# Patient Record
Sex: MALE | Race: WHITE | NOT HISPANIC OR LATINO | Employment: FULL TIME | URBAN - METROPOLITAN AREA
[De-identification: names, ages, dates, MRNs, and addresses within clinical notes are randomized per-mention and may not be internally consistent; named-entity substitution may affect disease eponyms.]

---

## 2017-01-11 ENCOUNTER — MEDICAL CORRESPONDENCE (OUTPATIENT)
Dept: HEALTH INFORMATION MANAGEMENT | Facility: CLINIC | Age: 27
End: 2017-01-11

## 2017-01-11 DIAGNOSIS — E10.9 TYPE 1 DIABETES MELLITUS (H): Primary | ICD-10-CM

## 2017-01-27 ASSESSMENT — ENCOUNTER SYMPTOMS: POLYDIPSIA: 1

## 2017-02-01 ENCOUNTER — OFFICE VISIT (OUTPATIENT)
Dept: ENDOCRINOLOGY | Facility: CLINIC | Age: 27
End: 2017-02-01

## 2017-02-01 VITALS
SYSTOLIC BLOOD PRESSURE: 123 MMHG | DIASTOLIC BLOOD PRESSURE: 82 MMHG | HEART RATE: 66 BPM | WEIGHT: 264.3 LBS | BODY MASS INDEX: 32.86 KG/M2 | HEIGHT: 75 IN

## 2017-02-01 DIAGNOSIS — E10.65 TYPE 1 DIABETES MELLITUS WITH HYPERGLYCEMIA (H): Primary | ICD-10-CM

## 2017-02-01 ASSESSMENT — PAIN SCALES - GENERAL: PAINLEVEL: NO PAIN (0)

## 2017-02-01 NOTE — Clinical Note
"2/1/2017       RE: Billy Carey  [address] [facility] [address]     Dear Colleague,    Thank you for referring your patient, Billy Carey, to the [facility] ENDOCRINOLOGY at [facility]. Please see a copy of my visit note below.    HPI   Billy Carey is a 26 year old male with type 1 diabetes mellitus here today for a follow up visit.   I have not seen Billy since March 2015.     He states he has been doing well and has a new job and is no longer traveling and is home at nights.  He continues to use a Medtronic Paradigm 630G insulin pump and his current basal rates are:   Midnight= 2.1 units/ hr.   8 am = 3.0 units/ hr.   2200 = 2.1 units/hr.   He does not have his bolus wizard set up and he has been using an I/C ratio of 1:10.  I had him set his bolus wizard at 1:10 and sensitivity 35.  Pt's A1C is 7.4 % today.    We downloaded pt's insulin pump and his meter data today.  He has been having hypoglycemia during the afternoon.  His average glucose was 110 with SD 41 over the past month.  On ROS today, he denies any visual changes.  Pt denies n/v, SOB at rest, cough, chest pain, abd pain, diarrhea, dysuria, hematuria or foot ulcers.  He denies any numbness, tingling or pain in his feet or hands.    ROS   Please see under HPI.     Family Hx   No change.     Personal Hx    Smoke: none.   ETOH: none.   .  He has a 3 1/2 yr old daughter and 18 month old son.  Working full time.    PMHX:   1. Type 1 Diabetes Mellitus.   2. Ear tubes as a child.   3. HTN.   4. Hx of mild retinopathy.   Past Medical History   Diagnosis Date     Diabetes mellitus type 1, uncontrolled, insulin dependent (H) 9/16/2013   Physical Exam   General appearance: Vital signs:   /82 mmHg  Pulse 66  Ht 1.905 m (6' 3\")  Wt 119.886 kg (264 lb 4.8 oz)  BMI 33.04 kg/m2  Estimated body mass index is 33.04 kg/(m^2) as calculated from the following:    Height as of this encounter: 1.905 m (6' 3\").    " Weight as of this encounter: 119.886 kg (264 lb 4.8 oz).  Head:Normal.   Eyes:  PERRLA; fundi not visualized.   Ears, Nose, Throat:  no goiter.   Lungs:  clear.   Cardiovascular system: RRR.   Abdomen: nontender.   Musculoskeletal system: no edema.   Foot:  no foot ulcers.   Neurological:  normal.   Skin:  Dry; skin dry and erythematous on the LE's b/l.    Results   CREATININE   Date Value Ref Range Status   03/27/2015 0.94 0.66 - 1.25 mg/dL Final     GFR ESTIMATE   Date Value Ref Range Status   03/27/2015 >90  Non  GFR Calc   >60 mL/min/1.7m2 Final     HEMOGLOBIN A1C   Date Value Ref Range Status   09/16/2013 8.3* 4.3 - 6.0 % Final     POTASSIUM   Date Value Ref Range Status   12/14/2012 4.2 3.4 - 5.3 mmol/L Final     ALT   Date Value Ref Range Status   03/27/2015 49 0 - 70 U/L Final     AST   Date Value Ref Range Status   03/27/2015 31 0 - 45 U/L Final     TSH   Date Value Ref Range Status   03/27/2015 2.23 0.40 - 4.00 mU/L Final     Comment:     Effective 7/30/2014, the reference range for this assay has changed to reflect   new instrumentation/methodology.       T4 FREE   Date Value Ref Range Status   03/27/2015 1.01 0.76 - 1.46 ng/dL Final     Comment:     Effective 7/30/2014, the reference range for this assay has changed to reflect   new instrumentation/methodology.           CHOLESTEROL   Date Value Ref Range Status   03/27/2015 193 <200 mg/dL Final     Comment:     LDL Cholesterol is the primary guide to therapy.   The NCEP recommends further evaluation of: patients with cholesterol greater   than 200 mg/dL if additional risk factors are present, cholesterol greater   than   240 mg/dL, triglycerides greater than 150 mg/dL, or HDL less than 40 mg/dL.     12/14/2012 183 0 - 200 mg/dL Final     Comment:     LDL Cholesterol is the primary guide to therapy.   The NCEP recommends further evaluation of: patients with cholesterol greater   than 200 mg/dL if additional risk factors are present,  cholesterol greater   than   240 mg/dL, triglycerides greater than 150 mg/dL, or HDL less than 40 mg/dL.     HDL CHOLESTEROL   Date Value Ref Range Status   03/27/2015 53 >40 mg/dL Final   12/14/2012 37* 40 - 110 mg/dL Final     LDL CHOLESTEROL CALCULATED   Date Value Ref Range Status   03/27/2015 127 0 - 129 mg/dL Final     Comment:     LDL Cholesterol is the primary guide to therapy: LDL-cholesterol goal in high   risk patients is <100 mg/dL and in very high risk patients is <70 mg/dL.     12/14/2012 105 0 - 129 mg/dL Final     Comment:     LDL Cholesterol is the primary guide to therapy: LDL-cholesterol goal in high   risk patients is <100 mg/dL and in very high risk patients is <70 mg/dL.     TRIGLYCERIDES   Date Value Ref Range Status   03/27/2015 66 0 - 150 mg/dL Final     Comment:     Fasting specimen   12/14/2012 205* 0 - 150 mg/dL Final     Comment:     Fasting specimen     CHOLESTEROL/HDL RATIO   Date Value Ref Range Status   03/27/2015 3.6 0.0 - 5.0 Final   12/14/2012 4.9 0.0 - 5.0 Final     A1C      7.4   2/1/2017  A1C      7.9   3/27/2015  A1C      7.9   12/12/2012  A1C      7.9   9/27/2011  A1C      9.2   4/9/2007    ASSESSMENT/PLAN:     1. TYPE I DIABETES MELLITUS: I had Billy add a lower basal insulin rate of 2.7 units/hr at 2 pm in view of hypoglycemia during the afternoon and early evening hours  His new basal insulin rates are:  Midnight= 2.10 units/hr.  8 am = 3.0 units/hr.  2 pm = 2.7 units/hr.  2200= 2.10 units/hr.  I had him set up his bolus wizard feature on his pump for an I/C ratio of 1:10 and sensitivity of 35.  I asked Billy to check his blood sugar premeals and at hs DAILY.  I placed a referral for pt to see Oph here.  Fasting labs have been ordered and are to be done within the next few weeks.  Pt declined getting the flu vaccine today.    2. HTN: Normotensive at this time without RX.    3. HX OF MILD RETINOPATHY: I placed a referral for pt to be seen in [facility] here.    4.  HYPER  LIPIDEMIA:  I placed a order for pt to have a fasting lipid panel done within the next few months.    5. Return to Endocrine Clinic to see me in 3 months.          Again, thank you for allowing me to participate in the care of your patient.      Sincerely,    Sri Pepe PA-C

## 2017-02-01 NOTE — PROGRESS NOTES
"HPI   Billy Carey is a 26 year old male with type 1 diabetes mellitus here today for a follow up visit.   I have not seen Billy since March 2015.     He states he has been doing well and has a new job and is no longer traveling and is home at nights.  He continues to use a Medtronic Paradigm 630G insulin pump and his current basal rates are:   Midnight= 2.1 units/ hr.   8 am = 3.0 units/ hr.   2200 = 2.1 units/hr.   He does not have his bolus wizard set up and he has been using an I/C ratio of 1:10.  I had him set his bolus wizard at 1:10 and sensitivity 35.  Pt's A1C is 7.4 % today.    We downloaded pt's insulin pump and his meter data today.  He has been having hypoglycemia during the afternoon.  His average glucose was 110 with SD 41 over the past month.  On ROS today, he denies any visual changes.  Pt denies n/v, SOB at rest, cough, chest pain, abd pain, diarrhea, dysuria, hematuria or foot ulcers.  He denies any numbness, tingling or pain in his feet or hands.    ROS   Please see under HPI.     Family Hx   No change.     Personal Hx    Smoke: none.   ETOH: none.   .  He has a 3 1/2 yr old daughter and 18 month old son.  Working full time.    PMHX:   1. Type 1 Diabetes Mellitus.   2. Ear tubes as a child.   3. HTN.   4. Hx of mild retinopathy.   Past Medical History   Diagnosis Date     Diabetes mellitus type 1, uncontrolled, insulin dependent (H) 9/16/2013   Physical Exam   General appearance: Vital signs:   /82 mmHg  Pulse 66  Ht 1.905 m (6' 3\")  Wt 119.886 kg (264 lb 4.8 oz)  BMI 33.04 kg/m2  Estimated body mass index is 33.04 kg/(m^2) as calculated from the following:    Height as of this encounter: 1.905 m (6' 3\").    Weight as of this encounter: 119.886 kg (264 lb 4.8 oz).  Head:Normal.   Eyes:  PERRLA; fundi not visualized.   Ears, Nose, Throat:  no goiter.   Lungs:  clear.   Cardiovascular system: RRR.   Abdomen: nontender.   Musculoskeletal system: no edema.   Foot:  no foot ulcers. "   Neurological:  normal.   Skin:  Dry; skin dry and erythematous on the LE's b/l.    Results   CREATININE   Date Value Ref Range Status   03/27/2015 0.94 0.66 - 1.25 mg/dL Final     GFR ESTIMATE   Date Value Ref Range Status   03/27/2015 >90  Non  GFR Calc   >60 mL/min/1.7m2 Final     HEMOGLOBIN A1C   Date Value Ref Range Status   09/16/2013 8.3* 4.3 - 6.0 % Final     POTASSIUM   Date Value Ref Range Status   12/14/2012 4.2 3.4 - 5.3 mmol/L Final     ALT   Date Value Ref Range Status   03/27/2015 49 0 - 70 U/L Final     AST   Date Value Ref Range Status   03/27/2015 31 0 - 45 U/L Final     TSH   Date Value Ref Range Status   03/27/2015 2.23 0.40 - 4.00 mU/L Final     Comment:     Effective 7/30/2014, the reference range for this assay has changed to reflect   new instrumentation/methodology.       T4 FREE   Date Value Ref Range Status   03/27/2015 1.01 0.76 - 1.46 ng/dL Final     Comment:     Effective 7/30/2014, the reference range for this assay has changed to reflect   new instrumentation/methodology.           CHOLESTEROL   Date Value Ref Range Status   03/27/2015 193 <200 mg/dL Final     Comment:     LDL Cholesterol is the primary guide to therapy.   The NCEP recommends further evaluation of: patients with cholesterol greater   than 200 mg/dL if additional risk factors are present, cholesterol greater   than   240 mg/dL, triglycerides greater than 150 mg/dL, or HDL less than 40 mg/dL.     12/14/2012 183 0 - 200 mg/dL Final     Comment:     LDL Cholesterol is the primary guide to therapy.   The NCEP recommends further evaluation of: patients with cholesterol greater   than 200 mg/dL if additional risk factors are present, cholesterol greater   than   240 mg/dL, triglycerides greater than 150 mg/dL, or HDL less than 40 mg/dL.     HDL CHOLESTEROL   Date Value Ref Range Status   03/27/2015 53 >40 mg/dL Final   12/14/2012 37* 40 - 110 mg/dL Final     LDL CHOLESTEROL CALCULATED   Date Value Ref Range  Status   03/27/2015 127 0 - 129 mg/dL Final     Comment:     LDL Cholesterol is the primary guide to therapy: LDL-cholesterol goal in high   risk patients is <100 mg/dL and in very high risk patients is <70 mg/dL.     12/14/2012 105 0 - 129 mg/dL Final     Comment:     LDL Cholesterol is the primary guide to therapy: LDL-cholesterol goal in high   risk patients is <100 mg/dL and in very high risk patients is <70 mg/dL.     TRIGLYCERIDES   Date Value Ref Range Status   03/27/2015 66 0 - 150 mg/dL Final     Comment:     Fasting specimen   12/14/2012 205* 0 - 150 mg/dL Final     Comment:     Fasting specimen     CHOLESTEROL/HDL RATIO   Date Value Ref Range Status   03/27/2015 3.6 0.0 - 5.0 Final   12/14/2012 4.9 0.0 - 5.0 Final     A1C      7.4   2/1/2017  A1C      7.9   3/27/2015  A1C      7.9   12/12/2012  A1C      7.9   9/27/2011  A1C      9.2   4/9/2007    ASSESSMENT/PLAN:     1. TYPE I DIABETES MELLITUS: I had Billy add a lower basal insulin rate of 2.7 units/hr at 2 pm in view of hypoglycemia during the afternoon and early evening hours  His new basal insulin rates are:  Midnight= 2.10 units/hr.  8 am = 3.0 units/hr.  2 pm = 2.7 units/hr.  2200= 2.10 units/hr.  I had him set up his bolus wizard feature on his pump for an I/C ratio of 1:10 and sensitivity of 35.  I asked Billy to check his blood sugar premeals and at hs DAILY.  I placed a referral for pt to see Oph here.  Fasting labs have been ordered and are to be done within the next few weeks.  Pt declined getting the flu vaccine today.    2. HTN: Normotensive at this time without RX.    3. HX OF MILD RETINOPATHY: I placed a referral for pt to be seen in Oph here.    4.  HYPER LIPIDEMIA:  I placed a order for pt to have a fasting lipid panel done within the next few months.    5. Return to Endocrine Clinic to see me in 3 months.

## 2017-02-10 ENCOUNTER — OFFICE VISIT (OUTPATIENT)
Dept: OPHTHALMOLOGY | Facility: CLINIC | Age: 27
End: 2017-02-10

## 2017-02-10 DIAGNOSIS — E10.9 TYPE 1 DIABETES MELLITUS WITHOUT RETINOPATHY (H): Primary | ICD-10-CM

## 2017-02-10 DIAGNOSIS — H52.03 HYPEROPIA, BILATERAL: ICD-10-CM

## 2017-02-10 ASSESSMENT — REFRACTION
OD_SPHERE: +0.50
OS_AXIS: 100
OS_SPHERE: +0.50
OS_CYLINDER: +0.50
OD_CYLINDER: +0.50
OD_AXIS: 078

## 2017-02-10 ASSESSMENT — SLIT LAMP EXAM - LIDS
COMMENTS: NORMAL
COMMENTS: NORMAL

## 2017-02-10 ASSESSMENT — VISUAL ACUITY
OD_SC: 20/15
OD_SC: J1+
METHOD: SNELLEN - LINEAR
OD_SC+: -2
OS_SC: J1+
OS_SC: 20/15

## 2017-02-10 ASSESSMENT — CUP TO DISC RATIO
OS_RATIO: 0.5
OD_RATIO: 0.4

## 2017-02-10 ASSESSMENT — REFRACTION_MANIFEST
OD_SPHERE: PLANO
OS_SPHERE: PLANO
OD_CYLINDER: SPHERE
OS_CYLINDER: SPHERE

## 2017-02-10 ASSESSMENT — TONOMETRY
IOP_METHOD: ICARE
OD_IOP_MMHG: 20
OS_IOP_MMHG: 16

## 2017-02-10 ASSESSMENT — CONF VISUAL FIELD
OS_NORMAL: 1
METHOD: COUNTING FINGERS
OD_NORMAL: 1

## 2017-02-10 ASSESSMENT — EXTERNAL EXAM - RIGHT EYE: OD_EXAM: NORMAL

## 2017-02-10 ASSESSMENT — EXTERNAL EXAM - LEFT EYE: OS_EXAM: NORMAL

## 2017-02-10 NOTE — PROGRESS NOTES
Assessment/Plan  1. Type 1 diabetes mellitus without retinopathy OU   Educated patient on clinical findings and the importance of continued management with primary care physician. Continue management as directed and return to clinic in 1 year for dilated exam, or sooner, as needed.  2. Hyperopia OU   No glasses prescription recommended given excellent vision uncorrected. Monitor annually.    Complete documentation of historical and exam elements from today's encounter can  be found in the full encounter summary report (not reduplicated in this progress  note). I personally obtained the chief complaint(s) and history of present illness. I  confirmed and edited as necessary the review of systems, past medical/surgical  history, family history, social history, and examination findings as documented by  others; and I examined the patient myself. I personally reviewed the relevant tests,  images, and reports as documented above. I formulated and edited as necessary the  assessment and plan and discussed the findings and management plan with the  patient and family.    Lazaro Caldwell OD, FAAO

## 2017-02-10 NOTE — NURSING NOTE
Chief Complaints and History of Present Illnesses   Patient presents with     Diabetic Eye Exam     HPI    Affected eye(s):  Both   Symptoms:     No blurred vision   No floaters   No flashes         Do you have eye pain now?:  No      Comments:  ENID: 18 months ago    POH: no dx, injuries, sx, or lasers  Fhx: none    Patient has no complaints today    DMI BS last reading 120 last reading: dx age 2  A1C 7.4 last week   A1C      8.3   9/16/2013  A1C      7.9   12/12/2012  A1C      7.9   9/27/2011  A1C      9.2   4/9/2007    Edna Bland February 10, 2017 8:29 AM

## 2017-04-04 ENCOUNTER — TELEPHONE (OUTPATIENT)
Dept: ENDOCRINOLOGY | Facility: CLINIC | Age: 27
End: 2017-04-04

## 2017-04-04 DIAGNOSIS — E10.65 TYPE 1 DIABETES MELLITUS WITH HYPERGLYCEMIA (H): ICD-10-CM

## 2017-04-14 ENCOUNTER — HOSPITAL ENCOUNTER (OUTPATIENT)
Dept: LAB | Facility: CLINIC | Age: 27
Discharge: HOME | End: 2017-04-14
Attending: PHYSICIAN ASSISTANT | Admitting: PHYSICIAN ASSISTANT
Payer: COMMERCIAL

## 2017-04-14 DIAGNOSIS — E10.65 TYPE 1 DIABETES MELLITUS WITH HYPERGLYCEMIA (H): ICD-10-CM

## 2017-04-14 LAB
ALT SERPL W P-5'-P-CCNC: 25 U/L (ref 0–70)
AST SERPL W P-5'-P-CCNC: 16 U/L (ref 0–45)
CHOLEST SERPL-MCNC: 189 MG/DL
CREAT SERPL-MCNC: 0.9 MG/DL (ref 0.66–1.25)
CREAT UR-MCNC: 234 MG/DL
GFR SERPL CREATININE-BSD FRML MDRD: NORMAL ML/MIN/1.7M2
HDLC SERPL-MCNC: 59 MG/DL
LDLC SERPL CALC-MCNC: 113 MG/DL
MICROALBUMIN UR-MCNC: 19 MG/L
MICROALBUMIN/CREAT UR: 8.08 MG/G CR (ref 0–17)
NONHDLC SERPL-MCNC: 130 MG/DL
T4 FREE SERPL-MCNC: 1.03 NG/DL (ref 0.76–1.46)
TRIGL SERPL-MCNC: 83 MG/DL
TSH SERPL DL<=0.05 MIU/L-ACNC: 2.76 MU/L (ref 0.4–4)

## 2017-04-14 PROCEDURE — 84450 TRANSFERASE (AST) (SGOT): CPT | Performed by: PHYSICIAN ASSISTANT

## 2017-04-14 PROCEDURE — 84460 ALANINE AMINO (ALT) (SGPT): CPT | Performed by: PHYSICIAN ASSISTANT

## 2017-04-14 PROCEDURE — 84443 ASSAY THYROID STIM HORMONE: CPT | Performed by: PHYSICIAN ASSISTANT

## 2017-04-14 PROCEDURE — 82043 UR ALBUMIN QUANTITATIVE: CPT | Performed by: PHYSICIAN ASSISTANT

## 2017-04-14 PROCEDURE — 84439 ASSAY OF FREE THYROXINE: CPT | Performed by: PHYSICIAN ASSISTANT

## 2017-04-14 PROCEDURE — 80061 LIPID PANEL: CPT | Performed by: PHYSICIAN ASSISTANT

## 2017-04-14 PROCEDURE — 36415 COLL VENOUS BLD VENIPUNCTURE: CPT | Performed by: PHYSICIAN ASSISTANT

## 2017-04-14 PROCEDURE — 82565 ASSAY OF CREATININE: CPT | Performed by: PHYSICIAN ASSISTANT

## 2017-06-20 ENCOUNTER — MEDICAL CORRESPONDENCE (OUTPATIENT)
Dept: HEALTH INFORMATION MANAGEMENT | Facility: CLINIC | Age: 27
End: 2017-06-20

## 2017-10-31 DIAGNOSIS — E10.65 TYPE 1 DIABETES MELLITUS WITH HYPERGLYCEMIA (H): ICD-10-CM

## 2018-04-02 DIAGNOSIS — E10.65 TYPE 1 DIABETES MELLITUS WITH HYPERGLYCEMIA (H): ICD-10-CM

## 2018-09-14 DIAGNOSIS — E10.8 TYPE 1 DIABETES MELLITUS WITH COMPLICATION (H): Primary | ICD-10-CM

## 2018-09-18 ENCOUNTER — TELEPHONE (OUTPATIENT)
Dept: ENDOCRINOLOGY | Facility: CLINIC | Age: 28
End: 2018-09-18

## 2018-09-18 NOTE — TELEPHONE ENCOUNTER
PA needed for  Novolog vials  10 ml needs 40 ml per 30 days for Type 1 diabetes He has a pump and is using up to 130 units daily . We sent in an order for Humalog put they tell us Novolog is covered it just  Needs clearance for the amount of insulin being used. We have not seen him since  2/2017  Continued cancels after refill given.

## 2018-09-18 NOTE — TELEPHONE ENCOUNTER
Per pharmacy, patient has [employer]; [id number]; PCN-ADV; [id number]; ID-[id number]    Central Prior Authorization Team   Phone: [phone]      PA Initiation    Medication: NOVOLOG VIAL 100 UNIT/ML soln-PA initiated  Insurance Company:    Pharmacy Filling the Rx: [id number] - [place], [place] - [address]  Filling Pharmacy Phone: [phone]  Filling Pharmacy Fax:    Start Date: 9/18/2018

## 2018-09-19 DIAGNOSIS — E10.65 TYPE 1 DIABETES MELLITUS WITH HYPERGLYCEMIA (H): ICD-10-CM

## 2018-09-19 NOTE — TELEPHONE ENCOUNTER
Prior Authorization Not Needed per Insurance    Medication: NOVOLOG VIAL 100 UNIT/ML soln-PA not needed  Insurance Company: [employer] - Phone [phone] Fax [phone]  Expected CoPay:      Pharmacy Filling the Rx: [id number] [place], [place] - [address]  Pharmacy Notified: Yes  Patient Notified: No    Per [employer], this does not require a PA. It does need an override each month due to patient reaching max amount allowable paid. Pharmacy can fill 2 days early each month and will need to call the pharmacy help desk to get the override, since insurance has paid max benefits for the year. They will continue to manually override this each month for the pharmacy.

## 2018-09-19 NOTE — TELEPHONE ENCOUNTER
Pharmacy  said  PA was needed for Novolog vials  Due to 130 units daily . PA done and one not needed.  Order for Humalog had been sent in but  pharmacy  said Novolog is the covered  Insulin. Billy needs to be seen in clinic for future fills. We gave him refills last  Time with the understanding that he needed to show for the August appointment  and it's down as a no show.

## 2018-11-28 ENCOUNTER — PATIENT OUTREACH (OUTPATIENT)
Dept: CARE COORDINATION | Facility: CLINIC | Age: 28
End: 2018-11-28

## 2018-11-29 ENCOUNTER — OFFICE VISIT (OUTPATIENT)
Dept: ENDOCRINOLOGY | Facility: CLINIC | Age: 28
End: 2018-11-29
Payer: COMMERCIAL

## 2018-11-29 VITALS
HEIGHT: 75 IN | WEIGHT: 268.2 LBS | BODY MASS INDEX: 33.35 KG/M2 | SYSTOLIC BLOOD PRESSURE: 135 MMHG | HEART RATE: 94 BPM | DIASTOLIC BLOOD PRESSURE: 79 MMHG

## 2018-11-29 DIAGNOSIS — E10.65 TYPE 1 DIABETES MELLITUS WITH HYPERGLYCEMIA (H): Primary | ICD-10-CM

## 2018-11-29 LAB — HBA1C MFR BLD: 6.6 % (ref 4.3–6)

## 2018-11-29 NOTE — MR AVS SNAPSHOT
"              After Visit Summary   11/29/2018    Billy Carey    MRN: [id number]           Patient Information     Date Of Birth          1990        Visit Information        Provider Department      11/29/2018 3:30 PM Sri Pepe PA-C [facility] Endocrinology        Today's Diagnoses     Type 1 diabetes mellitus with hyperglycemia (H)    -  1       Follow-ups after your visit        Your next 10 appointments already scheduled     May 30, 2019  3:00 PM CDT   (Arrive by 2:45 PM)   RETURN DIABETES with [facility] Endocrinology ([facility])    [address] [address] [phone]              Who to contact     Please call your clinic at [phone] to:    Ask questions about your health    Make or cancel appointments    Discuss your medicines    Learn about your test results    Speak to your doctor            Additional Information About Your Visit        [employer] Information     [employer] gives you secure access to your electronic health record. If you see a primary care provider, you can also send messages to your care team and make appointments. If you have questions, please call your primary care clinic.  If you do not have a primary care provider, please call [phone] and they will assist you.      [employer] is an electronic gateway that provides easy, online access to your medical records. With [employer], you can request a clinic appointment, read your test results, renew a prescription or communicate with your care team.     To access your existing account, please contact your [facility] or call [phone] for assistance.        Care EveryWhere ID     This is your Care EveryWhere ID. This could be used by other organizations to access your [place] medical records  [id number]        Your Vitals Were     Pulse Height BMI (Body Mass Index)             94 1.905 m (6' 3\") 33.52 " kg/m2          Blood Pressure from Last 3 Encounters:   11/29/18 135/79   02/01/17 123/82   10/21/16 120/80    Weight from Last 3 Encounters:   11/29/18 121.7 kg (268 lb 3.2 oz)   02/01/17 119.9 kg (264 lb 4.8 oz)   10/21/16 113.4 kg (250 lb)              We Performed the Following     C FLU VAC QUADRIVALENT SPLIT VIRUS 3+YRS IM     Hemoglobin A1c POCT          Today's Medication Changes          These changes are accurate as of 11/29/18 11:59 PM.  If you have any questions, ask your nurse or doctor.               These medicines have changed or have updated prescriptions.        Dose/Directions    insulin aspart 100 UNITS/ML vial   Commonly known as:  NovoLOG VIAL   This may have changed:    - medication strength  - See the new instructions.   Used for:  Type 1 diabetes mellitus with hyperglycemia (H)   Changed by:  Sri Pepe PA-C        Use in insulin pump.  Pt uses approx 120-130 units in 24 hrs.   Quantity:  40 mL   Refills:  3            Where to get your medicines      [facility] [place], [place] - [address] AT [address] [place] [address] [address] [facility] [address]     Phone:  [phone]     insulin aspart 100 UNITS/ML vial                Primary Care Provider    None Specified       No primary provider on file.        Equal Access to Services     ARABELLA PHILLIPS : Hadrobert son Soautumn, waaxda luqadaha, qaybta kaalmaalisa zaidi, laura lovell. So [facility] [phone].    ATENCIÓN: Si habla español, tiene a bellamy disposición servicios gratuitos de asistencia lingüística. Jose al [phone].    We comply with applicable federal civil rights laws and [place] laws. We do not discriminate on the basis of race, color, national origin, age, disability, sex, sexual orientation, or gender identity.            Thank you!     Thank you for choosing [facility] ENDOCRINOLOGY  for your care. Our  goal is always to provide you with excellent care. Hearing back from our patients is one way we can continue to improve our services. Please take a few minutes to complete the written survey that you may receive in the mail after your visit with us. Thank you!             Your Updated Medication List - Protect others around you: Learn how to safely use, store and throw away your medicines at www.disposemymeds.org.          This list is accurate as of 11/29/18 11:59 PM.  Always use your most recent med list.                   Brand Name Dispense Instructions for use Diagnosis    ASPIRIN NOT PRESCRIBED    INTENTIONAL      Type I (juvenile type) diabetes mellitus without mention of complication, uncontrolled       blood glucose monitoring meter device kit     1 kit    1 kit 5 times daily. Use as directed    Diabetes mellitus, type 1       blood glucose monitoring test strip    KELL CONTOUR NEXT    4 Box    Test 4-5 times daily    Diabetes mellitus type 1 (H)       insulin aspart 100 UNITS/ML vial    NovoLOG VIAL    40 mL    Use in insulin pump.  Pt uses approx 120-130 units in 24 hrs.    Type 1 diabetes mellitus with hyperglycemia (H)

## 2018-11-29 NOTE — NURSING NOTE
Chief Complaint   Patient presents with     RECHECK     Type I Diabetes     Performed capillary puncture for A1C testing. Patient tolerated well.    Joyce Alexandra, [facility]  Endocrinology & Diabetes

## 2018-11-29 NOTE — LETTER
11/29/2018       RE: Billy Carey  [address] [facility] [address]     Dear Colleague,    Thank you for referring your patient, Billy Carey, to the [facility] ENDOCRINOLOGY at [facility]. Please see a copy of my visit note below.    HPI   Billy Carey is a 28 year old male with type 1 diabetes mellitus here today for a follow up visit.   I have not seen Billy since Feb 2017.    He states he has been doing well and busy with work and his family.  He has 3 children ages 1, 3 and 5 year old.  He is currently using a [employer]tronic 670G insulin pump and his current basal rates are:   Midnight= 2.3 units/ hr.   8 am = 2.8 units/ hr.   1400 = 2.5 units/hr.  2200 = 2.3 units/hr.   His I/C ratio is 1:10;sensitivity is 30 and active insulin time is 3 1/2 hours  Pt's A1C is 6.6 % today.  His insulin pump download shows:  Auto mode use at 69 %.  Sensor wear is 74 %.  His average blood sugar was 151 with SD 48.  Bolus % is 38 % and basal % is 62 %.  Blood sugar values are good without frequent hypoglycemia.  On ROS today, he denies frequent headaches, blurred vision, n/v, SOB at rest, cough, chest pain, abd pain, diarrhea, dysuria, hematuria or foot ulcers.  He denies any numbness, tingling or pain in his feet or hands.    DIABETES CARE:  Retinopathy: none; pt seen by Oph in 2/2017.   Nephropathy: none; urine microalbuminuria negative in 4/2017.  Neuropathy: none.  Feet: no ulcers.  Taking ASA : no.  Lipids: LDL was 113 in 4/2017.    ROS   Please see under HPI.     ALLERGIES:  Penicillins     Current Outpatient Prescriptions   Medication     ASPIRIN NOT PRESCRIBED, INTENTIONAL,     blood glucose (KELL CONTOUR NEXT) test strip     Blood Glucose Monitoring Suppl (KELL CONTOUR NEXT LINK) W/DEVICE KIT     insulin aspart (NOVOLOG VIAL) 100 UNITS/ML vial     No current facility-administered medications for this visit.    Family Hx   No change.     Personal Hx    Smoke: none.   ETOH: none.  "  .  He has 3 children ages 1, 3 and 5.  Working full time.  Exercise: yes.    PMHX:   1. Type 1 Diabetes Mellitus.   2. Ear tubes as a child.   3. HTN.   4. Hx of mild retinopathy.   Past Medical History:   Diagnosis Date     Diabetes mellitus type 1, uncontrolled, insulin dependent (H) 9/16/2013     Physical Exam   General appearance: Vital signs:   /79  Pulse 94  Ht 1.905 m (6' 3\")  Wt 121.7 kg (268 lb 3.2 oz)  BMI 33.52 kg/m2  Estimated body mass index is 33.52 kg/(m^2) as calculated from the following:    Height as of this encounter: 1.905 m (6' 3\").    Weight as of this encounter: 121.7 kg (268 lb 3.2 oz).  Head:Normal.   Eyes:  PERRLA; fundi not visualized.   Ears, Nose, Throat:  no goiter.   Lungs:  clear.   Cardiovascular system: RRR.   Abdomen: nontender.   Musculoskeletal system: no edema.   Feet:  no foot ulcers.  Normal monofilamentous exam.  Neurological:  normal.   Skin:  Normal.    Results   Creatinine   Date Value Ref Range Status   04/14/2017 0.90 0.66 - 1.25 mg/dL Final     GFR Estimate   Date Value Ref Range Status   04/14/2017 >90  Non  GFR Calc   >60 mL/min/1.7m2 Final     Hemoglobin A1C   Date Value Ref Range Status   09/16/2013 8.3 (H) 4.3 - 6.0 % Final     Potassium   Date Value Ref Range Status   12/14/2012 4.2 3.4 - 5.3 mmol/L Final     ALT   Date Value Ref Range Status   04/14/2017 25 0 - 70 U/L Final     AST   Date Value Ref Range Status   04/14/2017 16 0 - 45 U/L Final     TSH   Date Value Ref Range Status   04/14/2017 2.76 0.40 - 4.00 mU/L Final     T4 Free   Date Value Ref Range Status   04/14/2017 1.03 0.76 - 1.46 ng/dL Final         Cholesterol   Date Value Ref Range Status   04/14/2017 189 <200 mg/dL Final   03/27/2015 193 <200 mg/dL Final     Comment:     LDL Cholesterol is the primary guide to therapy.   The NCEP recommends further evaluation of: patients with cholesterol greater   than 200 mg/dL if additional risk factors are present, cholesterol " greater   than   240 mg/dL, triglycerides greater than 150 mg/dL, or HDL less than 40 mg/dL.       HDL Cholesterol   Date Value Ref Range Status   04/14/2017 59 >39 mg/dL Final   03/27/2015 53 >40 mg/dL Final     LDL Cholesterol Calculated   Date Value Ref Range Status   04/14/2017 113 (H) <100 mg/dL Final     Comment:     Above desirable:  100-129 mg/dl   Borderline High:  130-159 mg/dL   High:             160-189 mg/dL   Very high:       >189 mg/dl     03/27/2015 127 0 - 129 mg/dL Final     Comment:     LDL Cholesterol is the primary guide to therapy: LDL-cholesterol goal in high   risk patients is <100 mg/dL and in very high risk patients is <70 mg/dL.       Triglycerides   Date Value Ref Range Status   04/14/2017 83 <150 mg/dL Final   03/27/2015 66 0 - 150 mg/dL Final     Comment:     Fasting specimen     Cholesterol/HDL Ratio   Date Value Ref Range Status   03/27/2015 3.6 0.0 - 5.0 Final   12/14/2012 4.9 0.0 - 5.0 Final     A1C      6.6   11/29/2018  A1C      7.4   2/1/2017  A1C      7.9   3/27/2015  A1C      7.9   12/12/2012  A1C      7.9   9/27/2011  A1C      9.2   4/9/2007    ASSESSMENT/PLAN:     1. TYPE I DIABETES MELLITUS:Type 1 diabetes mellitus with good glycemic control at this time with use of 670G insulin pump/sensor in auto mode.  No insulin rate change today.  Reminded pt to change set every 2-3 days and bolus approx 5 minutes prior to eating.  Pt is to schedule his annual Oph exam.  Feet are ok today.  His urine microalbuminuria was negative in 4/2017 with normal creat/GFR .  Flu vaccine given today.  I placed an order for fasting annual diabetes labs to be done within the next few weeks.    2. HTN: Stable BP today. No taking RX at this time.    3.  HYPERLIPIDEMIA:  Fasting lipid panel ordered.    4. Return to Endocrine Clinic to see me in 6 months.        Again, thank you for allowing me to participate in the care of your patient.      Sincerely,    Sri Pepe PA-C

## 2018-12-06 NOTE — PROGRESS NOTES
HPI   Billy Carey is a 28 year old male with type 1 diabetes mellitus here today for a follow up visit.   I have not seen Billy since Feb 2017.    He states he has been doing well and busy with work and his family.  He has 3 children ages 1, 3 and 5 year old.  He is currently using a [employer]tronic 670G insulin pump and his current basal rates are:   Midnight= 2.3 units/ hr.   8 am = 2.8 units/ hr.   1400 = 2.5 units/hr.  2200 = 2.3 units/hr.   His I/C ratio is 1:10;sensitivity is 30 and active insulin time is 3 1/2 hours  Pt's A1C is 6.6 % today.  His insulin pump download shows:  Auto mode use at 69 %.  Sensor wear is 74 %.  His average blood sugar was 151 with SD 48.  Bolus % is 38 % and basal % is 62 %.  Blood sugar values are good without frequent hypoglycemia.  On ROS today, he denies frequent headaches, blurred vision, n/v, SOB at rest, cough, chest pain, abd pain, diarrhea, dysuria, hematuria or foot ulcers.  He denies any numbness, tingling or pain in his feet or hands.    DIABETES CARE:  Retinopathy: none; pt seen by Oph in 2/2017.   Nephropathy: none; urine microalbuminuria negative in 4/2017.  Neuropathy: none.  Feet: no ulcers.  Taking ASA : no.  Lipids: LDL was 113 in 4/2017.    ROS   Please see under HPI.     ALLERGIES:  Penicillins     Current Outpatient Prescriptions   Medication     ASPIRIN NOT PRESCRIBED, INTENTIONAL,     blood glucose (KELL CONTOUR NEXT) test strip     Blood Glucose Monitoring Suppl (KELL CONTOUR NEXT LINK) W/DEVICE KIT     insulin aspart (NOVOLOG VIAL) 100 UNITS/ML vial     No current facility-administered medications for this visit.    Family Hx   No change.     Personal Hx    Smoke: none.   ETOH: none.   .  He has 3 children ages 1, 3 and 5.  Working full time.  Exercise: yes.    PMHX:   1. Type 1 Diabetes Mellitus.   2. Ear tubes as a child.   3. HTN.   4. Hx of mild retinopathy.   Past Medical History:   Diagnosis Date     Diabetes mellitus type 1, uncontrolled, insulin  "dependent (H) 9/16/2013     Physical Exam   General appearance: Vital signs:   /79  Pulse 94  Ht 1.905 m (6' 3\")  Wt 121.7 kg (268 lb 3.2 oz)  BMI 33.52 kg/m2  Estimated body mass index is 33.52 kg/(m^2) as calculated from the following:    Height as of this encounter: 1.905 m (6' 3\").    Weight as of this encounter: 121.7 kg (268 lb 3.2 oz).  Head:Normal.   Eyes:  PERRLA; fundi not visualized.   Ears, Nose, Throat:  no goiter.   Lungs:  clear.   Cardiovascular system: RRR.   Abdomen: nontender.   Musculoskeletal system: no edema.   Feet:  no foot ulcers.  Normal monofilamentous exam.  Neurological:  normal.   Skin:  Normal.    Results   Creatinine   Date Value Ref Range Status   04/14/2017 0.90 0.66 - 1.25 mg/dL Final     GFR Estimate   Date Value Ref Range Status   04/14/2017 >90  Non  GFR Calc   >60 mL/min/1.7m2 Final     Hemoglobin A1C   Date Value Ref Range Status   09/16/2013 8.3 (H) 4.3 - 6.0 % Final     Potassium   Date Value Ref Range Status   12/14/2012 4.2 3.4 - 5.3 mmol/L Final     ALT   Date Value Ref Range Status   04/14/2017 25 0 - 70 U/L Final     AST   Date Value Ref Range Status   04/14/2017 16 0 - 45 U/L Final     TSH   Date Value Ref Range Status   04/14/2017 2.76 0.40 - 4.00 mU/L Final     T4 Free   Date Value Ref Range Status   04/14/2017 1.03 0.76 - 1.46 ng/dL Final         Cholesterol   Date Value Ref Range Status   04/14/2017 189 <200 mg/dL Final   03/27/2015 193 <200 mg/dL Final     Comment:     LDL Cholesterol is the primary guide to therapy.   The NCEP recommends further evaluation of: patients with cholesterol greater   than 200 mg/dL if additional risk factors are present, cholesterol greater   than   240 mg/dL, triglycerides greater than 150 mg/dL, or HDL less than 40 mg/dL.       HDL Cholesterol   Date Value Ref Range Status   04/14/2017 59 >39 mg/dL Final   03/27/2015 53 >40 mg/dL Final     LDL Cholesterol Calculated   Date Value Ref Range Status "   04/14/2017 113 (H) <100 mg/dL Final     Comment:     Above desirable:  100-129 mg/dl   Borderline High:  130-159 mg/dL   High:             160-189 mg/dL   Very high:       >189 mg/dl     03/27/2015 127 0 - 129 mg/dL Final     Comment:     LDL Cholesterol is the primary guide to therapy: LDL-cholesterol goal in high   risk patients is <100 mg/dL and in very high risk patients is <70 mg/dL.       Triglycerides   Date Value Ref Range Status   04/14/2017 83 <150 mg/dL Final   03/27/2015 66 0 - 150 mg/dL Final     Comment:     Fasting specimen     Cholesterol/HDL Ratio   Date Value Ref Range Status   03/27/2015 3.6 0.0 - 5.0 Final   12/14/2012 4.9 0.0 - 5.0 Final     A1C      6.6   11/29/2018  A1C      7.4   2/1/2017  A1C      7.9   3/27/2015  A1C      7.9   12/12/2012  A1C      7.9   9/27/2011  A1C      9.2   4/9/2007    ASSESSMENT/PLAN:     1. TYPE I DIABETES MELLITUS:Type 1 diabetes mellitus with good glycemic control at this time with use of 670G insulin pump/sensor in auto mode.  No insulin rate change today.  Reminded pt to change set every 2-3 days and bolus approx 5 minutes prior to eating.  Pt is to schedule his annual Oph exam.  Feet are ok today.  His urine microalbuminuria was negative in 4/2017 with normal creat/GFR .  Flu vaccine given today.  I placed an order for fasting annual diabetes labs to be done within the next few weeks.    2. HTN: Stable BP today. No taking RX at this time.    3.  HYPERLIPIDEMIA:  Fasting lipid panel ordered.    4. Return to Endocrine Clinic to see me in 6 months.

## 2018-12-21 ENCOUNTER — HOSPITAL ENCOUNTER (OUTPATIENT)
Dept: LAB | Facility: CLINIC | Age: 28
Discharge: HOME | End: 2018-12-21
Attending: PHYSICIAN ASSISTANT | Admitting: PHYSICIAN ASSISTANT
Payer: COMMERCIAL

## 2018-12-21 DIAGNOSIS — E10.65 TYPE 1 DIABETES MELLITUS WITH HYPERGLYCEMIA (H): ICD-10-CM

## 2018-12-21 LAB
ALT SERPL W P-5'-P-CCNC: 29 U/L (ref 0–70)
AST SERPL W P-5'-P-CCNC: 20 U/L (ref 0–45)
CHOLEST SERPL-MCNC: 179 MG/DL
CREAT SERPL-MCNC: 0.92 MG/DL (ref 0.66–1.25)
CREAT UR-MCNC: 225 MG/DL
GFR SERPL CREATININE-BSD FRML MDRD: >90 ML/MIN/{1.73_M2}
HDLC SERPL-MCNC: 53 MG/DL
LDLC SERPL CALC-MCNC: 110 MG/DL
MICROALBUMIN UR-MCNC: 13 MG/L
MICROALBUMIN/CREAT UR: 5.6 MG/G CR (ref 0–17)
NONHDLC SERPL-MCNC: 126 MG/DL
POTASSIUM SERPL-SCNC: 4 MMOL/L (ref 3.4–5.3)
TRIGL SERPL-MCNC: 82 MG/DL
TSH SERPL DL<=0.005 MIU/L-ACNC: 1.52 MU/L (ref 0.4–4)

## 2018-12-21 PROCEDURE — 84450 TRANSFERASE (AST) (SGOT): CPT | Performed by: PHYSICIAN ASSISTANT

## 2018-12-21 PROCEDURE — 82043 UR ALBUMIN QUANTITATIVE: CPT | Performed by: PHYSICIAN ASSISTANT

## 2018-12-21 PROCEDURE — 36415 COLL VENOUS BLD VENIPUNCTURE: CPT | Performed by: PHYSICIAN ASSISTANT

## 2018-12-21 PROCEDURE — 82565 ASSAY OF CREATININE: CPT | Performed by: PHYSICIAN ASSISTANT

## 2018-12-21 PROCEDURE — 84443 ASSAY THYROID STIM HORMONE: CPT | Performed by: PHYSICIAN ASSISTANT

## 2018-12-21 PROCEDURE — 80061 LIPID PANEL: CPT | Performed by: PHYSICIAN ASSISTANT

## 2018-12-21 PROCEDURE — 84132 ASSAY OF SERUM POTASSIUM: CPT | Performed by: PHYSICIAN ASSISTANT

## 2018-12-21 PROCEDURE — 84460 ALANINE AMINO (ALT) (SGPT): CPT | Performed by: PHYSICIAN ASSISTANT

## 2019-02-07 ENCOUNTER — MYC MEDICAL ADVICE (OUTPATIENT)
Dept: ENDOCRINOLOGY | Facility: CLINIC | Age: 29
End: 2019-02-07

## 2019-02-14 ENCOUNTER — OFFICE VISIT (OUTPATIENT)
Dept: OPHTHALMOLOGY | Facility: CLINIC | Age: 29
End: 2019-02-14
Payer: COMMERCIAL

## 2019-02-14 DIAGNOSIS — H52.223 REGULAR ASTIGMATISM OF BOTH EYES: ICD-10-CM

## 2019-02-14 DIAGNOSIS — E10.3293 MILD NONPROLIFERATIVE DIABETIC RETINOPATHY OF BOTH EYES WITHOUT MACULAR EDEMA ASSOCIATED WITH TYPE 1 DIABETES MELLITUS (H): Primary | ICD-10-CM

## 2019-02-14 ASSESSMENT — TONOMETRY
OD_IOP_MMHG: 16
OS_IOP_MMHG: 15
IOP_METHOD: TONOPEN

## 2019-02-14 ASSESSMENT — REFRACTION_MANIFEST
OD_SPHERE: -2.25
OS_AXIS: 110
METHOD_AUTOREFRACTION: 1
OD_AXIS: 174
OD_CYLINDER: +1.75
OS_SPHERE: -0.50
OS_CYLINDER: +0.75

## 2019-02-14 ASSESSMENT — CUP TO DISC RATIO
OD_RATIO: 0.4
OS_RATIO: 0.5

## 2019-02-14 ASSESSMENT — REFRACTION
OD_CYLINDER: SPHERE
OD_SPHERE: +0.25

## 2019-02-14 ASSESSMENT — EXTERNAL EXAM - LEFT EYE: OS_EXAM: NORMAL

## 2019-02-14 ASSESSMENT — VISUAL ACUITY
METHOD: SNELLEN - LINEAR
OD_SC+: -1
OS_SC: 20/20
OD_SC: 20/15

## 2019-02-14 ASSESSMENT — CONF VISUAL FIELD
METHOD: COUNTING FINGERS
OS_NORMAL: 1
OD_NORMAL: 1

## 2019-02-14 ASSESSMENT — SLIT LAMP EXAM - LIDS
COMMENTS: NORMAL
COMMENTS: NORMAL

## 2019-02-14 ASSESSMENT — EXTERNAL EXAM - RIGHT EYE: OD_EXAM: NORMAL

## 2019-02-14 NOTE — LETTER
February 14, 2019          Your patient, Billy Carey, was examined in the Ophthalmology Clinic today for a diabetic evaluation.     Examination of the right eye today shows: Non-proliferative diabetic retinopathy:  mild.    Examination of the left eye today shows: Non-proliferative diabetic retinopathy:  mild.    The condition of the eyes today is: stable.    I recommend follow-up examination in: 1 year.    I have encouraged the patient to continue working with you to maintain tight control of blood glucose and blood pressure.   Thank you for allowing us to participate in the care of this patient.     Sincerely,    Lazaro Caldwell, ABBY, FAAO

## 2019-02-14 NOTE — PROGRESS NOTES
History  HPI     COMPREHENSIVE EYE EXAM     In both eyes.  Associated symptoms include Negative for eye pain, dryness, tearing, flashes and floaters.              Comments       Last BGL: 120 before lunch  Last A1C: 6.6  Results       Component                Value               Date                       A1C                      8.3                 09/16/2013                 A1C                      7.9                 12/12/2012                 A1C                      7.9                 09/27/2011                 A1C                      9.2                 04/09/2007                      Last edited by Vidya Bland on 2/14/2019  2:14 PM. (History)          Assessment/Plan  (E10.4223) Mild nonproliferative diabetic retinopathy of both eyes without macular edema associated with type 1 diabetes mellitus (H)  (primary encounter diagnosis)  Comment: Mild NPDR both eyes   Plan:  Educated patient on clinical findings and the importance of continued management with primary care physician. Continue management as directed and return to clinic in 1 year for dilated exam, or sooner, as needed.    (H52.223) Regular astigmatism of both eyes  Comment: Minimal refractive error  Plan: REFRACTION         No spectacles recommended. Monitor annually.    Return to clinic in 1 year for comprehensive eye exam.    Complete documentation of historical and exam elements from today's encounter can  be found in the full encounter summary report (not reduplicated in this progress  note). I personally obtained the chief complaint(s) and history of present illness. I  confirmed and edited as necessary the review of systems, past medical/surgical  history, family history, social history, and examination findings as documented by  others; and I examined the patient myself. I personally reviewed the relevant tests,  images, and reports as documented above. I formulated and edited as necessary the  assessment and plan and discussed the  findings and management plan with the  patient and family.    Lazaro Caldwell OD, FAAO

## 2019-02-14 NOTE — NURSING NOTE
Chief Complaints and History of Present Illnesses   Patient presents with     COMPREHENSIVE EYE EXAM     Chief Complaint(s) and History of Present Illness(es)     COMPREHENSIVE EYE EXAM     Laterality: both eyes    Associated symptoms: Negative for eye pain, dryness, tearing, flashes and floaters              Comments       Last BGL: 120 before lunch  Last A1C: 6.6  Results       Component                Value               Date                       A1C                      8.3                 09/16/2013                 A1C                      7.9                 12/12/2012                 A1C                      7.9                 09/27/2011                 A1C                      9.2                 04/09/2007

## 2019-08-16 ENCOUNTER — TELEPHONE (OUTPATIENT)
Dept: ENDOCRINOLOGY | Facility: CLINIC | Age: 29
End: 2019-08-16

## 2019-08-16 DIAGNOSIS — E10.65 TYPE 1 DIABETES MELLITUS WITH HYPERGLYCEMIA (H): ICD-10-CM

## 2019-08-16 RX ORDER — INSULIN ASPART 100 [IU]/ML
INJECTION, SOLUTION INTRAVENOUS; SUBCUTANEOUS
Qty: 3 ML | Refills: 0 | Status: SHIPPED | OUTPATIENT
Start: 2019-08-16 | End: 2019-08-16

## 2019-08-16 RX ORDER — INSULIN ASPART 100 [IU]/ML
INJECTION, SOLUTION INTRAVENOUS; SUBCUTANEOUS
Qty: 3 ML | Refills: 0 | Status: SHIPPED | OUTPATIENT
Start: 2019-08-16 | End: 2019-09-06

## 2019-08-17 ENCOUNTER — NURSE TRIAGE (OUTPATIENT)
Dept: NURSING | Facility: CLINIC | Age: 29
End: 2019-08-17

## 2019-08-17 ENCOUNTER — HOSPITAL ENCOUNTER (EMERGENCY)
Facility: CLINIC | Age: 29
Discharge: HOME | End: 2019-08-17
Attending: EMERGENCY MEDICINE | Admitting: EMERGENCY MEDICINE
Payer: COMMERCIAL

## 2019-08-17 VITALS
TEMPERATURE: 99.1 F | SYSTOLIC BLOOD PRESSURE: 143 MMHG | HEART RATE: 74 BPM | WEIGHT: 250.22 LBS | OXYGEN SATURATION: 97 % | BODY MASS INDEX: 31.11 KG/M2 | DIASTOLIC BLOOD PRESSURE: 85 MMHG | HEIGHT: 75 IN | RESPIRATION RATE: 16 BRPM

## 2019-08-17 DIAGNOSIS — R20.2 PARESTHESIA: ICD-10-CM

## 2019-08-17 DIAGNOSIS — R73.09 LABILE BLOOD GLUCOSE: ICD-10-CM

## 2019-08-17 LAB
ANION GAP SERPL CALCULATED.3IONS-SCNC: 5 MMOL/L (ref 3–14)
BASOPHILS # BLD AUTO: 0.1 10E9/L (ref 0–0.2)
BASOPHILS NFR BLD AUTO: 0.8 %
BUN SERPL-MCNC: 20 MG/DL (ref 7–30)
CALCIUM SERPL-MCNC: 8.8 MG/DL (ref 8.5–10.1)
CHLORIDE SERPL-SCNC: 105 MMOL/L (ref 94–109)
CO2 SERPL-SCNC: 29 MMOL/L (ref 20–32)
CREAT SERPL-MCNC: 1.11 MG/DL (ref 0.66–1.25)
DIFFERENTIAL METHOD BLD: NORMAL
EOSINOPHIL # BLD AUTO: 0.1 10E9/L (ref 0–0.7)
EOSINOPHIL NFR BLD AUTO: 1.4 %
ERYTHROCYTE [DISTWIDTH] IN BLOOD BY AUTOMATED COUNT: 11.9 % (ref 10–15)
GFR SERPL CREATININE-BSD FRML MDRD: 88 ML/MIN/{1.73_M2}
GLUCOSE BLDC GLUCOMTR-MCNC: 124 MG/DL (ref 70–99)
GLUCOSE SERPL-MCNC: 114 MG/DL (ref 70–99)
HCT VFR BLD AUTO: 44.6 % (ref 40–53)
HGB BLD-MCNC: 16.1 G/DL (ref 13.3–17.7)
IMM GRANULOCYTES # BLD: 0 10E9/L (ref 0–0.4)
IMM GRANULOCYTES NFR BLD: 0.2 %
LYMPHOCYTES # BLD AUTO: 2.5 10E9/L (ref 0.8–5.3)
LYMPHOCYTES NFR BLD AUTO: 27.2 %
MAGNESIUM SERPL-MCNC: 2.1 MG/DL (ref 1.6–2.3)
MCH RBC QN AUTO: 30.2 PG (ref 26.5–33)
MCHC RBC AUTO-ENTMCNC: 36.1 G/DL (ref 31.5–36.5)
MCV RBC AUTO: 84 FL (ref 78–100)
MONOCYTES # BLD AUTO: 1 10E9/L (ref 0–1.3)
MONOCYTES NFR BLD AUTO: 10.5 %
NEUTROPHILS # BLD AUTO: 5.4 10E9/L (ref 1.6–8.3)
NEUTROPHILS NFR BLD AUTO: 59.9 %
NRBC # BLD AUTO: 0 10*3/UL
NRBC BLD AUTO-RTO: 0 /100
PLATELET # BLD AUTO: 174 10E9/L (ref 150–450)
POTASSIUM SERPL-SCNC: 3.7 MMOL/L (ref 3.4–5.3)
RBC # BLD AUTO: 5.33 10E12/L (ref 4.4–5.9)
SODIUM SERPL-SCNC: 139 MMOL/L (ref 133–144)
WBC # BLD AUTO: 9.1 10E9/L (ref 4–11)

## 2019-08-17 PROCEDURE — 36415 COLL VENOUS BLD VENIPUNCTURE: CPT | Performed by: EMERGENCY MEDICINE

## 2019-08-17 PROCEDURE — 00000146 ZZHCL STATISTIC GLUCOSE BY METER IP

## 2019-08-17 PROCEDURE — 83735 ASSAY OF MAGNESIUM: CPT | Performed by: EMERGENCY MEDICINE

## 2019-08-17 PROCEDURE — 99283 EMERGENCY DEPT VISIT LOW MDM: CPT

## 2019-08-17 PROCEDURE — 80048 BASIC METABOLIC PNL TOTAL CA: CPT | Performed by: EMERGENCY MEDICINE

## 2019-08-17 PROCEDURE — 85025 COMPLETE CBC W/AUTO DIFF WBC: CPT | Performed by: EMERGENCY MEDICINE

## 2019-08-17 ASSESSMENT — ENCOUNTER SYMPTOMS: NUMBNESS: 1

## 2019-08-17 ASSESSMENT — MIFFLIN-ST. JEOR: SCORE: 2175.63

## 2019-08-17 NOTE — ED AVS SNAPSHOT
[facility] Emergency Department  [address]  [facility] [address]  Phone:  [phone]  Fax:  [phone]                                    Billy Carey   MRN: [id number]    Department:  [facility] Emergency Department   Date of Visit:  8/17/2019           After Visit Summary Signature Page    I have received my discharge instructions, and my questions have been answered. I have discussed any challenges I see with this plan with the nurse or doctor.    ..........................................................................................................................................  Patient/Patient Representative Signature      ..........................................................................................................................................  Patient Representative Print Name and Relationship to Patient    ..................................................               ................................................  Date                                   Time    ..........................................................................................................................................  Reviewed by Signature/Title    ...................................................              ..............................................  Date                                               Time          22EPIC Rev 08/18

## 2019-08-17 NOTE — TELEPHONE ENCOUNTER
"Endocrine cross cover:    Received page: \"Insulin was frozen -out of insulin -may need called in to pharmacy\".     Spoke with pt via phone - he has been having trouble with hyperglycemia for the last few days. He has rotated infusion sites and otherwise tried to trouble shoot. He feels like it takes several hours for his insulin to work. He notes that there was evidence of other things in his fridge freezing and suspects that the insulin he is currently using also froze.     Currently glucose is in the high 200s and he is asymptomatic.     Novolog vial re-ordered to pharmacy ([facility]) that is near pt's house and currently open.     He does not currently have subcutaneous insulin as back up. Novolog at current 1/10g as well as correction scale ordered. He reports current daily insulin dose is ~110 units per day, basal heavy, he estimates 60 units per day basal. Weight is ~120kg thus 0.3U/kg would be ~36 units. However suspect some insulin resistance in the setting of BMI 33. Ordered back up glargine dose of 50 units per day.     He was instructed to go to the ED if symptoms of DKA develop.     Angelica Melgoza MD  Endocrine Fellow         "

## 2019-08-18 NOTE — TELEPHONE ENCOUNTER
"Patient reports \"I'm Type 1 diabetic in last 4 days I've been having a really hard time with my pump. Seems like there is 2-3 hour delay with Novolog insulin. Been difficult to keep sugars in normal range. I called [employer] earlier- they are sending a new pump which is supposed to get delivered on Monday. Right now my pump is spotty at best.\"    Patient reports he spoke with endocrine on-call doctor yesterday who ordered back up insulin just in case pump fails. Patient currently has the back up insulin (Novolog, Glargine)     Patient also reports he went into the urgent care on Wednesday about numbness on left shin and left arm. Patient is asking if he should go in to get that checked out. FNA asked what the urgent care recommendations were. Patient said they did a workup and told him for further workup and diagnosis to get evaluated in the emergency department. Patient was advised he should follow their recommendations. Patient says he is going out of town this upcoming Monday. FNA advised again to go into ED to get evaluated.     Caller verbalized understanding and had no further questions.     Peyton Galindo RN/[facility]    Reason for Disposition    [1] Follow-up call to recent contact AND [2] information only call, no triage required    Protocols used: INFORMATION ONLY CALL-A-      "

## 2019-08-18 NOTE — ED TRIAGE NOTES
"Pt states hyperglycemia for \"a couple of days\", reports blood sugars \"in the 400s\" for the past several days. Also reports being seen at urgent care several days ago for numbness which is still present intermittently. ABCs intact GCS 15  in triage  "

## 2019-08-18 NOTE — ED PROVIDER NOTES
History     Chief Complaint:  Hyperglycemia    HPI  Billy Carey is a 29 year old male with a history of type I diabetes, who presents to the emergency department today for evaluation of hyperglycemia. The patient reports a light intermittent numbness in his left shin began two weeks ago as well as a light intermittent in the left arm which concerned him. He states he also had fluctuating blood sugar values recently between 400 and 40. He explains that he has tried every troubleshooting step he could think of in attempt to control his blood sugars as well as understand the katharine of the problem. He believes his insulin pump may have issues and he will be receiving a new pump on Monday. The patient visited [facility] for his numbness concerns where labs were drawn including Lyme testing, CT scan of the head and neck was completed, and the patient was referred here. Here, the patient states his numbness is not present currently and his blood sugars are reportedly controlled after obtaining some back-up insulin shots he took prior to presentation to the emergency department. With regard to Lyme, the patient states he has been in the woods a couple times and out fishing semi-recently, but nothing that would concern him for Lyme's disease. The patient denies other numbness, vision changes, or speech problems.     Allergies:  Penicillins    Medications:    Novolog flexpen  Lantus Pen    Past Medical History:    Diabetes mellitus type 1, uncontrolled, insulin dependent  Hyperlipidemia   Folliculitis    Past Surgical History:    Myringotomy, insert tube bilateral, combined    Family History:    Family history reviewed. No pertinent family history.    Social History:  The patient reports that he has never smoked. He has never used smokeless tobacco. He reports that he does not drink alcohol or use drugs.   Marital Status:      Review of Systems   Neurological: Positive for numbness (left shin and  "left arm, not present right now).   All other systems reviewed and are negative.      Physical Exam     Patient Vitals for the past 24 hrs:   BP Temp Temp src Pulse Heart Rate Resp SpO2 Height Weight   08/17/19 2200 (!) 143/85 -- -- 74 -- -- 97 % -- --   08/17/19 2145 (!) 141/82 -- -- 72 -- -- 97 % -- --   08/17/19 2130 (!) 140/96 -- -- 74 -- -- 97 % -- --   08/17/19 2115 (!) 154/95 -- -- 75 -- -- 97 % -- --   08/17/19 2100 (!) 146/91 -- -- 78 -- -- 98 % -- --   08/17/19 2044 133/78 99.1  F (37.3  C) Oral -- 84 16 99 % 1.905 m (6' 3\") 113.5 kg (250 lb 3.6 oz)     Physical Exam  Vital signs and nursing notes reviewed.     Constitutional: laying on gurney appears comfortable  HENT: Oropharynx is clear and moist  Eyes: Conjunctivae are normal bilaterally. Pupils equal  Neck: normal range of motion  Cardiovascular: Normal rate, regular rhythm, normal heart sounds.   Pulmonary/Chest: Effort normal and breath sounds normal. No respiratory distress.   Abdominal: Soft. Bowel sounds are normal. No tenderness to palpation. No rebound or guarding.   Musculoskeletal: No joint swelling or edema.   Neurological: Alert and oriented. No focal weakness.  Normal speech.  No ataxia.  Denies current paresthesias of extremities.   Skin: Skin is warm and dry. No rash noted.   Psych: normal affect      Emergency Department Course     Laboratory:  Laboratory findings were communicated with the patient who voiced understanding of the findings.    CBC: WBC 9.1, HGB 16.1,   BMP: Glucose 114 (H), o/w WNL (Creatinine 1.11)  Glucose 124 (H)  Magnesium 2.1    Emergency Department Course:  2103 Nursing notes and vitals reviewed.  2108 I performed a physical examination of the patient as documented above.  2250 I rechecked the patient.  2304 I personally reviewed the laboratory results with the patient and answered all related questions prior to discharge.    Impression & Plan      Medical Decision Making:  Billy Carey is a 29 year old " male who presents due to fluctuation blood sugars at home and also intermittent paresthesias in the left shin area and left lateral arm. On examination, he had no obvious neurologic abnormalities, evidence of vascular abnormaliy, or abnormalities of the extremities. His laboratory tests here were unremarkable. I had a long discussion about his insulin usage as he does use a pump and he is getting a new pump on Monday, so it is possible he could be having some pump malfunction that is causing his labile blood sugars at home. There is no indication there is any obvious infectious source that is causing him to have more labile blood sugars. I recommended that he not try to micro manage his sugars as much as he seems to be focusing on trying to keep his sugars in a specific, very tight range, I'd rather have him be a little bit elevated instead of having potentially low sugars at home as he says his sugars sometimes suddenly drop. He has been in touch with his endocrinologist and I advised he continue to follow up with them. He had a Lyme screen done as an outpatient and a CT scan done for his paresthesias and there is no indication he needs emergent MRI. I did refer him to neurology though if he does have persistent paresthesias or other neurologic symptoms, he should follow up with them or return here if any sudden worsening changes, specifically weakness, slurred speech, vision changes, fever, etc. Patient is discharged home in good condition.     Diagnosis:    ICD-10-CM    1. Labile blood glucose R73.09    2. Paresthesia R20.2      Disposition:   The patient is discharged to home.    Discharge Medications:  START taking      Dose / Directions   glucagon 1 MG kit  Commonly known as:  GLUCAGON EMERGENCY      Dose:  1 mg  Inject 1 mg into the muscle once for 1 dose  Quantity:  1 mg  Refills:  0       Scribe Disclosure:  Bebeto HUGHES, am serving as a scribe at 9:03 PM on 8/17/2019 to document services personally  performed by Julio César Tovar MD based on my observations and the provider's statements to me.    [facility] EMERGENCY DEPARTMENT       Julio César Tovar MD  08/18/19 [id number]

## 2019-08-19 ENCOUNTER — TELEPHONE (OUTPATIENT)
Dept: ENDOCRINOLOGY | Facility: CLINIC | Age: 29
End: 2019-08-19

## 2019-08-19 NOTE — TELEPHONE ENCOUNTER
[facility]    Phone Message    May a detailed message be left on voicemail: yes    Reason for Call: Symptoms or Concerns     If patient has red-flag symptoms, warm transfer to triage line    Current symptom or concern: Wife called and patient is still experiencing uncontrolled blood sugar and is also having pain in his leg. I did schedule him for Thursday at 9am but wife is really concerned about his symptoms. Please follow up.    Symptoms have been present for:  3 day(s)        Are there any new or worsening symptoms? Yes: Experiencing leg pain      Action Taken: Message routed to:  [facility] Diabetes

## 2019-08-19 NOTE — TELEPHONE ENCOUNTER
"Patient stated that he believes he had a \"Bad Reservoir consecutively for a few days\". He saw [facility] and could smell insulin in pump. Went to urgent care, was then directed to ED. Patient received his new pump today, August 19, 2019. He stated that he is hoping that his glucose becomes stable now that he has switched pumps. Patient will keep upcoming appt on 8/22/2019 with SAYRA Pepe.  "

## 2019-08-20 ENCOUNTER — TELEPHONE (OUTPATIENT)
Dept: ENDOCRINOLOGY | Facility: CLINIC | Age: 29
End: 2019-08-20

## 2019-08-20 NOTE — TELEPHONE ENCOUNTER
[facility] Call [facility]    Phone Message    May a detailed message be left on voicemail: yes    Reason for Call: Other: Patient has been having issues with their insulin pump. It appears to be leaking. the inside of it smells like insulin, and there are bubbles forming in the insulin that were not there when first using. Patient has tried using 6 different reservoirs, moved the site 8 times. This past weekend [employer] sent new pump over and same problems occurred. His blood sugars have been all out of wack this past week. Patient stated that he is running out of options, and unsure if he needs to move to a different pump. Please call back to discuss.     Action Taken: Message routed to:  [facility] Walter

## 2019-08-21 ENCOUNTER — TELEPHONE (OUTPATIENT)
Dept: EDUCATION SERVICES | Facility: CLINIC | Age: 29
End: 2019-08-21

## 2019-08-21 DIAGNOSIS — E10.65 UNCONTROLLED TYPE 1 DIABETES MELLITUS WITH HYPERGLYCEMIA (H): Primary | ICD-10-CM

## 2019-08-21 NOTE — TELEPHONE ENCOUNTER
[facility]    Phone Message    May a detailed message be left on voicemail: yes    Reason for Call: Medication Question or concern regarding medication   Prescription Clarification  Name of Medication: insulin lispro (HUMALOG KWIKPEN) 100 UNIT/ML pen  Prescribing Provider: Major   [facility]   What on the order needs clarification? The pharmacy states that the Pt does not want to pen, he prefers the [place]. Please send in new script as soon as possible          Action Taken: Message routed to:  [facility] Walter

## 2019-08-21 NOTE — TELEPHONE ENCOUNTER
Phone call from Haja.  He has continued issues with his blood sugar not being controlled.  He has been researching it and found someone who had a similar problem.  That person moved from Novolog to Humalog and the problem resolved.  Haja requests to try Humalog.  A prescription for this was sent in.  He was told that it may not be covered because insurance usually covers one or the other.  Humalog and Novolog are used interchangeably.  We can run a prior auth if needed. Tari Gonsalves RN,CDE

## 2019-08-22 ENCOUNTER — TELEPHONE (OUTPATIENT)
Dept: ENDOCRINOLOGY | Facility: CLINIC | Age: 29
End: 2019-08-22

## 2019-08-22 NOTE — TELEPHONE ENCOUNTER
Prior Authorization Retail Medication Request    Medication/Dose: Humalog 100 unit vials  ICD code (if different than what is on RX):E10.65  Rationale:Uncontrolled type 1 diabetes mellitus with hyperglycemia     Insurance Name:[facility]  Insurance ID:[id number]       Pharmacy Information (if different than what is on RX)  Name:[facility]   Phone:[phone]

## 2019-08-27 NOTE — TELEPHONE ENCOUNTER
Prior Authorization Approval    Authorization Effective Date: 8/27/2019  Authorization Expiration Date: 8/27/2020  Medication: Humalog 100 unit vials-PA approved  Approved Dose/Quantity:  Reference #: [id number]   Insurance Company: [employer] - Phone [phone] Fax [phone]  Expected CoPay:       CoPay Card Available:      Foundation Assistance Needed:    Which Pharmacy is filling the prescription (Not needed for infusion/clinic administered): [id number] - [place], [place] - [address]  Pharmacy Notified: Yes  Patient Notified: No-Pharmacy will contact

## 2019-08-27 NOTE — TELEPHONE ENCOUNTER
Central Prior Authorization Team   Phone: [phone]      PA Initiation    Medication: Humalog 100 unit vials-PA initiated  Insurance Company: [employer] - Phone [phone] Fax [phone]  Pharmacy Filling the Rx: [id number] - [place], [place] - [address]  Filling Pharmacy Phone: [phone]  Filling Pharmacy Fax:    Start Date: 8/27/2019

## 2019-08-28 NOTE — TELEPHONE ENCOUNTER
I received a voicemail from Marci at [facility] stating the Humalog was still rejecting. I reached out to Karina, lead alejandra, who indicated they were billing the wrong insurance which patient no longer has. Once she billed correctly, she verified paid claim. She will call patient when it's ready for pickup.

## 2019-09-04 DIAGNOSIS — E10.8 TYPE 1 DIABETES MELLITUS WITH COMPLICATION (H): Primary | ICD-10-CM

## 2019-09-06 DIAGNOSIS — E10.65 TYPE 1 DIABETES MELLITUS WITH HYPERGLYCEMIA (H): ICD-10-CM

## 2019-09-06 RX ORDER — INSULIN ASPART 100 [IU]/ML
INJECTION, SOLUTION INTRAVENOUS; SUBCUTANEOUS
Qty: 15 ML | Refills: 3 | Status: SHIPPED | OUTPATIENT
Start: 2019-09-06 | End: 2020-02-24

## 2019-09-06 RX ORDER — INSULIN GLARGINE 100 [IU]/ML
INJECTION, SOLUTION SUBCUTANEOUS
Qty: 15 ML | Refills: 1 | Status: SHIPPED | OUTPATIENT
Start: 2019-09-06 | End: 2019-09-30

## 2019-09-30 DIAGNOSIS — E10.65 TYPE 1 DIABETES MELLITUS WITH HYPERGLYCEMIA (H): ICD-10-CM

## 2019-09-30 NOTE — TELEPHONE ENCOUNTER
(Pharmacy requested: 15 Syringe)     insulin glargine (BASAGLAR KWIKPEN) 100 UNIT/ML pen Last Written Prescription Date:  9-6-19  Last Fill Quantity: 15 ML,   # refills: 1  Last Office Visit : 11-29-18  Future Office visit:  NONE    Routing refill request to provider for review/approval because:  Insulin - refilled per clinic

## 2019-10-01 RX ORDER — INSULIN GLARGINE 100 [IU]/ML
INJECTION, SOLUTION SUBCUTANEOUS
Qty: 15 ML | Refills: 1 | Status: SHIPPED | OUTPATIENT
Start: 2019-10-01 | End: 2023-04-13

## 2019-10-02 ENCOUNTER — TELEPHONE (OUTPATIENT)
Dept: NEUROLOGY | Facility: CLINIC | Age: 29
End: 2019-10-02

## 2019-10-02 NOTE — TELEPHONE ENCOUNTER
"----- Message from Risa N Link sent at 10/1/2019  2:53 PM CDT -----  Regarding: RE: Follow up with Sri COLBERT  Sent mychart to call and schedule.  ----- Message -----  From: Mayra Mijares RN  Sent: 9/30/2019   1:52 PM CDT  To: [facility]  Subject: Follow up with Sri COLBERT                   Please call and schedule follow up appointment with SAYRA Pires, based on previous office visit with patient on 11-. Patient currently has nothing scheduled, and cancelled his appt he was suppose to come to on 8-. Thank you!!       Office Visit Notes:  \"Return to Endocrine Clinic to see me in 6 months.\"      Mayra BLOCK RN                "

## 2019-11-07 ENCOUNTER — HEALTH MAINTENANCE LETTER (OUTPATIENT)
Age: 29
End: 2019-11-07

## 2019-11-12 ENCOUNTER — TELEPHONE (OUTPATIENT)
Dept: ENDOCRINOLOGY | Facility: CLINIC | Age: 29
End: 2019-11-12

## 2019-11-27 DIAGNOSIS — E10.65 TYPE 1 DIABETES MELLITUS WITH HYPERGLYCEMIA (H): ICD-10-CM

## 2019-11-27 NOTE — TELEPHONE ENCOUNTER
insulin aspart (NOVOLOG VIAL) 100 UNITS/ML vial        Last Written Prescription Date:  08/16/19  Last Fill Quantity: 40mL,   # refills: 3  Last Office Visit : 11/29/18  Future Office visit:  None scheduled    Routing refill request to provider for review/approval because:  Insulin - refilled per clinic

## 2019-11-27 NOTE — TELEPHONE ENCOUNTER
Short Acting Insulin Protocol Ebafrk48/27 12:47 PM   Blood pressure less than 140/90 in past 6 months    HgbA1C in past 3 or 6 months    Recent (6 mo) or future (30 days) visit within the authorizing provider's specialty     Sent to clinic coordinators for scheduling.   Mira Gonzales RN on 11/27/2019 at 12:56 PM

## 2019-12-02 ENCOUNTER — TELEPHONE (OUTPATIENT)
Dept: ENDOCRINOLOGY | Facility: CLINIC | Age: 29
End: 2019-12-02

## 2019-12-02 NOTE — TELEPHONE ENCOUNTER
Forms received from: [employer]     Forms were prescription for pump supplies and diabetic testing supplies     Faxed Date:12/3/19

## 2019-12-02 NOTE — TELEPHONE ENCOUNTER
[facility]    Phone Message    May a detailed message be left on voicemail: yes    Reason for Call: Other: [employer] IS CALLING TO CHECK ON rx FOR INSULIN PUMP SUPPLIES ORDER FAXED [phone].They are refaxing today. Please call [employer] at [phone] with any questions. Thanks.    Action Taken: Message routed to:  [facility] GLEN Watson

## 2019-12-03 ENCOUNTER — MEDICAL CORRESPONDENCE (OUTPATIENT)
Dept: HEALTH INFORMATION MANAGEMENT | Facility: CLINIC | Age: 29
End: 2019-12-03

## 2020-01-13 LAB
ALBUMIN (URINE) MG/SPEC: 9.1 UG/DL
ALBUMIN/CREATININE RATIO: 5.4 MG/G CREAT (ref 0–30)
ALT SERPL-CCNC: 16 IU/L (ref 0–44)
AST SERPL-CCNC: 18 IU/L (ref 0–40)
CHOLEST SERPL-MCNC: 199 MG/DL (ref 100–199)
CREAT SERPL-MCNC: 1.06 MG/DL (ref 0.76–1.27)
CREATININE (URINE): 169.4 MG/DL
GFR SERPL CREATININE-BSD FRML MDRD: 93 ML/MIN/1.73M2
GLUCOSE SERPL-MCNC: 72 MG/DL (ref 65–99)
HDLC SERPL-MCNC: 53 MG/DL
LDLC SERPL CALC-MCNC: 128 MG/DL (ref 0–99)
POTASSIUM SERPL-SCNC: 4.1 MMOL/L (ref 3.5–5.2)
TRIGL SERPL-MCNC: 92 MG/DL (ref 0–149)
TSH SERPL-ACNC: 3.87 UIU/ML (ref 0.45–4.5)

## 2020-01-24 LAB
HBA1C MFR BLD: 6.5 % (ref 4.8–5.6)
HBA1C MFR BLD: 6.5 % (ref 4.8–5.6)

## 2020-02-04 ENCOUNTER — TELEPHONE (OUTPATIENT)
Dept: ENDOCRINOLOGY | Facility: CLINIC | Age: 30
End: 2020-02-04

## 2020-02-13 ENCOUNTER — TRANSFERRED RECORDS (OUTPATIENT)
Dept: HEALTH INFORMATION MANAGEMENT | Facility: CLINIC | Age: 30
End: 2020-02-13

## 2020-02-24 ENCOUNTER — OFFICE VISIT (OUTPATIENT)
Dept: FAMILY MEDICINE | Facility: CLINIC | Age: 30
End: 2020-02-24
Payer: COMMERCIAL

## 2020-02-24 VITALS
HEIGHT: 75 IN | HEART RATE: 59 BPM | OXYGEN SATURATION: 99 % | DIASTOLIC BLOOD PRESSURE: 82 MMHG | WEIGHT: 266 LBS | BODY MASS INDEX: 33.07 KG/M2 | SYSTOLIC BLOOD PRESSURE: 120 MMHG | TEMPERATURE: 98.2 F | RESPIRATION RATE: 20 BRPM

## 2020-02-24 DIAGNOSIS — Z00.00 VISIT FOR PREVENTIVE HEALTH EXAMINATION: Primary | ICD-10-CM

## 2020-02-24 DIAGNOSIS — Z11.1 SCREENING EXAMINATION FOR PULMONARY TUBERCULOSIS: ICD-10-CM

## 2020-02-24 DIAGNOSIS — Z11.4 ENCOUNTER FOR SCREENING FOR HIV: ICD-10-CM

## 2020-02-24 PROCEDURE — 99385 PREV VISIT NEW AGE 18-39: CPT | Performed by: PHYSICIAN ASSISTANT

## 2020-02-24 PROCEDURE — 87389 HIV-1 AG W/HIV-1&-2 AB AG IA: CPT | Performed by: PHYSICIAN ASSISTANT

## 2020-02-24 PROCEDURE — 36415 COLL VENOUS BLD VENIPUNCTURE: CPT | Performed by: PHYSICIAN ASSISTANT

## 2020-02-24 PROCEDURE — 86481 TB AG RESPONSE T-CELL SUSP: CPT | Performed by: PHYSICIAN ASSISTANT

## 2020-02-24 RX ORDER — ERGOCALCIFEROL 1.25 MG/1
50000 CAPSULE, LIQUID FILLED ORAL WEEKLY
COMMUNITY
Start: 2020-01-24

## 2020-02-24 ASSESSMENT — ENCOUNTER SYMPTOMS
FREQUENCY: 0
NERVOUS/ANXIOUS: 0
PALPITATIONS: 0
COUGH: 0
NAUSEA: 0
SORE THROAT: 0
PARESTHESIAS: 0
CONSTIPATION: 0
HEMATURIA: 0
CHILLS: 0
EYE PAIN: 0
HEARTBURN: 0
HEMATOCHEZIA: 0
ABDOMINAL PAIN: 0
HEADACHES: 0
ARTHRALGIAS: 0
WEAKNESS: 0
DIARRHEA: 0
DIZZINESS: 0
MYALGIAS: 0
FEVER: 0
SHORTNESS OF BREATH: 0
DYSURIA: 0
JOINT SWELLING: 0

## 2020-02-24 ASSESSMENT — MIFFLIN-ST. JEOR: SCORE: 2247.2

## 2020-02-24 NOTE — PROGRESS NOTES
SUBJECTIVE:   CC: Billy Carey is an 29 year old male who presents for preventative health visit.     Healthy Habits:     Getting at least 3 servings of Calcium per day:  Yes    Bi-annual eye exam:  Yes    Dental care twice a year:  Yes    Sleep apnea or symptoms of sleep apnea:  None    Diet:  Carbohydrate counting    Frequency of exercise:  2-3 days/week    Duration of exercise:  30-45 minutes    Taking medications regularly:  Yes    Medication side effects:  Not applicable    PHQ-2 Total Score: 0    Additional concerns today:  No      History of type 1 diabetes. Followed by private practice endocrinologist. He states stable on pump. Has had no hospitalizations since diagnosis 27 years ago. However, has had a few pump failures requiring ER visits.         Today's PHQ-2 Score:   PHQ-2 ( 1999 [employer]) 2/24/2020   Q1: Little interest or pleasure in doing things 0   Q2: Feeling down, depressed or hopeless 0   PHQ-2 Score 0   Q1: Little interest or pleasure in doing things Not at all   Q2: Feeling down, depressed or hopeless Not at all   PHQ-2 Score 0       Abuse: Current or Past(Physical, Sexual or Emotional)- No  Do you feel safe in your environment? Yes        Social History     Tobacco Use     Smoking status: Never Smoker     Smokeless tobacco: Never Used   Substance Use Topics     Alcohol use: No     Comment: rarely         Alcohol Use 2/24/2020   Prescreen: >3 drinks/day or >7 drinks/week? No       Last PSA: No results found for: PSA    Reviewed orders with patient. Reviewed health maintenance and updated orders accordingly - Yes  BP Readings from Last 3 Encounters:   02/24/20 120/82   08/17/19 (!) 143/85   11/29/18 135/79    Wt Readings from Last 3 Encounters:   02/24/20 120.7 kg (266 lb)   08/17/19 113.5 kg (250 lb 3.6 oz)   11/29/18 121.7 kg (268 lb 3.2 oz)                  Patient Active Problem List   Diagnosis     Diabetes mellitus type 1, uncontrolled, insulin dependent (H)     Obesity     Hyperlipidemia  with target LDL less than 100     Folliculitis     Past Surgical History:   Procedure Laterality Date     MYRINGOTOMY, INSERT TUBE BILATERAL, COMBINED Bilateral     As a child       Social History     Tobacco Use     Smoking status: Never Smoker     Smokeless tobacco: Never Used   Substance Use Topics     Alcohol use: No     Comment: rarely     Family History   Problem Relation Age of Onset     C.A.D. No family hx of      Diabetes No family hx of      Hypertension No family hx of      Cancer No family hx of         no skin cancer     Amblyopia No family hx of      Retinal detachment No family hx of      Thyroid Disease No family hx of      Glaucoma No family hx of      Macular Degeneration No family hx of          Current Outpatient Medications   Medication Sig Dispense Refill     ASPIRIN NOT PRESCRIBED, INTENTIONAL,        blood glucose (addwish CONTOUR NEXT) test strip Test 4-5 times daily 4 Box 3     Blood Glucose Monitoring Suppl (addwish CONTOUR NEXT LINK) W/DEVICE KIT 1 kit 5 times daily. Use as directed 1 kit 1     glucagon (GLUCAGON EMERGENCY) 1 MG kit Inject 1 mg into the muscle once for 1 dose 1 mg 3     glucagon (GLUCAGON EMERGENCY) 1 MG kit Inject 1 mg into the muscle once for 1 dose 1 mg 0     insulin aspart (FIASP) 100 UNIT/ML vial        insulin glargine (BASAGLAR KWIKPEN) 100 UNIT/ML pen INJECT 50 UNITS ONCE DAILY SUBCUTANEOUSLY 15 mL 1     insulin pen needle (BD CHRIST U/F) 32G X 4 MM miscellaneous Use 3-6 pen needles daily or as directed. 50 each 0     vitamin D2 (ERGOCALCIFEROL) 85283 units (1250 mcg) capsule        Allergies   Allergen Reactions     Penicillins Rash     Recent Labs   Lab Test 08/17/19  2121 12/21/18  0929 04/14/17  0844 03/27/15  0955 09/16/13  1228  12/12/12  1214   A1C  --   --   --   --  8.3*  --  7.9*   LDL  --  110* 113* 127  --    < >  --    HDL  --  53 59 53  --    < >  --    TRIG  --  82 83 66  --    < >  --    ALT  --  29 25 49  --    < >  --    CR 1.11 0.92 0.90 0.94  --    <  ">  --    GFRESTIMATED 88 >90 >90  Non  GFR Calc   >90  Non  GFR Calc    --    < >  --    GFRESTBLACK >90 >90 >90   GFR Calc   >90   GFR Calc    --    < >  --    POTASSIUM 3.7 4.0  --   --   --    < >  --    TSH  --  1.52 2.76 2.23  --    < >  --     < > = values in this interval not displayed.        Reviewed and updated as needed this visit by clinical staff  Tobacco  Allergies  Meds  Problems  Med Hx  Surg Hx  Fam Hx  Soc Hx          Reviewed and updated as needed this visit by Provider  Tobacco  Allergies  Meds  Problems  Med Hx  Surg Hx  Fam Hx        Past Medical History:   Diagnosis Date     Diabetes mellitus type 1, uncontrolled, insulin dependent (H) 9/16/2013      Past Surgical History:   Procedure Laterality Date     MYRINGOTOMY, INSERT TUBE BILATERAL, COMBINED Bilateral     As a child       Review of Systems   Constitutional: Negative for chills and fever.   HENT: Negative for congestion, ear pain, hearing loss and sore throat.    Eyes: Negative for pain and visual disturbance.   Respiratory: Negative for cough and shortness of breath.    Cardiovascular: Negative for chest pain, palpitations and peripheral edema.   Gastrointestinal: Negative for abdominal pain, constipation, diarrhea, heartburn, hematochezia and nausea.   Genitourinary: Negative for discharge, dysuria, frequency, genital sores, hematuria, impotence and urgency.   Musculoskeletal: Negative for arthralgias, joint swelling and myalgias.   Skin: Negative for rash.   Neurological: Negative for dizziness, weakness, headaches and paresthesias.   Psychiatric/Behavioral: Negative for mood changes. The patient is not nervous/anxious.      OBJECTIVE:   /82 (BP Location: Right arm, Patient Position: Chair, Cuff Size: Adult Large)   Pulse 59   Temp 98.2  F (36.8  C) (Oral)   Resp 20   Ht 1.905 m (6' 3\")   Wt 120.7 kg (266 lb)   SpO2 99%   BMI 33.25 kg/m  "     Physical Exam  GENERAL: healthy, alert and no distress  EYES: Eyes grossly normal to inspection, PERRL and conjunctivae and sclerae normal  HENT: ear canals and TM's normal, nose and mouth without ulcers or lesions  NECK: no adenopathy, no asymmetry, masses, or scars and thyroid normal to palpation  RESP: lungs clear to auscultation - no rales, rhonchi or wheezes  CV: regular rate and rhythm, normal S1 S2, no S3 or S4, no murmur, click or rub, no peripheral edema and peripheral pulses strong  ABDOMEN: soft, nontender, no hepatosplenomegaly, no masses and bowel sounds normal   (male): normal male genitalia without lesions or urethral discharge, no hernia  MS: no gross musculoskeletal defects noted, no edema  SKIN: no suspicious lesions or rashes  NEURO: Normal strength and tone, mentation intact and speech normal  PSYCH: mentation appears normal, affect normal/bright  LYMPH: no cervical adenopathy    Diagnostic Test Results:  none     ASSESSMENT/PLAN:   (Z00.00) Visit for preventive health examination  (primary encounter diagnosis)  Comment: normal exam. States has his labs checked routinely through endocrine. Will obtain NELSY for this. Otherwise, stable on exam   Plan:     (Z11.1) Screening examination for pulmonary tuberculosis  Comment: requiring this for adoption form. Patient would like serum testing. Form to be finished after results return and given to patient or faxed.   Plan: Quantiferon TB Gold Plus            (Z11.4) Encounter for screening for HIV  Comment: as above   Plan: HIV Antigen Antibody Combo            (E10.65) Diabetes mellitus type 1, uncontrolled, insulin dependent (H)  Comment: stable per patient. nelsy obtianed.   Plan:     COUNSELING:   Reviewed preventive health counseling, as reflected in patient instructions       Regular exercise       Healthy diet/nutrition    Estimated body mass index is 33.25 kg/m  as calculated from the following:    Height as of this encounter: 1.905 m (6'  "3\").    Weight as of this encounter: 120.7 kg (266 lb).     Weight management plan: Discussed healthy diet and exercise guidelines     reports that he has never smoked. He has never used smokeless tobacco.      Counseling Resources:  ATP IV Guidelines  Pooled Cohorts Equation Calculator  FRAX Risk Assessment  ICSI Preventive Guidelines  Dietary Guidelines for Americans, 2010  USDA's MyPlate  ASA Prophylaxis  Lung CA Screening    Elfego Herrera PA-C  [facility]"

## 2020-02-25 ENCOUNTER — NURSE TRIAGE (OUTPATIENT)
Dept: NURSING | Facility: CLINIC | Age: 30
End: 2020-02-25

## 2020-02-25 ENCOUNTER — TELEPHONE (OUTPATIENT)
Dept: FAMILY MEDICINE | Facility: CLINIC | Age: 30
End: 2020-02-25

## 2020-02-25 LAB
GAMMA INTERFERON BACKGROUND BLD IA-ACNC: 0.02 IU/ML
HIV 1+2 AB+HIV1 P24 AG SERPL QL IA: NONREACTIVE
M TB IFN-G BLD-IMP: NEGATIVE
M TB IFN-G CD4+ BCKGRND COR BLD-ACNC: >10 IU/ML
MITOGEN IGNF BCKGRD COR BLD-ACNC: 0.02 IU/ML
MITOGEN IGNF BCKGRD COR BLD-ACNC: 0.04 IU/ML

## 2020-02-25 NOTE — TELEPHONE ENCOUNTER
----- Message from Elfego Herrera PA-C sent at 2/25/2020  3:24 PM CST -----  Please also call with the following. Forms in outbox.     Lewis Cervantes,    Your labs have returned and are negative. I have finished your form. Do you want us to fax or mail this anywhere for you? Or do you want to pick this up at the front?    -travon herrera, PAC

## 2020-02-26 NOTE — TELEPHONE ENCOUNTER
"Patient is returning a call from the clinic. Advised patient of the following message below as seen in chart review.     \"Hi Billy,     Your labs have returned and are negative. I have finished your form. Do you want us to fax or mail this anywhere for you? Or do you want to pick this up at the front?\"      Patient states he would like the form mailed to the address on file:  [id number]  [id number]    Notified patient that I will let the clinic know. Message sent to LEONA Galindo RN/[facility]    "

## 2020-02-26 NOTE — TELEPHONE ENCOUNTER
"Patient is returning a call from the clinic. Advised patient of the following message below as seen in chart review.     \"Lewis Cervantes,     Your labs have returned and are negative. I have finished your form. Do you want us to fax or mail this anywhere for you? Or do you want to pick this up at the front?\"      Patient states he would like the form mailed to the address on file:  [address] [id number]    Notified patient that I will let the clinic know.   Message sent to LEONA Galindo RN/[facility]    Reason for Disposition    [1] Follow-up call to recent contact AND [2] information only call, no triage required    Additional Information    Negative: [1] Caller is not with the adult (patient) AND [2] reporting urgent symptoms    Negative: Lab result questions    Negative: Medication questions    Negative: Caller can't be reached by phone    Negative: Caller has already spoken to PCP or another triager    Negative: RN needs further essential information from caller in order to complete triage    Negative: Requesting regular office appointment    Negative: [1] Caller requesting NON-URGENT health information AND [2] PCP's office is the best resource    Negative: Health Information question, no triage required and triager able to answer question    Negative: General information question, no triage required and triager able to answer question    Negative: Question about upcoming scheduled test, no triage required and triager able to answer question    Negative: [1] Caller is not with the adult (patient) AND [2] probable NON-URGENT symptoms    Protocols used: INFORMATION ONLY CALL-A-AH      "

## 2020-09-03 ENCOUNTER — TELEPHONE (OUTPATIENT)
Dept: FAMILY MEDICINE | Facility: CLINIC | Age: 30
End: 2020-09-03

## 2020-09-03 NOTE — TELEPHONE ENCOUNTER
ZB out until 9/14/20, called pt, had physical 2/2020, not sure what all is needed, F2F visits out 2-3 weeks, will check with wife to see if can be virtual with lab only, will call back to schedule, routed to TC, can await pt call back   Lida Almeida RN, BSN  Message handled by CLINIC NURSE.

## 2020-09-03 NOTE — TELEPHONE ENCOUNTER
Billy sent in a message to have paper work and immunizations.    He and his wife are adopting from [facility].    Should Elfego Herrera see this patient or Ravi Lozada     are adopting from [place] & need a medical suitability form completed & notarized. The form requests test for syphilis, HIV, tuberculosis, & Hep B/C.     Please contact Billy

## 2020-09-04 LAB
CHOLESTEROL (EXTERNAL): 159 MG/DL
HBA1C MFR BLD: 5.6 %
HDLC SERPL-MCNC: 44 MG/DL
LDL CHOLESTEROL CALCULATED (EXTERNAL): 96 MG/DL
NON HDL CHOLESTEROL (EXTERNAL): 115 MG/DL
TRIGLYCERIDES (EXTERNAL): 94 MG/DL

## 2020-09-17 ENCOUNTER — TRANSFERRED RECORDS (OUTPATIENT)
Dept: HEALTH INFORMATION MANAGEMENT | Facility: CLINIC | Age: 30
End: 2020-09-17

## 2020-09-17 LAB — RETINOPATHY: NORMAL

## 2020-10-19 ENCOUNTER — MYC MEDICAL ADVICE (OUTPATIENT)
Dept: FAMILY MEDICINE | Facility: CLINIC | Age: 30
End: 2020-10-19

## 2020-10-20 NOTE — TELEPHONE ENCOUNTER
Will route to , I don't see any attachment    Mine Vivar/LUIS  [place]---[facility]

## 2020-10-20 NOTE — TELEPHONE ENCOUNTER
Left detailed message on VM to resend Duplia message and send the attachment    Mine Vivar/LUIS  [place]---[facility]

## 2020-11-04 DIAGNOSIS — Z11.3 ROUTINE SCREENING FOR STI (SEXUALLY TRANSMITTED INFECTION): ICD-10-CM

## 2020-11-04 DIAGNOSIS — Z11.59 NEED FOR HEPATITIS C SCREENING TEST: ICD-10-CM

## 2020-11-04 DIAGNOSIS — Z11.59 NEED FOR HEPATITIS B SCREENING TEST: ICD-10-CM

## 2020-11-04 PROCEDURE — 87340 HEPATITIS B SURFACE AG IA: CPT | Performed by: PHYSICIAN ASSISTANT

## 2020-11-04 PROCEDURE — 86780 TREPONEMA PALLIDUM: CPT | Mod: 90 | Performed by: PHYSICIAN ASSISTANT

## 2020-11-04 PROCEDURE — 86704 HEP B CORE ANTIBODY TOTAL: CPT | Performed by: PHYSICIAN ASSISTANT

## 2020-11-04 PROCEDURE — 86706 HEP B SURFACE ANTIBODY: CPT | Performed by: PHYSICIAN ASSISTANT

## 2020-11-04 PROCEDURE — 36415 COLL VENOUS BLD VENIPUNCTURE: CPT | Performed by: PHYSICIAN ASSISTANT

## 2020-11-04 PROCEDURE — 99000 SPECIMEN HANDLING OFFICE-LAB: CPT | Performed by: PHYSICIAN ASSISTANT

## 2020-11-04 PROCEDURE — 86803 HEPATITIS C AB TEST: CPT | Performed by: PHYSICIAN ASSISTANT

## 2020-11-05 LAB
HBV CORE AB SERPL QL IA: NONREACTIVE
HBV SURFACE AB SERPL IA-ACNC: 71.18 M[IU]/ML
HBV SURFACE AG SERPL QL IA: NONREACTIVE
HCV AB SERPL QL IA: NONREACTIVE
T PALLIDUM AB SER QL: NONREACTIVE

## 2020-11-11 ENCOUNTER — MYC MEDICAL ADVICE (OUTPATIENT)
Dept: FAMILY MEDICINE | Facility: CLINIC | Age: 30
End: 2020-11-11

## 2020-11-12 NOTE — TELEPHONE ENCOUNTER
"We called Billy. No longer needs assistance with brenden, \"My wife took care of it.\"   Tavo Hernandez RN    "

## 2020-11-12 NOTE — TELEPHONE ENCOUNTER
Will route to Tavo GRIGSBY RN for coordinating with patient for notarizing    Mine Vivar/LUIS  [place]---[facility]

## 2020-11-29 ENCOUNTER — HEALTH MAINTENANCE LETTER (OUTPATIENT)
Age: 30
End: 2020-11-29

## 2021-04-10 ENCOUNTER — HEALTH MAINTENANCE LETTER (OUTPATIENT)
Age: 31
End: 2021-04-10

## 2021-06-05 ENCOUNTER — HEALTH MAINTENANCE LETTER (OUTPATIENT)
Age: 31
End: 2021-06-05

## 2021-06-10 ENCOUNTER — VIRTUAL VISIT (OUTPATIENT)
Dept: FAMILY MEDICINE | Facility: CLINIC | Age: 31
End: 2021-06-10
Payer: COMMERCIAL

## 2021-06-10 DIAGNOSIS — Z11.59 NEED FOR HEPATITIS B SCREENING TEST: ICD-10-CM

## 2021-06-10 DIAGNOSIS — Z02.89 ENCOUNTER FOR COMPLETION OF FORM WITH PATIENT: Primary | ICD-10-CM

## 2021-06-10 DIAGNOSIS — Z11.4 ENCOUNTER FOR SCREENING FOR HIV: ICD-10-CM

## 2021-06-10 DIAGNOSIS — Z11.1 SCREENING EXAMINATION FOR PULMONARY TUBERCULOSIS: ICD-10-CM

## 2021-06-10 DIAGNOSIS — Z11.3 ROUTINE SCREENING FOR STI (SEXUALLY TRANSMITTED INFECTION): ICD-10-CM

## 2021-06-10 DIAGNOSIS — Z11.59 NEED FOR HEPATITIS C SCREENING TEST: ICD-10-CM

## 2021-06-10 PROCEDURE — 99213 OFFICE O/P EST LOW 20 MIN: CPT | Mod: 95 | Performed by: PHYSICIAN ASSISTANT

## 2021-06-10 RX ORDER — ATORVASTATIN CALCIUM 10 MG/1
TABLET, FILM COATED ORAL
COMMUNITY
Start: 2021-01-18 | End: 2022-11-30

## 2021-06-10 NOTE — PROGRESS NOTES
"Billy is a 31 year old who is being evaluated via a billable telephone visit.      What phone number would you like to be contacted at? [phone]  How would you like to obtain your AVS? MyChart    Assessment & Plan     Encounter for completion of form with patient  Form started. Will keep at desk at inbasket until results return. Patient then to have notarized. Also, in case labs are required in 6 additional months, will place labs as standing to hopefully prevent delay in adoption paperwork for him and his family.      Need for hepatitis B screening test    - Hepatitis B Surface Antibody; Standing  - Hepatitis B surface antigen; Standing  - Hepatitis B core antibody; Standing    Need for hepatitis C screening test    - Hepatitis C Screen Reflex to HCV RNA Quant and Genotype; Standing    Routine screening for STI (sexually transmitted infection)    - Treponema Abs w Reflex to RPR and Titer; Standing    Encounter for screening for HIV    - HIV Antigen Antibody Combo; Standing    Screening examination for pulmonary tuberculosis    - Quantiferon-TB Gold Plus; Standing         Return in about 1 day (around 6/11/2021) for Lab Work.    Elfego Herrera PA-C  [facility]    Subjective   Billy is a 31 year old who presents for the following health issues     HPI     -completed adoption paper work and lab orders. Requires hep b evaluation, hep c testing, TB screening, syphilis screening, and HIV. Underwent this testing in nov of 2020, but patient informs me this is required every 6 months. He continues to follow endocrine for his diabetes and is reported as controlled. No concerns.       Review of Systems   Constitutional, HEENT, cardiovascular, pulmonary, gi and gu systems are negative, except as otherwise noted.      Objective    Vitals - Patient Reported  Weight (Patient Reported): 117.9 kg (260 lb)  Height (Patient Reported): 190.5 cm (6' 3\")  BMI (Based on Pt Reported Ht/Wt): " 32.5      Vitals:  No vitals were obtained today due to virtual visit.    Physical Exam   healthy, alert and no distress  PSYCH: Alert and oriented times 3; coherent speech, normal   rate and volume, able to articulate logical thoughts, able   to abstract reason, no tangential thoughts, no hallucinations   or delusions  His affect is normal  RESP: No cough, no audible wheezing, able to talk in full sentences  Remainder of exam unable to be completed due to telephone visits          Phone call duration: 5 minutes

## 2021-06-24 ENCOUNTER — TELEPHONE (OUTPATIENT)
Dept: FAMILY MEDICINE | Facility: CLINIC | Age: 31
End: 2021-06-24

## 2021-06-24 DIAGNOSIS — Z11.3 ROUTINE SCREENING FOR STI (SEXUALLY TRANSMITTED INFECTION): ICD-10-CM

## 2021-06-24 DIAGNOSIS — Z11.59 NEED FOR HEPATITIS C SCREENING TEST: ICD-10-CM

## 2021-06-24 DIAGNOSIS — Z11.59 NEED FOR HEPATITIS B SCREENING TEST: ICD-10-CM

## 2021-06-24 DIAGNOSIS — Z11.4 ENCOUNTER FOR SCREENING FOR HIV: ICD-10-CM

## 2021-06-24 DIAGNOSIS — Z11.1 SCREENING EXAMINATION FOR PULMONARY TUBERCULOSIS: ICD-10-CM

## 2021-06-24 PROCEDURE — 87389 HIV-1 AG W/HIV-1&-2 AB AG IA: CPT | Performed by: PHYSICIAN ASSISTANT

## 2021-06-24 PROCEDURE — 86780 TREPONEMA PALLIDUM: CPT | Mod: 90 | Performed by: PHYSICIAN ASSISTANT

## 2021-06-24 PROCEDURE — 86704 HEP B CORE ANTIBODY TOTAL: CPT | Performed by: PHYSICIAN ASSISTANT

## 2021-06-24 PROCEDURE — 36415 COLL VENOUS BLD VENIPUNCTURE: CPT | Performed by: PHYSICIAN ASSISTANT

## 2021-06-24 PROCEDURE — 86706 HEP B SURFACE ANTIBODY: CPT | Performed by: PHYSICIAN ASSISTANT

## 2021-06-24 PROCEDURE — 99000 SPECIMEN HANDLING OFFICE-LAB: CPT | Performed by: PHYSICIAN ASSISTANT

## 2021-06-24 PROCEDURE — 86803 HEPATITIS C AB TEST: CPT | Performed by: PHYSICIAN ASSISTANT

## 2021-06-24 PROCEDURE — 86481 TB AG RESPONSE T-CELL SUSP: CPT | Performed by: PHYSICIAN ASSISTANT

## 2021-06-24 PROCEDURE — 87340 HEPATITIS B SURFACE AG IA: CPT | Performed by: PHYSICIAN ASSISTANT

## 2021-06-24 NOTE — TELEPHONE ENCOUNTER
Patient Quality Outreach Summary      Summary:    Patient is due/failing the following:   A1C, Eye Exam and Microalbumin    Type of outreach:    pt has lab only apt today will discuss with patient at that time  Eye exam up to date-routed to abstract.  Spoke with patient-diabetes labs done at [facility]-they will be faxing resutls.     Questions for provider review:    None                                                                                                                    ARTIE Holt

## 2021-06-25 LAB
HBV CORE AB SERPL QL IA: NONREACTIVE
HBV SURFACE AB SERPL IA-ACNC: 51.48 M[IU]/ML
HBV SURFACE AG SERPL QL IA: NONREACTIVE
HCV AB SERPL QL IA: NONREACTIVE
HIV 1+2 AB+HIV1 P24 AG SERPL QL IA: NONREACTIVE
T PALLIDUM AB SER QL: NONREACTIVE

## 2021-06-26 LAB
GAMMA INTERFERON BACKGROUND BLD IA-ACNC: 0.04 IU/ML
M TB IFN-G CD4+ BCKGRND COR BLD-ACNC: 9.96 IU/ML
M TB TUBERC IFN-G BLD QL: NEGATIVE
MITOGEN IGNF BCKGRD COR BLD-ACNC: 0.08 IU/ML
MITOGEN IGNF BCKGRD COR BLD-ACNC: 0.13 IU/ML

## 2021-06-30 ENCOUNTER — TELEPHONE (OUTPATIENT)
Dept: FAMILY MEDICINE | Facility: CLINIC | Age: 31
End: 2021-06-30

## 2021-07-19 NOTE — TELEPHONE ENCOUNTER
Per spouse, form was missing [facility] and state fields so was rejected. Spouse provided new form. Form re-completed, [facility] Copy forwarded to abstracting.   Original form at  for spouse to .   Message handled by RN with huddle - provider name: James Herrera PA-C.  Tavo Hernandez RN

## 2021-09-25 ENCOUNTER — HEALTH MAINTENANCE LETTER (OUTPATIENT)
Age: 31
End: 2021-09-25

## 2021-12-09 ENCOUNTER — TRANSFERRED RECORDS (OUTPATIENT)
Dept: HEALTH INFORMATION MANAGEMENT | Facility: CLINIC | Age: 31
End: 2021-12-09
Payer: COMMERCIAL

## 2021-12-09 LAB
ALBUMIN (URINE) MG/SPEC: 1.5 MG/DL
ALBUMIN/CREATININE RATIO: 6 MCG/MG CREAT
ALT SERPL-CCNC: 23 U/L (ref 9–46)
AST SERPL-CCNC: 27 U/L (ref 10–40)
CHOLESTEROL (EXTERNAL): 200 MG/DL
CREATININE (EXTERNAL): 1.15 MG/DL (ref 0.6–1.35)
CREATININE (URINE): 271 MG/DL (ref 20–320)
GFR ESTIMATED (EXTERNAL): 83 ML/MIN/1.73M2
GFR ESTIMATED (IF AFRICAN AMERICAN) (EXTERNAL): 96 ML/MIN/1.73M2
GLUCOSE (EXTERNAL): 107 MG/DL (ref 65–99)
HBA1C MFR BLD: 6.1 %
HDLC SERPL-MCNC: 54 MG/DL
LDL CHOLESTEROL CALCULATED (EXTERNAL): 127 MG/DL
NON HDL CHOLESTEROL (EXTERNAL): 146 MG/DL
POTASSIUM (EXTERNAL): 4 MMOL/L (ref 3.5–5.3)
TRIGLYCERIDES (EXTERNAL): 93 MG/DL
TSH SERPL-ACNC: 2.81 MLU/L (ref 0.4–4.5)

## 2021-12-15 ENCOUNTER — HOSPITAL ENCOUNTER (EMERGENCY)
Facility: CLINIC | Age: 31
Discharge: HOME | End: 2021-12-15
Attending: EMERGENCY MEDICINE | Admitting: EMERGENCY MEDICINE
Payer: COMMERCIAL

## 2021-12-15 VITALS
RESPIRATION RATE: 13 BRPM | TEMPERATURE: 98.6 F | HEART RATE: 87 BPM | SYSTOLIC BLOOD PRESSURE: 132 MMHG | DIASTOLIC BLOOD PRESSURE: 85 MMHG | OXYGEN SATURATION: 99 %

## 2021-12-15 DIAGNOSIS — R00.2 PALPITATIONS: ICD-10-CM

## 2021-12-15 LAB
ANION GAP SERPL CALCULATED.3IONS-SCNC: 12 MMOL/L (ref 3–14)
BASOPHILS # BLD AUTO: 0.1 10E3/UL (ref 0–0.2)
BASOPHILS NFR BLD AUTO: 1 %
BUN SERPL-MCNC: 14 MG/DL (ref 7–30)
CALCIUM SERPL-MCNC: 9.7 MG/DL (ref 8.5–10.1)
CHLORIDE BLD-SCNC: 102 MMOL/L (ref 94–109)
CO2 SERPL-SCNC: 22 MMOL/L (ref 20–32)
CREAT SERPL-MCNC: 1.08 MG/DL (ref 0.66–1.25)
EOSINOPHIL # BLD AUTO: 0.1 10E3/UL (ref 0–0.7)
EOSINOPHIL NFR BLD AUTO: 1 %
ERYTHROCYTE [DISTWIDTH] IN BLOOD BY AUTOMATED COUNT: 11.9 % (ref 10–15)
GFR SERPL CREATININE-BSD FRML MDRD: 90 ML/MIN/1.73M2
GLUCOSE BLD-MCNC: 110 MG/DL (ref 70–99)
HCT VFR BLD AUTO: 48.8 % (ref 40–53)
HGB BLD-MCNC: 17.5 G/DL (ref 13.3–17.7)
HOLD SPECIMEN: NORMAL
IMM GRANULOCYTES # BLD: 0 10E3/UL
IMM GRANULOCYTES NFR BLD: 0 %
LYMPHOCYTES # BLD AUTO: 2.8 10E3/UL (ref 0.8–5.3)
LYMPHOCYTES NFR BLD AUTO: 32 %
MAGNESIUM SERPL-MCNC: 1.9 MG/DL (ref 1.6–2.3)
MCH RBC QN AUTO: 29.5 PG (ref 26.5–33)
MCHC RBC AUTO-ENTMCNC: 35.9 G/DL (ref 31.5–36.5)
MCV RBC AUTO: 82 FL (ref 78–100)
MONOCYTES # BLD AUTO: 0.9 10E3/UL (ref 0–1.3)
MONOCYTES NFR BLD AUTO: 10 %
NEUTROPHILS # BLD AUTO: 4.9 10E3/UL (ref 1.6–8.3)
NEUTROPHILS NFR BLD AUTO: 56 %
NRBC # BLD AUTO: 0 10E3/UL
NRBC BLD AUTO-RTO: 0 /100
PLATELET # BLD AUTO: 255 10E3/UL (ref 150–450)
POTASSIUM BLD-SCNC: 3.3 MMOL/L (ref 3.4–5.3)
RBC # BLD AUTO: 5.93 10E6/UL (ref 4.4–5.9)
SODIUM SERPL-SCNC: 136 MMOL/L (ref 133–144)
TROPONIN I SERPL HS-MCNC: 4 NG/L
TSH SERPL DL<=0.005 MIU/L-ACNC: 2.95 MU/L (ref 0.4–4)
WBC # BLD AUTO: 8.8 10E3/UL (ref 4–11)

## 2021-12-15 PROCEDURE — 84443 ASSAY THYROID STIM HORMONE: CPT | Performed by: EMERGENCY MEDICINE

## 2021-12-15 PROCEDURE — 93005 ELECTROCARDIOGRAM TRACING: CPT

## 2021-12-15 PROCEDURE — 85025 COMPLETE CBC W/AUTO DIFF WBC: CPT | Performed by: EMERGENCY MEDICINE

## 2021-12-15 PROCEDURE — 36415 COLL VENOUS BLD VENIPUNCTURE: CPT | Performed by: EMERGENCY MEDICINE

## 2021-12-15 PROCEDURE — 84484 ASSAY OF TROPONIN QUANT: CPT | Performed by: EMERGENCY MEDICINE

## 2021-12-15 PROCEDURE — 80048 BASIC METABOLIC PNL TOTAL CA: CPT | Performed by: EMERGENCY MEDICINE

## 2021-12-15 PROCEDURE — 250N000013 HC RX MED GY IP 250 OP 250 PS 637: Performed by: EMERGENCY MEDICINE

## 2021-12-15 PROCEDURE — 83735 ASSAY OF MAGNESIUM: CPT | Performed by: EMERGENCY MEDICINE

## 2021-12-15 PROCEDURE — 99284 EMERGENCY DEPT VISIT MOD MDM: CPT

## 2021-12-15 RX ORDER — POTASSIUM CHLORIDE 1500 MG/1
20 TABLET, EXTENDED RELEASE ORAL ONCE
Status: COMPLETED | OUTPATIENT
Start: 2021-12-15 | End: 2021-12-15

## 2021-12-15 RX ADMIN — POTASSIUM CHLORIDE 20 MEQ: 1500 TABLET, EXTENDED RELEASE ORAL at 14:57

## 2021-12-15 ASSESSMENT — ENCOUNTER SYMPTOMS
HEADACHES: 0
DYSURIA: 0
FEVER: 0
CHEST TIGHTNESS: 1
PALPITATIONS: 1
VOMITING: 0
NUMBNESS: 1
CHILLS: 0
LIGHT-HEADEDNESS: 1
NAUSEA: 0

## 2021-12-15 NOTE — ED TRIAGE NOTES
Pt c/o heart paliptations for several weeks and feels like he is going to pass out when it happens.  In the last 3 days he ws started on phentermine for weight loss and the episodes have gotten worse. Today it happen suddenly about 1 Hr ago and fees extremely anxious.

## 2021-12-15 NOTE — ED PROVIDER NOTES
--    ED Attending Physician Attestation    I Thompson Trammell DO, cared for this patient with the Resident. I have performed a history and physical examination of the patient and discussed management with the resident. I reviewed the resident's documentation above and agree with the documented findings and plan of care.    Summary of HPI, PE, ED Course   Patient is a 31 year old male evaluated in the emergency department for palpitations. Exam notable for sinus tachycardia. Lungs clear. No sensory abnormalities on exam. I reviewed and edited the exam and ROS as appropriate. ED course notable for resolution of the patient's paresthesias and palpitations. After the completion of care in the emergency department, the patient was discharged.    Critical Care & Procedures  Not applicable.    Medical Decision Making  The medical record was reviewed and interpreted.  Current labs reviewed and interpreted.  EKG reviewed and interpreted: Sinus tachycardia.      Thompson Trammell DO  Emergency Medicine       History   Chief Complaint:  Palpitations     HPI   Billy Carey is a 31 year old male with history of type 1 diabetes who presents with palpitations. The patient reports several weeks of intermittent palpitations with associated lightheadedness, which he has noticed occur once or twice per day. About one hour ago, his symptoms started again suddenly. The patient complains of chest tightness, tingling in his hands, and shortness of breath. He denies chest pain, headache, fever, chills, nausea, vomiting, or dysuria. He states that his blood glucose has been normal lately and he has been eating and drinking normally. The patient also notes that he started taking phentermine 3 days ago for weight loss. He reports some increased stress at work, though feels he has been handling this well recently.    Review of Systems   Constitutional: Negative for chills and fever.   Respiratory: Positive for chest tightness.     Cardiovascular: Positive for palpitations. Negative for chest pain.   Gastrointestinal: Negative for nausea and vomiting.   Genitourinary: Negative for dysuria.   Neurological: Positive for light-headedness and numbness. Negative for headaches.   All other systems reviewed and are negative.    Allergies:  Penicillins    Medications:  Lipitor  Insulin  Glucagon    Past Medical History:     Type 1 diabetes  Obesity  Hyperlipidemia    Past Surgical History:    Myringotomy    Social History:  Patient presents with his family    Physical Exam     Patient Vitals for the past 24 hrs:   BP Temp Pulse Resp SpO2   12/15/21 1500 -- -- 87 13 99 %   12/15/21 1445 132/85 -- 85 27 100 %   12/15/21 1311 -- -- 99 14 100 %   12/15/21 1300 (!) 156/100 -- 111 (!) 6 100 %   12/15/21 1250 -- -- -- 28 --   12/15/21 1223 (!) 147/95 98.6  F (37  C) 117 16 98 %     Physical Exam  Constitutional: Alert. Uncomfortable-appearing.  HENT: No apparent facial swelling, bruising, lesions on external inspection. Facial motor function is grossly symmetric.   Eyes: Pupils are round, equal bilaterally.  Extra-ocular movements grossly intact.   Neck: Able to fully range.   Cardiac: Warm and well-perfused. Tachycardic. Regular rhythm.  Pulmonary: Tachypneic. Clear to auscultation.  Abdomen: Soft. Non-distended. Dexcom in place  Musculoskeletal: Extremities are without apparent deformity on external inspection.  Skin: No diaphoresis.  No apparent lesions of exposed skin.   Neuro: Cranial nerves grossly intact. Spontaneously moves all 4 extremities.  Psychiatric: Anxious-appearing. Regular speech. Organized and appropriate though content and processes.    Emergency Department Course   ECG  ECG obtained at 1253, ECG read at 1257  Sinus tachycardia  Otherwise normal ECG   No prior ECG for comparison.  Rate 125 bpm. PA interval 112 ms. QRS duration 80 ms. QT/QTc 378/545 ms. P-R-T axes * 72 68.     Imaging:  No orders to display     Laboratory:  Labs Ordered  and Resulted from Time of ED Arrival to Time of ED Departure   BASIC METABOLIC PANEL - Abnormal       Result Value    Sodium 136      Potassium 3.3 (*)     Chloride 102      Carbon Dioxide (CO2) 22      Anion Gap 12      Urea Nitrogen 14      Creatinine 1.08      Calcium 9.7      Glucose 110 (*)     GFR Estimate 90     CBC WITH PLATELETS AND DIFFERENTIAL - Abnormal    WBC Count 8.8      RBC Count 5.93 (*)     Hemoglobin 17.5      Hematocrit 48.8      MCV 82      MCH 29.5      MCHC 35.9      RDW 11.9      Platelet Count 255      % Neutrophils 56      % Lymphocytes 32      % Monocytes 10      % Eosinophils 1      % Basophils 1      % Immature Granulocytes 0      NRBCs per 100 WBC 0      Absolute Neutrophils 4.9      Absolute Lymphocytes 2.8      Absolute Monocytes 0.9      Absolute Eosinophils 0.1      Absolute Basophils 0.1      Absolute Immature Granulocytes 0.0      Absolute NRBCs 0.0     TROPONIN I - Normal    Troponin I High Sensitivity 4     MAGNESIUM - Normal    Magnesium 1.9     TSH WITH FREE T4 REFLEX - Normal    TSH 2.95        Procedures  None    Emergency Department Course:    Assessments/Consults  ED Course as of 12/15/21 1728   Wed Dec 15, 2021   1250 Obtained history and performed physical exam       1258 Dr. Trammell discussed the case with Prashant Hercules MD. Discussed presentation, exam, ddx, plan.       1258 Dr. Trammell' evaluation   1328 Potassium(!): 3.3   1335 Glucose(!): 110   1336 Magnesium: 1.9   1338 Troponin I High Sensitivity: 4   1338 CBC + differential(!)  No leukocytosis, no anemia.   1357 TSH: 2.95   1435 Reevaluated patient.  He reports he has had resolution of his symptoms.     Reviewed:  I reviewed nursing notes, vitals, past medical history, Care Everywhere and [facility]    Interventions:  None    Disposition:  The patient was discharged to home.     Impression & Plan   Medical Decision Making:  MDM: This is a well-appearing 31 year old male presenting with palpitations.  DDx includes ACS,  anemia, dehydration, electrolyte abnormality, hyperthyroidism, metabolic abnormality, DKA, infection, anxiety, among others.  On arrival appears in distress with rapid respiratory rate.  Patient was hypertensive and tachycardic, though afebrile and satting well on room air.  He does report being on phentermine, though was intermittently having similar symptoms prior to initiating this therapy.  He has had good glucose control over the recent weeks, so less likely DKA/hyperglycemia precipitating symptoms.  No recent systemic symptoms to suggest infectious process.  Obtained labs including CBC, BMP, magnesium, troponin, TSH.  ECG was reassuring without evidence of acute ischemia, and with normal intervals.  Overall, his work-up was not concerning, though had mild hypokalemia which was replaced by PO.     On reevaluation following results of his work-up, he had reported resolution of his symptoms despite no therapy being given. I reviewed results and overall clinical impression. His questions were answered. Instructed them to follow-up with PCP as soon as possible; PCP referral was placed. Can consider Zio Patch in clinic. No new Rx given. Return precautions were given, instructed him to return to the ED if there are any concerns. He verbalized understanding of instructions and is agreeable to this plan. Discharged patient in stable condition.      Diagnosis:    ICD-10-CM    1. Palpitations  R00.2 Primary Care Referral       Discharge Medications:  Discharge Medication List as of 12/15/2021  2:36 PM          Scribe Disclosure:  I, Yaritza Salvador, am serving as a scribe at 12:49 PM on 12/15/2021 to document services personally performed by Thompson Trammell DO based on my observations and the provider's statements to me.     Prashant Hercules MD PGY-2  Emergency Medicine Resident, [facility]       Thompson Trammell DO  12/15/21 9806

## 2021-12-15 NOTE — DISCHARGE INSTRUCTIONS
Follow-up: I have placed a referral to establish care with a primary care physician.  Call to schedule an appointment as soon as possible.    While we are reassured by what we are seeing in the emergency department today, please understand that we only see you at one moment in time.  Because of this, if your symptoms worsen, change, or you develop new, concerning symptoms, you should return to the emergency department for further evaluation. We are always happy to take care of you here at [facility].

## 2021-12-16 LAB
ATRIAL RATE - MUSE: 125 BPM
DIASTOLIC BLOOD PRESSURE - MUSE: NORMAL MMHG
INTERPRETATION ECG - MUSE: NORMAL
P AXIS - MUSE: NORMAL DEGREES
PR INTERVAL - MUSE: 112 MS
QRS DURATION - MUSE: 80 MS
QT - MUSE: 378 MS
QTC - MUSE: 545 MS
R AXIS - MUSE: 72 DEGREES
SYSTOLIC BLOOD PRESSURE - MUSE: NORMAL MMHG
T AXIS - MUSE: 68 DEGREES
VENTRICULAR RATE- MUSE: 125 BPM

## 2022-01-06 ENCOUNTER — OFFICE VISIT (OUTPATIENT)
Dept: FAMILY MEDICINE | Facility: CLINIC | Age: 32
End: 2022-01-06
Payer: COMMERCIAL

## 2022-01-06 VITALS
SYSTOLIC BLOOD PRESSURE: 124 MMHG | BODY MASS INDEX: 33.69 KG/M2 | HEIGHT: 75 IN | HEART RATE: 66 BPM | OXYGEN SATURATION: 99 % | RESPIRATION RATE: 16 BRPM | TEMPERATURE: 98.7 F | WEIGHT: 271 LBS | DIASTOLIC BLOOD PRESSURE: 80 MMHG

## 2022-01-06 DIAGNOSIS — E87.6 HYPOKALEMIA: ICD-10-CM

## 2022-01-06 DIAGNOSIS — R00.2 PALPITATIONS: Primary | ICD-10-CM

## 2022-01-06 DIAGNOSIS — R55 PRE-SYNCOPE: ICD-10-CM

## 2022-01-06 DIAGNOSIS — E10.3293 MILD NONPROLIFERATIVE DIABETIC RETINOPATHY OF BOTH EYES WITHOUT MACULAR EDEMA ASSOCIATED WITH TYPE 1 DIABETES MELLITUS (H): ICD-10-CM

## 2022-01-06 LAB
CRP SERPL-MCNC: <2.9 MG/L (ref 0–8)
ERYTHROCYTE [SEDIMENTATION RATE] IN BLOOD BY WESTERGREN METHOD: 2 MM/HR (ref 0–15)
POTASSIUM BLD-SCNC: 4.2 MMOL/L (ref 3.4–5.3)

## 2022-01-06 PROCEDURE — 85652 RBC SED RATE AUTOMATED: CPT | Performed by: PHYSICIAN ASSISTANT

## 2022-01-06 PROCEDURE — 84132 ASSAY OF SERUM POTASSIUM: CPT | Performed by: PHYSICIAN ASSISTANT

## 2022-01-06 PROCEDURE — 36415 COLL VENOUS BLD VENIPUNCTURE: CPT | Performed by: PHYSICIAN ASSISTANT

## 2022-01-06 PROCEDURE — 99214 OFFICE O/P EST MOD 30 MIN: CPT | Performed by: PHYSICIAN ASSISTANT

## 2022-01-06 PROCEDURE — 86140 C-REACTIVE PROTEIN: CPT | Performed by: PHYSICIAN ASSISTANT

## 2022-01-06 ASSESSMENT — MIFFLIN-ST. JEOR: SCORE: 2259.88

## 2022-01-06 NOTE — PATIENT INSTRUCTIONS
"  Patient Education     Heart Palpitations    Palpitations are the feeling that your heart is beating hard, fast, or irregular. Some describe it as \"pounding,\" \"flip-flopping in the chest,\" or \"skipped beats.\" Palpitations may occur in someone with heart disease. But they can also occur in a healthy person.  Heart-related causes:    Heart rhythm problem (arrhythmia)    Heart valve disease    Disease of the heart muscle (cardiomyopathy)    Coronary artery disease    High blood pressure  Non-heart-related causes:    Certain medicines such as asthma inhalers and decongestants    Some herbal supplements, energy drinks and pills, and weight loss pills    Illegal stimulant drugs such as cocaine, crank, methamphetamine, PCP, bath salts, and ecstasy    Caffeine, alcohol, and tobacco    Health conditions such as thyroid disease, anemia, anxiety, and panic disorder  Sometimes the cause can't be found.  Home care  Follow these home care tips:    Don't use too much caffeine, alcohol, or tobacco, or any stimulant drugs.    Tell your doctor about any prescription or over-the-counter or herbal medicines you take.  Follow-up care    Follow up with your doctor, or as advised.    Call 911  This is the fastest and safest way to get to the emergency department. The paramedics can also start treatment on the way to the hospital, if needed.  Don't wait until your symptoms are severe to call 911. These are reasons to call 911:    Chest pain    Shortness of breath    Feeling lightheaded, faint, or dizzy, or losing consciousness    Very irregular heartbeat    Rapid heartbeat that makes you uncomfortable    Slower than usual heart rate along with symptoms    Chest pain with weakness, dizziness, heavy sweating, nausea, or vomiting    Extreme drowsiness, confusion, or weakness    Weakness of an arm or leg, or on one side of the face    Trouble with speech or vision  When to seek medical advice  Call your healthcare provider right away if you " have palpitations that last longer than normal, or are different from your past palpitations.  [employer] last reviewed this educational content on 11/1/2019 2000-2021 [employer]. All rights reserved. This information is not intended as a substitute for professional medical care. Always follow your healthcare professional's instructions.

## 2022-01-06 NOTE — PROGRESS NOTES
"Assessment & Plan     Palpitations  Unknown etiology. Patient reports it seemed to start after he started Phentermine for weight loss, but he stopped the medication after his ED visit on 12/15 and although the palpitations have improved he continues to have \"episodes\" every 2-3 days. He also reports that he is noticing he is more sensitive to caffeine since being off the Phentermine and he feels like this seems to be a possible trigger of the episodes that have been occurring the past couple weeks. He has no history of palpitations or cardiac issues in the past. He has never been worked up for this previously, other than EKG in the ED which showed sinus tachycardia. He also had TSH checked in the ED which was normal. His CBC and BMP were relatively unremarkable other than hypokalemia, will recheck that today with potassium level. Due to his ongoing episodes of palpitations, will ordered leadless EKG monitor for 7 days to see if we can capture any of these events. Low suspicion for hypertrophic cardiomyopathy based on history and no S4 heard on exam with valsalva. Additionally, low suspicion for myocarditis or pericarditis, but given he has little children in his home who have been sick recently, will check ESR and CRP to look for any increase in his inflammatory markers. The patient is in agreement with this plan. His symptoms are likely secondary to a reaction to the Phentermine use and now he may have some underlying anxiety which is triggered with the caffeine/stimulant use. Will wait on EKG monitor results and then further assess. Patient is in agreement with this plan.  - Leadless EKG Monitor 3 to 7 Days; Future  - Potassium  - ESR: Erythrocyte sedimentation rate  - CRP, inflammation    Pre-syncope  No pre-syncopal episodes since his ED visit, but he does report having a pre-syncopal event while deer hunting a couple months prior to the Phentermine use. He states his blood sugar was normal at that time. This " "could be due to dehydration or other external cause with him being in a deer stand for hours, but given this event occurred prior to his other palpitation episodes, leadless EKG monitor will be good to assess for any cardiac arrhythmias.  - Leadless EKG Monitor 3 to 7 Days; Future  - Potassium  - ESR: Erythrocyte sedimentation rate  - CRP, inflammation    Hypokalemia  Patient with hypokalemia (3.3) during his ED visit on 12/15. Potassium level checked today to re-assess.  - Potassium    Mild nonproliferative diabetic retinopathy of both eyes without macular edema associated with type 1 diabetes mellitus (H)  History of this, patient reports it was very mild. He is overdue for his eye exam. Encouraged him to follow up with Ophthalmology for assessment of this.      BMI:   Estimated body mass index is 33.87 kg/m  as calculated from the following:    Height as of this encounter: 1.905 m (6' 3\").    Weight as of this encounter: 122.9 kg (271 lb).       Return in about 3 months (around 4/6/2022) for Physical Exam.    MADISON Wang  [facility] PRISCILLA Cervantes is a 31 year old who presents for the following health issues     HPI     ED/ Followup:    Facility:  [facility]   Date of visit: 12/15/2021  Reason for visit: palpitations and vertigo  Current Status: has improved     Palpitations/Pre-Syncope: Patient was recently seen in the ED on 12/15 for palpitations, pre-syncope and numbness/tingling of her upper extremities. EKG and laboratory evaluation at that time were unremarkable. The patient felt his symptoms may be due to him starting Phentermine so he discontinued this on 12/15 and reports his symptoms have improved, but he continues to have occasional episodes of palpitations and lightheadedness which occurs on average every 2-3 days. He does report he seems to be more sensitive to caffeine since discontinuing the Phentermine. He also reports having some increased difficulty " "sleeping, has trouble falling asleep. Although most of his symptoms started after he started Phentermine, he does report one episode of presyncope while he was deer hunting in October which he was never seen for. He otherwise is very active (lifts and bikes frequently) and this does not cause him any issues. No history of passing out in his adolescent or early adult years with exercise. Denies chest pain, shortness of breath or feeling off balance. No recent illness that the patient is aware of. No visionary or hearing changes.     Diabetes Mellitus, Type 1: Well controlled with insulin pump. Sugars have been normal recently and most recent A1C was 6.1%. He feels he is stable. Last eye examination was >1 year ago.      Review of Systems   Constitutional, HEENT, cardiovascular, pulmonary, gi and gu systems are negative, except as otherwise noted.      Objective    /80   Pulse 66   Temp 98.7  F (37.1  C) (Oral)   Resp 16   Ht 1.905 m (6' 3\")   Wt 122.9 kg (271 lb)   SpO2 99%   BMI 33.87 kg/m    Physical Exam   GENERAL: healthy, alert and no distress  RESP: lungs clear to auscultation - no rales, rhonchi or wheezes  CV: regular rate and rhythm, normal S1 S2, no S3 or S4, no murmur, click or rub        "

## 2022-01-06 NOTE — PROGRESS NOTES
Assessment & Plan     Palpitations  Unclear cause. Possible side effect of phentermine and residually present since stopping, though course length for this to resolve seem quite long. Exam normal today and work up negative at ER. Did have slightly low potassium, but not in the range that should induce arrhythmias/sxs. Rechecking this today. arrythmia is still possible and given ongiong symptoms, recommending zio patch for assessment. Also, given children with illness prior to his symptoms as well as age and male gender, consider possible myocarditis/pericarditis secondary to asymptomatic covid-19. Will check esr and crp today to aid in this. If elevated, echo to be obtained. If work up negative and symptoms do not continue to improve, consider echo still in the future  - Leadless EKG Monitor 3 to 7 Days; Future  - Potassium; Future  - ESR: Erythrocyte sedimentation rate; Future  - CRP, inflammation; Future  - Potassium  - ESR: Erythrocyte sedimentation rate  - CRP, inflammation    Pre-syncope  As above   - Leadless EKG Monitor 3 to 7 Days; Future  - Potassium; Future  - ESR: Erythrocyte sedimentation rate; Future  - CRP, inflammation; Future  - Potassium  - ESR: Erythrocyte sedimentation rate  - CRP, inflammation    Hypokalemia  As above   - Potassium; Future  - Potassium    Mild nonproliferative diabetic retinopathy of both eyes without macular edema associated with type 1 diabetes mellitus (H)  Followed by optho. Stable per patient.           No follow-ups on file.    ADAM [facility]iz[facility] PRISCILLA Cervantes is a 31 year old who presents for the following health issues     HPI     ED/UC Followup:    Facility:  [facility]   Date of visit: 12/15/2021  Reason for visit: palpitations and vertigo  Current Status: has improved. States intermittent palpitations started shortly after starting phentermine through his endocrinologist in December. This prompted ER visit  "where work up was normal with exception of slightly low potassium. Patient has since been off of phentermine for ~3 weeks but still has noted intermittent palpitations, especially when taking in any caffeine. Last occurrence was 5 days ago. No chest pains with these, but admits to feeling lightheaded and needing to lay down. Last minutes to up to 2 hours, though has improved over time. No history of similar symptoms. No known family history of heart disease including congenital.     Patient is type 1 diabetic and well controlled on pump. Most recent a1c was 6.1%. NELSY obtained to have notes and labs faxed to us. He states during episodes, no hypoglycemia.     Of note, patient does admit to his children being sick with mild URI prior to his symptoms starting. He never had URI symptoms. Kids were never tested for covid.     tsh was normal at ER       Review of Systems   Constitutional, HEENT, cardiovascular, pulmonary, GI, , musculoskeletal, neuro, skin, endocrine and psych systems are negative, except as otherwise noted.      Objective    /80   Pulse 66   Temp 98.7  F (37.1  C) (Oral)   Resp 16   Ht 1.905 m (6' 3\")   Wt 122.9 kg (271 lb)   SpO2 99%   BMI 33.87 kg/m    Body mass index is 33.87 kg/m .  Physical Exam   GENERAL: healthy, alert and no distress  NECK: no carotid bruits  RESP: lungs clear to auscultation - no rales, rhonchi or wheezes  CV: regular rates and rhythm, normal S1 S2, no S3 or S4 and no murmur, click or rub. Negative for murmur on valsalva   PSYCH: mentation appears normal, affect normal/bright            "

## 2022-01-07 ENCOUNTER — HOSPITAL ENCOUNTER (OUTPATIENT)
Dept: CARDIOLOGY | Facility: CLINIC | Age: 32
End: 2022-01-07
Attending: PHYSICIAN ASSISTANT
Payer: COMMERCIAL

## 2022-01-07 DIAGNOSIS — R00.2 PALPITATIONS: ICD-10-CM

## 2022-01-07 DIAGNOSIS — R55 PRE-SYNCOPE: ICD-10-CM

## 2022-01-07 PROCEDURE — 93248 EXT ECG>7D<15D REV&INTERPJ: CPT | Performed by: INTERNAL MEDICINE

## 2022-01-07 NOTE — PROGRESS NOTES
Registered patient for at home placement of Zio patch. [employer] will mail Zio patch to patient's home address.

## 2022-01-15 ENCOUNTER — HEALTH MAINTENANCE LETTER (OUTPATIENT)
Age: 32
End: 2022-01-15

## 2022-05-07 ENCOUNTER — HEALTH MAINTENANCE LETTER (OUTPATIENT)
Age: 32
End: 2022-05-07

## 2022-07-02 ENCOUNTER — HEALTH MAINTENANCE LETTER (OUTPATIENT)
Age: 32
End: 2022-07-02

## 2022-07-20 ENCOUNTER — TELEPHONE (OUTPATIENT)
Dept: FAMILY MEDICINE | Facility: CLINIC | Age: 32
End: 2022-07-20

## 2022-07-20 NOTE — TELEPHONE ENCOUNTER
Patient Quality Outreach    Patient is due for the following:   Diabetes -  A1C and Eye Exam    NEXT STEPS:   Schedule a lab only visit for A1C    Type of outreach:    Sent [employer] message.      Questions for provider review:    None     Chris Chung

## 2022-11-30 ENCOUNTER — NURSE TRIAGE (OUTPATIENT)
Dept: NURSING | Facility: CLINIC | Age: 32
End: 2022-11-30

## 2022-11-30 ENCOUNTER — OFFICE VISIT (OUTPATIENT)
Dept: INTERNAL MEDICINE | Facility: CLINIC | Age: 32
End: 2022-11-30
Payer: COMMERCIAL

## 2022-11-30 VITALS
SYSTOLIC BLOOD PRESSURE: 108 MMHG | HEART RATE: 64 BPM | TEMPERATURE: 98.1 F | BODY MASS INDEX: 35.31 KG/M2 | WEIGHT: 284 LBS | RESPIRATION RATE: 16 BRPM | DIASTOLIC BLOOD PRESSURE: 76 MMHG | OXYGEN SATURATION: 98 % | HEIGHT: 75 IN

## 2022-11-30 DIAGNOSIS — E66.812 CLASS 2 OBESITY WITHOUT SERIOUS COMORBIDITY WITH BODY MASS INDEX (BMI) OF 35.0 TO 35.9 IN ADULT, UNSPECIFIED OBESITY TYPE: ICD-10-CM

## 2022-11-30 DIAGNOSIS — E10.69 TYPE 1 DIABETES MELLITUS WITH OTHER SPECIFIED COMPLICATION (H): ICD-10-CM

## 2022-11-30 DIAGNOSIS — Z00.00 ROUTINE GENERAL MEDICAL EXAMINATION AT A HEALTH CARE FACILITY: Primary | ICD-10-CM

## 2022-11-30 DIAGNOSIS — Z23 NEED FOR PROPHYLACTIC VACCINATION AND INOCULATION AGAINST INFLUENZA: ICD-10-CM

## 2022-11-30 DIAGNOSIS — Z13.220 SCREENING FOR HYPERLIPIDEMIA: ICD-10-CM

## 2022-11-30 DIAGNOSIS — E10.3293 MILD NONPROLIFERATIVE DIABETIC RETINOPATHY OF BOTH EYES WITHOUT MACULAR EDEMA ASSOCIATED WITH TYPE 1 DIABETES MELLITUS (H): ICD-10-CM

## 2022-11-30 PROBLEM — E66.01 MORBID OBESITY (H): Status: ACTIVE | Noted: 2022-11-30

## 2022-11-30 LAB
ALBUMIN SERPL BCG-MCNC: 4.5 G/DL (ref 3.5–5.2)
ALP SERPL-CCNC: 54 U/L (ref 40–129)
ALT SERPL W P-5'-P-CCNC: 52 U/L (ref 10–50)
ANION GAP SERPL CALCULATED.3IONS-SCNC: 12 MMOL/L (ref 7–15)
AST SERPL W P-5'-P-CCNC: 46 U/L (ref 10–50)
BASOPHILS # BLD AUTO: 0.1 10E3/UL (ref 0–0.2)
BASOPHILS NFR BLD AUTO: 1 %
BILIRUB SERPL-MCNC: 1.1 MG/DL
BUN SERPL-MCNC: 14.2 MG/DL (ref 6–20)
CALCIUM SERPL-MCNC: 9.8 MG/DL (ref 8.6–10)
CHLORIDE SERPL-SCNC: 104 MMOL/L (ref 98–107)
CHOLEST SERPL-MCNC: 214 MG/DL
CREAT SERPL-MCNC: 1.11 MG/DL (ref 0.67–1.17)
CREAT UR-MCNC: 262 MG/DL
DEPRECATED HCO3 PLAS-SCNC: 25 MMOL/L (ref 22–29)
EOSINOPHIL # BLD AUTO: 0.3 10E3/UL (ref 0–0.7)
EOSINOPHIL NFR BLD AUTO: 5 %
ERYTHROCYTE [DISTWIDTH] IN BLOOD BY AUTOMATED COUNT: 12.2 % (ref 10–15)
GFR SERPL CREATININE-BSD FRML MDRD: 89 ML/MIN/1.73M2
GLUCOSE SERPL-MCNC: 86 MG/DL (ref 70–99)
HBA1C MFR BLD: 6 % (ref 0–5.6)
HCT VFR BLD AUTO: 47 % (ref 40–53)
HDLC SERPL-MCNC: 59 MG/DL
HGB BLD-MCNC: 16.5 G/DL (ref 13.3–17.7)
IMM GRANULOCYTES # BLD: 0 10E3/UL
IMM GRANULOCYTES NFR BLD: 1 %
LDLC SERPL CALC-MCNC: 143 MG/DL
LYMPHOCYTES # BLD AUTO: 1.4 10E3/UL (ref 0.8–5.3)
LYMPHOCYTES NFR BLD AUTO: 23 %
MCH RBC QN AUTO: 29.4 PG (ref 26.5–33)
MCHC RBC AUTO-ENTMCNC: 35.1 G/DL (ref 31.5–36.5)
MCV RBC AUTO: 84 FL (ref 78–100)
MICROALBUMIN UR-MCNC: 19.9 MG/L
MICROALBUMIN/CREAT UR: 7.6 MG/G CR (ref 0–17)
MONOCYTES # BLD AUTO: 0.8 10E3/UL (ref 0–1.3)
MONOCYTES NFR BLD AUTO: 14 %
NEUTROPHILS # BLD AUTO: 3.4 10E3/UL (ref 1.6–8.3)
NEUTROPHILS NFR BLD AUTO: 56 %
NONHDLC SERPL-MCNC: 155 MG/DL
NRBC # BLD AUTO: 0 10E3/UL
NRBC BLD AUTO-RTO: 0 /100
PLAT MORPH BLD: NORMAL
PLATELET # BLD AUTO: 42 10E3/UL (ref 150–450)
POTASSIUM SERPL-SCNC: 4.6 MMOL/L (ref 3.4–5.3)
PROT SERPL-MCNC: 6.6 G/DL (ref 6.4–8.3)
RBC # BLD AUTO: 5.62 10E6/UL (ref 4.4–5.9)
RBC MORPH BLD: NORMAL
SODIUM SERPL-SCNC: 141 MMOL/L (ref 136–145)
TRIGL SERPL-MCNC: 61 MG/DL
TSH SERPL DL<=0.005 MIU/L-ACNC: 2.73 UIU/ML (ref 0.3–4.2)
WBC # BLD AUTO: 6 10E3/UL (ref 4–11)

## 2022-11-30 PROCEDURE — 90471 IMMUNIZATION ADMIN: CPT | Performed by: NURSE PRACTITIONER

## 2022-11-30 PROCEDURE — 82043 UR ALBUMIN QUANTITATIVE: CPT | Performed by: NURSE PRACTITIONER

## 2022-11-30 PROCEDURE — 83036 HEMOGLOBIN GLYCOSYLATED A1C: CPT | Performed by: NURSE PRACTITIONER

## 2022-11-30 PROCEDURE — 80050 GENERAL HEALTH PANEL: CPT | Performed by: NURSE PRACTITIONER

## 2022-11-30 PROCEDURE — 80061 LIPID PANEL: CPT | Performed by: NURSE PRACTITIONER

## 2022-11-30 PROCEDURE — 90686 IIV4 VACC NO PRSV 0.5 ML IM: CPT | Performed by: NURSE PRACTITIONER

## 2022-11-30 PROCEDURE — 36415 COLL VENOUS BLD VENIPUNCTURE: CPT | Performed by: NURSE PRACTITIONER

## 2022-11-30 PROCEDURE — 99395 PREV VISIT EST AGE 18-39: CPT | Mod: 25 | Performed by: NURSE PRACTITIONER

## 2022-11-30 RX ORDER — ATORVASTATIN CALCIUM 10 MG/1
10 TABLET, FILM COATED ORAL DAILY
Qty: 90 TABLET | Refills: 3 | Status: SHIPPED | OUTPATIENT
Start: 2022-11-30

## 2022-11-30 ASSESSMENT — ENCOUNTER SYMPTOMS
EYE PAIN: 0
CONSTIPATION: 0
PALPITATIONS: 0
DYSURIA: 0
WEAKNESS: 0
NERVOUS/ANXIOUS: 0
JOINT SWELLING: 0
ABDOMINAL PAIN: 0
HEARTBURN: 0
ARTHRALGIAS: 0
DIARRHEA: 0
COUGH: 0
HEMATURIA: 0
SHORTNESS OF BREATH: 0
FEVER: 0
CHILLS: 0
HEADACHES: 0
HEMATOCHEZIA: 0
NAUSEA: 0
MYALGIAS: 0
DIZZINESS: 0
SORE THROAT: 0
PARESTHESIAS: 0

## 2022-11-30 NOTE — PROGRESS NOTES
SUBJECTIVE:   CC: Billy is an 32 year old who presents for preventative health visit.     Healthy Habits:     Getting at least 3 servings of Calcium per day:  Yes    Bi-annual eye exam:  Yes    Dental care twice a year:  Yes    Sleep apnea or symptoms of sleep apnea:  None    Diet:  Breakfast skipped    Frequency of exercise:  2-3 days/week    Duration of exercise:  30-45 minutes    Taking medications regularly:  Yes    Medication side effects:  None    PHQ-2 Total Score: 0    Additional concerns today:  No              Today's PHQ-2 Score:   PHQ-2 ( 1999 Pf[employer]) 11/30/2022   Q1: Little interest or pleasure in doing things 0   Q2: Feeling down, depressed or hopeless 0   PHQ-2 Score 0   PHQ-2 Total Score (12-17 Years)- Positive if 3 or more points; Administer PHQ-A if positive -   Q1: Little interest or pleasure in doing things Not at all   Q2: Feeling down, depressed or hopeless Not at all   PHQ-2 Score 0       Have you ever done Advance Care Planning? (For example, a Health Directive, POLST, or a discussion with a medical provider or your loved ones about your wishes): No, advance care planning information given to patient to review.  Patient declined advance care planning discussion at this time.    Social History     Tobacco Use     Smoking status: Never     Smokeless tobacco: Never   Substance Use Topics     Alcohol use: No     Comment: rarely         Alcohol Use 11/30/2022   Prescreen: >3 drinks/day or >7 drinks/week? No       Last PSA: No results found for: PSA    Reviewed orders with patient. Reviewed health maintenance and updated orders accordingly - Yes      Reviewed and updated as needed this visit by clinical staff   Tobacco  Allergies  Meds  Problems  Med Hx  Surg Hx  Fam Hx          Reviewed and updated as needed this visit by Provider    Allergies                  Review of Systems   Constitutional: Negative for chills and fever.   HENT: Negative for congestion, ear pain, hearing loss and sore  "throat.    Eyes: Negative for pain and visual disturbance.   Respiratory: Negative for cough and shortness of breath.    Cardiovascular: Negative for chest pain, palpitations and peripheral edema.   Gastrointestinal: Negative for abdominal pain, constipation, diarrhea, heartburn, hematochezia and nausea.   Genitourinary: Negative for dysuria, genital sores, hematuria and urgency.   Musculoskeletal: Negative for arthralgias, joint swelling and myalgias.   Skin: Negative for rash.   Neurological: Negative for dizziness, weakness, headaches and paresthesias.   Psychiatric/Behavioral: Negative for mood changes. The patient is not nervous/anxious.      Sees endocrine for diabetes   a1c has been excellent     Wt Readings from Last 4 Encounters:   11/30/22 128.8 kg (284 lb)   01/06/22 122.9 kg (271 lb)   02/24/20 120.7 kg (266 lb)   08/17/19 113.5 kg (250 lb 3.6 oz)     Working towards adoption from [place] - child less than 5 years   Their youngest child is 5     OBJECTIVE:   /76   Pulse 64   Temp 98.1  F (36.7  C) (Oral)   Resp 16   Ht 1.905 m (6' 3\")   Wt 128.8 kg (284 lb)   SpO2 98%   BMI 35.50 kg/m      Physical Exam  GENERAL:  alert and no distress  EYES: Eyes grossly normal to inspection, and conjunctivae and sclerae normal  HENT: ear canals and TM's normal, nose and mouth without ulcers or lesions  NECK: no adenopathy, no asymmetry, masses, or scars and thyroid normal to palpation  RESP: lungs clear to auscultation - no rales, rhonchi or wheezes  CV: regular rate and rhythm, normal S1 S2, no S3 or S4, no murmur, click or rub, no peripheral edema and peripheral pulses strong  ABDOMEN: soft, nontender, no hepatosplenomegaly, no masses and bowel sounds normal   (male): normal male genitalia without lesions or urethral discharge, no hernia  MS: no gross musculoskeletal defects noted, no edema  SKIN: no suspicious lesions or rashes  NEURO: Normal strength and tone, mentation intact and speech " normal  PSYCH: mentation appears normal, affect normal/bright    Diagnostic Test Results:  Labs reviewed in Epic  Lab     ASSESSMENT/PLAN:   (Z00.00) Routine general medical examination at a health care facility  (primary encounter diagnosis)  Comment:   Plan: Lipid panel reflex to direct LDL Non-fasting,         Albumin Random Urine Quantitative with Creat         Ratio, Comprehensive metabolic panel (BMP +         Alb, Alk Phos, ALT, AST, Total. Bili, TP), TSH         with free T4 reflex, CBC with platelets and         differential            (E10.69) Type 1 diabetes mellitus with other specified complication (H)  Comment: uses an insulin pump and sees endocrine   Last a1c good   Lab Results   Component Value Date    A1C 6.0 11/30/2022    A1C 6.5 01/24/2020    A1C 8.3 09/16/2013    A1C 7.9 12/12/2012    A1C 7.9 09/27/2011    A1C 9.2 04/09/2007       Plan: atorvastatin (LIPITOR) 10 MG tablet        He had stopped medication for a while     (E10.5140) Mild nonproliferative diabetic retinopathy of both eyes without macular edema associated with type 1 diabetes mellitus (H)  Comment:   Plan: HEMOGLOBIN A1C, Lipid panel reflex to direct         LDL Non-fasting, Albumin Random Urine         Quantitative with Creat Ratio            (Z13.220) Screening for hyperlipidemia  Comment:   Plan: Lipid panel reflex to direct LDL Non-fasting            (E66.9,  Z68.35) Class 2 obesity without serious comorbidity with body mass index (BMI) of 35.0 to 35.9 in adult, unspecified obesity type  Comment:   Plan: discussed weight loss     (Z23) Need for prophylactic vaccination and inoculation against influenza  Comment:   Plan: INFLUENZA VACCINE IM > 6 MONTHS VALENT IIV4         (AFLURIA/FLUZONE)                    COUNSELING:   Reviewed preventive health counseling, as reflected in patient instructions       Regular exercise       Healthy diet/nutrition       Osteoporosis prevention/bone health      BMI:   Estimated body mass index is  "35.5 kg/m  as calculated from the following:    Height as of this encounter: 1.905 m (6' 3\").    Weight as of this encounter: 128.8 kg (284 lb).         He reports that he has never smoked. He has never used smokeless tobacco.        ZAHRAA Kingsley CNP [facility]  "

## 2022-12-01 ENCOUNTER — LAB (OUTPATIENT)
Dept: LAB | Facility: CLINIC | Age: 32
End: 2022-12-01
Payer: COMMERCIAL

## 2022-12-01 ENCOUNTER — TELEPHONE (OUTPATIENT)
Dept: INTERNAL MEDICINE | Facility: CLINIC | Age: 32
End: 2022-12-01

## 2022-12-01 ENCOUNTER — PATIENT OUTREACH (OUTPATIENT)
Dept: ONCOLOGY | Facility: CLINIC | Age: 32
End: 2022-12-01

## 2022-12-01 DIAGNOSIS — D69.6 LOW PLATELET COUNT (H): Primary | ICD-10-CM

## 2022-12-01 DIAGNOSIS — D69.6 LOW PLATELET COUNT (H): ICD-10-CM

## 2022-12-01 LAB
BASOPHILS # BLD AUTO: 0.1 10E3/UL (ref 0–0.2)
BASOPHILS NFR BLD AUTO: 1 %
EOSINOPHIL # BLD AUTO: 0.2 10E3/UL (ref 0–0.7)
EOSINOPHIL NFR BLD AUTO: 3 %
ERYTHROCYTE [DISTWIDTH] IN BLOOD BY AUTOMATED COUNT: 12.1 % (ref 10–15)
HCT VFR BLD AUTO: 50.6 % (ref 40–53)
HGB BLD-MCNC: 17.5 G/DL (ref 13.3–17.7)
IMM GRANULOCYTES # BLD: 0 10E3/UL
IMM GRANULOCYTES NFR BLD: 0 %
LYMPHOCYTES # BLD AUTO: 2 10E3/UL (ref 0.8–5.3)
LYMPHOCYTES NFR BLD AUTO: 28 %
MCH RBC QN AUTO: 29.2 PG (ref 26.5–33)
MCHC RBC AUTO-ENTMCNC: 34.6 G/DL (ref 31.5–36.5)
MCV RBC AUTO: 85 FL (ref 78–100)
MONOCYTES # BLD AUTO: 0.9 10E3/UL (ref 0–1.3)
MONOCYTES NFR BLD AUTO: 13 %
NEUTROPHILS # BLD AUTO: 3.9 10E3/UL (ref 1.6–8.3)
NEUTROPHILS NFR BLD AUTO: 55 %
NRBC # BLD AUTO: 0 10E3/UL
NRBC BLD AUTO-RTO: 0 /100
PLATELET # BLD AUTO: 43 10E3/UL (ref 150–450)
RBC # BLD AUTO: 5.99 10E6/UL (ref 4.4–5.9)
RETICS # AUTO: 0.08 10E6/UL (ref 0.03–0.1)
RETICS/RBC NFR AUTO: 1.3 % (ref 0.5–2)
WBC # BLD AUTO: 7.2 10E3/UL (ref 4–11)

## 2022-12-01 PROCEDURE — 85025 COMPLETE CBC W/AUTO DIFF WBC: CPT

## 2022-12-01 PROCEDURE — 85045 AUTOMATED RETICULOCYTE COUNT: CPT

## 2022-12-01 PROCEDURE — 36415 COLL VENOUS BLD VENIPUNCTURE: CPT

## 2022-12-01 NOTE — TELEPHONE ENCOUNTER
Jayshree from [facility] lab calling with critical value for platelets.  Reports it was run today and was 43.    Please advise, thanks.

## 2022-12-01 NOTE — PROGRESS NOTES
Referral received for benign heme services, see below.    Referral reason: thrombocytopenia    Current abnormal labs:   Lab Results   Component Value Date    WBC 6.0 11/30/2022    WBC 9.1 08/17/2019     Lab Results   Component Value Date    RBC 5.62 11/30/2022    RBC 5.33 08/17/2019     Lab Results   Component Value Date    HGB 16.5 11/30/2022    HGB 16.1 08/17/2019     Lab Results   Component Value Date    HCT 47.0 11/30/2022    HCT 44.6 08/17/2019     Lab Results   Component Value Date    MCV 84 11/30/2022    MCV 84 08/17/2019     Lab Results   Component Value Date    MCH 29.4 11/30/2022    MCH 30.2 08/17/2019     Lab Results   Component Value Date    MCHC 35.1 11/30/2022    MCHC 36.1 08/17/2019     Lab Results   Component Value Date    RDW 12.2 11/30/2022    RDW 11.9 08/17/2019     Lab Results   Component Value Date    PLT 42 11/30/2022     08/17/2019         Outreach: MyChart message to patient to advise recheck of labs to ensure they are correct as well as a request to referring provider for smear to ensure no clumping or other abnormalities with sample. Platelets rechecked and still low.    Plan: Triage instructions updated and sent to NPS for completion.

## 2022-12-01 NOTE — TELEPHONE ENCOUNTER
Patient has been advised through Oncology/hematology services. Future appointment in process.    Reading emoteShare message, it looks like hematology is requesting a recheck cbc. Order pended.

## 2022-12-01 NOTE — TELEPHONE ENCOUNTER
Seen @ [facility] this am.   Call rec'd from oncall physician regarding low platelet count. He states he was told someone would follow up. Patient wants to get in for retest tomorrow to have it rechecked.      He would like this message to be sent to [facility]: Isabella Mitchell CNP. Please have clinic call him back on Thursday am.  Message will be routed to provider.     Saba Molina RN Triage Nurse Advisor 9:21 PM 11/30/2022    Reason for Disposition    Caller requesting lab results  (Exception: Routine or non-urgent lab result.)    Additional Information    Negative: Lab calling with strep throat test results and triager can call in prescription    Negative: Lab calling with urinalysis test results and triager can call in prescription    Negative: Medication questions    Negative: Medication renewal and refill questions    Negative: Pre-operative or pre-procedural questions    Negative: ED call to PCP (i.e., primary care provider; doctor, NP, or PA)    Negative: Doctor (or NP/PA) call to PCP    Negative: Call about patient who is currently hospitalized    Negative: Lab or radiology calling with CRITICAL test results    Negative: [1] Follow-up call from patient regarding patient's clinical status AND [2] information urgent    Negative: [1] Caller requests to speak ONLY to PCP AND [2] URGENT question    Negative: [1] Caller requests to speak to PCP now AND [2] won't tell us reason for call  (Exception: If 10 pm to 6 am, caller must first discuss reason for the call.)    Negative: Notification of hospital admission    Negative: Notification of death    Protocols used: PCP CALL - NO TRIAGE-[facility]

## 2022-12-01 NOTE — TELEPHONE ENCOUNTER
Called patient and the cbc repeat was done today - platelet low 43  Morphology pending   He will call hematology for appointment

## 2022-12-02 LAB
PATH REPORT.COMMENTS IMP SPEC: NORMAL
PATH REPORT.FINAL DX SPEC: NORMAL
PATH REPORT.MICROSCOPIC SPEC OTHER STN: NORMAL
PATH REPORT.MICROSCOPIC SPEC OTHER STN: NORMAL

## 2022-12-02 PROCEDURE — 85060 BLOOD SMEAR INTERPRETATION: CPT | Performed by: PATHOLOGY

## 2022-12-02 NOTE — PROGRESS NOTES
RECORDS STATUS - ALL OTHER DIAGNOSIS    low platelet count  RECORDS RECEIVED FROM: [facility]   DATE RECEIVED: 12/6/2022   NOTES STATUS DETAILS   OFFICE NOTE from referring provider Complete ref by MIHAELA Mitchell   MEDICATION LIST Complete [facility]   CLINICAL TRIAL TREATMENTS TO DATE     LABS     PATHOLOGY REPORTS     ANYTHING RELATED TO DIAGNOSIS Complete Labs last updated on 12/1/2022   GENONOMIC TESTING     TYPE:     IMAGING (NEED IMAGES & REPORT)     CT SCANS     MRI     MAMMO     ULTRASOUND     PET

## 2022-12-05 LAB
DAT POLY: NORMAL
SPECIMEN EXPIRATION DATE: NORMAL

## 2022-12-05 NOTE — PROGRESS NOTES
[facility]    Hematology/Oncology New Patient Note      Today's Date: 12/6/22    Reason for Consultation: Thrombocytopenia  Referring Provider: ZAHRAA Kingsley CNP      HISTORY OF PRESENT ILLNESS: Billy Carey is a 32 year old male who is referred for thrombocytopenia. 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    Historically, he has had historical normal CBC. On 11/30/22, WBC 6.0, Hb 16.5, PLT 42. Repeat CBC on 12/1/22, WBC 7.2, Hb 17.5, PLT 43. Hepatitis and HIV studies negative.     He has had eustachian tube placement without bleeding event. He has not had any other surgeries.     For DM1- he is followed by endocrinology. He has continuous blood glucose monitoring and an insulin pump.     He drinks alcohol 1-2x/week. No excessive use. No history of smoking.     Family history of VTE in his sister. He is unaware of the nuances surrounding this event. No family history of bleeding disorder or malignancy.       REVIEW OF SYSTEMS:   A 14 point ROS was reviewed with pertinent positives and negatives in the HPI.        HOME MEDICATIONS:  Current Outpatient Medications   Medication Sig Dispense Refill     ASPIRIN NOT PRESCRIBED, INTENTIONAL,        atorvastatin (LIPITOR) 10 MG tablet Take 1 tablet (10 mg) by mouth daily 90 tablet 3     blood glucose (KELL CONTOUR NEXT) test strip Test 4-5 times daily 4 Box 3     Blood Glucose Monitoring Suppl (KELL CONTOUR NEXT LINK) W/DEVICE KIT 1 kit 5 times daily. Use as directed 1 kit 1     glucagon (GLUCAGON EMERGENCY) 1 MG kit Inject 1 mg into the muscle once for 1 dose 1 mg 3     insulin aspart (FIASP) 100 UNIT/ML vial        insulin glargine (BASAGLAR KWIKPEN) 100 UNIT/ML pen INJECT 50 UNITS ONCE DAILY SUBCUTANEOUSLY 15 mL 1     insulin pen needle (BD CHRIST U/F) 32G X 4 MM miscellaneous Use 3-6 pen needles daily or as directed. 50 each 0     vitamin D2 (ERGOCALCIFEROL) 94368 units (1250 mcg) capsule             ALLERGIES:  Allergies   Allergen Reactions     Penicillins Rash         PAST MEDICAL HISTORY:  Past Medical History:   Diagnosis Date     Diabetes mellitus type 1, uncontrolled, insulin dependent 2013         PAST SURGICAL HISTORY:  Past Surgical History:   Procedure Laterality Date     MYRINGOTOMY, INSERT TUBE BILATERAL, COMBINED Bilateral     As a child         SOCIAL HISTORY:  Social History     Socioeconomic History     Marital status:      Spouse name: Jayla     Number of children: 3     Years of education: Not on file     Highest education level: Not on file   Occupational History     Occupation:      Comment: [employer]   Tobacco Use     Smoking status: Never     Smokeless tobacco: Never   Substance and Sexual Activity     Alcohol use: No     Comment: rarely     Drug use: No     Sexual activity: Yes     Partners: Female   Other Topics Concern     Parent/sibling w/ CABG, MI or angioplasty before 65F 55M? Not Asked   Social History Narrative     Not on file     Social Determinants of Health     Financial Resource Strain: Not on file   Food Insecurity: Not on file   Transportation Needs: Not on file   Physical Activity: Not on file   Stress: Not on file   Social Connections: Not on file   Intimate Partner Violence: Not on file   Housing Stability: Not on file         FAMILY HISTORY:  Family History   Problem Relation Age of Onset     C.A.D. No family hx of      Diabetes No family hx of      Hypertension No family hx of      Cancer No family hx of         no skin cancer     Amblyopia No family hx of      Retinal detachment No family hx of      Thyroid Disease No family hx of      Glaucoma No family hx of      Macular Degeneration No family hx of          PHYSICAL EXAM:  Vital signs:  BP (!) 141/91   Pulse 66   Temp 97  F (36.1  C) (Tympanic)   Resp 16   Wt 128.7 kg (283 lb 11.2 oz)   SpO2 99%   BMI 35.46 kg/m     ECO  GENERAL/CONSTITUTIONAL: No acute distress.  EYES: Pupils  are equal, round, and react to light and accommodation. Extraocular movements intact.  No scleral icterus.  ENT/MOUTH: Neck supple. Oropharynx clear, no mucositis.  LYMPH: No anterior cervical, posterior cervical, supraclavicular, axillary or inguinal adenopathy.   RESPIRATORY: Clear to auscultation bilaterally. No crackles or wheezing.   CARDIOVASCULAR: Regular rate and rhythm without murmurs, gallops, or rubs.  GASTROINTESTINAL: No hepatosplenomegaly, masses, or tenderness. The patient has normal bowel sounds. No guarding.  No distention.  MUSCULOSKELETAL: Warm and well-perfused, no cyanosis, clubbing, or edema.  NEUROLOGIC: Cranial nerves II-XII are intact. Alert, oriented, answers questions appropriately.  INTEGUMENTARY: No rashes or jaundice.  GAIT: Steady, does not use assistive device      LABS:   Latest Reference Range & Units 12/06/22 09:40   Sodium 136 - 145 mmol/L 141   Potassium 3.4 - 5.3 mmol/L 3.9   Chloride 98 - 107 mmol/L 102   Carbon Dioxide (CO2) 22 - 29 mmol/L 30 (H)   Urea Nitrogen 6.0 - 20.0 mg/dL 18.4   Creatinine 0.67 - 1.17 mg/dL 1.18 (H)   GFR Estimate >60 mL/min/1.73m2 83   Calcium 8.6 - 10.0 mg/dL 9.7   Anion Gap 7 - 15 mmol/L 9   Albumin 3.5 - 5.2 g/dL 5.0   Protein Total 6.4 - 8.3 g/dL 7.7   Alkaline Phosphatase 40 - 129 U/L 76   ALT 10 - 50 U/L 34   AST 10 - 50 U/L 33   Bilirubin Total <=1.2 mg/dL 0.9   Glucose 70 - 99 mg/dL 67 (L)   Iron 61 - 157 ug/dL 59 (L)   Iron Binding Capacity 240 - 430 ug/dL 319   Iron Sat Index 15 - 46 % 18   Lactate Dehydrogenase 0 - 250 U/L 240   TSH 0.30 - 4.20 uIU/mL 3.85   WBC 4.0 - 11.0 10e3/uL 9.0 (P)   Hemoglobin 13.3 - 17.7 g/dL 17.9 (H) (P)   Hematocrit 40.0 - 53.0 % 52.7 (P)   Platelet Count 150 - 450 10e3/uL 46 (LL) (P)   RBC Count 4.40 - 5.90 10e6/uL 6.20 (H) (P)   MCV 78 - 100 fL 85 (P)   MCH 26.5 - 33.0 pg 28.9 (P)   MCHC 31.5 - 36.5 g/dL 34.0 (P)   RDW 10.0 - 15.0 % 12.3 (P)   Absolute NRBCs 10e3/uL 0.0 (P)   NRBCs per 100 WBC <1 /100 0 (P)   %  Retic 0.5 - 2.0 % 1.4   Absolute Retic 0.025 - 0.095 10e6/uL 0.085   INR 0.85 - 1.15  0.91   PTT 22 - 38 Seconds 25   Fibrinogen 170 - 490 mg/dL 282   SPECIMEN EXPIRATION DATE  [id number]   MIRA Anti-IgG,-C3d  Positive 3+        Latest Reference Range & Units 11/04/20 14:20 06/24/21 16:27   Hepatitis B Surface Antibody <8.00 m[IU]/mL 71.18 (H) 51.48 (H)   Hep B Surface Agn NR^Nonreactive  Nonreactive Nonreactive   Hepatitis B Core Margarette NR^Nonreactive  Nonreactive Nonreactive   Hepatitis C Antibody NR^Nonreactive  Nonreactive Nonreactive   HIV Antigen Antibody Combo NR^Nonreactive      Nonreactive       PATHOLOGY:  Final Diagnosis 12/1/22:   Peripheral blood, morphology:  - Marked thrombocytopenia.  - Hemoglobin quantitatively within normal limits and erythrocytes without diagnostic morphologic abnormality.  - WBC subsets quantitatively within normal limits and without diagnostic morphologic abnormality.  - Negative for schistocytes.  - Negative for overt features of myelodysplasia or circulating blasts.         ASSESSMENT/PLAN:  Billy Carey is a 32 year old male who is referred for thrombocytopenia. 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    1) Thrombocytopenia, acute  -This is a new diagnosis for patient and an incidental finding when he was in for routine physical examination. He has not had any bleeding episodes.   -No new meds, recent infection, or recent surgeries.  -TSH normal at 3.85.   -HepB/C and HIV studies historically negative.   -No splenomegaly on physical examination.  -I doubt hemolysis as retic is normal as well as LFTs.  -We discussed the possibility of lab artifact, but there is no comment on platelet clumping on smear. Also, he had lab work repeated two days in a row with PLT count returning in the 40s.   -Given his history of autoimmune disease, he may have underlying ITP, which is a diagnosis of exclusion. We had discussed possible steroid therapy if platelets are to return  <30K.   -Repeat CBC is showing PLTs still in the 40's. Hb is 17.9, WBC normal.   -Also is consideration for a microangiopathic hemolytic anemia. MIRA has come back positive at 3+ (which is inconsistent with MAHA) and patient does not have elevated LFTs, abnormal coag studies, nor reticulocytosis. LDH is also normal at 240. He is afebrile. Will await for further MIRA workup from blood bank as well.  -Repeat peripheral smear is pending. Will need to assess for evidence of schistocytosis.   -Discussed bone marrow biopsy if he is to develop abnormality in his other cell lines.   -Hold on steroids for now until further information above returns.     2) DM1  -Patient managed by endocrinology.  -If he is to need steroids, we discussed he will need to stay in contact with his endo team due to risk of DKA with steroid treatment.     3) History of HTN, HLD    4) ELIZABETH  -Check renal US.    5) Check weekly CBC, CMP, LDH, coag studies, MIRA to monitor for worsening of counts with follow up in 3-4 weeks.    Complexity: HIGH.     Hayde Lopez DO  Hematology/Oncology  [facility]

## 2022-12-06 ENCOUNTER — PATIENT OUTREACH (OUTPATIENT)
Dept: ONCOLOGY | Facility: CLINIC | Age: 32
End: 2022-12-06

## 2022-12-06 ENCOUNTER — LAB (OUTPATIENT)
Dept: ONCOLOGY | Facility: CLINIC | Age: 32
End: 2022-12-06
Attending: NURSE PRACTITIONER
Payer: COMMERCIAL

## 2022-12-06 ENCOUNTER — PRE VISIT (OUTPATIENT)
Dept: ONCOLOGY | Facility: CLINIC | Age: 32
End: 2022-12-06

## 2022-12-06 ENCOUNTER — MYC MEDICAL ADVICE (OUTPATIENT)
Dept: ONCOLOGY | Facility: CLINIC | Age: 32
End: 2022-12-06

## 2022-12-06 VITALS
DIASTOLIC BLOOD PRESSURE: 91 MMHG | HEART RATE: 66 BPM | SYSTOLIC BLOOD PRESSURE: 141 MMHG | OXYGEN SATURATION: 99 % | WEIGHT: 283.7 LBS | RESPIRATION RATE: 16 BRPM | BODY MASS INDEX: 35.46 KG/M2 | TEMPERATURE: 97 F

## 2022-12-06 DIAGNOSIS — N17.9 ACUTE KIDNEY INJURY (H): Primary | ICD-10-CM

## 2022-12-06 DIAGNOSIS — D69.6 LOW PLATELET COUNT (H): ICD-10-CM

## 2022-12-06 LAB
ALBUMIN SERPL BCG-MCNC: 5 G/DL (ref 3.5–5.2)
ALP SERPL-CCNC: 76 U/L (ref 40–129)
ALT SERPL W P-5'-P-CCNC: 34 U/L (ref 10–50)
ANION GAP SERPL CALCULATED.3IONS-SCNC: 9 MMOL/L (ref 7–15)
APTT PPP: 25 SECONDS (ref 22–38)
AST SERPL W P-5'-P-CCNC: 33 U/L (ref 10–50)
BASOPHILS # BLD AUTO: 0.1 10E3/UL (ref 0–0.2)
BASOPHILS NFR BLD AUTO: 1 %
BILIRUB SERPL-MCNC: 0.9 MG/DL
BUN SERPL-MCNC: 18.4 MG/DL (ref 6–20)
CALCIUM SERPL-MCNC: 9.7 MG/DL (ref 8.6–10)
CHLORIDE SERPL-SCNC: 102 MMOL/L (ref 98–107)
CREAT SERPL-MCNC: 1.18 MG/DL (ref 0.67–1.17)
DEPRECATED HCO3 PLAS-SCNC: 30 MMOL/L (ref 22–29)
EOSINOPHIL # BLD AUTO: 0.4 10E3/UL (ref 0–0.7)
EOSINOPHIL NFR BLD AUTO: 5 %
ERYTHROCYTE [DISTWIDTH] IN BLOOD BY AUTOMATED COUNT: 12.3 % (ref 10–15)
FIBRINOGEN PPP-MCNC: 282 MG/DL (ref 170–490)
FOLATE SERPL-MCNC: 8.8 NG/ML (ref 4.6–34.8)
GFR SERPL CREATININE-BSD FRML MDRD: 83 ML/MIN/1.73M2
GLUCOSE SERPL-MCNC: 67 MG/DL (ref 70–99)
HCT VFR BLD AUTO: 52.7 % (ref 40–53)
HGB BLD-MCNC: 17.9 G/DL (ref 13.3–17.7)
IMM GRANULOCYTES # BLD: 0 10E3/UL
IMM GRANULOCYTES NFR BLD: 0 %
INR PPP: 0.91 (ref 0.85–1.15)
IRON BINDING CAPACITY (ROCHE): 319 UG/DL (ref 240–430)
IRON SATN MFR SERPL: 18 % (ref 15–46)
IRON SERPL-MCNC: 59 UG/DL (ref 61–157)
LDH SERPL L TO P-CCNC: 240 U/L (ref 0–250)
LYMPHOCYTES # BLD AUTO: 2.5 10E3/UL (ref 0.8–5.3)
LYMPHOCYTES NFR BLD AUTO: 28 %
MCH RBC QN AUTO: 28.9 PG (ref 26.5–33)
MCHC RBC AUTO-ENTMCNC: 34 G/DL (ref 31.5–36.5)
MCV RBC AUTO: 85 FL (ref 78–100)
MONOCYTES # BLD AUTO: 1 10E3/UL (ref 0–1.3)
MONOCYTES NFR BLD AUTO: 11 %
NEUTROPHILS # BLD AUTO: 4.9 10E3/UL (ref 1.6–8.3)
NEUTROPHILS NFR BLD AUTO: 55 %
NRBC # BLD AUTO: 0 10E3/UL
NRBC BLD AUTO-RTO: 0 /100
PLATELET # BLD AUTO: 46 10E3/UL (ref 150–450)
POTASSIUM SERPL-SCNC: 3.9 MMOL/L (ref 3.4–5.3)
PROT SERPL-MCNC: 7.7 G/DL (ref 6.4–8.3)
RBC # BLD AUTO: 6.2 10E6/UL (ref 4.4–5.9)
RETICS # AUTO: 0.09 10E6/UL (ref 0.03–0.1)
RETICS/RBC NFR AUTO: 1.4 % (ref 0.5–2)
SODIUM SERPL-SCNC: 141 MMOL/L (ref 136–145)
TSH SERPL DL<=0.005 MIU/L-ACNC: 3.85 UIU/ML (ref 0.3–4.2)
VIT B12 SERPL-MCNC: 890 PG/ML (ref 232–1245)
WBC # BLD AUTO: 9 10E3/UL (ref 4–11)

## 2022-12-06 PROCEDURE — 85610 PROTHROMBIN TIME: CPT | Performed by: INTERNAL MEDICINE

## 2022-12-06 PROCEDURE — 84999 UNLISTED CHEMISTRY PROCEDURE: CPT | Performed by: INTERNAL MEDICINE

## 2022-12-06 PROCEDURE — 83550 IRON BINDING TEST: CPT | Performed by: INTERNAL MEDICINE

## 2022-12-06 PROCEDURE — 99205 OFFICE O/P NEW HI 60 MIN: CPT | Performed by: INTERNAL MEDICINE

## 2022-12-06 PROCEDURE — 86880 COOMBS TEST DIRECT: CPT | Performed by: INTERNAL MEDICINE

## 2022-12-06 PROCEDURE — 85045 AUTOMATED RETICULOCYTE COUNT: CPT | Performed by: INTERNAL MEDICINE

## 2022-12-06 PROCEDURE — 80053 COMPREHEN METABOLIC PANEL: CPT | Performed by: INTERNAL MEDICINE

## 2022-12-06 PROCEDURE — G0463 HOSPITAL OUTPT CLINIC VISIT: HCPCS

## 2022-12-06 PROCEDURE — 82607 VITAMIN B-12: CPT | Performed by: INTERNAL MEDICINE

## 2022-12-06 PROCEDURE — 85025 COMPLETE CBC W/AUTO DIFF WBC: CPT | Performed by: INTERNAL MEDICINE

## 2022-12-06 PROCEDURE — 83010 ASSAY OF HAPTOGLOBIN QUANT: CPT | Performed by: INTERNAL MEDICINE

## 2022-12-06 PROCEDURE — 82040 ASSAY OF SERUM ALBUMIN: CPT | Performed by: INTERNAL MEDICINE

## 2022-12-06 PROCEDURE — 85730 THROMBOPLASTIN TIME PARTIAL: CPT | Performed by: INTERNAL MEDICINE

## 2022-12-06 PROCEDURE — 84443 ASSAY THYROID STIM HORMONE: CPT | Performed by: INTERNAL MEDICINE

## 2022-12-06 PROCEDURE — 83615 LACTATE (LD) (LDH) ENZYME: CPT | Performed by: INTERNAL MEDICINE

## 2022-12-06 PROCEDURE — 85384 FIBRINOGEN ACTIVITY: CPT | Performed by: INTERNAL MEDICINE

## 2022-12-06 PROCEDURE — 36415 COLL VENOUS BLD VENIPUNCTURE: CPT | Performed by: INTERNAL MEDICINE

## 2022-12-06 PROCEDURE — 85060 BLOOD SMEAR INTERPRETATION: CPT | Performed by: PATHOLOGY

## 2022-12-06 PROCEDURE — 86900 BLOOD TYPING SEROLOGIC ABO: CPT | Performed by: INTERNAL MEDICINE

## 2022-12-06 PROCEDURE — 82746 ASSAY OF FOLIC ACID SERUM: CPT | Performed by: INTERNAL MEDICINE

## 2022-12-06 PROCEDURE — 86901 BLOOD TYPING SEROLOGIC RH(D): CPT | Performed by: INTERNAL MEDICINE

## 2022-12-06 ASSESSMENT — PAIN SCALES - GENERAL: PAINLEVEL: NO PAIN (0)

## 2022-12-06 NOTE — PROGRESS NOTES
Medical Assistant Note:  Billy Carey presents today for blood draw.    Patient seen by provider today: Yes: .   present during visit today: Not Applicable.    Concerns: No Concerns.    Procedure:  Lab draw site: right antecub, Needle type: butterfly, Gauge: 21.    Post Assessment:  Labs drawn without difficulty: Yes.    Discharge Plan:  Departure Mode: Ambulatory.    Face to Face Time: 10.    Shanna Quiroz, CMA

## 2022-12-06 NOTE — PROGRESS NOTES
received a call from Av stating that he has a planned work from 12/06/22 - 12/08/22. He is calling to obtain Dr. Lopez's opinion about this trip.     Writer spoke to Dr. Lopez who recommends that Billy NOT go on this trip as his platelets are very low and require frequent monitoring and with these low platelets he is at risk for bleed. Writer spoke to Billy and he expressed understanding. Writer sent a letter to Billy's mychart to excuse him from this trip.     Armida Gibbons RN on 12/6/2022 at 3:04 PM

## 2022-12-06 NOTE — LETTER
December 6, 2022      To Whom it May Concern,       Please excuse Billy from his scheduled work trip on 12/06/22 - 12/08/22 as he has an urgent medical concern that needs to be monitored. Please contact our clinic at [phone] if you have any additional questions. Thank you.      Sincerely,      Dr. Hayde Lopez

## 2022-12-06 NOTE — PROGRESS NOTES
DATE:  12/6/2022   TIME OF RECEIPT FROM LAB: 9:40 am   LAB TEST: Platelets   LAB VALUE:  46  RESULTS GIVEN WITH READ-BACK TO: Hayde Lopez   TIME LAB VALUE REPORTED TO PROVIDER:   9:50 am       Latest Reference Range & Units 12/06/22 09:40   Platelet Count 150 - 450 10e3/uL 46 (LL) (P)   (LL): Data is critically low  (P): Preliminary    Writer spoke to Dr. Lopez. No change in the plan of care at this time. Dr. Lopez states that she will call the patient to discuss these results.    Armida Gibbons RN on 12/6/2022 at 11:15 AM

## 2022-12-06 NOTE — LETTER
12/6/2022         RE: Billy Carey  [address] [id number]        Dear Colleague,    Thank you for referring your patient, Billy Carey, to the [facility]. Please see a copy of my visit note below.    [facility]    Hematology/Oncology New Patient Note      Today's Date: 12/6/22    Reason for Consultation: Thrombocytopenia  Referring Provider: ZAHRAA Kingsley CNP      HISTORY OF PRESENT ILLNESS: Billy Carey is a 32 year old male who is referred for thrombocytopenia. 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    Historically, he has had historical normal CBC. On 11/30/22, WBC 6.0, Hb 16.5, PLT 42. Repeat CBC on 12/1/22, WBC 7.2, Hb 17.5, PLT 43. Hepatitis and HIV studies negative.     He has had eustachian tube placement without bleeding event. He has not had any other surgeries.     For DM1- he is followed by endocrinology. He has continuous blood glucose monitoring and an insulin pump.     He drinks alcohol 1-2x/week. No excessive use. No history of smoking.     Family history of VTE in his sister. He is unaware of the nuances surrounding this event. No family history of bleeding disorder or malignancy.       REVIEW OF SYSTEMS:   A 14 point ROS was reviewed with pertinent positives and negatives in the HPI.        HOME MEDICATIONS:  Current Outpatient Medications   Medication Sig Dispense Refill     ASPIRIN NOT PRESCRIBED, INTENTIONAL,        atorvastatin (LIPITOR) 10 MG tablet Take 1 tablet (10 mg) by mouth daily 90 tablet 3     blood glucose (KELL CONTOUR NEXT) test strip Test 4-5 times daily 4 Box 3     Blood Glucose Monitoring Suppl (KELL CONTOUR NEXT LINK) W/DEVICE KIT 1 kit 5 times daily. Use as directed 1 kit 1     glucagon (GLUCAGON EMERGENCY) 1 MG kit Inject 1 mg into the muscle once for 1 dose 1 mg 3     insulin aspart (FIASP) 100 UNIT/ML vial        insulin glargine (BASAGLAR KWIKPEN) 100 UNIT/ML pen  INJECT 50 UNITS ONCE DAILY SUBCUTANEOUSLY 15 mL 1     insulin pen needle (BD CHRIST U/F) 32G X 4 MM miscellaneous Use 3-6 pen needles daily or as directed. 50 each 0     vitamin D2 (ERGOCALCIFEROL) 67599 units (1250 mcg) capsule            ALLERGIES:  Allergies   Allergen Reactions     Penicillins Rash         PAST MEDICAL HISTORY:  Past Medical History:   Diagnosis Date     Diabetes mellitus type 1, uncontrolled, insulin dependent 9/16/2013         PAST SURGICAL HISTORY:  Past Surgical History:   Procedure Laterality Date     MYRINGOTOMY, INSERT TUBE BILATERAL, COMBINED Bilateral     As a child         SOCIAL HISTORY:  Social History     Socioeconomic History     Marital status:      Spouse name: Jayla     Number of children: 3     Years of education: Not on file     Highest education level: Not on file   Occupational History     Occupation:      Comment: [employer]   Tobacco Use     Smoking status: Never     Smokeless tobacco: Never   Substance and Sexual Activity     Alcohol use: No     Comment: rarely     Drug use: No     Sexual activity: Yes     Partners: Female   Other Topics Concern     Parent/sibling w/ CABG, MI or angioplasty before 65F 55M? Not Asked   Social History Narrative     Not on file     Social Determinants of Health     Financial Resource Strain: Not on file   Food Insecurity: Not on file   Transportation Needs: Not on file   Physical Activity: Not on file   Stress: Not on file   Social Connections: Not on file   Intimate Partner Violence: Not on file   Housing Stability: Not on file         FAMILY HISTORY:  Family History   Problem Relation Age of Onset     C.A.D. No family hx of      Diabetes No family hx of      Hypertension No family hx of      Cancer No family hx of         no skin cancer     Amblyopia No family hx of      Retinal detachment No family hx of      Thyroid Disease No family hx of      Glaucoma No family hx of      Macular Degeneration No family hx of           PHYSICAL EXAM:  Vital signs:  BP (!) 141/91   Pulse 66   Temp 97  F (36.1  C) (Tympanic)   Resp 16   Wt 128.7 kg (283 lb 11.2 oz)   SpO2 99%   BMI 35.46 kg/m     ECO  GENERAL/CONSTITUTIONAL: No acute distress.  EYES: Pupils are equal, round, and react to light and accommodation. Extraocular movements intact.  No scleral icterus.  ENT/MOUTH: Neck supple. Oropharynx clear, no mucositis.  LYMPH: No anterior cervical, posterior cervical, supraclavicular, axillary or inguinal adenopathy.   RESPIRATORY: Clear to auscultation bilaterally. No crackles or wheezing.   CARDIOVASCULAR: Regular rate and rhythm without murmurs, gallops, or rubs.  GASTROINTESTINAL: No hepatosplenomegaly, masses, or tenderness. The patient has normal bowel sounds. No guarding.  No distention.  MUSCULOSKELETAL: Warm and well-perfused, no cyanosis, clubbing, or edema.  NEUROLOGIC: Cranial nerves II-XII are intact. Alert, oriented, answers questions appropriately.  INTEGUMENTARY: No rashes or jaundice.  GAIT: Steady, does not use assistive device      LABS:   Latest Reference Range & Units 22 09:40   Sodium 136 - 145 mmol/L 141   Potassium 3.4 - 5.3 mmol/L 3.9   Chloride 98 - 107 mmol/L 102   Carbon Dioxide (CO2) 22 - 29 mmol/L 30 (H)   Urea Nitrogen 6.0 - 20.0 mg/dL 18.4   Creatinine 0.67 - 1.17 mg/dL 1.18 (H)   GFR Estimate >60 mL/min/1.73m2 83   Calcium 8.6 - 10.0 mg/dL 9.7   Anion Gap 7 - 15 mmol/L 9   Albumin 3.5 - 5.2 g/dL 5.0   Protein Total 6.4 - 8.3 g/dL 7.7   Alkaline Phosphatase 40 - 129 U/L 76   ALT 10 - 50 U/L 34   AST 10 - 50 U/L 33   Bilirubin Total <=1.2 mg/dL 0.9   Glucose 70 - 99 mg/dL 67 (L)   Iron 61 - 157 ug/dL 59 (L)   Iron Binding Capacity 240 - 430 ug/dL 319   Iron Sat Index 15 - 46 % 18   Lactate Dehydrogenase 0 - 250 U/L 240   TSH 0.30 - 4.20 uIU/mL 3.85   WBC 4.0 - 11.0 10e3/uL 9.0 (P)   Hemoglobin 13.3 - 17.7 g/dL 17.9 (H) (P)   Hematocrit 40.0 - 53.0 % 52.7 (P)   Platelet Count 150 - 450 10e3/uL 46  (LL) (P)   RBC Count 4.40 - 5.90 10e6/uL 6.20 (H) (P)   MCV 78 - 100 fL 85 (P)   MCH 26.5 - 33.0 pg 28.9 (P)   MCHC 31.5 - 36.5 g/dL 34.0 (P)   RDW 10.0 - 15.0 % 12.3 (P)   Absolute NRBCs 10e3/uL 0.0 (P)   NRBCs per 100 WBC <1 /100 0 (P)   % Retic 0.5 - 2.0 % 1.4   Absolute Retic 0.025 - 0.095 10e6/uL 0.085   INR 0.85 - 1.15  0.91   PTT 22 - 38 Seconds 25   Fibrinogen 170 - 490 mg/dL 282   SPECIMEN EXPIRATION DATE  [id number]   MIRA Anti-IgG,-C3d  Positive 3+        Latest Reference Range & Units 11/04/20 14:20 06/24/21 16:27   Hepatitis B Surface Antibody <8.00 m[IU]/mL 71.18 (H) 51.48 (H)   Hep B Surface Agn NR^Nonreactive  Nonreactive Nonreactive   Hepatitis B Core Margarette NR^Nonreactive  Nonreactive Nonreactive   Hepatitis C Antibody NR^Nonreactive  Nonreactive Nonreactive   HIV Antigen Antibody Combo NR^Nonreactive      Nonreactive       PATHOLOGY:  Final Diagnosis 12/1/22:   Peripheral blood, morphology:  - Marked thrombocytopenia.  - Hemoglobin quantitatively within normal limits and erythrocytes without diagnostic morphologic abnormality.  - WBC subsets quantitatively within normal limits and without diagnostic morphologic abnormality.  - Negative for schistocytes.  - Negative for overt features of myelodysplasia or circulating blasts.         ASSESSMENT/PLAN:  Billy Carey is a 32 year old male who is referred for thrombocytopenia. 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    1) Thrombocytopenia, acute  -This is a new diagnosis for patient and an incidental finding when he was in for routine physical examination. He has not had any bleeding episodes.   -No new meds, recent infection, or recent surgeries.  -TSH normal at 3.85.   -HepB/C and HIV studies historically negative.   -No splenomegaly on physical examination.  -I doubt hemolysis as retic is normal as well as LFTs.  -We discussed the possibility of lab artifact, but there is no comment on platelet clumping on smear. Also, he had lab work  repeated two days in a row with PLT count returning in the 40s.   -Given his history of autoimmune disease, he may have underlying ITP, which is a diagnosis of exclusion. We had discussed possible steroid therapy if platelets are to return <30K.   -Repeat CBC is showing PLTs still in the 40's. Hb is 17.9, WBC normal.   -Also is consideration for a microangiopathic hemolytic anemia. MIRA has come back positive at 3+ (which is inconsistent with MAHA) and patient does not have elevated LFTs, abnormal coag studies, nor reticulocytosis. LDH is also normal at 240. He is afebrile. Will await for further MIRA workup from blood bank as well.  -Repeat peripheral smear is pending. Will need to assess for evidence of schistocytosis.   -Discussed bone marrow biopsy if he is to develop abnormality in his other cell lines.   -Hold on steroids for now until further information above returns.     2) DM1  -Patient managed by endocrinology.  -If he is to need steroids, we discussed he will need to stay in contact with his endo team due to risk of DKA with steroid treatment.     3) History of HTN, HLD    4) ELIZABETH  -Check renal US.    5) Check weekly CBC, CMP, LDH, coag studies, MIRA to monitor for worsening of counts with follow up in 3-4 weeks.    Complexity: HIGH.     Hayde Lopez DO  Hematology/Oncology  [facility]        Again, thank you for allowing me to participate in the care of your patient.        Sincerely,        Hayde Lopez DO

## 2022-12-06 NOTE — NURSING NOTE
"Oncology Rooming Note    December 6, 2022 9:11 AM   Billy Carey is a 32 year old male who presents for:    Chief Complaint   Patient presents with     Oncology Clinic Visit     New Patient     Initial Vitals: BP (!) 141/91   Pulse 66   Temp 97  F (36.1  C) (Tympanic)   Resp 16   Wt 128.7 kg (283 lb 11.2 oz)   SpO2 99%   BMI 35.46 kg/m   Estimated body mass index is 35.46 kg/m  as calculated from the following:    Height as of 11/30/22: 1.905 m (6' 3\").    Weight as of this encounter: 128.7 kg (283 lb 11.2 oz). Body surface area is 2.61 meters squared.  No Pain (0) Comment: Data Unavailable   No LMP for male patient.  Allergies reviewed: Yes  Medications reviewed: Yes    Medications: Medication refills not needed today.  Pharmacy name entered into [employer]:    [id number] [place], [place] - [address] [place] [address] [address] AT [facility]  [id number] [place] [address]  [id number] - [place], [place] - [address]  [facility] [place] [facility] [address] [place] [address]      Radha Burt CMA              "

## 2022-12-07 ENCOUNTER — MYC MEDICAL ADVICE (OUTPATIENT)
Dept: ONCOLOGY | Facility: CLINIC | Age: 32
End: 2022-12-07

## 2022-12-07 ENCOUNTER — HOSPITAL ENCOUNTER (OUTPATIENT)
Dept: CT IMAGING | Facility: CLINIC | Age: 32
Discharge: HOME | End: 2022-12-07
Attending: INTERNAL MEDICINE | Admitting: INTERNAL MEDICINE
Payer: COMMERCIAL

## 2022-12-07 ENCOUNTER — INFUSION THERAPY VISIT (OUTPATIENT)
Dept: INFUSION THERAPY | Facility: CLINIC | Age: 32
End: 2022-12-07
Attending: INTERNAL MEDICINE
Payer: COMMERCIAL

## 2022-12-07 VITALS
SYSTOLIC BLOOD PRESSURE: 141 MMHG | HEART RATE: 89 BPM | TEMPERATURE: 98.1 F | DIASTOLIC BLOOD PRESSURE: 89 MMHG | OXYGEN SATURATION: 98 % | RESPIRATION RATE: 16 BRPM

## 2022-12-07 DIAGNOSIS — D69.6 LOW PLATELET COUNT (H): Primary | ICD-10-CM

## 2022-12-07 DIAGNOSIS — R59.1 DIFFUSE LYMPHADENOPATHY: ICD-10-CM

## 2022-12-07 DIAGNOSIS — N17.9 ACUTE KIDNEY INJURY (H): Primary | ICD-10-CM

## 2022-12-07 DIAGNOSIS — D69.6 THROMBOCYTOPENIA (H): Primary | ICD-10-CM

## 2022-12-07 DIAGNOSIS — N17.9 ACUTE KIDNEY INJURY (H): ICD-10-CM

## 2022-12-07 DIAGNOSIS — D69.6 LOW PLATELET COUNT (H): ICD-10-CM

## 2022-12-07 LAB
HAPTOGLOB SERPL-MCNC: 37 MG/DL (ref 32–197)
PATH REPORT.COMMENTS IMP SPEC: NORMAL
PATH REPORT.COMMENTS IMP SPEC: NORMAL
PATH REPORT.FINAL DX SPEC: NORMAL
PATH REPORT.MICROSCOPIC SPEC OTHER STN: NORMAL
PATH REPORT.MICROSCOPIC SPEC OTHER STN: NORMAL
PATH REPORT.RELEVANT HX SPEC: NORMAL

## 2022-12-07 PROCEDURE — 96360 HYDRATION IV INFUSION INIT: CPT

## 2022-12-07 PROCEDURE — 258N000003 HC RX IP 258 OP 636: Performed by: INTERNAL MEDICINE

## 2022-12-07 PROCEDURE — 74177 CT ABD & PELVIS W/CONTRAST: CPT

## 2022-12-07 PROCEDURE — 250N000011 HC RX IP 250 OP 636: Performed by: INTERNAL MEDICINE

## 2022-12-07 RX ORDER — DIPHENHYDRAMINE HYDROCHLORIDE 50 MG/ML
50 INJECTION INTRAMUSCULAR; INTRAVENOUS
Status: CANCELLED
Start: 2022-12-07

## 2022-12-07 RX ORDER — HEPARIN SODIUM (PORCINE) LOCK FLUSH IV SOLN 100 UNIT/ML 100 UNIT/ML
5 SOLUTION INTRAVENOUS
Status: CANCELLED | OUTPATIENT
Start: 2022-12-07

## 2022-12-07 RX ORDER — MEPERIDINE HYDROCHLORIDE 25 MG/ML
25 INJECTION INTRAMUSCULAR; INTRAVENOUS; SUBCUTANEOUS EVERY 30 MIN PRN
Status: CANCELLED | OUTPATIENT
Start: 2022-12-07

## 2022-12-07 RX ORDER — EPINEPHRINE 1 MG/ML
0.3 INJECTION, SOLUTION INTRAMUSCULAR; SUBCUTANEOUS EVERY 5 MIN PRN
Status: CANCELLED | OUTPATIENT
Start: 2022-12-07

## 2022-12-07 RX ORDER — METHYLPREDNISOLONE SODIUM SUCCINATE 125 MG/2ML
125 INJECTION, POWDER, LYOPHILIZED, FOR SOLUTION INTRAMUSCULAR; INTRAVENOUS
Status: CANCELLED
Start: 2022-12-07

## 2022-12-07 RX ORDER — ALBUTEROL SULFATE 90 UG/1
1-2 AEROSOL, METERED RESPIRATORY (INHALATION)
Status: CANCELLED
Start: 2022-12-07

## 2022-12-07 RX ORDER — ALBUTEROL SULFATE 0.83 MG/ML
2.5 SOLUTION RESPIRATORY (INHALATION)
Status: CANCELLED | OUTPATIENT
Start: 2022-12-07

## 2022-12-07 RX ORDER — IOPAMIDOL 755 MG/ML
500 INJECTION, SOLUTION INTRAVASCULAR ONCE
Status: COMPLETED | OUTPATIENT
Start: 2022-12-07 | End: 2022-12-07

## 2022-12-07 RX ORDER — HEPARIN SODIUM,PORCINE 10 UNIT/ML
5 VIAL (ML) INTRAVENOUS
Status: CANCELLED | OUTPATIENT
Start: 2022-12-07

## 2022-12-07 RX ADMIN — SODIUM CHLORIDE 1000 ML: 9 INJECTION, SOLUTION INTRAVENOUS at 11:25

## 2022-12-07 RX ADMIN — IOPAMIDOL 100 ML: 755 INJECTION, SOLUTION INTRAVENOUS at 11:11

## 2022-12-07 RX ADMIN — SODIUM CHLORIDE 58 ML: 9 INJECTION, SOLUTION INTRAVENOUS at 11:11

## 2022-12-07 NOTE — TELEPHONE ENCOUNTER
Writer spoke to Billy about Dr. Lopez's recommendations. He verbalized understanding. He is scheduled for a STAT CT and IV fluids today.    Armida Gibbons RN on 12/7/2022 at 12:19 PM

## 2022-12-07 NOTE — PROGRESS NOTES
Called patient to review CT results showing diffuse LAD, including pericardial fat pad mass/LAD. His labwork has returned with MIRA 3+. Haptoglobin is detectable.    I had discussed my concern for a lymphoproliferative neoplasm.  Patient is agreeable to the following:    Continue to hold on steroids.  Schedule for PET.  Schedule for bone marrow biopsy.     Will try to get this scheduled by the end of the week into early next week. He is willing to have these completed at the  to expedite workup.     Hayde Lopez,   Hematology/Oncology  [facility]

## 2022-12-07 NOTE — PROGRESS NOTES
Infusion Nursing Note:  Billy Carey presents today for IV fluids, 1L NS.    Patient seen by provider today: No, saw Dr Lopez yesterday.   present during visit today: Not Applicable.    Note: N/A.    Intravenous Access:  Peripheral IV placed.    Treatment Conditions:  Not Applicable.    Post Infusion Assessment:  Patient tolerated infusion without incident.  Site patent and intact, free from redness, edema or discomfort.  No evidence of extravasations.  Access discontinued per protocol.     Discharge Plan:   Discharge instructions reviewed with: Patient.  Patient and/or family verbalized understanding of discharge instructions and all questions answered.  AVS to patient via [employer].    Patient discharged in stable condition accompanied by: self and wife.  Departure Mode: Ambulatory.      Francine Iglesias RN

## 2022-12-08 LAB
PERFORMING LABORATORY: NORMAL
SCANNED LAB RESULT: NORMAL
TEST NAME: NORMAL

## 2022-12-08 NOTE — TELEPHONE ENCOUNTER
Patient calling in with questions if he can move Bone marrow biopsy date to Friday 12/9/22. Writer to check with scheduling and update Armida DELGADILLOCC for Dr. Lopez if this can be adjusted. Writer educated patient that appointment on 12/14/22 may be the earliest patient can have biopsy but will update Armida if appointment can be adjusted.     Ovidio Murphy, RN, BSN.  RN Care Coordinator     [facility]   [phone]

## 2022-12-09 ENCOUNTER — HOSPITAL ENCOUNTER (OUTPATIENT)
Dept: PET IMAGING | Facility: HOSPITAL | Age: 32
Discharge: HOME | End: 2022-12-09
Attending: INTERNAL MEDICINE | Admitting: INTERNAL MEDICINE
Payer: COMMERCIAL

## 2022-12-09 DIAGNOSIS — D69.6 THROMBOCYTOPENIA (H): ICD-10-CM

## 2022-12-09 DIAGNOSIS — R59.1 DIFFUSE LYMPHADENOPATHY: Primary | ICD-10-CM

## 2022-12-09 DIAGNOSIS — R59.1 DIFFUSE LYMPHADENOPATHY: ICD-10-CM

## 2022-12-09 LAB — GLUCOSE BLDC GLUCOMTR-MCNC: 62 MG/DL (ref 70–99)

## 2022-12-09 PROCEDURE — A9552 F18 FDG: HCPCS | Performed by: INTERNAL MEDICINE

## 2022-12-09 PROCEDURE — 82962 GLUCOSE BLOOD TEST: CPT

## 2022-12-09 PROCEDURE — 343N000001 HC RX 343: Performed by: INTERNAL MEDICINE

## 2022-12-09 PROCEDURE — 78816 PET IMAGE W/CT FULL BODY: CPT | Mod: PI

## 2022-12-09 RX ADMIN — FLUDEOXYGLUCOSE F-18 16.82 MCI.: 500 INJECTION, SOLUTION INTRAVENOUS at 09:35

## 2022-12-09 NOTE — PROGRESS NOTES
I called IR to review PET and biopsy of a left retroperitoneal LAD given the high FDG avidity. Dr. Llanos is able to complete this.     I called patient to review the above. Will arrange for retroperitoneal LN biopsy.     Bone marrow biopsy already scheduled for 12/4/22.    All questions answered.     Hayde Lopez,   Hematology/Oncology  [facility]

## 2022-12-10 NOTE — TELEPHONE ENCOUNTER
Writer contacted Billy to discuss upcoming bone marrow biopsy instructions. Writer discussed what a bone marrow biopsy is, why it's needed, how to get ready for the biopsy, what happens during the biopsy, length or procedure, when to expect results, self care at home, having a , and when to contact the doctor/triage team.    * Billy denies being on any blood thinning medications.    He is aware of upcoming appointment date/time/location. He understands these instructions and does not have any further questions. Writer sent copy of Your Bone Marrow Biopsy to his [facility].    Armida Gibbons RN on 12/10/2022 at 8:47 AM

## 2022-12-13 LAB
DAT POLY: NORMAL
SPECIMEN EXPIRATION DATE: NORMAL

## 2022-12-14 ENCOUNTER — OFFICE VISIT (OUTPATIENT)
Dept: ONCOLOGY | Facility: CLINIC | Age: 32
End: 2022-12-14
Attending: INTERNAL MEDICINE
Payer: COMMERCIAL

## 2022-12-14 ENCOUNTER — APPOINTMENT (OUTPATIENT)
Dept: LAB | Facility: CLINIC | Age: 32
End: 2022-12-14
Attending: INTERNAL MEDICINE
Payer: COMMERCIAL

## 2022-12-14 VITALS
RESPIRATION RATE: 18 BRPM | DIASTOLIC BLOOD PRESSURE: 77 MMHG | HEART RATE: 99 BPM | TEMPERATURE: 98.9 F | SYSTOLIC BLOOD PRESSURE: 156 MMHG | OXYGEN SATURATION: 100 %

## 2022-12-14 DIAGNOSIS — D69.6 THROMBOCYTOPENIA (H): Primary | ICD-10-CM

## 2022-12-14 DIAGNOSIS — D69.6 LOW PLATELET COUNT (H): ICD-10-CM

## 2022-12-14 DIAGNOSIS — R59.1 DIFFUSE LYMPHADENOPATHY: ICD-10-CM

## 2022-12-14 LAB
ALBUMIN SERPL BCG-MCNC: 4.6 G/DL (ref 3.5–5.2)
ALP SERPL-CCNC: 57 U/L (ref 40–129)
ALT SERPL W P-5'-P-CCNC: 26 U/L (ref 10–50)
ANION GAP SERPL CALCULATED.3IONS-SCNC: 14 MMOL/L (ref 7–15)
APTT PPP: 26 SECONDS (ref 22–38)
AST SERPL W P-5'-P-CCNC: 70 U/L (ref 10–50)
BASOPHILS # BLD AUTO: 0.1 10E3/UL (ref 0–0.2)
BASOPHILS NFR BLD AUTO: 1 %
BILIRUB SERPL-MCNC: 1.8 MG/DL
BLOOD BANK CHART COMMENT: NORMAL
BUN SERPL-MCNC: 11 MG/DL (ref 6–20)
CALCIUM SERPL-MCNC: 9.6 MG/DL (ref 8.6–10)
CHLORIDE SERPL-SCNC: 98 MMOL/L (ref 98–107)
CREAT SERPL-MCNC: 1.23 MG/DL (ref 0.67–1.17)
DAT, ANTI-C3: NEGATIVE
DAT, ANTI-IGG, TUBE: NORMAL
DEPRECATED HCO3 PLAS-SCNC: 26 MMOL/L (ref 22–29)
EOSINOPHIL # BLD AUTO: 0.2 10E3/UL (ref 0–0.7)
EOSINOPHIL NFR BLD AUTO: 4 %
ERYTHROCYTE [DISTWIDTH] IN BLOOD BY AUTOMATED COUNT: 11.9 % (ref 10–15)
FIBRINOGEN PPP-MCNC: 211 MG/DL (ref 170–490)
GFR SERPL CREATININE-BSD FRML MDRD: 79 ML/MIN/1.73M2
GLUCOSE SERPL-MCNC: 214 MG/DL (ref 70–99)
HAPTOGLOB SERPL-MCNC: 31 MG/DL (ref 32–197)
HCT VFR BLD AUTO: 48.4 % (ref 40–53)
HGB BLD-MCNC: 17.3 G/DL (ref 13.3–17.7)
IMM GRANULOCYTES # BLD: 0 10E3/UL
IMM GRANULOCYTES NFR BLD: 0 %
INR PPP: 1.1 (ref 0.85–1.15)
INTERPRETATION: NORMAL
LDH SERPL L TO P-CCNC: 237 U/L (ref 0–250)
LYMPHOCYTES # BLD AUTO: 1.4 10E3/UL (ref 0.8–5.3)
LYMPHOCYTES NFR BLD AUTO: 21 %
MCH RBC QN AUTO: 29.1 PG (ref 26.5–33)
MCHC RBC AUTO-ENTMCNC: 35.7 G/DL (ref 31.5–36.5)
MCV RBC AUTO: 81 FL (ref 78–100)
MONOCYTES # BLD AUTO: 0.7 10E3/UL (ref 0–1.3)
MONOCYTES NFR BLD AUTO: 10 %
NEUTROPHILS # BLD AUTO: 4.3 10E3/UL (ref 1.6–8.3)
NEUTROPHILS NFR BLD AUTO: 64 %
NRBC # BLD AUTO: 0 10E3/UL
NRBC BLD AUTO-RTO: 0 /100
PLATELET # BLD AUTO: 59 10E3/UL (ref 150–450)
POTASSIUM SERPL-SCNC: 4 MMOL/L (ref 3.4–5.3)
PROT SERPL-MCNC: 7 G/DL (ref 6.4–8.3)
RBC # BLD AUTO: 5.95 10E6/UL (ref 4.4–5.9)
SODIUM SERPL-SCNC: 138 MMOL/L (ref 136–145)
SPECIMEN EXPIRATION DATE: NORMAL
WBC # BLD AUTO: 6.6 10E3/UL (ref 4–11)

## 2022-12-14 PROCEDURE — 86880 COOMBS TEST DIRECT: CPT | Performed by: NURSE PRACTITIONER

## 2022-12-14 PROCEDURE — 88305 TISSUE EXAM BY PATHOLOGIST: CPT | Mod: TC | Performed by: NURSE PRACTITIONER

## 2022-12-14 PROCEDURE — 88185 FLOWCYTOMETRY/TC ADD-ON: CPT | Performed by: NURSE PRACTITIONER

## 2022-12-14 PROCEDURE — 88237 TISSUE CULTURE BONE MARROW: CPT | Performed by: NURSE PRACTITIONER

## 2022-12-14 PROCEDURE — 83615 LACTATE (LD) (LDH) ENZYME: CPT | Performed by: NURSE PRACTITIONER

## 2022-12-14 PROCEDURE — 88311 DECALCIFY TISSUE: CPT | Mod: TC | Performed by: NURSE PRACTITIONER

## 2022-12-14 PROCEDURE — 38222 DX BONE MARROW BX & ASPIR: CPT | Performed by: NURSE PRACTITIONER

## 2022-12-14 PROCEDURE — 85610 PROTHROMBIN TIME: CPT | Performed by: NURSE PRACTITIONER

## 2022-12-14 PROCEDURE — 88275 CYTOGENETICS 100-300: CPT | Performed by: NURSE PRACTITIONER

## 2022-12-14 PROCEDURE — 83010 ASSAY OF HAPTOGLOBIN QUANT: CPT | Performed by: NURSE PRACTITIONER

## 2022-12-14 PROCEDURE — 85730 THROMBOPLASTIN TIME PARTIAL: CPT | Performed by: NURSE PRACTITIONER

## 2022-12-14 PROCEDURE — 81277 CYTOGENOMIC NEO MICRORA ALYS: CPT | Performed by: NURSE PRACTITIONER

## 2022-12-14 PROCEDURE — 250N000011 HC RX IP 250 OP 636: Performed by: NURSE PRACTITIONER

## 2022-12-14 PROCEDURE — 88189 FLOWCYTOMETRY/READ 16 & >: CPT | Mod: GC | Performed by: STUDENT IN AN ORGANIZED HEALTH CARE EDUCATION/TRAINING PROGRAM

## 2022-12-14 PROCEDURE — 85004 AUTOMATED DIFF WBC COUNT: CPT | Performed by: NURSE PRACTITIONER

## 2022-12-14 PROCEDURE — 88184 FLOWCYTOMETRY/ TC 1 MARKER: CPT | Performed by: STUDENT IN AN ORGANIZED HEALTH CARE EDUCATION/TRAINING PROGRAM

## 2022-12-14 PROCEDURE — 96374 THER/PROPH/DIAG INJ IV PUSH: CPT

## 2022-12-14 PROCEDURE — 85384 FIBRINOGEN ACTIVITY: CPT | Performed by: NURSE PRACTITIONER

## 2022-12-14 PROCEDURE — 80053 COMPREHEN METABOLIC PANEL: CPT | Performed by: NURSE PRACTITIONER

## 2022-12-14 PROCEDURE — 36415 COLL VENOUS BLD VENIPUNCTURE: CPT | Performed by: NURSE PRACTITIONER

## 2022-12-14 RX ADMIN — MIDAZOLAM HYDROCHLORIDE 2 MG: 1 INJECTION, SOLUTION INTRAMUSCULAR; INTRAVENOUS at 09:53

## 2022-12-14 ASSESSMENT — PAIN SCALES - GENERAL: PAINLEVEL: NO PAIN (0)

## 2022-12-14 NOTE — NURSING NOTE
"Oncology Rooming Note    December 14, 2022 9:25 AM   Billy Carey is a 32 year old male who presents for:    No chief complaint on file.    Initial Vitals: BP (!) 156/77   Pulse 99   Temp 98.9  F (37.2  C)   Resp 18   SpO2 100%  Estimated body mass index is 35.46 kg/m  as calculated from the following:    Height as of 11/30/22: 1.905 m (6' 3\").    Weight as of 12/6/22: 128.7 kg (283 lb 11.2 oz). There is no height or weight on file to calculate BSA.  No Pain (0) Comment: Data Unavailable   No LMP for male patient.  Allergies reviewed: Yes  Medications reviewed: Yes    Medications: Medication refills not needed today.  Pharmacy name entered into [employer]:    [id number] - [place], [place] - [address] [facility] [address] [place] [address]  [id number] [place], [place] - [address]  [id number] [place], [place] - [address] [facility] - [address]    Clinical concerns: none       Jessica Berg RN        BMT Teaching Flowsheet  Teaching Topic: bone marrow biopsy  Person(s) involved in teaching: Patient  Motivation Level  Asks Questions: Yes  Eager to Learn: Yes  Cooperative: Yes  Receptive (willing/able to accept information): Yes  Patient demonstrates understanding of the following:   - Reason for the appointment, diagnosis and treatment plan: Yes  - Knowledge of proper use of medications and conditions for which they are ordered (with special attention to potential side effects or drug interactions): Yes  - Which situations necessitate calling provider and whom to contact: Yes  Teaching concerns addressed: what to expect during and post procedure including restrictions  Proper use and care of (medical equipment, care aids, etc.) NA  Pain management techniques: Yes  Patient instructed on hand hygiene: NA  How and/when to access community resources: NA  Infection Control:  Patient demonstrates understanding of the " following:   Surgical procedure site care taught Yes  Signs and symptoms of infection taught Yes  Wound care taught Yes  Central venous catheter care taught NA  Instructional Materials Used/Given: post procedure instructions  Pt requests IV versed pre med

## 2022-12-14 NOTE — PROGRESS NOTES
BMT ONC Adult Bone Marrow Biopsy Procedure Note  December 14, 2022  BP (!) 156/77   Pulse 99   Temp 98.9  F (37.2  C)   Resp 18   SpO2 100%        DIAGNOSIS: thrombocytopenia      PROCEDURE: Unilateral Bone Marrow Biopsy and Unilateral Aspirate    [place]ATION: [facility]    Patient s identification was positively verified by verbal identification and invasive procedure safety checklist was completed. Informed consent was obtained. Following the administration of Midazolam as pre-medication, patient was placed in the prone position and prepped and draped in a sterile manner. Approximately 10 cc of 1% Lidocaine was used over the left posterior iliac spine. Following this a 3 mm incision was made. Trephine bone marrow core(s) was (were) obtained from the [facility]. Bone marrow aspirates were obtained from the IC. Aspirates were sent for morphology, immunophenotyping, cytogenetics and molecular diagnostics process and hold. A total of approximately 23 ml of marrow was aspirated. Following this procedure a sterile dressing was applied to the bone marrow biopsy site(s). The patient was placed in the supine position to maintain pressure on the biopsy site. Post-procedure wound care instructions were given.     Complications: NO    Interventions: NO    Length of procedure:20 minutes or less      Procedure performed by: Domi Staton CNP on 12/14/2022 at 10:13 AM

## 2022-12-15 ENCOUNTER — HOSPITAL ENCOUNTER (OUTPATIENT)
Dept: CT IMAGING | Facility: CLINIC | Age: 32
Discharge: HOME | End: 2022-12-15
Attending: INTERNAL MEDICINE | Admitting: INTERNAL MEDICINE
Payer: COMMERCIAL

## 2022-12-15 VITALS
RESPIRATION RATE: 18 BRPM | HEART RATE: 64 BPM | OXYGEN SATURATION: 96 % | SYSTOLIC BLOOD PRESSURE: 131 MMHG | DIASTOLIC BLOOD PRESSURE: 68 MMHG

## 2022-12-15 DIAGNOSIS — R59.1 DIFFUSE LYMPHADENOPATHY: ICD-10-CM

## 2022-12-15 LAB
PATH REPORT.COMMENTS IMP SPEC: NORMAL
PATH REPORT.FINAL DX SPEC: NORMAL
PATH REPORT.FINAL DX SPEC: NORMAL
PATH REPORT.MICROSCOPIC SPEC OTHER STN: NORMAL
PATH REPORT.RELEVANT HX SPEC: NORMAL
PATH REPORT.RELEVANT HX SPEC: NORMAL
PATH REPORT.SUPPLEMENTAL REPORTS SPEC: NORMAL

## 2022-12-15 PROCEDURE — 88300 SURGICAL PATH GROSS: CPT | Mod: 26 | Performed by: PATHOLOGY

## 2022-12-15 PROCEDURE — 85097 BONE MARROW INTERPRETATION: CPT | Performed by: PATHOLOGY

## 2022-12-15 PROCEDURE — 88185 FLOWCYTOMETRY/TC ADD-ON: CPT | Performed by: INTERNAL MEDICINE

## 2022-12-15 PROCEDURE — 88313 SPECIAL STAINS GROUP 2: CPT | Mod: 26 | Performed by: PATHOLOGY

## 2022-12-15 PROCEDURE — 250N000009 HC RX 250: Performed by: PHYSICIAN ASSISTANT

## 2022-12-15 PROCEDURE — 88189 FLOWCYTOMETRY/READ 16 & >: CPT | Mod: GC | Performed by: STUDENT IN AN ORGANIZED HEALTH CARE EDUCATION/TRAINING PROGRAM

## 2022-12-15 PROCEDURE — 88161 CYTOPATH SMEAR OTHER SOURCE: CPT | Mod: 26 | Performed by: PATHOLOGY

## 2022-12-15 PROCEDURE — 250N000011 HC RX IP 250 OP 636: Performed by: PHYSICIAN ASSISTANT

## 2022-12-15 PROCEDURE — 77012 CT SCAN FOR NEEDLE BIOPSY: CPT

## 2022-12-15 PROCEDURE — 88305 TISSUE EXAM BY PATHOLOGIST: CPT | Mod: 26 | Performed by: PATHOLOGY

## 2022-12-15 PROCEDURE — 88184 FLOWCYTOMETRY/ TC 1 MARKER: CPT | Performed by: STUDENT IN AN ORGANIZED HEALTH CARE EDUCATION/TRAINING PROGRAM

## 2022-12-15 PROCEDURE — 85060 BLOOD SMEAR INTERPRETATION: CPT | Performed by: PATHOLOGY

## 2022-12-15 PROCEDURE — 88300 SURGICAL PATH GROSS: CPT | Mod: TC | Performed by: INTERNAL MEDICINE

## 2022-12-15 PROCEDURE — 88184 FLOWCYTOMETRY/ TC 1 MARKER: CPT | Performed by: INTERNAL MEDICINE

## 2022-12-15 PROCEDURE — 88311 DECALCIFY TISSUE: CPT | Mod: 26 | Performed by: PATHOLOGY

## 2022-12-15 RX ORDER — FENTANYL CITRATE 50 UG/ML
25-50 INJECTION, SOLUTION INTRAMUSCULAR; INTRAVENOUS EVERY 5 MIN PRN
Status: DISCONTINUED | OUTPATIENT
Start: 2022-12-15 | End: 2022-12-16 | Stop reason: HOSPADM

## 2022-12-15 RX ORDER — FLUMAZENIL 0.1 MG/ML
0.2 INJECTION, SOLUTION INTRAVENOUS
Status: DISCONTINUED | OUTPATIENT
Start: 2022-12-15 | End: 2022-12-16 | Stop reason: HOSPADM

## 2022-12-15 RX ORDER — NALOXONE HYDROCHLORIDE 0.4 MG/ML
0.4 INJECTION, SOLUTION INTRAMUSCULAR; INTRAVENOUS; SUBCUTANEOUS
Status: DISCONTINUED | OUTPATIENT
Start: 2022-12-15 | End: 2022-12-16 | Stop reason: HOSPADM

## 2022-12-15 RX ORDER — NALOXONE HYDROCHLORIDE 0.4 MG/ML
0.2 INJECTION, SOLUTION INTRAMUSCULAR; INTRAVENOUS; SUBCUTANEOUS
Status: DISCONTINUED | OUTPATIENT
Start: 2022-12-15 | End: 2022-12-16 | Stop reason: HOSPADM

## 2022-12-15 RX ADMIN — FENTANYL CITRATE 100 MCG: 50 INJECTION INTRAMUSCULAR; INTRAVENOUS at 11:13

## 2022-12-15 RX ADMIN — MIDAZOLAM HYDROCHLORIDE 1 MG: 1 INJECTION, SOLUTION INTRAMUSCULAR; INTRAVENOUS at 11:23

## 2022-12-15 RX ADMIN — MIDAZOLAM HYDROCHLORIDE 2 MG: 1 INJECTION, SOLUTION INTRAMUSCULAR; INTRAVENOUS at 11:15

## 2022-12-15 RX ADMIN — LIDOCAINE HYDROCHLORIDE 10 ML: 10 INJECTION, SOLUTION EPIDURAL; INFILTRATION; INTRACAUDAL; PERINEURAL at 11:16

## 2022-12-15 RX ADMIN — FENTANYL CITRATE 50 MCG: 50 INJECTION INTRAMUSCULAR; INTRAVENOUS at 11:23

## 2022-12-15 NOTE — SEDATION DOCUMENTATION
Patient tolerated procedure well. Biopsy cores in RPMI taken to lab. Sedation time 21 minutes. Meds given as per EMAR. Patient taken to holding room in stable condition and tolerating apple juice well, at this time. Will continue to monitor.

## 2022-12-19 LAB
INTERPRETATION: NORMAL
PATH REPORT.COMMENTS IMP SPEC: NORMAL
PATH REPORT.FINAL DX SPEC: NORMAL
PATH REPORT.MICROSCOPIC SPEC OTHER STN: NORMAL
PATH REPORT.RELEVANT HX SPEC: NORMAL

## 2022-12-29 LAB
PATH REPORT.COMMENTS IMP SPEC: NORMAL
PATH REPORT.FINAL DX SPEC: NORMAL
PATH REPORT.GROSS SPEC: NORMAL
PATH REPORT.MICROSCOPIC SPEC OTHER STN: NORMAL
PATH REPORT.RELEVANT HX SPEC: NORMAL
PHOTO IMAGE: NORMAL

## 2022-12-30 DIAGNOSIS — R59.1 DIFFUSE LYMPHADENOPATHY: ICD-10-CM

## 2022-12-30 DIAGNOSIS — D69.6 THROMBOCYTOPENIA (H): Primary | ICD-10-CM

## 2022-12-30 LAB
ADDITIONAL COMMENTS: NORMAL
CULTURE HARVEST COMPLETE DATE: NORMAL
INTERPRETATION: NORMAL
ISCN: NORMAL
METHODS: NORMAL

## 2022-12-30 PROCEDURE — 88368 INSITU HYBRIDIZATION MANUAL: CPT | Mod: 26 | Performed by: MEDICAL GENETICS

## 2023-01-04 ENCOUNTER — ANCILLARY ORDERS (OUTPATIENT)
Dept: ONCOLOGY | Facility: CLINIC | Age: 33
End: 2023-01-04

## 2023-01-04 ENCOUNTER — HOSPITAL ENCOUNTER (OUTPATIENT)
Dept: ULTRASOUND IMAGING | Facility: CLINIC | Age: 33
Discharge: HOME | End: 2023-01-04
Attending: INTERNAL MEDICINE | Admitting: INTERNAL MEDICINE
Payer: COMMERCIAL

## 2023-01-04 DIAGNOSIS — D69.6 THROMBOCYTOPENIA: ICD-10-CM

## 2023-01-04 DIAGNOSIS — R59.1 DIFFUSE LYMPHADENOPATHY: ICD-10-CM

## 2023-01-04 DIAGNOSIS — D69.6 THROMBOCYTOPENIA (H): ICD-10-CM

## 2023-01-04 PROCEDURE — 88184 FLOWCYTOMETRY/ TC 1 MARKER: CPT | Performed by: PATHOLOGY

## 2023-01-04 PROCEDURE — 88365 INSITU HYBRIDIZATION (FISH): CPT | Mod: 26 | Performed by: PATHOLOGY

## 2023-01-04 PROCEDURE — 88305 TISSUE EXAM BY PATHOLOGIST: CPT | Mod: TC | Performed by: INTERNAL MEDICINE

## 2023-01-04 PROCEDURE — 250N000009 HC RX 250: Performed by: RADIOLOGY

## 2023-01-04 PROCEDURE — 88307 TISSUE EXAM BY PATHOLOGIST: CPT | Mod: 26 | Performed by: PATHOLOGY

## 2023-01-04 PROCEDURE — 38505 NEEDLE BIOPSY LYMPH NODES: CPT | Mod: RT

## 2023-01-04 PROCEDURE — 88341 IMHCHEM/IMCYTCHM EA ADD ANTB: CPT | Mod: 26 | Performed by: PATHOLOGY

## 2023-01-04 PROCEDURE — 88185 FLOWCYTOMETRY/TC ADD-ON: CPT | Performed by: INTERNAL MEDICINE

## 2023-01-04 PROCEDURE — 88189 FLOWCYTOMETRY/READ 16 & >: CPT | Mod: GC | Performed by: PATHOLOGY

## 2023-01-04 PROCEDURE — 88342 IMHCHEM/IMCYTCHM 1ST ANTB: CPT | Mod: 26 | Performed by: PATHOLOGY

## 2023-01-04 RX ADMIN — LIDOCAINE HYDROCHLORIDE 5 ML: 10 INJECTION, SOLUTION EPIDURAL; INFILTRATION; INTRACAUDAL; PERINEURAL at 14:15

## 2023-01-04 NOTE — DISCHARGE INSTRUCTIONS

## 2023-01-05 LAB
PATH REPORT.COMMENTS IMP SPEC: NORMAL
PATH REPORT.FINAL DX SPEC: NORMAL
PATH REPORT.MICROSCOPIC SPEC OTHER STN: NORMAL
PATH REPORT.RELEVANT HX SPEC: NORMAL

## 2023-01-06 LAB
PATH REPORT.COMMENTS IMP SPEC: NORMAL
PATH REPORT.FINAL DX SPEC: NORMAL
PATH REPORT.GROSS SPEC: NORMAL
PATH REPORT.MICROSCOPIC SPEC OTHER STN: NORMAL
PATH REPORT.RELEVANT HX SPEC: NORMAL
PATH REPORT.SUPPLEMENTAL REPORTS SPEC: NORMAL
PHOTO IMAGE: NORMAL

## 2023-01-09 LAB
DAT POLY: NORMAL
SPECIMEN EXPIRATION DATE: NORMAL

## 2023-01-10 ENCOUNTER — LAB (OUTPATIENT)
Dept: LAB | Facility: CLINIC | Age: 33
End: 2023-01-10
Attending: INTERNAL MEDICINE
Payer: COMMERCIAL

## 2023-01-10 ENCOUNTER — HOSPITAL ENCOUNTER (OUTPATIENT)
Dept: ULTRASOUND IMAGING | Facility: CLINIC | Age: 33
Discharge: HOME | End: 2023-01-10
Attending: INTERNAL MEDICINE
Payer: COMMERCIAL

## 2023-01-10 ENCOUNTER — TELEPHONE (OUTPATIENT)
Dept: MAMMOGRAPHY | Facility: CLINIC | Age: 33
End: 2023-01-10

## 2023-01-10 DIAGNOSIS — S40.011A TRAUMATIC ECCHYMOSIS OF RIGHT SHOULDER, INITIAL ENCOUNTER: Primary | ICD-10-CM

## 2023-01-10 DIAGNOSIS — D69.6 THROMBOCYTOPENIA (H): ICD-10-CM

## 2023-01-10 DIAGNOSIS — R59.1 DIFFUSE LYMPHADENOPATHY: ICD-10-CM

## 2023-01-10 DIAGNOSIS — D69.6 THROMBOCYTOPENIA (H): Primary | ICD-10-CM

## 2023-01-10 DIAGNOSIS — R59.1 DIFFUSE LYMPHADENOPATHY: Primary | ICD-10-CM

## 2023-01-10 DIAGNOSIS — S40.011A TRAUMATIC ECCHYMOSIS OF RIGHT SHOULDER, INITIAL ENCOUNTER: ICD-10-CM

## 2023-01-10 LAB
ALBUMIN SERPL BCG-MCNC: 4.6 G/DL (ref 3.5–5.2)
ALP SERPL-CCNC: 63 U/L (ref 40–129)
ALT SERPL W P-5'-P-CCNC: 26 U/L (ref 10–50)
ANION GAP SERPL CALCULATED.3IONS-SCNC: 11 MMOL/L (ref 7–15)
APTT PPP: 26 SECONDS (ref 22–38)
AST SERPL W P-5'-P-CCNC: 29 U/L (ref 10–50)
BASOPHILS # BLD AUTO: 0.1 10E3/UL (ref 0–0.2)
BASOPHILS NFR BLD AUTO: 1 %
BILIRUB SERPL-MCNC: 1.2 MG/DL
BUN SERPL-MCNC: 14 MG/DL (ref 6–20)
CALCIUM SERPL-MCNC: 9.3 MG/DL (ref 8.6–10)
CHLORIDE SERPL-SCNC: 100 MMOL/L (ref 98–107)
CREAT SERPL-MCNC: 1.03 MG/DL (ref 0.67–1.17)
CRP SERPL-MCNC: <3 MG/L
DEPRECATED HCO3 PLAS-SCNC: 25 MMOL/L (ref 22–29)
EOSINOPHIL # BLD AUTO: 0.1 10E3/UL (ref 0–0.7)
EOSINOPHIL NFR BLD AUTO: 1 %
ERYTHROCYTE [DISTWIDTH] IN BLOOD BY AUTOMATED COUNT: 11.9 % (ref 10–15)
ERYTHROCYTE [SEDIMENTATION RATE] IN BLOOD BY WESTERGREN METHOD: 6 MM/HR (ref 0–15)
FIBRINOGEN PPP-MCNC: 269 MG/DL (ref 170–490)
GFR SERPL CREATININE-BSD FRML MDRD: >90 ML/MIN/1.73M2
GLUCOSE SERPL-MCNC: 172 MG/DL (ref 70–99)
HCT VFR BLD AUTO: 45.8 % (ref 40–53)
HGB BLD-MCNC: 16.4 G/DL (ref 13.3–17.7)
IMM GRANULOCYTES # BLD: 0 10E3/UL
IMM GRANULOCYTES NFR BLD: 0 %
INR PPP: 1.06 (ref 0.85–1.15)
LDH SERPL L TO P-CCNC: 196 U/L (ref 0–250)
LYMPHOCYTES # BLD AUTO: 1.5 10E3/UL (ref 0.8–5.3)
LYMPHOCYTES NFR BLD AUTO: 23 %
MCH RBC QN AUTO: 29.7 PG (ref 26.5–33)
MCHC RBC AUTO-ENTMCNC: 35.8 G/DL (ref 31.5–36.5)
MCV RBC AUTO: 83 FL (ref 78–100)
MONOCYTES # BLD AUTO: 0.7 10E3/UL (ref 0–1.3)
MONOCYTES NFR BLD AUTO: 10 %
NEUTROPHILS # BLD AUTO: 4.2 10E3/UL (ref 1.6–8.3)
NEUTROPHILS NFR BLD AUTO: 65 %
NRBC # BLD AUTO: 0 10E3/UL
NRBC BLD AUTO-RTO: 0 /100
PLAT MORPH BLD: NORMAL
PLATELET # BLD AUTO: 45 10E3/UL (ref 150–450)
POTASSIUM SERPL-SCNC: 4.4 MMOL/L (ref 3.4–5.3)
PROT SERPL-MCNC: 6.9 G/DL (ref 6.4–8.3)
RBC # BLD AUTO: 5.52 10E6/UL (ref 4.4–5.9)
RBC MORPH BLD: NORMAL
RETICS # AUTO: 0.06 10E6/UL (ref 0.03–0.1)
RETICS/RBC NFR AUTO: 1 % (ref 0.5–2)
SODIUM SERPL-SCNC: 136 MMOL/L (ref 136–145)
TOTAL PROTEIN SERUM FOR ELP: 6.7 G/DL (ref 6.4–8.3)
URATE SERPL-MCNC: 5.8 MG/DL (ref 3.4–7)
WBC # BLD AUTO: 6.5 10E3/UL (ref 4–11)

## 2023-01-10 PROCEDURE — 84165 PROTEIN E-PHORESIS SERUM: CPT | Mod: TC | Performed by: PATHOLOGY

## 2023-01-10 PROCEDURE — 86140 C-REACTIVE PROTEIN: CPT

## 2023-01-10 PROCEDURE — 85730 THROMBOPLASTIN TIME PARTIAL: CPT

## 2023-01-10 PROCEDURE — 86803 HEPATITIS C AB TEST: CPT

## 2023-01-10 PROCEDURE — 76882 US LMTD JT/FCL EVL NVASC XTR: CPT | Mod: RT

## 2023-01-10 PROCEDURE — 86900 BLOOD TYPING SEROLOGIC ABO: CPT

## 2023-01-10 PROCEDURE — 86901 BLOOD TYPING SEROLOGIC RH(D): CPT

## 2023-01-10 PROCEDURE — 87389 HIV-1 AG W/HIV-1&-2 AB AG IA: CPT

## 2023-01-10 PROCEDURE — 87340 HEPATITIS B SURFACE AG IA: CPT

## 2023-01-10 PROCEDURE — 86704 HEP B CORE ANTIBODY TOTAL: CPT

## 2023-01-10 PROCEDURE — 85045 AUTOMATED RETICULOCYTE COUNT: CPT

## 2023-01-10 PROCEDURE — 84999 UNLISTED CHEMISTRY PROCEDURE: CPT

## 2023-01-10 PROCEDURE — 85652 RBC SED RATE AUTOMATED: CPT

## 2023-01-10 PROCEDURE — 86038 ANTINUCLEAR ANTIBODIES: CPT

## 2023-01-10 PROCEDURE — 85610 PROTHROMBIN TIME: CPT

## 2023-01-10 PROCEDURE — 82784 ASSAY IGA/IGD/IGG/IGM EACH: CPT

## 2023-01-10 PROCEDURE — 83521 IG LIGHT CHAINS FREE EACH: CPT

## 2023-01-10 PROCEDURE — 84550 ASSAY OF BLOOD/URIC ACID: CPT

## 2023-01-10 PROCEDURE — 83615 LACTATE (LD) (LDH) ENZYME: CPT

## 2023-01-10 PROCEDURE — 86880 COOMBS TEST DIRECT: CPT

## 2023-01-10 PROCEDURE — 86334 IMMUNOFIX E-PHORESIS SERUM: CPT | Performed by: PATHOLOGY

## 2023-01-10 PROCEDURE — 85060 BLOOD SMEAR INTERPRETATION: CPT | Performed by: PATHOLOGY

## 2023-01-10 PROCEDURE — 84155 ASSAY OF PROTEIN SERUM: CPT | Mod: 91

## 2023-01-10 PROCEDURE — 36415 COLL VENOUS BLD VENIPUNCTURE: CPT

## 2023-01-10 PROCEDURE — 80053 COMPREHEN METABOLIC PANEL: CPT | Performed by: INTERNAL MEDICINE

## 2023-01-10 PROCEDURE — 85025 COMPLETE CBC W/AUTO DIFF WBC: CPT

## 2023-01-10 PROCEDURE — 83010 ASSAY OF HAPTOGLOBIN QUANT: CPT

## 2023-01-10 PROCEDURE — 86706 HEP B SURFACE ANTIBODY: CPT

## 2023-01-10 PROCEDURE — 85384 FIBRINOGEN ACTIVITY: CPT

## 2023-01-10 NOTE — PROGRESS NOTES
I called patient to review pathology that has returned negative.     I spoke with my colleagues who are in agreement with continued pursuit of underlying LAD and will send for excisional biopsy with general surgery. Differential also includes Castleman's disease.     Patient is still asymptomatic and agreeable to the plan above.     Will update labs today and send for excisional biopsy.     Hayde Lopez DO  Hematology/Oncology  [facility]

## 2023-01-10 NOTE — TELEPHONE ENCOUNTER
Pathology report reviewed with our breast radiologist Dr Henderson, who confirmed the recent breast imaging is concordant with the final pathology results.    Recommended follow up is follow up care as planned with Dr Lopez.   Patient states he is having significant bruising but seems stable, no new bleeding. He would appreciate a call with next steps.  I walked him through the path report stating nothing significant was found but Dr Henderson feels sample is concordant.  Dr Lopez's office notified and will be in touch with him.    Questions were answered and my phone number given if he has further questions or concerns.  I informed patient I will notify the ordering provider of the results and recommendations for follow up.  Patient verbalized understanding and agrees with the plan of care.     Piedad Rich RN CBCN  Breast Care Nurse Coordinator  [facility]  [phone]

## 2023-01-11 ENCOUNTER — PATIENT OUTREACH (OUTPATIENT)
Dept: ONCOLOGY | Facility: CLINIC | Age: 33
End: 2023-01-11

## 2023-01-11 ENCOUNTER — OFFICE VISIT (OUTPATIENT)
Dept: SURGERY | Facility: CLINIC | Age: 33
End: 2023-01-11
Payer: COMMERCIAL

## 2023-01-11 VITALS
BODY MASS INDEX: 34.28 KG/M2 | SYSTOLIC BLOOD PRESSURE: 131 MMHG | HEART RATE: 70 BPM | HEIGHT: 75 IN | OXYGEN SATURATION: 99 % | WEIGHT: 275.7 LBS | DIASTOLIC BLOOD PRESSURE: 83 MMHG

## 2023-01-11 DIAGNOSIS — D69.6 THROMBOCYTOPENIA (H): ICD-10-CM

## 2023-01-11 DIAGNOSIS — R59.1 DIFFUSE LYMPHADENOPATHY: ICD-10-CM

## 2023-01-11 LAB
ALBUMIN SERPL ELPH-MCNC: 4.6 G/DL (ref 3.7–5.1)
ALPHA1 GLOB SERPL ELPH-MCNC: 0.2 G/DL (ref 0.2–0.4)
ALPHA2 GLOB SERPL ELPH-MCNC: 0.5 G/DL (ref 0.5–0.9)
B-GLOBULIN SERPL ELPH-MCNC: 0.6 G/DL (ref 0.6–1)
GAMMA GLOB SERPL ELPH-MCNC: 0.8 G/DL (ref 0.7–1.6)
HAPTOGLOB SERPL-MCNC: 22 MG/DL (ref 32–197)
HBV CORE AB SERPL QL IA: NONREACTIVE
HBV SURFACE AB SERPL IA-ACNC: 35.73 M[IU]/ML
HBV SURFACE AB SERPL IA-ACNC: REACTIVE M[IU]/ML
HBV SURFACE AG SERPL QL IA: NONREACTIVE
HCV AB SERPL QL IA: NONREACTIVE
HIV 1+2 AB+HIV1 P24 AG SERPL QL IA: NONREACTIVE
IGA SERPL-MCNC: 135 MG/DL (ref 84–499)
IGG SERPL-MCNC: 956 MG/DL (ref 610–1616)
IGM SERPL-MCNC: 44 MG/DL (ref 35–242)
KAPPA LC FREE SER-MCNC: 1.33 MG/DL (ref 0.33–1.94)
KAPPA LC FREE/LAMBDA FREE SER NEPH: 0.92 {RATIO} (ref 0.26–1.65)
LAMBDA LC FREE SERPL-MCNC: 1.45 MG/DL (ref 0.57–2.63)
M PROTEIN SERPL ELPH-MCNC: 0 G/DL
PATH REPORT.COMMENTS IMP SPEC: NORMAL
PATH REPORT.COMMENTS IMP SPEC: NORMAL
PATH REPORT.FINAL DX SPEC: NORMAL
PATH REPORT.MICROSCOPIC SPEC OTHER STN: NORMAL
PATH REPORT.MICROSCOPIC SPEC OTHER STN: NORMAL
PROT PATTERN SERPL ELPH-IMP: NORMAL
PROT PATTERN SERPL IFE-IMP: NORMAL

## 2023-01-11 PROCEDURE — 99203 OFFICE O/P NEW LOW 30 MIN: CPT | Performed by: SURGERY

## 2023-01-11 PROCEDURE — 84165 PROTEIN E-PHORESIS SERUM: CPT | Mod: 26

## 2023-01-11 PROCEDURE — 86334 IMMUNOFIX E-PHORESIS SERUM: CPT | Mod: 26

## 2023-01-11 RX ORDER — CLINDAMYCIN PHOSPHATE 900 MG/50ML
900 INJECTION, SOLUTION INTRAVENOUS SEE ADMIN INSTRUCTIONS
Status: CANCELLED | OUTPATIENT
Start: 2023-01-11

## 2023-01-11 RX ORDER — CLINDAMYCIN PHOSPHATE 900 MG/50ML
900 INJECTION, SOLUTION INTRAVENOUS
Status: CANCELLED | OUTPATIENT
Start: 2023-01-11

## 2023-01-11 ASSESSMENT — PAIN SCALES - GENERAL: PAINLEVEL: NO PAIN (0)

## 2023-01-11 NOTE — PROGRESS NOTES
spoke with Ally in surgery scheduling. Ally confirmed that Billy was not told a surgery date by 01/13/23. Ally states that she will call the patient directly to discuss pre operative exam at the PAC clinic and date of surgery.  tried calling Billy, but did not reach him. Awaiting call back.    Armida Gibbons RN on 1/11/2023 at 1:36 PM

## 2023-01-11 NOTE — PROGRESS NOTES
Patient here for discussion about left inguinal lymph node biopsy.  In November he had a cold, and the symptoms persisted for a long time. He had a routine checkup a few weeks later and was noted to have low platelets. Workup ensued which has included PET/CT as well as needle biopsy of lymph node of right axilla. The PET/CT showed lymphadenopathy in several places. The needle biopsy was nondiagnostic. He does not have any symptoms such as night sweats, fevers, fatigue.  On exam, he is healthy appearing, no acute distress. He has enlarged lymph node in right axilla, and likely enlarged lymph node in left groin area. Not able to palpate any other area of lymphadenopathy having examined neck, clavicular area, axillae, and ingiuinal areas on both sides.   A/p - lymphadenopathy and thromobocytopenia of unknown cause. Next step in workup is a better lymph node biopsy, which will be of the left groin. I discussed risks/beneftis of procedure including bleeding, infection, damage to surrounding organs. Limitations of procedure discussed including a nondiagnostic result. Questions answered. He is understanding of discussion and agreeable to proceed.  This will be excision of left inguianl lymph node, local/mac. I will need [employer] to help with assessment during the case. He will need a preoperative physical.

## 2023-01-11 NOTE — PROGRESS NOTES
Billy met with Dr. Burger today to complete a preoperative consult for removal of a lymph node. He was told at his appointment that he might be able to get this procedure done on 01/13/23 and is calling to inquire about escalating a preoperative exam.    Per chart review, a referral was placed to the PAC team. Writer contacted Olivier (RNs in surgery) to discuss recommendations. Awaiting a call back.     Armida Gibbons RN on 1/11/2023 at 11:37 AM

## 2023-01-11 NOTE — LETTER
1/11/2023       RE: Billy Carey  [address] [place] [address]     Dear Colleague,    Thank you for referring your patient, Billy Carey, to the [facility] [place] [facility]. Please see a copy of my visit note below.    Patient here for discussion about left inguinal lymph node biopsy.  In November he had a cold, and the symptoms persisted for a long time. He had a routine checkup a few weeks later and was noted to have low platelets. Workup ensued which has included PET/CT as well as needle biopsy of lymph node of right axilla. The PET/CT showed lymphadenopathy in several places. The needle biopsy was nondiagnostic. He does not have any symptoms such as night sweats, fevers, fatigue.  On exam, he is healthy appearing, no acute distress. He has enlarged lymph node in right axilla, and likely enlarged lymph node in left groin area. Not able to palpate any other area of lymphadenopathy having examined neck, clavicular area, axillae, and ingiuinal areas on both sides.   A/p - lymphadenopathy and thromobocytopenia of unknown cause. Next step in workup is a better lymph node biopsy, which will be of the left groin. I discussed risks/beneftis of procedure including bleeding, infection, damage to surrounding organs. Limitations of procedure discussed including a nondiagnostic result. Questions answered. He is understanding of discussion and agreeable to proceed.  This will be excision of left inguianl lymph node, local/mac. I will need [facility] to help with assessment during the case. He will need a preoperative physical.        Sincerely,    New Burger MD

## 2023-01-11 NOTE — NURSING NOTE
Pre and Post op Patient Education/Teaching Flowsheet  Relevant Diagnosis:  Lymphadenopathy  Teaching Topic:  Pre and post op teaching  Person(s) Involved in teaching:  Patient     Motivation Level:  Asks Questions:  Yes  Eager to Learn:  Yes  Cooperative:  Yes  Receptive (willing/able to accept information):  Yes  Any cultural factors/Orthodox beliefs that may influence understanding or compliance?  No    Patient/caregiver/family demonstrates understanding of the following:  Reason for the appointment, diagnosis, and treatment plan:  Yes  Patient demonstrates understanding of the following:  Pre-op bowel prep:  N/A  Post-op pain management recommendations (medications, ice compress, binder/athletic supporter (if applicable), etc.:  Yes  Inguinal hernia patients:  Post-op urinary retention- discussed signs/symptoms and visit to ER for Werner catheter placement and to stay in place for at least 48 hours:  NA  Restrictions:  Yes  Medications to take the day of surgery:  Per PCP  Blood thinner medications discussed and when to stop (if applicable):  Yes  Wound care:  Yes  Diabetes medication management (if applicable):  Per PCP  Which situations necessitate calling provider and whom to contact:  Discussed how to contact the hospital, nurse, and clinic scheduling staff if necessary      Date and time of surgery:  TBD  Location of surgery: [facility] [place]- 5th Floor  History and Physical and any other testing necessary prior to surgery:  Yes  Required time line for completion of History and Physical and any pre-op testing:  Yes  Discuss need for someone to drive patient home and stay with them for 24 hours:  Yes  Pre-op showering/scrub information with Surgical Scrub:  Yes  NPO Guidelines:  NPO per Anesthesia Guidelines    Infection Prevention: Patient demonstrates understanding of the following:  Patient instructed on hand hygiene:  Yes  Surgical procedure site care will be taught and will  be reviewed at the time of discharge  Signs and symptoms of infection taught:  Yes  Wound care reviewed and will be taught at the time of discharge  Central venous catheter care will be taught at the time of discharge (if applicable)    Post-op follow-up:  Instructional materials used/given/mailed:  Surgical logistics, post op teaching sheet, and surgical scrub

## 2023-01-11 NOTE — NURSING NOTE
"Chief Complaint   Patient presents with     New Patient     Groin lymphadenopathy       Vitals:    01/11/23 0845   BP: 131/83   BP Location: Left arm   Patient Position: Sitting   Cuff Size: Adult Large   Pulse: 70   SpO2: 99%   Weight: 125.1 kg (275 lb 11.2 oz)   Height: 1.905 m (6' 3\")       Body mass index is 34.46 kg/m .                          Scout Davies, EMT    "

## 2023-01-11 NOTE — PATIENT INSTRUCTIONS
You met with Dr. New Burger.      Today's visit instructions:    Reminder: Surgery Requirements  Your surgery will be at [facility] [place]- 5th Floor  You will need to arrive 1.5 hours early.  You will need someone to drive you home (over 18 years old) and stay with you for 24 hours after the procedure.  You will need a preop physical with PAC (pre-anesthesia care) within 30 days of surgery- closer is always better.  Stop any blood thinners, vitamins, minerals, or herbal supplements 5 days before surgery.  If you are taking a prescribed blood thinner please let us know for specific instructions.  Fasting- a nurse from [facility] will call you 1-2 days before surgery to confirm your procedure and tell you when to stop eating and drinking.   Wash with the soap (Antibacterial, Dial Complete Foam, Hibiclense, or soap given/mailed from the clinic) the night before surgery and morning of surgery. See instructions in the Surgery Packet.  If you would like a procedure estimate please call Cost of Care at [phone].       If you have questions please contact Leny RN or Rayna RN during regular clinic hours, Monday through Friday 7:30 AM - 4:00 PM, or you can contact us via [facility] at anytime.       If you have urgent needs after-hours, weekends, or holidays please call the hospital at [phone] and ask to speak with our on-call General Surgery Team.    Appointment [phone]  Nurse Advice (Leny or Rayna): [phone]   Surgery Scheduler (Ally): [phone]  Fax: [phone]    After Your Lymph Node Removal      Incision care   You may take a shower the day after surgery. Carefully wash your incision with soap and water. Do not submerge yourself in water (bath, whirlpool, hot tub, pool, lake) for 14 days after surgery.   Remove the gauze bandage 24 hours after surgery, but leave the medical tape (Steri-Strips) or glue in place. These will loosen and fall off on  their own 1-2 weeks after surgery.    Always wash your hands before touching your incisions or removing bandages.   It is not unusual to form a collection of fluid or blood under your incision that may feel firm or squishy- it can take several weeks to months for your body to reabsorb it.  At times, it may even drain.  If that should happen keep the area clean with soap, water,  and cover with a clean gauze dressing. You can change this daily or as needed.     Other medicines   Wait to start aspirin or blood thinners until the day after surgery. You can continue your regular medicines at your normal time the day after surgery.   Your pain medicine may cause constipation (hard, dry stools). To help with this, take the stool softener your doctor gave you or an over-the-counter stool softener or laxative. You can stop taking this when you are no longer taking pain medicine and your bowel movements are back to normal.      For pain or discomfort   Take the narcotic pain medicine your doctor gave you as needed and as instructed on the bottle. If you prefer to use over-the-counter medication, use acetaminophen (Tylenol) or ibuprofen (Advil, Motrin) as instructed on the box. Do not take Tylenol if it is in your narcotic pain medication.    Use an ice pack on your surgical cut (incision) for 20 minutes at a time as needed for the first 24 hours. Be sure to protect your skin by putting a cloth between the ice pack and your skin.      Activities   No driving until you feel it s safe to do so. Don t drive while taking narcotic pain medicine.      Diet   You can eat your regular meals after surgery.      Results   You should know any test results about 5-7 business days after surgery from your referring provider.     When to call the doctor   Call your doctor if you have:   A fever above 101 F (38.3 C) (taken under the tongue), or a fever or chills lasting more than a day.   Redness at the incision site.   Any fluid or blood  draining from the incision, especially if it smells bad.    Severe pain that doesn t improve with pain medicine.      We will call you 2 to 4 days after surgery to review this handout, answer questions and help arrange after-surgery care. If you have questions or concerns, please call [phone] during regular office hours. If you need to call after business hours, call [phone] and ask to page the surgeon on-call.

## 2023-01-11 NOTE — PROGRESS NOTES
Writer received a return call from Billy. He verbalized understanding that he will get a call from the surgery team with next steps and appointment details.    Armida Gibbons RN on 1/11/2023 at 4:41 PM

## 2023-01-12 ENCOUNTER — TELEPHONE (OUTPATIENT)
Dept: SURGERY | Facility: CLINIC | Age: 33
End: 2023-01-12

## 2023-01-12 ENCOUNTER — OFFICE VISIT (OUTPATIENT)
Dept: PEDIATRICS | Facility: CLINIC | Age: 33
End: 2023-01-12
Payer: COMMERCIAL

## 2023-01-12 VITALS
OXYGEN SATURATION: 96 % | SYSTOLIC BLOOD PRESSURE: 110 MMHG | HEART RATE: 65 BPM | HEIGHT: 75 IN | TEMPERATURE: 97.3 F | DIASTOLIC BLOOD PRESSURE: 72 MMHG | WEIGHT: 275.2 LBS | BODY MASS INDEX: 34.22 KG/M2 | RESPIRATION RATE: 18 BRPM

## 2023-01-12 DIAGNOSIS — E10.3293 MILD NONPROLIFERATIVE DIABETIC RETINOPATHY OF BOTH EYES WITHOUT MACULAR EDEMA ASSOCIATED WITH TYPE 1 DIABETES MELLITUS (H): ICD-10-CM

## 2023-01-12 DIAGNOSIS — Z01.818 PREOP GENERAL PHYSICAL EXAM: Primary | ICD-10-CM

## 2023-01-12 DIAGNOSIS — I88.9 LYMPHADENITIS: ICD-10-CM

## 2023-01-12 LAB
ANA PAT SER IF-IMP: ABNORMAL
ANA SER QL IF: POSITIVE
ANA TITR SER IF: ABNORMAL {TITER}

## 2023-01-12 PROCEDURE — 99214 OFFICE O/P EST MOD 30 MIN: CPT | Performed by: INTERNAL MEDICINE

## 2023-01-12 SDOH — ECONOMIC STABILITY: FOOD INSECURITY: WITHIN THE PAST 12 MONTHS, YOU WORRIED THAT YOUR FOOD WOULD RUN OUT BEFORE YOU GOT MONEY TO BUY MORE.: NEVER TRUE

## 2023-01-12 SDOH — ECONOMIC STABILITY: TRANSPORTATION INSECURITY
IN THE PAST 12 MONTHS, HAS LACK OF TRANSPORTATION KEPT YOU FROM MEETINGS, WORK, OR FROM GETTING THINGS NEEDED FOR DAILY LIVING?: NO

## 2023-01-12 SDOH — HEALTH STABILITY: PHYSICAL HEALTH: ON AVERAGE, HOW MANY MINUTES DO YOU ENGAGE IN EXERCISE AT THIS LEVEL?: 40 MIN

## 2023-01-12 SDOH — HEALTH STABILITY: PHYSICAL HEALTH: ON AVERAGE, HOW MANY DAYS PER WEEK DO YOU ENGAGE IN MODERATE TO STRENUOUS EXERCISE (LIKE A BRISK WALK)?: 6 DAYS

## 2023-01-12 SDOH — ECONOMIC STABILITY: TRANSPORTATION INSECURITY
IN THE PAST 12 MONTHS, HAS THE LACK OF TRANSPORTATION KEPT YOU FROM MEDICAL APPOINTMENTS OR FROM GETTING MEDICATIONS?: NO

## 2023-01-12 SDOH — ECONOMIC STABILITY: INCOME INSECURITY: IN THE LAST 12 MONTHS, WAS THERE A TIME WHEN YOU WERE NOT ABLE TO PAY THE MORTGAGE OR RENT ON TIME?: NO

## 2023-01-12 SDOH — ECONOMIC STABILITY: INCOME INSECURITY: HOW HARD IS IT FOR YOU TO PAY FOR THE VERY BASICS LIKE FOOD, HOUSING, MEDICAL CARE, AND HEATING?: NOT HARD AT ALL

## 2023-01-12 SDOH — ECONOMIC STABILITY: FOOD INSECURITY: WITHIN THE PAST 12 MONTHS, THE FOOD YOU BOUGHT JUST DIDN'T LAST AND YOU DIDN'T HAVE MONEY TO GET MORE.: NEVER TRUE

## 2023-01-12 ASSESSMENT — LIFESTYLE VARIABLES
HOW MANY STANDARD DRINKS CONTAINING ALCOHOL DO YOU HAVE ON A TYPICAL DAY: 1 OR 2
SKIP TO QUESTIONS 9-10: 0
AUDIT-C TOTAL SCORE: 2
HOW OFTEN DO YOU HAVE A DRINK CONTAINING ALCOHOL: MONTHLY OR LESS
HOW OFTEN DO YOU HAVE SIX OR MORE DRINKS ON ONE OCCASION: LESS THAN MONTHLY

## 2023-01-12 ASSESSMENT — SOCIAL DETERMINANTS OF HEALTH (SDOH)
DO YOU BELONG TO ANY CLUBS OR ORGANIZATIONS SUCH AS CHURCH GROUPS UNIONS, FRATERNAL OR ATHLETIC GROUPS, OR SCHOOL GROUPS?: YES
HOW OFTEN DO YOU ATTEND CHURCH OR RELIGIOUS SERVICES?: 1 TO 4 TIMES PER YEAR
HOW OFTEN DO YOU GET TOGETHER WITH FRIENDS OR RELATIVES?: MORE THAN THREE TIMES A WEEK
IN A TYPICAL WEEK, HOW MANY TIMES DO YOU TALK ON THE PHONE WITH FAMILY, FRIENDS, OR NEIGHBORS?: MORE THAN THREE TIMES A WEEK

## 2023-01-12 ASSESSMENT — PAIN SCALES - GENERAL: PAINLEVEL: NO PAIN (0)

## 2023-01-12 NOTE — TELEPHONE ENCOUNTER
Patient is scheduled for surgery with Dr. Burger    Spoke with: Billy    Date of Surgery: 1/20/2023    Location: [facility]    Informed patient they will need an adult  Yes    Pre op with Provider n/a    H&P: Scheduled with PCP    Pre-procedure COVID-19 Test: n/a    Additional imaging/appointments: n/a    Surgery packet: To be sent via trinket     Additional comments: n/a

## 2023-01-12 NOTE — PROGRESS NOTES
[facility] [place]  [address]  [address]  [place] [address]  Phone: [phone]  Fax: [phone]  Primary Provider: Elfego Herrera  Pre-op Performing Provider: CRUZITO COBB MAI      PREOPERATIVE EVALUATION:  Today's date: 1/12/2023    Billy Carey is a 32 year old male who presents for a preoperative evaluation.    Surgical Information:  Surgery/Procedure: Lymph node removal left groin region   Surgery Location: [facility]   Surgeon: New Burger MD  Surgery Date: 1/20/2023  Time of Surgery: 12pm  Where patient plans to recover: At home with family  Fax number for surgical facility: Note does not need to be faxed, will be available electronically in Epic.    Type of Anesthesia Anticipated: Local with MAC    Assessment & Plan     The proposed surgical procedure is considered LOW risk.    Preop general physical exam  31 yo pt with hx of type 1 DM and recent hx of thrombocytopenia and lymphadenopathy here for preoperative assessment for lymph node biopsy under local with MAC    Axillary lymphadenopathy with thrombocytopenia  Reason for procedure    Mild nonproliferative diabetic retinopathy of both eyes without macular edema associated with type 1 diabetes mellitus (H)  Stable, last A1C within 3 months at goal         Implanted Device:   - Type of device: CGM Patient advised to bring device information on day of surgery.      Risks and Recommendations:  The patient has the following additional risks and recommendations for perioperative complications:   - No identified additional risk factors other than previously addressed    Medication Instructions:   - Statins: Continue taking on the day of surgery.    - Continuous Glucose Monitor (CGM): Patient was made aware on the day of surgery, they should be prepared to remove the Continuous Glucose Monitor (CGM) prior to the operation in order to avoid damage to the equipment during the procedure. The  CGM will not be the source of glucose monitoring during the operation.  - Insulin pump: continue basal rate until time of surgery    RECOMMENDATION:  APPROVAL GIVEN to proceed with proposed procedure, without further diagnostic evaluation.            Subjective     HPI related to upcoming procedure:     Lab Results   Component Value Date    A1C 6.0 11/30/2022    A1C 6.5 01/24/2020    A1C 8.3 09/16/2013    A1C 7.9 12/12/2012    A1C 7.9 09/27/2011    A1C 9.2 04/09/2007         Preop Questions 1/12/2023   1. Have you ever had a heart attack or stroke? No   2. Have you ever had surgery on your heart or blood vessels, such as a stent placement, a coronary artery bypass, or surgery on an artery in your head, neck, heart, or legs? No   3. Do you have chest pain with activity? No   4. Do you have a history of  heart failure? No   5. Do you currently have a cold, bronchitis or symptoms of other infection? No   6. Do you have a cough, shortness of breath, or wheezing? No   7. Do you or anyone in your family have previous history of blood clots? No   8. Do you or does anyone in your family have a serious bleeding problem such as prolonged bleeding following surgeries or cuts? No   9. Have you ever had problems with anemia or been told to take iron pills? No   10. Have you had any abnormal blood loss such as black, tarry or bloody stools? No   11. Have you ever had a blood transfusion? No   12. Are you willing to have a blood transfusion if it is medically needed before, during, or after your surgery? Yes   13. Have you or any of your relatives ever had problems with anesthesia? No   14. Do you have sleep apnea, excessive snoring or daytime drowsiness? No   15. Do you have any artifical heart valves or other implanted medical devices like a pacemaker, defibrillator, or continuous glucose monitor? YES - CGM   15a. What type of device do you have? cgm   15b. Name of the clinic that manages your device:  [facility]   16. Do you have  artificial joints? No   17. Are you allergic to latex? No       Health Care Directive:  Patient does not have a Health Care Directive or Living Will:     Preoperative Review of :   reviewed - no record of controlled substances prescribed.      Status of Chronic Conditions:  See problem list for active medical problems.  Problems all longstanding and stable, except as noted/documented.  See ROS for pertinent symptoms related to these conditions.      Review of Systems  All other systems on a 10-point review are negative, unless otherwise noted in HPI      Patient Active Problem List    Diagnosis Date Noted     Acute kidney injury (H) 12/07/2022     Priority: Medium     Morbid obesity (H) 11/30/2022     Priority: Medium     Mild nonproliferative diabetic retinopathy of both eyes without macular edema associated with type 1 diabetes mellitus (H) 09/17/2020     Priority: Medium     Folliculitis 06/29/2015     Priority: Medium     Hyperlipidemia with target LDL less than 100 06/16/2014     Priority: Medium     Diagnosis updated by automated process. Provider to review and confirm.       Diabetes mellitus type 1, uncontrolled, insulin dependent 09/16/2013     Priority: Medium     Obesity 09/16/2013     Priority: Medium      Past Medical History:   Diagnosis Date     Diabetes mellitus type 1, uncontrolled, insulin dependent 9/16/2013     Past Surgical History:   Procedure Laterality Date     MYRINGOTOMY, INSERT TUBE BILATERAL, COMBINED Bilateral     As a child     Current Outpatient Medications   Medication Sig Dispense Refill     atorvastatin (LIPITOR) 10 MG tablet Take 1 tablet (10 mg) by mouth daily 90 tablet 3     blood glucose (KELL CONTOUR NEXT) test strip Test 4-5 times daily 4 Box 3     Blood Glucose Monitoring Suppl (KELL CONTOUR NEXT LINK) W/DEVICE KIT 1 kit 5 times daily. Use as directed 1 kit 1     insulin glargine (BASAGLAR KWIKPEN) 100 UNIT/ML pen INJECT 50 UNITS ONCE DAILY SUBCUTANEOUSLY 15 mL 1      "insulin pen needle (BD CHRIST U/F) 32G X 4 MM miscellaneous Use 3-6 pen needles daily or as directed. 50 each 0     vitamin D2 (ERGOCALCIFEROL) 26006 units (1250 mcg) capsule        glucagon (GLUCAGON EMERGENCY) 1 MG kit Inject 1 mg into the muscle once for 1 dose 1 mg 3     insulin aspart (FIASP) 100 UNIT/ML vial          Allergies   Allergen Reactions     Penicillins Rash        Social History     Tobacco Use     Smoking status: Never     Smokeless tobacco: Never   Substance Use Topics     Alcohol use: No     Comment: rarely     Family History   Problem Relation Age of Onset     C.A.D. No family hx of      Diabetes No family hx of      Hypertension No family hx of      Cancer No family hx of         no skin cancer     Amblyopia No family hx of      Retinal detachment No family hx of      Thyroid Disease No family hx of      Glaucoma No family hx of      Macular Degeneration No family hx of      History   Drug Use No         Objective     /72   Pulse 65   Temp 97.3  F (36.3  C) (Tympanic)   Resp 18   Ht 1.905 m (6' 3\")   Wt 124.8 kg (275 lb 3.2 oz)   SpO2 96%   BMI 34.40 kg/m      Physical Exam    GENERAL APPEARANCE: healthy, alert and no distress     EYES: EOMI,  PERRL     HENT: ear canals and TM's normal and nose and mouth without ulcers or lesions     NECK: no adenopathy, no asymmetry, masses, or scars and thyroid normal to palpation     RESP: lungs clear to auscultation - no rales, rhonchi or wheezes     CV: regular rates and rhythm, normal S1 S2, no S3 or S4 and no murmur, click or rub     ABDOMEN:  soft, nontender, no HSM or masses and bowel sounds normal     MS: extremities normal- no gross deformities noted, no evidence of inflammation in joints, FROM in all extremities.     SKIN: no suspicious lesions or rashes     NEURO: Normal strength and tone, sensory exam grossly normal, mentation intact and speech normal     PSYCH: mentation appears normal. and affect normal/bright     LYMPHATICS: No " cervical adenopathy    Recent Labs   Lab Test 01/10/23  1517 12/14/22  0920 12/01/22  1159 11/30/22  0756   HGB 16.4 17.3   < > 16.5   PLT 45* 59*   < > 42*   INR 1.06 1.10   < >  --     138   < > 141   POTASSIUM 4.4 4.0   < > 4.6   CR 1.03 1.23*   < > 1.11   A1C  --   --   --  6.0*    < > = values in this interval not displayed.        Diagnostics:  No labs were ordered during this visit.   No EKG required, no history of coronary heart disease, significant arrhythmia, peripheral arterial disease or other structural heart disease.    Revised Cardiac Risk Index (RCRI):  The patient has the following serious cardiovascular risks for perioperative complications:   - Diabetes Mellitus (on Insulin) = 1 point     RCRI Interpretation: 1 point: Class II (low risk - 0.9% complication rate)           Signed Electronically by: Jessenia Bui MD  Copy of this evaluation report is provided to requesting physician.

## 2023-01-12 NOTE — PATIENT INSTRUCTIONS
For informational purposes only. Not to replace the advice of your health care provider. Copyright   2003,  [facility]. All rights reserved. Clinically reviewed by Anusha Simmons MD. MessageMe [id number] - REV .  Preparing for Your Surgery  Getting started  A nurse will call you to review your health history and instructions. They will give you an arrival time based on your scheduled surgery time. Please be ready to share:    Your doctor's clinic name and phone number    Your medical, surgical, and anesthesia history    A list of allergies and sensitivities    A list of medicines, including herbal treatments and over-the-counter drugs    Whether the patient has a legal guardian (ask how to send us the papers in advance)  Please tell us if you're pregnant--or if there's any chance you might be pregnant. Some surgeries may injure a fetus (unborn baby), so they require a pregnancy test. Surgeries that are safe for a fetus don't always need a test, and you can choose whether to have one.   If you have a child who's having surgery, please ask for a copy of Preparing for Your Child's Surgery.    Preparing for surgery    Within 10 to 30 days of surgery: Have a pre-op exam (sometimes called an H&P, or History and Physical). This can be done at a clinic or pre-operative center.  ? If you're having a , you may not need this exam. Talk to your care team.    At your pre-op exam, talk to your care team about all medicines you take. If you need to stop any medicines before surgery, ask when to start taking them again.  ? We do this for your safety. Many medicines can make you bleed too much during surgery. Some change how well surgery (anesthesia) drugs work.    Call your insurance company to let them know you're having surgery. (If you don't have insurance, call [phone].)    Call your clinic if there's any change in your health. This includes signs of a cold or flu (sore throat, runny nose,  cough, rash, fever). It also includes a scrape or scratch near the surgery site.    If you have questions on the day of surgery, call your hospital or surgery center.  Eating and drinking guidelines  For your safety: Unless your surgeon tells you otherwise, follow the guidelines below.    Eat and drink as usual until 8 hours before you arrive for surgery. After that, no food or milk.    Drink clear liquids until 2 hours before you arrive. These are liquids you can see through, like water, Gatorade, and Propel Water. They also include plain black coffee and tea (no cream or milk), candy, and breath mints. You can spit out gum when you arrive.    If you drink alcohol: Stop drinking it the night before surgery.    If your care team tells you to take medicine on the morning of surgery, it's okay to take it with a sip of water.  Preventing infection    Shower or bathe the night before and morning of your surgery. Follow the instructions your clinic gave you. (If no instructions, use regular soap.)    Don't shave or clip hair near your surgery site. We'll remove the hair if needed.    Don't smoke or vape the morning of surgery. You may chew nicotine gum up to 2 hours before surgery. A nicotine patch is okay.  ? Note: Some surgeries require you to completely quit smoking and nicotine. Check with your surgeon.    Your care team will make every effort to keep you safe from infection. We will:  ? Clean our hands often with soap and water (or an alcohol-based hand rub).  ? Clean the skin at your surgery site with a special soap that kills germs.  ? Give you a special gown to keep you warm. (Cold raises the risk of infection.)  ? Wear special hair covers, masks, gowns and gloves during surgery.  ? Give antibiotic medicine, if prescribed. Not all surgeries need antibiotics.  What to bring on the day of surgery    Photo ID and insurance card    Copy of your health care directive, if you have one    Glasses and hearing aids (bring  cases)  ? You can't wear contacts during surgery    Inhaler and eye drops, if you use them (tell us about these when you arrive)    CPAP machine or breathing device, if you use them    A few personal items, if spending the night    If you have . . .  ? A pacemaker, ICD (cardiac defibrillator) or other implant: Bring the ID card.  ? An implanted stimulator: Bring the remote control.  ? A legal guardian: Bring a copy of the certified (court-stamped) guardianship papers.  Please remove any jewelry, including body piercings. Leave jewelry and other valuables at home.  If you're going home the day of surgery    You must have a responsible adult drive you home. They should stay with you overnight as well.    If you don't have someone to stay with you, and you aren't safe to go home alone, we may keep you overnight. Insurance often won't pay for this.  After surgery  If it's hard to control your pain or you need more pain medicine, please call your surgeon's office.  Questions?   If you have any questions for your care team, list them here: _________________________________________________________________________________________________________________________________________________________________________ ____________________________________ ____________________________________ ____________________________________    Instructions About Your Continuous Glucose Monitor  You should be prepared to remove the Continuous Glucose Monitor prior to the operation in order to avoid damage to the equipment during the procedure. Your Continuous Glucose Monitor will not be the source of glucose monitoring during the operation.    How to Manage Your Diabetes Before Surgery  If you use insulin for your diabetes, follow these steps to keep your blood sugar in a safe range before surgery, when you ve been told not to eat or drink:     Check your blood sugar every 4 hours     If your blood sugar is high, take a corrective dose (not a meal  dose) of sliding-scale insulin, if that is what you re used to doing    If your blood sugar is below 100, or you have symptoms of hypoglycemia, follow these steps:   1) Have 1 item from this list:  - 4 oz (1/2 cup) of fruit juice without pulp    - 4 oz (1/2 cup) of regular soda (not diet soda)    - 3 glucose gels    - 5 sugar cubes or sugar packets   2) Check your blood sugar again after 15 minutes  3) Repeat steps 1 and 2 again until your blood sugar is greater than 100

## 2023-01-15 LAB
PERFORMING LABORATORY: NORMAL
SCANNED LAB RESULT: NORMAL
TEST NAME: NORMAL

## 2023-01-19 ENCOUNTER — ANESTHESIA EVENT (OUTPATIENT)
Dept: SURGERY | Facility: CLINIC | Age: 33
End: 2023-01-19
Payer: COMMERCIAL

## 2023-01-20 ENCOUNTER — HOSPITAL ENCOUNTER (OUTPATIENT)
Facility: CLINIC | Age: 33
Discharge: HOME | End: 2023-01-20
Attending: SURGERY | Admitting: SURGERY
Payer: COMMERCIAL

## 2023-01-20 ENCOUNTER — TRANSFERRED RECORDS (OUTPATIENT)
Dept: HEALTH INFORMATION MANAGEMENT | Facility: CLINIC | Age: 33
End: 2023-01-20

## 2023-01-20 ENCOUNTER — ANESTHESIA (OUTPATIENT)
Dept: SURGERY | Facility: CLINIC | Age: 33
End: 2023-01-20
Payer: COMMERCIAL

## 2023-01-20 VITALS
TEMPERATURE: 98.5 F | OXYGEN SATURATION: 97 % | DIASTOLIC BLOOD PRESSURE: 81 MMHG | WEIGHT: 273.15 LBS | BODY MASS INDEX: 33.96 KG/M2 | HEART RATE: 65 BPM | RESPIRATION RATE: 15 BRPM | SYSTOLIC BLOOD PRESSURE: 121 MMHG | HEIGHT: 75 IN

## 2023-01-20 DIAGNOSIS — R59.1 DIFFUSE LYMPHADENOPATHY: ICD-10-CM

## 2023-01-20 LAB
GLUCOSE BLDC GLUCOMTR-MCNC: 121 MG/DL (ref 70–99)
GLUCOSE BLDC GLUCOMTR-MCNC: 152 MG/DL (ref 70–99)
GLUCOSE BLDC GLUCOMTR-MCNC: 177 MG/DL (ref 70–99)

## 2023-01-20 PROCEDURE — 272N000001 HC OR GENERAL SUPPLY STERILE: Performed by: SURGERY

## 2023-01-20 PROCEDURE — 88341 IMHCHEM/IMCYTCHM EA ADD ANTB: CPT | Mod: 26 | Performed by: STUDENT IN AN ORGANIZED HEALTH CARE EDUCATION/TRAINING PROGRAM

## 2023-01-20 PROCEDURE — 250N000009 HC RX 250: Performed by: STUDENT IN AN ORGANIZED HEALTH CARE EDUCATION/TRAINING PROGRAM

## 2023-01-20 PROCEDURE — 88312 SPECIAL STAINS GROUP 1: CPT | Mod: 26 | Performed by: STUDENT IN AN ORGANIZED HEALTH CARE EDUCATION/TRAINING PROGRAM

## 2023-01-20 PROCEDURE — 999N000141 HC STATISTIC PRE-PROCEDURE NURSING ASSESSMENT: Performed by: SURGERY

## 2023-01-20 PROCEDURE — 88264 CHROMOSOME ANALYSIS 20-25: CPT | Performed by: SURGERY

## 2023-01-20 PROCEDURE — 258N000003 HC RX IP 258 OP 636: Performed by: STUDENT IN AN ORGANIZED HEALTH CARE EDUCATION/TRAINING PROGRAM

## 2023-01-20 PROCEDURE — 88185 FLOWCYTOMETRY/TC ADD-ON: CPT | Performed by: SURGERY

## 2023-01-20 PROCEDURE — 38500 BIOPSY/REMOVAL LYMPH NODES: CPT | Mod: GC | Performed by: SURGERY

## 2023-01-20 PROCEDURE — 82962 GLUCOSE BLOOD TEST: CPT

## 2023-01-20 PROCEDURE — 370N000017 HC ANESTHESIA TECHNICAL FEE, PER MIN: Performed by: SURGERY

## 2023-01-20 PROCEDURE — 88365 INSITU HYBRIDIZATION (FISH): CPT | Mod: 26 | Performed by: STUDENT IN AN ORGANIZED HEALTH CARE EDUCATION/TRAINING PROGRAM

## 2023-01-20 PROCEDURE — 88189 FLOWCYTOMETRY/READ 16 & >: CPT | Mod: GC | Performed by: STUDENT IN AN ORGANIZED HEALTH CARE EDUCATION/TRAINING PROGRAM

## 2023-01-20 PROCEDURE — 88342 IMHCHEM/IMCYTCHM 1ST ANTB: CPT | Mod: 26 | Performed by: STUDENT IN AN ORGANIZED HEALTH CARE EDUCATION/TRAINING PROGRAM

## 2023-01-20 PROCEDURE — 88305 TISSUE EXAM BY PATHOLOGIST: CPT | Mod: 26 | Performed by: STUDENT IN AN ORGANIZED HEALTH CARE EDUCATION/TRAINING PROGRAM

## 2023-01-20 PROCEDURE — 250N000011 HC RX IP 250 OP 636: Performed by: STUDENT IN AN ORGANIZED HEALTH CARE EDUCATION/TRAINING PROGRAM

## 2023-01-20 PROCEDURE — 250N000009 HC RX 250: Performed by: SURGERY

## 2023-01-20 PROCEDURE — 88305 TISSUE EXAM BY PATHOLOGIST: CPT | Mod: TC | Performed by: SURGERY

## 2023-01-20 PROCEDURE — 710N000012 HC RECOVERY PHASE 2, PER MINUTE: Performed by: SURGERY

## 2023-01-20 PROCEDURE — 88313 SPECIAL STAINS GROUP 2: CPT | Mod: 26 | Performed by: STUDENT IN AN ORGANIZED HEALTH CARE EDUCATION/TRAINING PROGRAM

## 2023-01-20 PROCEDURE — 88360 TUMOR IMMUNOHISTOCHEM/MANUAL: CPT | Mod: 26 | Performed by: STUDENT IN AN ORGANIZED HEALTH CARE EDUCATION/TRAINING PROGRAM

## 2023-01-20 PROCEDURE — 360N000075 HC SURGERY LEVEL 2, PER MIN: Performed by: SURGERY

## 2023-01-20 RX ORDER — HYDROMORPHONE HCL IN WATER/PF 6 MG/30 ML
0.4 PATIENT CONTROLLED ANALGESIA SYRINGE INTRAVENOUS EVERY 5 MIN PRN
Status: DISCONTINUED | OUTPATIENT
Start: 2023-01-20 | End: 2023-01-20 | Stop reason: HOSPADM

## 2023-01-20 RX ORDER — DEXTROSE MONOHYDRATE 100 MG/ML
INJECTION, SOLUTION INTRAVENOUS CONTINUOUS PRN
Status: CANCELLED | OUTPATIENT
Start: 2023-01-20

## 2023-01-20 RX ORDER — FENTANYL CITRATE 50 UG/ML
50 INJECTION, SOLUTION INTRAMUSCULAR; INTRAVENOUS EVERY 5 MIN PRN
Status: DISCONTINUED | OUTPATIENT
Start: 2023-01-20 | End: 2023-01-20 | Stop reason: HOSPADM

## 2023-01-20 RX ORDER — FENTANYL CITRATE 50 UG/ML
25 INJECTION, SOLUTION INTRAMUSCULAR; INTRAVENOUS EVERY 5 MIN PRN
Status: DISCONTINUED | OUTPATIENT
Start: 2023-01-20 | End: 2023-01-20 | Stop reason: HOSPADM

## 2023-01-20 RX ORDER — NICOTINE POLACRILEX 4 MG
15-30 LOZENGE BUCCAL
Status: CANCELLED | OUTPATIENT
Start: 2023-01-20

## 2023-01-20 RX ORDER — OXYCODONE HYDROCHLORIDE 5 MG/1
5 TABLET ORAL EVERY 6 HOURS PRN
Qty: 5 TABLET | Refills: 0 | Status: SHIPPED | OUTPATIENT
Start: 2023-01-20 | End: 2023-01-23

## 2023-01-20 RX ORDER — CLINDAMYCIN PHOSPHATE 900 MG/50ML
900 INJECTION, SOLUTION INTRAVENOUS
Status: DISCONTINUED | OUTPATIENT
Start: 2023-01-20 | End: 2023-01-20 | Stop reason: HOSPADM

## 2023-01-20 RX ORDER — SODIUM CHLORIDE, SODIUM LACTATE, POTASSIUM CHLORIDE, CALCIUM CHLORIDE 600; 310; 30; 20 MG/100ML; MG/100ML; MG/100ML; MG/100ML
INJECTION, SOLUTION INTRAVENOUS CONTINUOUS
Status: CANCELLED | OUTPATIENT
Start: 2023-01-20

## 2023-01-20 RX ORDER — HYDROMORPHONE HCL IN WATER/PF 6 MG/30 ML
0.2 PATIENT CONTROLLED ANALGESIA SYRINGE INTRAVENOUS EVERY 5 MIN PRN
Status: DISCONTINUED | OUTPATIENT
Start: 2023-01-20 | End: 2023-01-20 | Stop reason: HOSPADM

## 2023-01-20 RX ORDER — LIDOCAINE HYDROCHLORIDE 20 MG/ML
INJECTION, SOLUTION INFILTRATION; PERINEURAL PRN
Status: DISCONTINUED | OUTPATIENT
Start: 2023-01-20 | End: 2023-01-20

## 2023-01-20 RX ORDER — ONDANSETRON 4 MG/1
4 TABLET, ORALLY DISINTEGRATING ORAL EVERY 30 MIN PRN
Status: DISCONTINUED | OUTPATIENT
Start: 2023-01-20 | End: 2023-01-20 | Stop reason: HOSPADM

## 2023-01-20 RX ORDER — PROPOFOL 10 MG/ML
INJECTION, EMULSION INTRAVENOUS CONTINUOUS PRN
Status: DISCONTINUED | OUTPATIENT
Start: 2023-01-20 | End: 2023-01-20

## 2023-01-20 RX ORDER — SODIUM CHLORIDE, SODIUM LACTATE, POTASSIUM CHLORIDE, CALCIUM CHLORIDE 600; 310; 30; 20 MG/100ML; MG/100ML; MG/100ML; MG/100ML
INJECTION, SOLUTION INTRAVENOUS CONTINUOUS PRN
Status: DISCONTINUED | OUTPATIENT
Start: 2023-01-20 | End: 2023-01-20

## 2023-01-20 RX ORDER — ONDANSETRON 2 MG/ML
INJECTION INTRAMUSCULAR; INTRAVENOUS PRN
Status: DISCONTINUED | OUTPATIENT
Start: 2023-01-20 | End: 2023-01-20

## 2023-01-20 RX ORDER — LIDOCAINE 40 MG/G
CREAM TOPICAL
Status: CANCELLED | OUTPATIENT
Start: 2023-01-20

## 2023-01-20 RX ORDER — CLINDAMYCIN PHOSPHATE 900 MG/50ML
900 INJECTION, SOLUTION INTRAVENOUS SEE ADMIN INSTRUCTIONS
Status: DISCONTINUED | OUTPATIENT
Start: 2023-01-20 | End: 2023-01-20 | Stop reason: HOSPADM

## 2023-01-20 RX ORDER — LIDOCAINE HYDROCHLORIDE AND EPINEPHRINE 10; 10 MG/ML; UG/ML
INJECTION, SOLUTION INFILTRATION; PERINEURAL PRN
Status: DISCONTINUED | OUTPATIENT
Start: 2023-01-20 | End: 2023-01-20 | Stop reason: HOSPADM

## 2023-01-20 RX ORDER — SODIUM CHLORIDE, SODIUM LACTATE, POTASSIUM CHLORIDE, CALCIUM CHLORIDE 600; 310; 30; 20 MG/100ML; MG/100ML; MG/100ML; MG/100ML
INJECTION, SOLUTION INTRAVENOUS CONTINUOUS
Status: DISCONTINUED | OUTPATIENT
Start: 2023-01-20 | End: 2023-01-20 | Stop reason: HOSPADM

## 2023-01-20 RX ORDER — FENTANYL CITRATE 50 UG/ML
INJECTION, SOLUTION INTRAMUSCULAR; INTRAVENOUS PRN
Status: DISCONTINUED | OUTPATIENT
Start: 2023-01-20 | End: 2023-01-20

## 2023-01-20 RX ORDER — DEXTROSE MONOHYDRATE 25 G/50ML
25-50 INJECTION, SOLUTION INTRAVENOUS
Status: CANCELLED | OUTPATIENT
Start: 2023-01-20

## 2023-01-20 RX ORDER — ONDANSETRON 2 MG/ML
4 INJECTION INTRAMUSCULAR; INTRAVENOUS EVERY 30 MIN PRN
Status: DISCONTINUED | OUTPATIENT
Start: 2023-01-20 | End: 2023-01-20 | Stop reason: HOSPADM

## 2023-01-20 RX ADMIN — ONDANSETRON 4 MG: 2 INJECTION INTRAMUSCULAR; INTRAVENOUS at 13:54

## 2023-01-20 RX ADMIN — FENTANYL CITRATE 50 MCG: 50 INJECTION, SOLUTION INTRAMUSCULAR; INTRAVENOUS at 13:08

## 2023-01-20 RX ADMIN — LIDOCAINE HYDROCHLORIDE 100 MG: 20 INJECTION, SOLUTION INFILTRATION; PERINEURAL at 13:08

## 2023-01-20 RX ADMIN — FENTANYL CITRATE 50 MCG: 50 INJECTION, SOLUTION INTRAMUSCULAR; INTRAVENOUS at 13:52

## 2023-01-20 RX ADMIN — MIDAZOLAM 2 MG: 1 INJECTION INTRAMUSCULAR; INTRAVENOUS at 13:05

## 2023-01-20 RX ADMIN — SODIUM CHLORIDE, POTASSIUM CHLORIDE, SODIUM LACTATE AND CALCIUM CHLORIDE: 600; 310; 30; 20 INJECTION, SOLUTION INTRAVENOUS at 13:02

## 2023-01-20 RX ADMIN — PROPOFOL 125 MCG/KG/MIN: 10 INJECTION, EMULSION INTRAVENOUS at 13:50

## 2023-01-20 RX ADMIN — PROPOFOL 150 MCG/KG/MIN: 10 INJECTION, EMULSION INTRAVENOUS at 13:08

## 2023-01-20 ASSESSMENT — ACTIVITIES OF DAILY LIVING (ADL)
ADLS_ACUITY_SCORE: 18

## 2023-01-20 NOTE — BRIEF OP NOTE
[facility]    Brief Operative Note    Pre-operative diagnosis: Diffuse lymphadenopathy [R59.1]  Post-operative diagnosis Same as pre-operative diagnosis    Procedure: Procedure(s):  Lymph Node excisional biopsy  Surgeon: Surgeon(s) and Role:     * New Burger MD - Primary  Anesthesia: MAC with Local   Estimated Blood Loss: Less than 10 ml    Drains: None  Specimens:   ID Type Source Tests Collected by Time Destination   1 : partial lymph node left groin  Tissue Lymph Node(s), Inguinal, Left SURGICAL PATHOLOGY EXAM New Burger MD 1/20/2023  2:16 PM      Findings: Enlarged left inguinal lymph nodes, matted together  Complications: None.  Implants: * No implants in log *      Tiesha Gutierrez MD  General Surgery PGY-2

## 2023-01-20 NOTE — DISCHARGE INSTRUCTIONS
Same-Day Surgery   Adult Discharge Orders & Instructions   For 24 hours after surgery  Get plenty of rest.  A responsible adult must stay with you for at least 24 hours after you leave the hospital.   Do not drive or use heavy equipment.  If you have weakness or tingling, don't drive or use heavy equipment until this feeling goes away.  Do not drink alcohol.  Avoid strenuous or risky activities.  Ask for help when climbing stairs.   You may feel lightheaded.  IF so, sit for a few minutes before standing.  Have someone help you get up.   If you have nausea (feel sick to your stomach): Drink only clear liquids such as apple juice, ginger ale, broth or 7-Up.  Rest may also help.  Be sure to drink enough fluids.  Move to a regular diet as you feel able.  You may have a slight fever. Call the doctor if your fever is over 100 F (37.7 C) (taken under the tongue) or lasts longer than 24 hours.  You may have a dry mouth, a sore throat, muscle aches or trouble sleeping.  These should go away after 24 hours.  Do not make important or legal decisions.   Call your doctor for any of the followin.  Signs of infection (fever, growing tenderness at the surgery site, a large amount of drainage or bleeding, severe pain, foul-smelling drainage, redness, swelling).    2. It has been over 8 to 10 hours since surgery and you are still not able to urinate (pass water).    3.  Headache for over 24 hours.    To contact a doctor, call Dr Burger at the general surgery clinic at [phone] or:  '   [phone] and ask for the resident on call for General Surgery (answered 24 hours a day)  '   Emergency Department: [facility]: [phone]

## 2023-01-20 NOTE — ANESTHESIA POSTPROCEDURE EVALUATION
Patient: Billy Carey    Procedure: Procedure(s):  Lymph Node excisional biopsy       Anesthesia Type:  MAC    Note:  Disposition: Outpatient   Postop Pain Control: Uneventful            Sign Out: Well controlled pain   PONV: No   Neuro/Psych: Uneventful            Sign Out: Acceptable/Baseline neuro status   Airway/Respiratory: Uneventful            Sign Out: Acceptable/Baseline resp. status   CV/Hemodynamics: Uneventful            Sign Out: Acceptable CV status; No obvious hypovolemia; No obvious fluid overload   Other NRE: NONE   DID A NON-ROUTINE EVENT OCCUR? No           Last vitals:  Vitals Value Taken Time   /66 01/20/23 1515   Temp 36.9  C (98.5  F) 01/20/23 1451   Pulse 77 01/20/23 1515   Resp 17 01/20/23 1515   SpO2 98 % 01/20/23 1515       Electronically Signed By: Alexsander Dugan MD, MD  January 20, 2023  3:28 PM

## 2023-01-20 NOTE — ANESTHESIA CARE TRANSFER NOTE
Patient: Billy Carey    Procedure: Procedure(s):  Lymph Node excisional biopsy       Diagnosis: Diffuse lymphadenopathy [R59.1]  Diagnosis Additional Information: No value filed.    Anesthesia Type:   MAC     Note:    Oropharynx: oropharynx clear of all foreign objects and spontaneously breathing  Level of Consciousness: awake  Oxygen Supplementation: face mask  Level of Supplemental Oxygen (L/min / FiO2): 6  Independent Airway: airway patency satisfactory and stable  Dentition: dentition unchanged  Vital Signs Stable: post-procedure vital signs reviewed and stable  Report to RN Given: handoff report given  Patient transferred to: PACU  Comments: -VSS  Handoff Report: Identifed the Patient, Identified the Reponsible Provider, Reviewed the pertinent medical history, Discussed the surgical course, Reviewed Intra-OP anesthesia mangement and issues during anesthesia, Set expectations for post-procedure period and Allowed opportunity for questions and acknowledgement of understanding      Vitals:  Vitals Value Taken Time   /63 01/20/23 1451   Temp     Pulse 78 01/20/23 1451   Resp     SpO2 99 % 01/20/23 1454   Vitals shown include unvalidated device data.    Electronically Signed By: Jarred Stiles MD  January 20, 2023  2:55 PM

## 2023-01-20 NOTE — PROGRESS NOTES
Patient seen in preop. No changes since I saw him in clinic recently. Discussed risks of surgery again, including bleeding, infection, damage to surrounding organs, for example sensory nerve damage or lymph leak. Questions answered. He is understanding and agreeable to proceed.

## 2023-01-21 NOTE — OP NOTE
Procedure Date: 01/20/2023    PREOPERATIVE DIAGNOSIS:  Diffuse lymphadenopathy.    POSTOPERATIVE DIAGNOSIS:  Diffuse lymphadenopathy.    PROCEDURE:  Excisional biopsy of lymph node in the left groin.    ANESTHESIA:  Local/MAC.    ESTIMATED BLOOD LOSS:  10 mL.     INDICATIONS FOR PROCEDURE:  The patient is a healthy 32-year-old gentleman with lymphadenopathy and thrombocytopenia that has had a workup so far that has not showed the underlying diagnosis.  Most recently, he had a core needle biopsy of an enlarged lymph node in his right axilla that was nondiagnostic.  I was therefore asked to biopsy a lymph node in his left groin based on PET/CT findings.  Risks and benefits of the procedure were discussed with the patient including risks of bleeding, infection, damage to surrounding organs.  He was understanding of the discussion and agreeable to proceed.    DESCRIPTION OF PROCEDURE:  The patient was placed in the supine position and MAC was administered.  Area was prepped and draped in the usual sterile fashion.  Timeout was performed.  I then used the ultrasound to examine the area again to confirm that this was what I palpated in the groin that I felt to be a ridge of lymph node tissue was indeed that and this appeared to be the case also with the ultrasound.  I then made an incision after local was administered.  Dissection was carried down onto this area.  This cord was about 2 cm thick and several centimeters longer than that.  I used an ultrasound to examine this again and this appeared to be a lymph node, though atypical in light of its size.  It did not feel dense.  It felt soft, almost like a lipoma.  I did start my dissection on the lateral aspect of this with a goal of removing about 2 cm or so of this and the attachments to the lymph node were all tied off with a 2-0 Vicryl.  As this continued to go medially, I decided to just amputate it at that area with a 2-0 Vicryl and then cut that off.  The specimen  was examined on the back table.  I did cut it open to ensure that I had a lymph node and I did see lymph node tissue in addition to a significant amount of fat that appears to have infiltrated into the lymph node.  Good hemostasis.  Wound was closed with several 3-0 Vicryls in the underlying fascia then several in the dermis and then running 4-0 Vicryl in the subcuticular tissue.  Steri-Strips and dressing applied.  The patient tolerated the procedure well and was taken to recovery in good condition.  I spoke to the wife about the findings immediately after and then both the patient and wife when he was more awake about these findings and impression.  They were understanding of discussion and our clinic will be in touch with them to ensure that he does well from a surgical standpoint and recovering from this procedure.    New Burger MD        D: 2023   T: 2023   MT: MANUEL    Name:     MELANIE KELLY  MRN:      [id number]        Account:        [id number]   :      1990           Procedure Date: 2023     Document: [id number]

## 2023-01-23 ENCOUNTER — PATIENT OUTREACH (OUTPATIENT)
Dept: SURGERY | Facility: CLINIC | Age: 33
End: 2023-01-23
Payer: COMMERCIAL

## 2023-01-23 NOTE — PROGRESS NOTES
RN Post-Op/Post-Discharge Care Coordination Note    Mr. Billy Carey is a 32 year old male who underwent excision of al left groin LN on 1/20 with  Dr. New Burger.  Spoke with Patient.    Support  Patient able to care for self independently     Health Status  Nausea/Vomiting: Patient denies nausea/vomiting.  Eating/drinking: Patient is able to eat and drink without any complaints.  Bowel habits: Patient reports having a normal bowel movement.  Drains (OMAR): N/A  Fevers/chills: Patient denies any fever or chills.  Incisions: Patient denies any signs and symptoms of infection..  Wound closure:  Steri-strips  Pain: Improved and he has been off of analgesics for 24 hours.  New Medications:  Oxycodone  Blood sugars WNL    Activity/Restrictions  No restrictions    Equipment  None    Pathology reviewed with patient:  No, not yet available. Pathology will be discussed with the patient by Dr. Hayde Lopez.    Forms/Letters  No    All of his questions were answered including reviewing restrictions and wound care.  He will call this office if he has any further questions and/or concerns.      Patient will return to the clinic on a PRN basis.    A copy of this note was routed to the primary surgeon.      Whom and When to Call  Patient acknowledges understanding of how to manage any medication changes and   when to seek medical care.     Patient advised that if after hour medical concerns arise to please call [phone] and choose option 4 to speak to the physician on call.

## 2023-01-25 NOTE — PROGRESS NOTES
[facility]    Hematology/Oncology Established Patient Note      Today's Date: 1/26/23    Reason for Consultation: Thrombocytopenia  Referring Provider: ZAHRAA Kingsley CNP      HISTORY OF PRESENT ILLNESS: Billy Carey is a 32 year old male who is referred for thrombocytopenia. 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    Historically, he has had historical normal CBC. On 11/30/22, WBC 6.0, Hb 16.5, PLT 42. Repeat CBC on 12/1/22, WBC 7.2, Hb 17.5, PLT 43. Hepatitis and HIV studies negative.     He has had eustachian tube placement without bleeding event. He has not had any other surgeries.     For DM1- he is followed by endocrinology. He has continuous blood glucose monitoring and an insulin pump.     He drinks alcohol 1-2x/week. No excessive use. No history of smoking.     Family history of VTE in his sister. He is unaware of the nuances surrounding this event. No family history of bleeding disorder or malignancy.       INTERIM HISTORY:  See my telephone notes 12/6, 12/9/22, 1/20/23.     Underwent excisional biopsy on 1/20/23.        REVIEW OF SYSTEMS:   A 14 point ROS was reviewed with pertinent positives and negatives in the HPI.        HOME MEDICATIONS:  Current Outpatient Medications   Medication Sig Dispense Refill     atorvastatin (LIPITOR) 10 MG tablet Take 1 tablet (10 mg) by mouth daily 90 tablet 3     blood glucose (KELL CONTOUR NEXT) test strip Test 4-5 times daily 4 Box 3     Blood Glucose Monitoring Suppl (KELL CONTOUR NEXT LINK) W/DEVICE KIT 1 kit 5 times daily. Use as directed 1 kit 1     glucagon (GLUCAGON EMERGENCY) 1 MG kit Inject 1 mg into the muscle once for 1 dose 1 mg 3     insulin aspart (FIASP) 100 UNIT/ML vial        insulin glargine (BASAGLAR KWIKPEN) 100 UNIT/ML pen INJECT 50 UNITS ONCE DAILY SUBCUTANEOUSLY 15 mL 1     insulin pen needle (BD CHRIST U/F) 32G X 4 MM miscellaneous Use 3-6 pen needles daily or as directed. 50 each 0     vitamin D2  (ERGOCALCIFEROL) 56238 units (1250 mcg) capsule            ALLERGIES:  Allergies   Allergen Reactions     Penicillins Rash         PAST MEDICAL HISTORY:  Past Medical History:   Diagnosis Date     Diabetes mellitus type 1, uncontrolled, insulin dependent 9/16/2013         PAST SURGICAL HISTORY:  Past Surgical History:   Procedure Laterality Date     EXCISE MASS GROIN Left 1/20/2023    Procedure: Lymph Node excisional biopsy;  Surgeon: New Burger MD;  Location: [facility] OR     MYRINGOTOMY, INSERT TUBE BILATERAL, COMBINED Bilateral     As a child         SOCIAL HISTORY:  Social History     Socioeconomic History     Marital status:      Spouse name: Jayla     Number of children: 3     Years of education: Not on file     Highest education level: Not on file   Occupational History     Occupation:      Comment: [employer]   Tobacco Use     Smoking status: Never     Smokeless tobacco: Never   Substance and Sexual Activity     Alcohol use: No     Comment: rarely     Drug use: No     Sexual activity: Yes     Partners: Female   Other Topics Concern     Parent/sibling w/ CABG, MI or angioplasty before 65F 55M? Not Asked   Social History Narrative     Not on file     Social Determinants of Health     Financial Resource Strain: Low Risk      Difficulty of Paying Living Expenses: Not hard at all   Food Insecurity: No Food Insecurity     Worried About Running Out of Food in the Last Year: Never true     Ran Out of Food in the Last Year: Never true   Transportation Needs: No Transportation Needs     Lack of Transportation (Medical): No     Lack of Transportation (Non-Medical): No   Physical Activity: Sufficiently Active     Days of Exercise per Week: 6 days     Minutes of Exercise per Session: 40 min   Stress: No Stress Concern Present     Feeling of Stress : Only a little   Social Connections: Socially Integrated     Frequency of Communication with Friends and Family: More than three times a week      "Frequency of Social Gatherings with Friends and Family: More than three times a week     Attends Judaism Services: 1 to 4 times per year     Active Member of Clubs or Organizations: Yes     Attends Club or Organization Meetings: Not on file     Marital Status:    Intimate Partner Violence: Not on file   Housing Stability: Low Risk      Unable to Pay for Housing in the Last Year: No     Number of Places Lived in the Last Year: 1     Unstable Housing in the Last Year: No         FAMILY HISTORY:  Family History   Problem Relation Age of Onset     C.A.D. No family hx of      Diabetes No family hx of      Hypertension No family hx of      Cancer No family hx of         no skin cancer     Amblyopia No family hx of      Retinal detachment No family hx of      Thyroid Disease No family hx of      Glaucoma No family hx of      Macular Degeneration No family hx of          PHYSICAL EXAM:  Vital signs:  BP (!) 143/84   Pulse 64   Temp 97  F (36.1  C) (Tympanic)   Resp 16   Ht 1.905 m (6' 3\")   Wt 125.7 kg (277 lb 3.2 oz)   SpO2 99%   BMI 34.65 kg/m     ECO  GENERAL/CONSTITUTIONAL: No acute distress.  LYMPH: No anterior cervical, posterior cervical, supraclavicular, axillary or inguinal adenopathy.   RESPIRATORY: Clear to auscultation bilaterally. No crackles or wheezing.   CARDIOVASCULAR: Regular rate and rhythm without murmurs, gallops, or rubs.  GASTROINTESTINAL: No hepatosplenomegaly, masses, or tenderness. The patient has normal bowel sounds. No guarding.  No distention.  MUSCULOSKELETAL: Warm and well-perfused, no cyanosis, clubbing, or edema.  NEUROLOGIC: Cranial nerves II-XII are intact. Alert, oriented, answers questions appropriately.  INTEGUMENTARY: No rashes or jaundice.  GAIT: Steady, does not use assistive device.      LABS:   Latest Reference Range & Units 01/10/23 15:17   Sodium 136 - 145 mmol/L 136   Potassium 3.4 - 5.3 mmol/L 4.4   Chloride 98 - 107 mmol/L 100   Carbon Dioxide (CO2) 22 - 29 " mmol/L 25   Urea Nitrogen 6.0 - 20.0 mg/dL 14.0   Creatinine 0.67 - 1.17 mg/dL 1.03   GFR Estimate >60 mL/min/1.73m2 >90   Calcium 8.6 - 10.0 mg/dL 9.3   Anion Gap 7 - 15 mmol/L 11   Albumin 3.5 - 5.2 g/dL 4.6   Protein Total 6.4 - 8.3 g/dL 6.9   Alkaline Phosphatase 40 - 129 U/L 63   ALT 10 - 50 U/L 26   AST 10 - 50 U/L 29   Bilirubin Total <=1.2 mg/dL 1.2   CRP Inflammation <5.00 mg/L <3.00   Glucose 70 - 99 mg/dL 172 (H)   Lactate Dehydrogenase 0 - 250 U/L 196   Uric Acid 3.4 - 7.0 mg/dL 5.8   WBC 4.0 - 11.0 10e3/uL 6.5   Hemoglobin 13.3 - 17.7 g/dL 16.4   Hematocrit 40.0 - 53.0 % 45.8   Platelet Count 150 - 450 10e3/uL 45 (LL)   RBC Count 4.40 - 5.90 10e6/uL 5.52   MCV 78 - 100 fL 83   MCH 26.5 - 33.0 pg 29.7   MCHC 31.5 - 36.5 g/dL 35.8   RDW 10.0 - 15.0 % 11.9   % Neutrophils % 65   % Lymphocytes % 23   % Monocytes % 10   % Eosinophils % 1   % Basophils % 1   Absolute Basophils 0.0 - 0.2 10e3/uL 0.1   Absolute Eosinophils 0.0 - 0.7 10e3/uL 0.1   Absolute Immature Granulocytes <=0.4 10e3/uL 0.0   Absolute Lymphocytes 0.8 - 5.3 10e3/uL 1.5   Absolute Monocytes 0.0 - 1.3 10e3/uL 0.7   % Immature Granulocytes % 0   Absolute Neutrophils 1.6 - 8.3 10e3/uL 4.2   Absolute NRBCs 10e3/uL 0.0   NRBCs per 100 WBC <1 /100 0   RBC Morphology  Confirmed RBC Indices   Platelet Morphology Automated Count Confirmed. Platelet morphology is normal.  Automated Count Confirmed. Platelet morphology is normal.   % Retic 0.5 - 2.0 % 1.0   Absolute Retic 0.025 - 0.095 10e6/uL 0.057   Sed Rate 0 - 15 mm/hr 6   INR 0.85 - 1.15  1.06   PTT 22 - 38 Seconds 26   Fibrinogen 170 - 490 mg/dL 269   SPECIMEN EXPIRATION DATE  [id number]   MIRA Anti-IgG,-C3d  Positive 3+   Hep B Surface Agn Nonreactive  Nonreactive   Hepatitis B Core Margarette Nonreactive  Nonreactive   Hepatitis B Surface Antibody Instrument Value <8.00 m[IU]/mL 35.73   Hepatitis B Surface Antibody  Reactive   Hepatitis C Antibody Nonreactive  Nonreactive   HIV Antigen Antibody  Combo Nonreactive  Nonreactive      Latest Reference Range & Units 01/10/23 15:17   PAVAN interpretation Negative  Positive !      01/10/23 15:17   PAVAN titer 1 1:160       Serum M-protein (g/dL):  1/10/23: 0.0       Latest Reference Range & Units 12/14/22 09:20   MIRA Anti-IgG,-C3d  Weak Positive   MIRA Anti-C3  Negative   MIRA Anti-IgG, Tube  Positive 1+   Blood Bank Chart Comment  BB Chart Comment      Latest Reference Range & Units 12/14/22 09:20   Haptoglobin 32 - 197 mg/dL 31 (L)          Latest Reference Range & Units 12/06/22 09:40   Iron 61 - 157 ug/dL 59 (L)   Iron Binding Capacity 240 - 430 ug/dL 319   Iron Sat Index 15 - 46 % 18   Lactate Dehydrogenase 0 - 250 U/L 240   INR 0.85 - 1.15  0.91   PTT 22 - 38 Seconds 25   Fibrinogen 170 - 490 mg/dL 282   SPECIMEN EXPIRATION DATE  [id number]   MIRA Anti-IgG,-C3d  Positive 3+               PATHOLOGY:  Final Diagnosis 12/1/22:   Peripheral blood, morphology:  - Marked thrombocytopenia.  - Hemoglobin quantitatively within normal limits and erythrocytes without diagnostic morphologic abnormality.  - WBC subsets quantitatively within normal limits and without diagnostic morphologic abnormality.  - Negative for schistocytes.  - Negative for overt features of myelodysplasia or circulating blasts.     Final Diagnosis 12/6/22:   Peripheral blood:  --Slight polycythemia/erythrocytosis.  --Marked thrombocytopenia.   Electronically signed by Angel Marshall MD on 12/7/2022 at 10:33 AM   Comment    The cause of this patient's thrombocytopenia is unclear from the peripheral smear. Some common causes of thrombocytopenia to consider include infections, medications, alcohol, immune mediated mechanisms, and splenic sequestration. If splenic sequestration is of clinical concern, an accurate assessment of spleen size is recommended.         Clinical Information    Thrombocytopenia   Peripheral Smear    The red blood cells appear normochromic.  Rouleaux formation does not appear  increased.  Polychromasia does not appear increased.  Poikilocytosis appears mild and nonspecific.  Platelets  are well granulated.  Lymphocytes are predominantly small with cytologically mature chromatin and appear overall polymorphous.  Neutrophils contain normal cytoplasmic granulation and unremarkable nuclear morphology.  There is no dysplasia and no circulating blasts are seen.     Final Diagnosis 12/14/22:   Bone marrow, left posterior iliac crest, decalcified trephine biopsy and aspiration:  -- Normocellular bone marrow for age (60 to 70%) with maturing trilineage hematopoiesis.  -- No evidence of marrow involvement by lymphoma or other hematopoietic neoplasm.     Peripheral blood showing:  -- Moderate thrombocytopenia.     Case Report   Flow Cytometry Report                             Case: [id number]                                   Authorizing Provider:  Hayde Lopez DO         Collected:           12/14/2022 10:06 AM           Ordering Location:     [facility]   Received:            12/14/2022 10:21 AM                                  [facility]                                                             Pathologist:           Marleny Holden MD                                                      Specimen:    Iliac Crest, Bone Marrow Aspirate, Left                                                    Flow Interpretation   A. Iliac Crest, Bone Marrow Aspirate, Left:    - Polytypic B cells  - No aberrant immunophenotype on T cells  - No increase in myeloid blasts and no abnormal myeloid blast population     Case Report   Surgical Pathology Report                         Case: [id number]                                   Authorizing Provider:  Hayde Lopez DO         Collected:           12/15/2022 11:27 AM           Ordering Location:     [facility]   Received:            12/15/2022 11:54 AM                                  Imaging                                                                        Pathologist:           Mirna Roger                                                                Specimen:    Retroperitoneal                                                                            Final Diagnosis   A.  Retroperitoneum, biopsy:  -Three tissue fragments, entirely submitted for flow cytometry evaluation (gross examination only).       Case Report   Flow Cytometry Report                             Case: [id number]                                   Authorizing Provider:  Hayde Lopez DO         Collected:           12/15/2022 01:16 PM           Ordering Location:     [facility]   Received:            12/15/2022 01:17 PM                                  Imaging                                                                       Pathologist:           Marleny Holden MD                                                      Specimen:    Retroperitoneal                                                                            Flow Interpretation   A. Retroperitoneal :    - Polytypic B cells  - No aberrant immunophenotype on T cells         Case Report   Surgical Pathology Report                         Case: [id number]                                   Authorizing Provider:  Hayde Lopez DO         Collected:           01/04/2023 02:25 PM           Ordering Location:     [facility]   Received:            01/04/2023 02:48 PM                                  [facility]                                                                 Pathologist:           Saadia Allen MD                                                                            Specimen:    Lymph Node(s), Axillary, Right                                                             Final Diagnosis   Lymph node, right axillary, core biopsies:  -Partially sampled  lymph node showing no evidence of Hodgkin's or non-Hodgkin's lymphoma  -Completed immunophenotyping studies show polytypic B cells and no aberrant T-cell antigen expression     Case Report   Flow Cytometry Report                             Case: [id number]                                   Authorizing Provider:  Hayde Lopez DO         Collected:           01/04/2023 02:25 PM           Ordering Location:     [facility]   Received:            01/04/2023 02:53 PM                                  [facility]                                                                 Pathologist:           Xiomara Ramsey MD                                                         Specimen:    Lymph Node(s), Axillary, Right                                                             Flow Interpretation   A. Lymph Node(s), Axillary, Right, :  -Polytypic B cells  No aberrant immunophenotype on T cells         Lymph node 1/20/23 pending:        Case Report   Flow Cytometry Report                             Case: [id number]                                   Authorizing Provider:  New Burger         Collected:           01/20/2023 02:33 PM                                  MD Ari                                                                  Ordering Location:     [facility]                 Received:            01/20/2023 02:33 PM           Pathologist:           Marleny Holden MD                                                      Specimen:    Lymph Node(s), Inguinal, Left                                                              Flow Interpretation   A. Lymph Node(s), Inguinal, Left:     - Polytypic B cells  - No aberrant immunophenotype on T cells           IMAGING:  CT CAP 12/6/22:  IMPRESSION:  1.  Several enlarged lymph nodes identified at the right axilla,  retroperitoneum, left pelvis and borderline enlarged at the left  inguinal locations. While nonspecific, a neoplastic etiology  is  possible including lymphoma.  2.  Mild splenomegaly.  3.  Lobulated indeterminate nodular masses at the left pericardial  fat.  4.  Indeterminate multiple bilateral pulmonary nodules. The dominant  solid nodule is at the right lower lobe measuring 1.1 cm. Neoplasm  remains in the differential and surveillance imaging and/or further  workup is recommended. See below for follow up imaging guidelines.    PET 12/9/22:  IMPRESSION:     Findings suspicious for active neoplasm such as lymphoma involving lymph nodes above/below the diaphragm. The right axillary for left inguinal lymph nodes are amenable to ultrasound-guided histologic sampling if desired.        ASSESSMENT/PLAN:  Billy Carey is a 32 year old male who is referred for thrombocytopenia. 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    1) Thrombocytopenia, warm autoimmune hemolysis  -This is a new diagnosis for patient and an incidental finding when he was in for routine physical examination. He has not had any bleeding episodes.   -No new meds, recent infection, or recent surgeries.  -TSH normal at 3.85.   -HepB/C and HIV studies historically negative.   -No splenomegaly on physical examination.  -I doubt hemolysis as retic is normal as well as LFTs.  -We discussed the possibility of lab artifact, but there is no comment on platelet clumping on smear. Also, he had lab work repeated two days in a row with PLT count returning in the 40s.   -Given his history of autoimmune disease, he may have underlying ITP, which is a diagnosis of exclusion. We had discussed possible steroid therapy if platelets are to return <30K.   -Repeat CBC is showing PLTs still in the 40's. Hb is 17.9, WBC normal.   -Also is consideration for a microangiopathic hemolytic anemia. MIRA has come back positive at 3+ (which is inconsistent with MAHA) and patient does not have elevated LFTs, abnormal coag studies, nor reticulocytosis. LDH is also normal at 240. He is afebrile.  [facility] blood bank testing has returned +IgG and negative for complement, consistent with warm AIHA.   -CT CAP had returned with 2 cm LN of the retroperitoneum, inguinal, and axillary. There was also note of an abnormal pericardial fat mass. I sent for a PET, that did not show FDG avidity of the pericardial fat mass. The retroperitoneal LNs had SUV of 12-13 with the remainder LN 6-7. I sent patient for a core biopsy of the retroperitoneal LN with IR that returned in fragments with unremarkable FLOW. Bone marrow biopsy was normocellular with normal megakaryocyte morphology. I then pursued a right axillary LN biopsy, which again returned negative with unremarkable FLOW. I sent for excisional biopsy of an inguinal LN with general surgery with pathology once again returning as benign with folliculary and interfollicular hyperplasia. Congo red staining is negative. EBV, HHV8 negative. I reviewed with pathology for any morphologic evidence consistent with Castleman's disease and spoke with Dr. Kaminski who is unable to confirm this at this time. She did have an interdepartmental pathology review. I do note the sinuses contain abundant histiocytes in the microscopic description and in my differential diagnosis is also Rosai-Dorfamn or Langerhan's disease.  I spoke with Dr. Tyler who is in agreement with workup above. I reviewed with Dr. 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 I reviewed I would then move onto rituximab next if patient is not to have improvement with steroid therapy. Dr. Tyler is in agreement with this plan.   -I reviewed the above with patient and wife, Jayla. I will need to continue to monitor the patient not only for treatment response for the hemolysis, but for evolution of LAD. My plan is to recheck with PET in 6 months time and will continue to monitor for treatment  response discussed above.     2) Positive PAVAN  -Titer 1:160.  -Patient without arthralgias, skin changes.  -Will check APLA as well as DS-DNA and CH50.  -Possible referral to rheumatology.     3) History of infectious mononucleosis  -EBV negative on LN.  -Will check EBV PCR.     4) DM1  -Patient managed by endocrinology.  -He is instructed to strictly monitor BG due to risk of DKA with steroid therapy.     5) History of HTN, HLD    6) -Labs today.  -Prednisone 60 mg daily x2 weeks.  -Follow up in 2 weeks with labs and to discuss rituximab.     Complexity: HIGH.     Hayde Lopez DO  Hematology/Oncology  [facility]

## 2023-01-26 ENCOUNTER — ONCOLOGY VISIT (OUTPATIENT)
Dept: ONCOLOGY | Facility: CLINIC | Age: 33
End: 2023-01-26
Attending: INTERNAL MEDICINE
Payer: COMMERCIAL

## 2023-01-26 VITALS
OXYGEN SATURATION: 99 % | HEART RATE: 64 BPM | DIASTOLIC BLOOD PRESSURE: 84 MMHG | SYSTOLIC BLOOD PRESSURE: 143 MMHG | WEIGHT: 277.2 LBS | TEMPERATURE: 97 F | HEIGHT: 75 IN | RESPIRATION RATE: 16 BRPM | BODY MASS INDEX: 34.47 KG/M2

## 2023-01-26 DIAGNOSIS — D59.10 AUTOIMMUNE HEMOLYTIC ANEMIA (H): Primary | ICD-10-CM

## 2023-01-26 LAB
ALBUMIN SERPL BCG-MCNC: 4.7 G/DL (ref 3.5–5.2)
ALP SERPL-CCNC: 65 U/L (ref 40–129)
ALT SERPL W P-5'-P-CCNC: 34 U/L (ref 10–50)
ANION GAP SERPL CALCULATED.3IONS-SCNC: 13 MMOL/L (ref 7–15)
APTT PPP: 26 SECONDS (ref 22–38)
AST SERPL W P-5'-P-CCNC: 33 U/L (ref 10–50)
BASOPHILS # BLD AUTO: 0.1 10E3/UL (ref 0–0.2)
BASOPHILS NFR BLD AUTO: 1 %
BILIRUB SERPL-MCNC: 1.5 MG/DL
BUN SERPL-MCNC: 15.5 MG/DL (ref 6–20)
CALCIUM SERPL-MCNC: 9.4 MG/DL (ref 8.6–10)
CHLORIDE SERPL-SCNC: 98 MMOL/L (ref 98–107)
CREAT SERPL-MCNC: 1.01 MG/DL (ref 0.67–1.17)
DEPRECATED HCO3 PLAS-SCNC: 26 MMOL/L (ref 22–29)
EOSINOPHIL # BLD AUTO: 0.1 10E3/UL (ref 0–0.7)
EOSINOPHIL NFR BLD AUTO: 2 %
ERYTHROCYTE [DISTWIDTH] IN BLOOD BY AUTOMATED COUNT: 11.9 % (ref 10–15)
ERYTHROCYTE [SEDIMENTATION RATE] IN BLOOD BY WESTERGREN METHOD: 3 MM/HR (ref 0–15)
FIBRINOGEN PPP-MCNC: 253 MG/DL (ref 170–490)
GFR SERPL CREATININE-BSD FRML MDRD: >90 ML/MIN/1.73M2
GLUCOSE SERPL-MCNC: 174 MG/DL (ref 70–99)
HCT VFR BLD AUTO: 48.2 % (ref 40–53)
HGB BLD-MCNC: 17 G/DL (ref 13.3–17.7)
IMM GRANULOCYTES # BLD: 0 10E3/UL
IMM GRANULOCYTES NFR BLD: 0 %
INR PPP: 1.06 (ref 0.85–1.15)
LDH SERPL L TO P-CCNC: 295 U/L (ref 0–250)
LYMPHOCYTES # BLD AUTO: 1.4 10E3/UL (ref 0.8–5.3)
LYMPHOCYTES NFR BLD AUTO: 21 %
MCH RBC QN AUTO: 29.6 PG (ref 26.5–33)
MCHC RBC AUTO-ENTMCNC: 35.3 G/DL (ref 31.5–36.5)
MCV RBC AUTO: 84 FL (ref 78–100)
MONOCYTES # BLD AUTO: 0.7 10E3/UL (ref 0–1.3)
MONOCYTES NFR BLD AUTO: 10 %
NEUTROPHILS # BLD AUTO: 4.5 10E3/UL (ref 1.6–8.3)
NEUTROPHILS NFR BLD AUTO: 66 %
NRBC # BLD AUTO: 0 10E3/UL
NRBC BLD AUTO-RTO: 0 /100
PATH REPORT.ADDENDUM SPEC: NORMAL
PATH REPORT.COMMENTS IMP SPEC: NORMAL
PATH REPORT.FINAL DX SPEC: NORMAL
PATH REPORT.GROSS SPEC: NORMAL
PATH REPORT.MICROSCOPIC SPEC OTHER STN: NORMAL
PATH REPORT.RELEVANT HX SPEC: NORMAL
PHOTO IMAGE: NORMAL
PLATELET # BLD AUTO: 50 10E3/UL (ref 150–450)
POTASSIUM SERPL-SCNC: 4.3 MMOL/L (ref 3.4–5.3)
PROT SERPL-MCNC: 7.3 G/DL (ref 6.4–8.3)
RBC # BLD AUTO: 5.75 10E6/UL (ref 4.4–5.9)
SODIUM SERPL-SCNC: 137 MMOL/L (ref 136–145)
WBC # BLD AUTO: 6.8 10E3/UL (ref 4–11)

## 2023-01-26 PROCEDURE — 86146 BETA-2 GLYCOPROTEIN ANTIBODY: CPT | Performed by: INTERNAL MEDICINE

## 2023-01-26 PROCEDURE — 85390 FIBRINOLYSINS SCREEN I&R: CPT | Mod: 26 | Performed by: PATHOLOGY

## 2023-01-26 PROCEDURE — 85025 COMPLETE CBC W/AUTO DIFF WBC: CPT | Performed by: INTERNAL MEDICINE

## 2023-01-26 PROCEDURE — 85652 RBC SED RATE AUTOMATED: CPT | Performed by: INTERNAL MEDICINE

## 2023-01-26 PROCEDURE — 83615 LACTATE (LD) (LDH) ENZYME: CPT | Performed by: INTERNAL MEDICINE

## 2023-01-26 PROCEDURE — 80053 COMPREHEN METABOLIC PANEL: CPT | Performed by: INTERNAL MEDICINE

## 2023-01-26 PROCEDURE — 85610 PROTHROMBIN TIME: CPT | Performed by: INTERNAL MEDICINE

## 2023-01-26 PROCEDURE — 85730 THROMBOPLASTIN TIME PARTIAL: CPT | Mod: 91 | Performed by: INTERNAL MEDICINE

## 2023-01-26 PROCEDURE — 87799 DETECT AGENT NOS DNA QUANT: CPT | Performed by: INTERNAL MEDICINE

## 2023-01-26 PROCEDURE — 85384 FIBRINOGEN ACTIVITY: CPT | Performed by: INTERNAL MEDICINE

## 2023-01-26 PROCEDURE — 85730 THROMBOPLASTIN TIME PARTIAL: CPT | Performed by: INTERNAL MEDICINE

## 2023-01-26 PROCEDURE — G0463 HOSPITAL OUTPT CLINIC VISIT: HCPCS | Performed by: INTERNAL MEDICINE

## 2023-01-26 PROCEDURE — 83010 ASSAY OF HAPTOGLOBIN QUANT: CPT | Performed by: INTERNAL MEDICINE

## 2023-01-26 PROCEDURE — 86162 COMPLEMENT TOTAL (CH50): CPT | Performed by: INTERNAL MEDICINE

## 2023-01-26 PROCEDURE — 86225 DNA ANTIBODY NATIVE: CPT | Performed by: INTERNAL MEDICINE

## 2023-01-26 PROCEDURE — 99214 OFFICE O/P EST MOD 30 MIN: CPT | Performed by: INTERNAL MEDICINE

## 2023-01-26 PROCEDURE — 86147 CARDIOLIPIN ANTIBODY EA IG: CPT | Performed by: INTERNAL MEDICINE

## 2023-01-26 PROCEDURE — 36415 COLL VENOUS BLD VENIPUNCTURE: CPT | Performed by: INTERNAL MEDICINE

## 2023-01-26 RX ORDER — PREDNISONE 20 MG/1
TABLET ORAL
Qty: 42 TABLET | Refills: 0 | Status: SHIPPED | OUTPATIENT
Start: 2023-01-26 | End: 2023-02-15

## 2023-01-26 ASSESSMENT — PAIN SCALES - GENERAL: PAINLEVEL: NO PAIN (0)

## 2023-01-26 NOTE — NURSING NOTE
"Oncology Rooming Note    January 26, 2023 1:58 PM   Billy Carey is a 32 year old male who presents for:    Chief Complaint   Patient presents with     Oncology Clinic Visit     Thrombocytopenia      Initial Vitals: BP (!) 143/84   Pulse 64   Temp 97  F (36.1  C) (Tympanic)   Resp 16   Ht 1.905 m (6' 3\")   Wt 125.7 kg (277 lb 3.2 oz)   SpO2 99%   BMI 34.65 kg/m   Estimated body mass index is 34.65 kg/m  as calculated from the following:    Height as of this encounter: 1.905 m (6' 3\").    Weight as of this encounter: 125.7 kg (277 lb 3.2 oz). Body surface area is 2.58 meters squared.  No Pain (0) Comment: Data Unavailable   No LMP for male patient.  Allergies reviewed: Yes  Medications reviewed: Yes    Medications: Medication refills not needed today.  Pharmacy name entered into [facility]:    [id number] - [place], [place] - [address] [place] [address] [address] [place] [address]  [id number] - [place] - [address]  [id number] - [place], [place] - [address] [facility] - . [place], [place] - [address] [facility]    Clinical concerns: follow up       Maddy Leigh CMA              "

## 2023-01-26 NOTE — LETTER
1/26/2023         RE: Billy Carey  [address] [place] [address]      [facility]    Hematology/Oncology Established Patient Note      Today's Date: 1/26/23    Reason for Consultation: Thrombocytopenia  Referring Provider: ZAHRAA Kingsley CNP      HISTORY OF PRESENT ILLNESS: Billy Carey is a 32 year old male who is referred for thrombocytopenia. 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    Historically, he has had historical normal CBC. On 11/30/22, WBC 6.0, Hb 16.5, PLT 42. Repeat CBC on 12/1/22, WBC 7.2, Hb 17.5, PLT 43. Hepatitis and HIV studies negative.     He has had eustachian tube placement without bleeding event. He has not had any other surgeries.     For DM1- he is followed by endocrinology. He has continuous blood glucose monitoring and an insulin pump.     He drinks alcohol 1-2x/week. No excessive use. No history of smoking.     Family history of VTE in his sister. He is unaware of the nuances surrounding this event. No family history of bleeding disorder or malignancy.       INTERIM HISTORY:  See my telephone notes 12/6, 12/9/22, 1/20/23.     Underwent excisional biopsy on 1/20/23.        REVIEW OF SYSTEMS:   A 14 point ROS was reviewed with pertinent positives and negatives in the HPI.        HOME MEDICATIONS:  Current Outpatient Medications   Medication Sig Dispense Refill     atorvastatin (LIPITOR) 10 MG tablet Take 1 tablet (10 mg) by mouth daily 90 tablet 3     blood glucose (KELL CONTOUR NEXT) test strip Test 4-5 times daily 4 Box 3     Blood Glucose Monitoring Suppl (KELL CONTOUR NEXT LINK) W/DEVICE KIT 1 kit 5 times daily. Use as directed 1 kit 1     glucagon (GLUCAGON EMERGENCY) 1 MG kit Inject 1 mg into the muscle once for 1 dose 1 mg 3     insulin aspart (FIASP) 100 UNIT/ML vial        insulin glargine (BASAGLAR KWIKPEN) 100 UNIT/ML pen INJECT 50 UNITS ONCE DAILY SUBCUTANEOUSLY 15 mL 1     insulin pen needle (BD CHRIST U/F) 32G  X 4 MM miscellaneous Use 3-6 pen needles daily or as directed. 50 each 0     vitamin D2 (ERGOCALCIFEROL) 95267 units (1250 mcg) capsule            ALLERGIES:  Allergies   Allergen Reactions     Penicillins Rash         PAST MEDICAL HISTORY:  Past Medical History:   Diagnosis Date     Diabetes mellitus type 1, uncontrolled, insulin dependent 9/16/2013         PAST SURGICAL HISTORY:  Past Surgical History:   Procedure Laterality Date     EXCISE MASS GROIN Left 1/20/2023    Procedure: Lymph Node excisional biopsy;  Surgeon: New Burger MD;  Location: [facility] OR     MYRINGOTOMY, INSERT TUBE BILATERAL, COMBINED Bilateral     As a child         SOCIAL HISTORY:  Social History     Socioeconomic History     Marital status:      Spouse name: Jayla     Number of children: 3     Years of education: Not on file     Highest education level: Not on file   Occupational History     Occupation:      Comment: [employer]   Tobacco Use     Smoking status: Never     Smokeless tobacco: Never   Substance and Sexual Activity     Alcohol use: No     Comment: rarely     Drug use: No     Sexual activity: Yes     Partners: Female   Other Topics Concern     Parent/sibling w/ CABG, MI or angioplasty before 65F 55M? Not Asked   Social History Narrative     Not on file     Social Determinants of Health     Financial Resource Strain: Low Risk      Difficulty of Paying Living Expenses: Not hard at all   Food Insecurity: No Food Insecurity     Worried About Running Out of Food in the Last Year: Never true     Ran Out of Food in the Last Year: Never true   Transportation Needs: No Transportation Needs     Lack of Transportation (Medical): No     Lack of Transportation (Non-Medical): No   Physical Activity: Sufficiently Active     Days of Exercise per Week: 6 days     Minutes of Exercise per Session: 40 min   Stress: No Stress Concern Present     Feeling of Stress : Only a little   Social Connections: Socially Integrated  "    Frequency of Communication with Friends and Family: More than three times a week     Frequency of Social Gatherings with Friends and Family: More than three times a week     Attends Hindu Services: 1 to 4 times per year     Active Member of Clubs or Organizations: Yes     Attends Club or Organization Meetings: Not on file     Marital Status:    Intimate Partner Violence: Not on file   Housing Stability: Low Risk      Unable to Pay for Housing in the Last Year: No     Number of Places Lived in the Last Year: 1     Unstable Housing in the Last Year: No         FAMILY HISTORY:  Family History   Problem Relation Age of Onset     C.A.D. No family hx of      Diabetes No family hx of      Hypertension No family hx of      Cancer No family hx of         no skin cancer     Amblyopia No family hx of      Retinal detachment No family hx of      Thyroid Disease No family hx of      Glaucoma No family hx of      Macular Degeneration No family hx of          PHYSICAL EXAM:  Vital signs:  BP (!) 143/84   Pulse 64   Temp 97  F (36.1  C) (Tympanic)   Resp 16   Ht 1.905 m (6' 3\")   Wt 125.7 kg (277 lb 3.2 oz)   SpO2 99%   BMI 34.65 kg/m     ECO  GENERAL/CONSTITUTIONAL: No acute distress.  LYMPH: No anterior cervical, posterior cervical, supraclavicular, axillary or inguinal adenopathy.   RESPIRATORY: Clear to auscultation bilaterally. No crackles or wheezing.   CARDIOVASCULAR: Regular rate and rhythm without murmurs, gallops, or rubs.  GASTROINTESTINAL: No hepatosplenomegaly, masses, or tenderness. The patient has normal bowel sounds. No guarding.  No distention.  MUSCULOSKELETAL: Warm and well-perfused, no cyanosis, clubbing, or edema.  NEUROLOGIC: Cranial nerves II-XII are intact. Alert, oriented, answers questions appropriately.  INTEGUMENTARY: No rashes or jaundice.  GAIT: Steady, does not use assistive device.      LABS:   Latest Reference Range & Units 01/10/23 15:17   Sodium 136 - 145 mmol/L 136 "   Potassium 3.4 - 5.3 mmol/L 4.4   Chloride 98 - 107 mmol/L 100   Carbon Dioxide (CO2) 22 - 29 mmol/L 25   Urea Nitrogen 6.0 - 20.0 mg/dL 14.0   Creatinine 0.67 - 1.17 mg/dL 1.03   GFR Estimate >60 mL/min/1.73m2 >90   Calcium 8.6 - 10.0 mg/dL 9.3   Anion Gap 7 - 15 mmol/L 11   Albumin 3.5 - 5.2 g/dL 4.6   Protein Total 6.4 - 8.3 g/dL 6.9   Alkaline Phosphatase 40 - 129 U/L 63   ALT 10 - 50 U/L 26   AST 10 - 50 U/L 29   Bilirubin Total <=1.2 mg/dL 1.2   CRP Inflammation <5.00 mg/L <3.00   Glucose 70 - 99 mg/dL 172 (H)   Lactate Dehydrogenase 0 - 250 U/L 196   Uric Acid 3.4 - 7.0 mg/dL 5.8   WBC 4.0 - 11.0 10e3/uL 6.5   Hemoglobin 13.3 - 17.7 g/dL 16.4   Hematocrit 40.0 - 53.0 % 45.8   Platelet Count 150 - 450 10e3/uL 45 (LL)   RBC Count 4.40 - 5.90 10e6/uL 5.52   MCV 78 - 100 fL 83   MCH 26.5 - 33.0 pg 29.7   MCHC 31.5 - 36.5 g/dL 35.8   RDW 10.0 - 15.0 % 11.9   % Neutrophils % 65   % Lymphocytes % 23   % Monocytes % 10   % Eosinophils % 1   % Basophils % 1   Absolute Basophils 0.0 - 0.2 10e3/uL 0.1   Absolute Eosinophils 0.0 - 0.7 10e3/uL 0.1   Absolute Immature Granulocytes <=0.4 10e3/uL 0.0   Absolute Lymphocytes 0.8 - 5.3 10e3/uL 1.5   Absolute Monocytes 0.0 - 1.3 10e3/uL 0.7   % Immature Granulocytes % 0   Absolute Neutrophils 1.6 - 8.3 10e3/uL 4.2   Absolute NRBCs 10e3/uL 0.0   NRBCs per 100 WBC <1 /100 0   RBC Morphology  Confirmed RBC Indices   Platelet Morphology Automated Count Confirmed. Platelet morphology is normal.  Automated Count Confirmed. Platelet morphology is normal.   % Retic 0.5 - 2.0 % 1.0   Absolute Retic 0.025 - 0.095 10e6/uL 0.057   Sed Rate 0 - 15 mm/hr 6   INR 0.85 - 1.15  1.06   PTT 22 - 38 Seconds 26   Fibrinogen 170 - 490 mg/dL 269   SPECIMEN EXPIRATION DATE  [id number]   MIRA Anti-IgG,-C3d  Positive 3+   Hep B Surface Agn Nonreactive  Nonreactive   Hepatitis B Core Margarette Nonreactive  Nonreactive   Hepatitis B Surface Antibody Instrument Value <8.00 m[IU]/mL 35.73   Hepatitis B Surface  Antibody  Reactive   Hepatitis C Antibody Nonreactive  Nonreactive   HIV Antigen Antibody Combo Nonreactive  Nonreactive      Latest Reference Range & Units 01/10/23 15:17   PAVAN interpretation Negative  Positive !      01/10/23 15:17   PAVAN titer 1 1:160       Serum M-protein (g/dL):  1/10/23: 0.0       Latest Reference Range & Units 12/14/22 09:20   MIRA Anti-IgG,-C3d  Weak Positive   MIRA Anti-C3  Negative   MIRA Anti-IgG, Tube  Positive 1+   Blood Bank Chart Comment  BB Chart Comment      Latest Reference Range & Units 12/14/22 09:20   Haptoglobin 32 - 197 mg/dL 31 (L)          Latest Reference Range & Units 12/06/22 09:40   Iron 61 - 157 ug/dL 59 (L)   Iron Binding Capacity 240 - 430 ug/dL 319   Iron Sat Index 15 - 46 % 18   Lactate Dehydrogenase 0 - 250 U/L 240   INR 0.85 - 1.15  0.91   PTT 22 - 38 Seconds 25   Fibrinogen 170 - 490 mg/dL 282   SPECIMEN EXPIRATION DATE  [id number]   MIRA Anti-IgG,-C3d  Positive 3+               PATHOLOGY:  Final Diagnosis 12/1/22:   Peripheral blood, morphology:  - Marked thrombocytopenia.  - Hemoglobin quantitatively within normal limits and erythrocytes without diagnostic morphologic abnormality.  - WBC subsets quantitatively within normal limits and without diagnostic morphologic abnormality.  - Negative for schistocytes.  - Negative for overt features of myelodysplasia or circulating blasts.     Final Diagnosis 12/6/22:   Peripheral blood:  --Slight polycythemia/erythrocytosis.  --Marked thrombocytopenia.   Electronically signed by Angel Marshall MD on 12/7/2022 at 10:33 AM   Comment    The cause of this patient's thrombocytopenia is unclear from the peripheral smear. Some common causes of thrombocytopenia to consider include infections, medications, alcohol, immune mediated mechanisms, and splenic sequestration. If splenic sequestration is of clinical concern, an accurate assessment of spleen size is recommended.         Clinical Information    Thrombocytopenia   Peripheral  Smear    The red blood cells appear normochromic.  Rouleaux formation does not appear increased.  Polychromasia does not appear increased.  Poikilocytosis appears mild and nonspecific.  Platelets  are well granulated.  Lymphocytes are predominantly small with cytologically mature chromatin and appear overall polymorphous.  Neutrophils contain normal cytoplasmic granulation and unremarkable nuclear morphology.  There is no dysplasia and no circulating blasts are seen.     Final Diagnosis 12/14/22:   Bone marrow, left posterior iliac crest, decalcified trephine biopsy and aspiration:  -- Normocellular bone marrow for age (60 to 70%) with maturing trilineage hematopoiesis.  -- No evidence of marrow involvement by lymphoma or other hematopoietic neoplasm.     Peripheral blood showing:  -- Moderate thrombocytopenia.     Case Report   Flow Cytometry Report                             Case: [id number]                                   Authorizing Provider:  Hayde Lopez DO         Collected:           12/14/2022 10:06 AM           Ordering Location:     [facility]   Received:            12/14/2022 10:21 AM                                  [facility]                                                             Pathologist:           Marleny Holden MD                                                      Specimen:    Iliac Crest, Bone Marrow Aspirate, Left                                                    Flow Interpretation   A. Iliac Crest, Bone Marrow Aspirate, Left:    - Polytypic B cells  - No aberrant immunophenotype on T cells  - No increase in myeloid blasts and no abnormal myeloid blast population     Case Report   Surgical Pathology Report                         Case: [id number]                                   Authorizing Provider:  Hayde Lopez DO         Collected:           12/15/2022 11:27 AM           Ordering Location:     [facility]   Received:             12/15/2022 11:54 AM                                  Imaging                                                                       Pathologist:           Mirna Roger                                                                Specimen:    Retroperitoneal                                                                            Final Diagnosis   A.  Retroperitoneum, biopsy:  -Three tissue fragments, entirely submitted for flow cytometry evaluation (gross examination only).       Case Report   Flow Cytometry Report                             Case: [id number]                                   Authorizing Provider:  Hayde Lopez DO         Collected:           12/15/2022 01:16 PM           Ordering Location:     [facility]   Received:            12/15/2022 01:17 PM                                  Imaging                                                                       Pathologist:           Marleny Holden MD                                                      Specimen:    Retroperitoneal                                                                            Flow Interpretation   A. Retroperitoneal :    - Polytypic B cells  - No aberrant immunophenotype on T cells         Case Report   Surgical Pathology Report                         Case: [id number]                                   Authorizing Provider:  Hayde Lopez DO         Collected:           01/04/2023 02:25 PM           Ordering Location:     [facility]   Received:            01/04/2023 02:48 PM                                  [facility]                                                                 Pathologist:           Saadia Allen MD                                                                            Specimen:    Lymph Node(s), Axillary, Right                                                              Final Diagnosis   Lymph node, right axillary, core biopsies:  -Partially sampled lymph node showing no evidence of Hodgkin's or non-Hodgkin's lymphoma  -Completed immunophenotyping studies show polytypic B cells and no aberrant T-cell antigen expression     Case Report   Flow Cytometry Report                             Case: [id number]                                   Authorizing Provider:  Hayde Lopez DO         Collected:           01/04/2023 02:25 PM           Ordering Location:     [facility]   Received:            01/04/2023 02:53 PM                                  [facility]                                                                 Pathologist:           Xiomara Ramsey MD                                                         Specimen:    Lymph Node(s), Axillary, Right                                                             Flow Interpretation   A. Lymph Node(s), Axillary, Right, :  -Polytypic B cells  No aberrant immunophenotype on T cells         Lymph node 1/20/23 pending:        Case Report   Flow Cytometry Report                             Case: [id number]                                   Authorizing Provider:  New Burger         Collected:           01/20/2023 02:33 PM                                  MD rAi                                                                  Ordering Location:     [facility] OR                 Received:            01/20/2023 02:33 PM           Pathologist:           Marleny Holden MD                                                      Specimen:    Lymph Node(s), Inguinal, Left                                                              Flow Interpretation   A. Lymph Node(s), Inguinal, Left:     - Polytypic B cells  - No aberrant immunophenotype on T cells           IMAGING:  CT CAP 12/6/22:  IMPRESSION:  1.  Several enlarged lymph nodes identified at the right axilla,  retroperitoneum, left pelvis and  borderline enlarged at the left  inguinal locations. While nonspecific, a neoplastic etiology is  possible including lymphoma.  2.  Mild splenomegaly.  3.  Lobulated indeterminate nodular masses at the left pericardial  fat.  4.  Indeterminate multiple bilateral pulmonary nodules. The dominant  solid nodule is at the right lower lobe measuring 1.1 cm. Neoplasm  remains in the differential and surveillance imaging and/or further  workup is recommended. See below for follow up imaging guidelines.    PET 12/9/22:  IMPRESSION:     Findings suspicious for active neoplasm such as lymphoma involving lymph nodes above/below the diaphragm. The right axillary for left inguinal lymph nodes are amenable to ultrasound-guided histologic sampling if desired.        ASSESSMENT/PLAN:  Billy Carey is a 32 year old male who is referred for thrombocytopenia. 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    1) Thrombocytopenia, warm autoimmune hemolysis  -This is a new diagnosis for patient and an incidental finding when he was in for routine physical examination. He has not had any bleeding episodes.   -No new meds, recent infection, or recent surgeries.  -TSH normal at 3.85.   -HepB/C and HIV studies historically negative.   -No splenomegaly on physical examination.  -I doubt hemolysis as retic is normal as well as LFTs.  -We discussed the possibility of lab artifact, but there is no comment on platelet clumping on smear. Also, he had lab work repeated two days in a row with PLT count returning in the 40s.   -Given his history of autoimmune disease, he may have underlying ITP, which is a diagnosis of exclusion. We had discussed possible steroid therapy if platelets are to return <30K.   -Repeat CBC is showing PLTs still in the 40's. Hb is 17.9, WBC normal.   -Also is consideration for a microangiopathic hemolytic anemia. MIRA has come back positive at 3+ (which is inconsistent with MAHA) and patient does not have elevated  LFTs, abnormal coag studies, nor reticulocytosis. LDH is also normal at 240. He is afebrile. [facility] blood bank testing has returned +IgG and negative for complement, consistent with warm AIHA.   -CT CAP had returned with 2 cm LN of the retroperitoneum, inguinal, and axillary. There was also note of an abnormal pericardial fat mass. I sent for a PET, that did not show FDG avidity of the pericardial fat mass. The retroperitoneal LNs had SUV of 12-13 with the remainder LN 6-7. I sent patient for a core biopsy of the retroperitoneal LN with IR that returned in fragments with unremarkable FLOW. Bone marrow biopsy was normocellular with normal megakaryocyte morphology. I then pursued a right axillary LN biopsy, which again returned negative with unremarkable FLOW. I sent for excisional biopsy of an inguinal LN with general surgery with pathology once again returning as benign with folliculary and interfollicular hyperplasia. EBV, HHV8 negative. I reviewed with pathology for any morphologic evidence consistent with Castleman's disease and spoke with Dr. Kaminski who is unable to confirm this at this time. I spoke with Dr. Tyler who is in agreement with workup above. I reviewed with Dr. 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 I reviewed I would then move onto rituximab next if patient is not to have improvement with steroid therapy. Dr. Tyler is in agreement with this plan.   -I reviewed the above with patient and wife, Jayla. I will need to continue to monitor the patient not only for treatment response for the hemolysis, but for evolution of LAD. My plan is to recheck with PET in 6 months time and will continue to monitor for treatment response discussed above.     2) Positive PAVAN  -Titer 1:160.  -Patient without arthralgias, skin changes.  -Will check APLA as well as DS-DNA and  CH50.  -Possible referral to rheumatology.     3) History of infectious mononucleosis  -EBV negative on LN.  -Will check EBV PCR.     4) DM1  -Patient managed by endocrinology.  -He is instructed to strictly monitor BG due to risk of DKA with steroid therapy.     5) History of HTN, HLD    6) -Labs today.  -Prednisone 60 mg daily x2 weeks.  -Follow up in 2 weeks with labs and to discuss rituximab.     Complexity: HIGH.     Hayde Lopez DO  Hematology/Oncology  [facility]          Hayde Lopez DO

## 2023-01-26 NOTE — LETTER
1/26/2023         RE: Billy Carey  [address] [facility] [address]        Dear Colleague,    Thank you for referring your patient, Billy Carey, to the [facility]. Please see a copy of my visit note below.    [facility]    Hematology/Oncology Established Patient Note      Today's Date: 1/26/23    Reason for Consultation: Thrombocytopenia  Referring Provider: ZAHRAA Kingsley CNP      HISTORY OF PRESENT ILLNESS: Billy Carey is a 32 year old male who is referred for thrombocytopenia. 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    Historically, he has had historical normal CBC. On 11/30/22, WBC 6.0, Hb 16.5, PLT 42. Repeat CBC on 12/1/22, WBC 7.2, Hb 17.5, PLT 43. Hepatitis and HIV studies negative.     He has had eustachian tube placement without bleeding event. He has not had any other surgeries.     For DM1- he is followed by endocrinology. He has continuous blood glucose monitoring and an insulin pump.     He drinks alcohol 1-2x/week. No excessive use. No history of smoking.     Family history of VTE in his sister. He is unaware of the nuances surrounding this event. No family history of bleeding disorder or malignancy.       INTERIM HISTORY:  See my telephone notes 12/6, 12/9/22, 1/20/23.     Underwent excisional biopsy on 1/20/23.        REVIEW OF SYSTEMS:   A 14 point ROS was reviewed with pertinent positives and negatives in the HPI.        HOME MEDICATIONS:  Current Outpatient Medications   Medication Sig Dispense Refill     atorvastatin (LIPITOR) 10 MG tablet Take 1 tablet (10 mg) by mouth daily 90 tablet 3     blood glucose (KELL CONTOUR NEXT) test strip Test 4-5 times daily 4 Box 3     Blood Glucose Monitoring Suppl (KELL CONTOUR NEXT LINK) W/DEVICE KIT 1 kit 5 times daily. Use as directed 1 kit 1     glucagon (GLUCAGON EMERGENCY) 1 MG kit Inject 1 mg into the muscle once for 1 dose 1 mg 3     insulin aspart  (FIASP) 100 UNIT/ML vial        insulin glargine (BASAGLAR KWIKPEN) 100 UNIT/ML pen INJECT 50 UNITS ONCE DAILY SUBCUTANEOUSLY 15 mL 1     insulin pen needle (BD CHRIST U/F) 32G X 4 MM miscellaneous Use 3-6 pen needles daily or as directed. 50 each 0     vitamin D2 (ERGOCALCIFEROL) 97788 units (1250 mcg) capsule            ALLERGIES:  Allergies   Allergen Reactions     Penicillins Rash         PAST MEDICAL HISTORY:  Past Medical History:   Diagnosis Date     Diabetes mellitus type 1, uncontrolled, insulin dependent 9/16/2013         PAST SURGICAL HISTORY:  Past Surgical History:   Procedure Laterality Date     EXCISE MASS GROIN Left 1/20/2023    Procedure: Lymph Node excisional biopsy;  Surgeon: New Burger MD;  Location: [facility] OR     MYRINGOTOMY, INSERT TUBE BILATERAL, COMBINED Bilateral     As a child         SOCIAL HISTORY:  Social History     Socioeconomic History     Marital status:      Spouse name: Jayla     Number of children: 3     Years of education: Not on file     Highest education level: Not on file   Occupational History     Occupation:      Comment: [employer]   Tobacco Use     Smoking status: Never     Smokeless tobacco: Never   Substance and Sexual Activity     Alcohol use: No     Comment: rarely     Drug use: No     Sexual activity: Yes     Partners: Female   Other Topics Concern     Parent/sibling w/ CABG, MI or angioplasty before 65F 55M? Not Asked   Social History Narrative     Not on file     Social Determinants of Health     Financial Resource Strain: Low Risk      Difficulty of Paying Living Expenses: Not hard at all   Food Insecurity: No Food Insecurity     Worried About Running Out of Food in the Last Year: Never true     Ran Out of Food in the Last Year: Never true   Transportation Needs: No Transportation Needs     Lack of Transportation (Medical): No     Lack of Transportation (Non-Medical): No   Physical Activity: Sufficiently Active     Days of Exercise per  "Week: 6 days     Minutes of Exercise per Session: 40 min   Stress: No Stress Concern Present     Feeling of Stress : Only a little   Social Connections: Socially Integrated     Frequency of Communication with Friends and Family: More than three times a week     Frequency of Social Gatherings with Friends and Family: More than three times a week     Attends Protestant Services: 1 to 4 times per year     Active Member of Clubs or Organizations: Yes     Attends Club or Organization Meetings: Not on file     Marital Status:    Intimate Partner Violence: Not on file   Housing Stability: Low Risk      Unable to Pay for Housing in the Last Year: No     Number of Places Lived in the Last Year: 1     Unstable Housing in the Last Year: No         FAMILY HISTORY:  Family History   Problem Relation Age of Onset     C.A.D. No family hx of      Diabetes No family hx of      Hypertension No family hx of      Cancer No family hx of         no skin cancer     Amblyopia No family hx of      Retinal detachment No family hx of      Thyroid Disease No family hx of      Glaucoma No family hx of      Macular Degeneration No family hx of          PHYSICAL EXAM:  Vital signs:  BP (!) 143/84   Pulse 64   Temp 97  F (36.1  C) (Tympanic)   Resp 16   Ht 1.905 m (6' 3\")   Wt 125.7 kg (277 lb 3.2 oz)   SpO2 99%   BMI 34.65 kg/m     ECO  GENERAL/CONSTITUTIONAL: No acute distress.  LYMPH: No anterior cervical, posterior cervical, supraclavicular, axillary or inguinal adenopathy.   RESPIRATORY: Clear to auscultation bilaterally. No crackles or wheezing.   CARDIOVASCULAR: Regular rate and rhythm without murmurs, gallops, or rubs.  GASTROINTESTINAL: No hepatosplenomegaly, masses, or tenderness. The patient has normal bowel sounds. No guarding.  No distention.  MUSCULOSKELETAL: Warm and well-perfused, no cyanosis, clubbing, or edema.  NEUROLOGIC: Cranial nerves II-XII are intact. Alert, oriented, answers questions " appropriately.  INTEGUMENTARY: No rashes or jaundice.  GAIT: Steady, does not use assistive device.      LABS:   Latest Reference Range & Units 01/10/23 15:17   Sodium 136 - 145 mmol/L 136   Potassium 3.4 - 5.3 mmol/L 4.4   Chloride 98 - 107 mmol/L 100   Carbon Dioxide (CO2) 22 - 29 mmol/L 25   Urea Nitrogen 6.0 - 20.0 mg/dL 14.0   Creatinine 0.67 - 1.17 mg/dL 1.03   GFR Estimate >60 mL/min/1.73m2 >90   Calcium 8.6 - 10.0 mg/dL 9.3   Anion Gap 7 - 15 mmol/L 11   Albumin 3.5 - 5.2 g/dL 4.6   Protein Total 6.4 - 8.3 g/dL 6.9   Alkaline Phosphatase 40 - 129 U/L 63   ALT 10 - 50 U/L 26   AST 10 - 50 U/L 29   Bilirubin Total <=1.2 mg/dL 1.2   CRP Inflammation <5.00 mg/L <3.00   Glucose 70 - 99 mg/dL 172 (H)   Lactate Dehydrogenase 0 - 250 U/L 196   Uric Acid 3.4 - 7.0 mg/dL 5.8   WBC 4.0 - 11.0 10e3/uL 6.5   Hemoglobin 13.3 - 17.7 g/dL 16.4   Hematocrit 40.0 - 53.0 % 45.8   Platelet Count 150 - 450 10e3/uL 45 (LL)   RBC Count 4.40 - 5.90 10e6/uL 5.52   MCV 78 - 100 fL 83   MCH 26.5 - 33.0 pg 29.7   MCHC 31.5 - 36.5 g/dL 35.8   RDW 10.0 - 15.0 % 11.9   % Neutrophils % 65   % Lymphocytes % 23   % Monocytes % 10   % Eosinophils % 1   % Basophils % 1   Absolute Basophils 0.0 - 0.2 10e3/uL 0.1   Absolute Eosinophils 0.0 - 0.7 10e3/uL 0.1   Absolute Immature Granulocytes <=0.4 10e3/uL 0.0   Absolute Lymphocytes 0.8 - 5.3 10e3/uL 1.5   Absolute Monocytes 0.0 - 1.3 10e3/uL 0.7   % Immature Granulocytes % 0   Absolute Neutrophils 1.6 - 8.3 10e3/uL 4.2   Absolute NRBCs 10e3/uL 0.0   NRBCs per 100 WBC <1 /100 0   RBC Morphology  Confirmed RBC Indices   Platelet Morphology Automated Count Confirmed. Platelet morphology is normal.  Automated Count Confirmed. Platelet morphology is normal.   % Retic 0.5 - 2.0 % 1.0   Absolute Retic 0.025 - 0.095 10e6/uL 0.057   Sed Rate 0 - 15 mm/hr 6   INR 0.85 - 1.15  1.06   PTT 22 - 38 Seconds 26   Fibrinogen 170 - 490 mg/dL 269   SPECIMEN EXPIRATION DATE  [id number]   MIRA Anti-IgG,-C3d   Positive 3+   Hep B Surface Agn Nonreactive  Nonreactive   Hepatitis B Core Margarette Nonreactive  Nonreactive   Hepatitis B Surface Antibody Instrument Value <8.00 m[IU]/mL 35.73   Hepatitis B Surface Antibody  Reactive   Hepatitis C Antibody Nonreactive  Nonreactive   HIV Antigen Antibody Combo Nonreactive  Nonreactive      Latest Reference Range & Units 01/10/23 15:17   PAVAN interpretation Negative  Positive !      01/10/23 15:17   PAVAN titer 1 1:160       Serum M-protein (g/dL):  1/10/23: 0.0       Latest Reference Range & Units 12/14/22 09:20   MIRA Anti-IgG,-C3d  Weak Positive   MIRA Anti-C3  Negative   MIRA Anti-IgG, Tube  Positive 1+   Blood Bank Chart Comment  BB Chart Comment      Latest Reference Range & Units 12/14/22 09:20   Haptoglobin 32 - 197 mg/dL 31 (L)          Latest Reference Range & Units 12/06/22 09:40   Iron 61 - 157 ug/dL 59 (L)   Iron Binding Capacity 240 - 430 ug/dL 319   Iron Sat Index 15 - 46 % 18   Lactate Dehydrogenase 0 - 250 U/L 240   INR 0.85 - 1.15  0.91   PTT 22 - 38 Seconds 25   Fibrinogen 170 - 490 mg/dL 282   SPECIMEN EXPIRATION DATE  [id number]   MIRA Anti-IgG,-C3d  Positive 3+               PATHOLOGY:  Final Diagnosis 12/1/22:   Peripheral blood, morphology:  - Marked thrombocytopenia.  - Hemoglobin quantitatively within normal limits and erythrocytes without diagnostic morphologic abnormality.  - WBC subsets quantitatively within normal limits and without diagnostic morphologic abnormality.  - Negative for schistocytes.  - Negative for overt features of myelodysplasia or circulating blasts.     Final Diagnosis 12/6/22:   Peripheral blood:  --Slight polycythemia/erythrocytosis.  --Marked thrombocytopenia.   Electronically signed by Angel Marshall MD on 12/7/2022 at 10:33 AM   Comment    The cause of this patient's thrombocytopenia is unclear from the peripheral smear. Some common causes of thrombocytopenia to consider include infections, medications, alcohol, immune mediated  mechanisms, and splenic sequestration. If splenic sequestration is of clinical concern, an accurate assessment of spleen size is recommended.         Clinical Information    Thrombocytopenia   Peripheral Smear    The red blood cells appear normochromic.  Rouleaux formation does not appear increased.  Polychromasia does not appear increased.  Poikilocytosis appears mild and nonspecific.  Platelets  are well granulated.  Lymphocytes are predominantly small with cytologically mature chromatin and appear overall polymorphous.  Neutrophils contain normal cytoplasmic granulation and unremarkable nuclear morphology.  There is no dysplasia and no circulating blasts are seen.     Final Diagnosis 12/14/22:   Bone marrow, left posterior iliac crest, decalcified trephine biopsy and aspiration:  -- Normocellular bone marrow for age (60 to 70%) with maturing trilineage hematopoiesis.  -- No evidence of marrow involvement by lymphoma or other hematopoietic neoplasm.     Peripheral blood showing:  -- Moderate thrombocytopenia.     Case Report   Flow Cytometry Report                             Case: [id number]                                   Authorizing Provider:  Hayde Lopez DO         Collected:           12/14/2022 10:06 AM           Ordering Location:     [facility]   Received:            12/14/2022 10:21 AM                                  [facility]                                                             Pathologist:           Marleny Holden MD                                                      Specimen:    Iliac Crest, Bone Marrow Aspirate, Left                                                    Flow Interpretation   A. Iliac Crest, Bone Marrow Aspirate, Left:    - Polytypic B cells  - No aberrant immunophenotype on T cells  - No increase in myeloid blasts and no abnormal myeloid blast population     Case Report   Surgical Pathology Report                         Case: [id number]                                    Authorizing Provider:  Hayde Lopez DO         Collected:           12/15/2022 11:27 AM           Ordering Location:     [facility]   Received:            12/15/2022 11:54 AM                                  [facility]                                                                       Pathologist:           Mirna Roger                                                                Specimen:    Retroperitoneal                                                                            Final Diagnosis   A.  Retroperitoneum, biopsy:  -Three tissue fragments, entirely submitted for flow cytometry evaluation (gross examination only).       Case Report   Flow Cytometry Report                             Case: [id number]                                   Authorizing Provider:  Hayde Lopez DO         Collected:           12/15/2022 01:16 PM           Ordering Location:     [facility]   Received:            12/15/2022 01:17 PM                                  [facility]                                                                       Pathologist:           Marleny Holden MD                                                      Specimen:    Retroperitoneal                                                                            Flow Interpretation   A. Retroperitoneal :    - Polytypic B cells  - No aberrant immunophenotype on T cells         Case Report   Surgical Pathology Report                         Case: [id number]                                   Authorizing Provider:  Hayde Lopez DO         Collected:           01/04/2023 02:25 PM           Ordering Location:     [facility]   Received:            01/04/2023 02:48 PM                                  [facility]                                                                 Pathologist:           Saadia Allen,                                                                              MD                                                                            Specimen:    Lymph Node(s), Axillary, Right                                                             Final Diagnosis   Lymph node, right axillary, core biopsies:  -Partially sampled lymph node showing no evidence of Hodgkin's or non-Hodgkin's lymphoma  -Completed immunophenotyping studies show polytypic B cells and no aberrant T-cell antigen expression     Case Report   Flow Cytometry Report                             Case: [id number]                                   Authorizing Provider:  Hayde Lopez DO         Collected:           01/04/2023 02:25 PM           Ordering Location:     [facility]   Received:            01/04/2023 02:53 PM                                  [facility]                                                                 Pathologist:           Xiomara Rasmey MD                                                         Specimen:    Lymph Node(s), Axillary, Right                                                             Flow Interpretation   A. Lymph Node(s), Axillary, Right, :  -Polytypic B cells  No aberrant immunophenotype on T cells         Lymph node 1/20/23 pending:        Case Report   Flow Cytometry Report                             Case: [id number]                                   Authorizing Provider:  New Burger         Collected:           01/20/2023 02:33 PM                                  MD Ari                                                                  Ordering Location:     [facility] OR                 Received:            01/20/2023 02:33 PM           Pathologist:           Marleny Holden MD                                                      Specimen:    Lymph Node(s), Inguinal, Left                                                              Flow Interpretation   A. Lymph Node(s), Inguinal, Left:     - Polytypic B  cells  - No aberrant immunophenotype on T cells           IMAGING:  CT CAP 12/6/22:  IMPRESSION:  1.  Several enlarged lymph nodes identified at the right axilla,  retroperitoneum, left pelvis and borderline enlarged at the left  inguinal locations. While nonspecific, a neoplastic etiology is  possible including lymphoma.  2.  Mild splenomegaly.  3.  Lobulated indeterminate nodular masses at the left pericardial  fat.  4.  Indeterminate multiple bilateral pulmonary nodules. The dominant  solid nodule is at the right lower lobe measuring 1.1 cm. Neoplasm  remains in the differential and surveillance imaging and/or further  workup is recommended. See below for follow up imaging guidelines.    PET 12/9/22:  IMPRESSION:     Findings suspicious for active neoplasm such as lymphoma involving lymph nodes above/below the diaphragm. The right axillary for left inguinal lymph nodes are amenable to ultrasound-guided histologic sampling if desired.        ASSESSMENT/PLAN:  Billy Carey is a 32 year old male who is referred for thrombocytopenia. 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    1) Thrombocytopenia, warm autoimmune hemolysis  -This is a new diagnosis for patient and an incidental finding when he was in for routine physical examination. He has not had any bleeding episodes.   -No new meds, recent infection, or recent surgeries.  -TSH normal at 3.85.   -HepB/C and HIV studies historically negative.   -No splenomegaly on physical examination.  -I doubt hemolysis as retic is normal as well as LFTs.  -We discussed the possibility of lab artifact, but there is no comment on platelet clumping on smear. Also, he had lab work repeated two days in a row with PLT count returning in the 40s.   -Given his history of autoimmune disease, he may have underlying ITP, which is a diagnosis of exclusion. We had discussed possible steroid therapy if platelets are to return <30K.   -Repeat CBC is showing PLTs still in the  40's. Hb is 17.9, WBC normal.   -Also is consideration for a microangiopathic hemolytic anemia. MIRA has come back positive at 3+ (which is inconsistent with MAHA) and patient does not have elevated LFTs, abnormal coag studies, nor reticulocytosis. LDH is also normal at 240. He is afebrile. [facility] blood bank testing has returned +IgG and negative for complement, consistent with warm AIHA.   -CT CAP had returned with 2 cm LN of the retroperitoneum, inguinal, and axillary. There was also note of an abnormal pericardial fat mass. I sent for a PET, that did not show FDG avidity of the pericardial fat mass. The retroperitoneal LNs had SUV of 12-13 with the remainder LN 6-7. I sent patient for a core biopsy of the retroperitoneal LN with IR that returned in fragments with unremarkable FLOW. Bone marrow biopsy was normocellular with normal megakaryocyte morphology. I then pursued a right axillary LN biopsy, which again returned negative with unremarkable FLOW. I sent for excisional biopsy of an inguinal LN with general surgery with pathology once again returning as benign with folliculary and interfollicular hyperplasia. EBV, HHV8 negative. I reviewed with pathology for any morphologic evidence consistent with Castleman's disease and spoke with Dr. Kaminski who is unable to confirm this at this time. I spoke with Dr. Tyler who is in agreement with workup above. I reviewed with Dr. 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 I reviewed I would then move onto rituximab next if patient is not to have improvement with steroid therapy. Dr. Tyler is in agreement with this plan.   -I reviewed the above with patient and wife, Jayla. I will need to continue to monitor the patient not only for treatment response for the hemolysis, but for evolution of LAD. My plan is to recheck with PET in 6 months  time and will continue to monitor for treatment response discussed above.     2) Positive PAVAN  -Titer 1:160.  -Patient without arthralgias, skin changes.  -Will check APLA as well as DS-DNA and CH50.  -Possible referral to rheumatology.     3) History of infectious mononucleosis  -EBV negative on LN.  -Will check EBV PCR.     4) DM1  -Patient managed by endocrinology.  -He is instructed to strictly monitor BG due to risk of DKA with steroid therapy.     5) History of HTN, HLD    6) -Labs today.  -Prednisone 60 mg daily x2 weeks.  -Follow up in 2 weeks with labs and to discuss rituximab.     Complexity: HIGH.     Hayde Lopez DO  Hematology/Oncology  [facility]        Again, thank you for allowing me to participate in the care of your patient.        Sincerely,        Hayde Lopez DO

## 2023-01-26 NOTE — PROGRESS NOTES
Medical Assistant Note:  Billy Carey presents today for blood draw.    Patient seen by provider today: Yes: Dr. Lopez.   present during visit today: Not Applicable.    Concerns: No Concerns.    Procedure:  Labs drawn    Post Assessment:  Labs drawn without difficulty: Yes.    Discharge Plan:  Departure Mode: Ambulatory.    Face to Face Time: 10 min.    Radha Burt CMA

## 2023-01-27 LAB
B2 GLYCOPROT1 IGG SERPL IA-ACNC: 1.9 U/ML
B2 GLYCOPROT1 IGM SERPL IA-ACNC: <2.4 U/ML
CARDIOLIPIN IGG SER IA-ACNC: <2 GPL-U/ML
CARDIOLIPIN IGG SER IA-ACNC: NEGATIVE
CARDIOLIPIN IGM SER IA-ACNC: <2 MPL-U/ML
CARDIOLIPIN IGM SER IA-ACNC: NEGATIVE
CULTURE HARVEST COMPLETE DATE: NORMAL
DRVVT SCREEN RATIO: 0.88
DSDNA AB SER-ACNC: <0.6 IU/ML
EBV DNA # SPEC NAA+PROBE: NOT DETECTED COPIES/ML
HAPTOGLOB SERPL-MCNC: 31 MG/DL (ref 32–197)
INTERPRETATION: NORMAL
LA PPP-IMP: NEGATIVE
LUPUS INTERPRETATION: NORMAL
PTT RATIO: 0.97
THROMBIN TIME: 18.8 SECONDS (ref 13–19)

## 2023-01-28 LAB — CH50 SERPL-ACNC: 61.6 U/ML

## 2023-02-14 LAB
DAT POLY: NORMAL
SPECIMEN EXPIRATION DATE: NORMAL

## 2023-02-14 NOTE — PROGRESS NOTES
[facility]    Hematology/Oncology Established Patient Note      Today's Date: 2/15/23    Reason for Consultation: Thrombocytopenia  Referring Provider: ZAHRAA Kingsley CNP      HISTORY OF PRESENT ILLNESS: Billy Carey is a 32 year old male who is referred for thrombocytopenia. 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    Historically, he has had historical normal CBC. On 11/30/22, WBC 6.0, Hb 16.5, PLT 42. Repeat CBC on 12/1/22, WBC 7.2, Hb 17.5, PLT 43. Hepatitis and HIV studies negative.     He has had eustachian tube placement without bleeding event. He has not had any other surgeries.     For DM1- he is followed by endocrinology. He has continuous blood glucose monitoring and an insulin pump.     He drinks alcohol 1-2x/week. No excessive use. No history of smoking.     Family history of VTE in his sister. He is unaware of the nuances surrounding this event. No family history of bleeding disorder or malignancy.       INTERIM HISTORY:  See my telephone notes 12/6, 12/9/22, 1/20/23.     Underwent excisional biopsy on 1/20/23.    He denies gross evidence of bleed.         REVIEW OF SYSTEMS:   A 14 point ROS was reviewed with pertinent positives and negatives in the HPI.        HOME MEDICATIONS:  Current Outpatient Medications   Medication Sig Dispense Refill     atorvastatin (LIPITOR) 10 MG tablet Take 1 tablet (10 mg) by mouth daily 90 tablet 3     blood glucose (KELL CONTOUR NEXT) test strip Test 4-5 times daily 4 Box 3     Blood Glucose Monitoring Suppl (KELL CONTOUR NEXT LINK) W/DEVICE KIT 1 kit 5 times daily. Use as directed 1 kit 1     glucagon (GLUCAGON EMERGENCY) 1 MG kit Inject 1 mg into the muscle once for 1 dose 1 mg 3     insulin aspart (FIASP) 100 UNIT/ML vial        insulin glargine (BASAGLAR KWIKPEN) 100 UNIT/ML pen INJECT 50 UNITS ONCE DAILY SUBCUTANEOUSLY 15 mL 1     insulin pen needle (BD CHRIST U/F) 32G X 4 MM miscellaneous Use 3-6 pen needles daily  or as directed. 50 each 0     predniSONE (DELTASONE) 20 MG tablet Take 3 tablets by mouth (60 mg) daily x 14 weeks. 42 tablet 0     vitamin D2 (ERGOCALCIFEROL) 88004 units (1250 mcg) capsule            ALLERGIES:  Allergies   Allergen Reactions     Penicillins Rash         PAST MEDICAL HISTORY:  Past Medical History:   Diagnosis Date     Diabetes mellitus type 1, uncontrolled, insulin dependent 9/16/2013         PAST SURGICAL HISTORY:  Past Surgical History:   Procedure Laterality Date     EXCISE MASS GROIN Left 1/20/2023    Procedure: Lymph Node excisional biopsy;  Surgeon: New Burger MD;  Location: [facility] OR     MYRINGOTOMY, INSERT TUBE BILATERAL, COMBINED Bilateral     As a child         SOCIAL HISTORY:  Social History     Socioeconomic History     Marital status:      Spouse name: Jayla     Number of children: 3     Years of education: Not on file     Highest education level: Not on file   Occupational History     Occupation:      Comment: [employer]   Tobacco Use     Smoking status: Never     Smokeless tobacco: Never   Substance and Sexual Activity     Alcohol use: No     Comment: rarely     Drug use: No     Sexual activity: Yes     Partners: Female   Other Topics Concern     Parent/sibling w/ CABG, MI or angioplasty before 65F 55M? Not Asked   Social History Narrative     Not on file     Social Determinants of Health     Financial Resource Strain: Low Risk      Difficulty of Paying Living Expenses: Not hard at all   Food Insecurity: No Food Insecurity     Worried About Running Out of Food in the Last Year: Never true     Ran Out of Food in the Last Year: Never true   Transportation Needs: No Transportation Needs     Lack of Transportation (Medical): No     Lack of Transportation (Non-Medical): No   Physical Activity: Sufficiently Active     Days of Exercise per Week: 6 days     Minutes of Exercise per Session: 40 min   Stress: No Stress Concern Present     Feeling of Stress :  Only a little   Social Connections: Socially Integrated     Frequency of Communication with Friends and Family: More than three times a week     Frequency of Social Gatherings with Friends and Family: More than three times a week     Attends Holiness Services: 1 to 4 times per year     Active Member of Clubs or Organizations: Yes     Attends Club or Organization Meetings: Not on file     Marital Status:    Intimate Partner Violence: Not on file   Housing Stability: Low Risk      Unable to Pay for Housing in the Last Year: No     Number of Places Lived in the Last Year: 1     Unstable Housing in the Last Year: No         FAMILY HISTORY:  Family History   Problem Relation Age of Onset     C.A.D. No family hx of      Diabetes No family hx of      Hypertension No family hx of      Cancer No family hx of         no skin cancer     Amblyopia No family hx of      Retinal detachment No family hx of      Thyroid Disease No family hx of      Glaucoma No family hx of      Macular Degeneration No family hx of          PHYSICAL EXAM:  Vital signs:  There were no vitals taken for this visit.   ECO  GENERAL/CONSTITUTIONAL: No acute distress.  LYMPH: No anterior cervical, posterior cervical, supraclavicular, axillary or inguinal adenopathy.   RESPIRATORY: Clear to auscultation bilaterally. No crackles or wheezing.   CARDIOVASCULAR: Regular rate and rhythm without murmurs, gallops, or rubs.  GASTROINTESTINAL: No hepatosplenomegaly, masses, or tenderness. The patient has normal bowel sounds. No guarding.  No distention.  MUSCULOSKELETAL: Warm and well-perfused, no cyanosis, clubbing, or edema.  NEUROLOGIC: Cranial nerves II-XII are intact. Alert, oriented, answers questions appropriately.  INTEGUMENTARY: No rashes or jaundice.  GAIT: Steady, does not use assistive device.      LABS:   Latest Reference Range & Units 01/10/23 15:17   Sodium 136 - 145 mmol/L 136   Potassium 3.4 - 5.3 mmol/L 4.4   Chloride 98 - 107 mmol/L 100    Carbon Dioxide (CO2) 22 - 29 mmol/L 25   Urea Nitrogen 6.0 - 20.0 mg/dL 14.0   Creatinine 0.67 - 1.17 mg/dL 1.03   GFR Estimate >60 mL/min/1.73m2 >90   Calcium 8.6 - 10.0 mg/dL 9.3   Anion Gap 7 - 15 mmol/L 11   Albumin 3.5 - 5.2 g/dL 4.6   Protein Total 6.4 - 8.3 g/dL 6.9   Alkaline Phosphatase 40 - 129 U/L 63   ALT 10 - 50 U/L 26   AST 10 - 50 U/L 29   Bilirubin Total <=1.2 mg/dL 1.2   CRP Inflammation <5.00 mg/L <3.00   Glucose 70 - 99 mg/dL 172 (H)   Lactate Dehydrogenase 0 - 250 U/L 196   Uric Acid 3.4 - 7.0 mg/dL 5.8   WBC 4.0 - 11.0 10e3/uL 6.5   Hemoglobin 13.3 - 17.7 g/dL 16.4   Hematocrit 40.0 - 53.0 % 45.8   Platelet Count 150 - 450 10e3/uL 45 (LL)   RBC Count 4.40 - 5.90 10e6/uL 5.52   MCV 78 - 100 fL 83   MCH 26.5 - 33.0 pg 29.7   MCHC 31.5 - 36.5 g/dL 35.8   RDW 10.0 - 15.0 % 11.9   % Neutrophils % 65   % Lymphocytes % 23   % Monocytes % 10   % Eosinophils % 1   % Basophils % 1   Absolute Basophils 0.0 - 0.2 10e3/uL 0.1   Absolute Eosinophils 0.0 - 0.7 10e3/uL 0.1   Absolute Immature Granulocytes <=0.4 10e3/uL 0.0   Absolute Lymphocytes 0.8 - 5.3 10e3/uL 1.5   Absolute Monocytes 0.0 - 1.3 10e3/uL 0.7   % Immature Granulocytes % 0   Absolute Neutrophils 1.6 - 8.3 10e3/uL 4.2   Absolute NRBCs 10e3/uL 0.0   NRBCs per 100 WBC <1 /100 0   RBC Morphology  Confirmed RBC Indices   Platelet Morphology Automated Count Confirmed. Platelet morphology is normal.  Automated Count Confirmed. Platelet morphology is normal.   % Retic 0.5 - 2.0 % 1.0   Absolute Retic 0.025 - 0.095 10e6/uL 0.057   Sed Rate 0 - 15 mm/hr 6   INR 0.85 - 1.15  1.06   PTT 22 - 38 Seconds 26   Fibrinogen 170 - 490 mg/dL 269   SPECIMEN EXPIRATION DATE  [id number]   MIRA Anti-IgG,-C3d  Positive 3+   Hep B Surface Agn Nonreactive  Nonreactive   Hepatitis B Core Margarette Nonreactive  Nonreactive   Hepatitis B Surface Antibody Instrument Value <8.00 m[IU]/mL 35.73   Hepatitis B Surface Antibody  Reactive   Hepatitis C Antibody Nonreactive   Nonreactive   HIV Antigen Antibody Combo Nonreactive  Nonreactive      Latest Reference Range & Units 01/10/23 15:17   PAVAN interpretation Negative  Positive !      01/10/23 15:17   PAVAN titer 1 1:160       Serum M-protein (g/dL):  1/10/23: 0.0       Latest Reference Range & Units 12/14/22 09:20   MIRA Anti-IgG,-C3d  Weak Positive   MIRA Anti-C3  Negative   MIRA Anti-IgG, Tube  Positive 1+   Blood Bank Chart Comment  BB Chart Comment      Latest Reference Range & Units 12/14/22 09:20   Haptoglobin 32 - 197 mg/dL 31 (L)          Latest Reference Range & Units 12/06/22 09:40   Iron 61 - 157 ug/dL 59 (L)   Iron Binding Capacity 240 - 430 ug/dL 319   Iron Sat Index 15 - 46 % 18   Lactate Dehydrogenase 0 - 250 U/L 240   INR 0.85 - 1.15  0.91   PTT 22 - 38 Seconds 25   Fibrinogen 170 - 490 mg/dL 282   SPECIMEN EXPIRATION DATE  [id number]   MIRA Anti-IgG,-C3d  Positive 3+               PATHOLOGY:  Final Diagnosis 12/1/22:   Peripheral blood, morphology:  - Marked thrombocytopenia.  - Hemoglobin quantitatively within normal limits and erythrocytes without diagnostic morphologic abnormality.  - WBC subsets quantitatively within normal limits and without diagnostic morphologic abnormality.  - Negative for schistocytes.  - Negative for overt features of myelodysplasia or circulating blasts.     Final Diagnosis 12/6/22:   Peripheral blood:  --Slight polycythemia/erythrocytosis.  --Marked thrombocytopenia.   Electronically signed by Angel Marshall MD on 12/7/2022 at 10:33 AM   Comment    The cause of this patient's thrombocytopenia is unclear from the peripheral smear. Some common causes of thrombocytopenia to consider include infections, medications, alcohol, immune mediated mechanisms, and splenic sequestration. If splenic sequestration is of clinical concern, an accurate assessment of spleen size is recommended.         Clinical Information    Thrombocytopenia   Peripheral Smear    The red blood cells appear normochromic.   Rouleaux formation does not appear increased.  Polychromasia does not appear increased.  Poikilocytosis appears mild and nonspecific.  Platelets  are well granulated.  Lymphocytes are predominantly small with cytologically mature chromatin and appear overall polymorphous.  Neutrophils contain normal cytoplasmic granulation and unremarkable nuclear morphology.  There is no dysplasia and no circulating blasts are seen.     Final Diagnosis 12/14/22:   Bone marrow, left posterior iliac crest, decalcified trephine biopsy and aspiration:  -- Normocellular bone marrow for age (60 to 70%) with maturing trilineage hematopoiesis.  -- No evidence of marrow involvement by lymphoma or other hematopoietic neoplasm.     Peripheral blood showing:  -- Moderate thrombocytopenia.     Case Report   Flow Cytometry Report                             Case: [id number]                                   Authorizing Provider:  Hayde Lopez DO         Collected:           12/14/2022 10:06 AM           Ordering Location:     [facility]   Received:            12/14/2022 10:21 AM                                  [facility]                                                             Pathologist:           Marleny Holden MD                                                      Specimen:    Iliac Crest, Bone Marrow Aspirate, Left                                                    Flow Interpretation   A. Iliac Crest, Bone Marrow Aspirate, Left:    - Polytypic B cells  - No aberrant immunophenotype on T cells  - No increase in myeloid blasts and no abnormal myeloid blast population     Case Report   Surgical Pathology Report                         Case: [id number]                                   Authorizing Provider:  Hayde Lopez DO         Collected:           12/15/2022 11:27 AM           Ordering Location:     [facility]   Received:            12/15/2022 11:54 AM                                   Imaging                                                                       Pathologist:           Mirna Roger                                                                Specimen:    Retroperitoneal                                                                            Final Diagnosis   A.  Retroperitoneum, biopsy:  -Three tissue fragments, entirely submitted for flow cytometry evaluation (gross examination only).       Case Report   Flow Cytometry Report                             Case: [id number]                                   Authorizing Provider:  Hayde Lopez DO         Collected:           12/15/2022 01:16 PM           Ordering Location:     [facility]   Received:            12/15/2022 01:17 PM                                  Imaging                                                                       Pathologist:           Marleny Holden MD                                                      Specimen:    Retroperitoneal                                                                            Flow Interpretation   A. Retroperitoneal :    - Polytypic B cells  - No aberrant immunophenotype on T cells         Case Report   Surgical Pathology Report                         Case: [id number]                                   Authorizing Provider:  Hayde Lopez DO         Collected:           01/04/2023 02:25 PM           Ordering Location:     [facility]   Received:            01/04/2023 02:48 PM                                  [facility]                                                                 Pathologist:           Saadia Allen MD                                                                            Specimen:    Lymph Node(s), Axillary, Right                                                             Final Diagnosis   Lymph node, right axillary,  core biopsies:  -Partially sampled lymph node showing no evidence of Hodgkin's or non-Hodgkin's lymphoma  -Completed immunophenotyping studies show polytypic B cells and no aberrant T-cell antigen expression     Case Report   Flow Cytometry Report                             Case: [id number]                                   Authorizing Provider:  Hayde Lopez DO         Collected:           01/04/2023 02:25 PM           Ordering Location:     [facility]   Received:            01/04/2023 02:53 PM                                  [facility]                                                                 Pathologist:           Xiomara Ramsey MD                                                         Specimen:    Lymph Node(s), Axillary, Right                                                             Flow Interpretation   A. Lymph Node(s), Axillary, Right, :  -Polytypic B cells  No aberrant immunophenotype on T cells         Lymph node 1/20/23 pending:        Case Report   Flow Cytometry Report                             Case: [id number]                                   Authorizing Provider:  New Burger         Collected:           01/20/2023 02:33 PM                                  MD Ari                                                                  Ordering Location:     [facility] OR                 Received:            01/20/2023 02:33 PM           Pathologist:           Marleny Holden MD                                                      Specimen:    Lymph Node(s), Inguinal, Left                                                              Flow Interpretation   A. Lymph Node(s), Inguinal, Left:     - Polytypic B cells  - No aberrant immunophenotype on T cells           IMAGING:  CT CAP 12/6/22:  IMPRESSION:  1.  Several enlarged lymph nodes identified at the right axilla,  retroperitoneum, left pelvis and borderline enlarged at the left  inguinal locations. While  nonspecific, a neoplastic etiology is  possible including lymphoma.  2.  Mild splenomegaly.  3.  Lobulated indeterminate nodular masses at the left pericardial  fat.  4.  Indeterminate multiple bilateral pulmonary nodules. The dominant  solid nodule is at the right lower lobe measuring 1.1 cm. Neoplasm  remains in the differential and surveillance imaging and/or further  workup is recommended. See below for follow up imaging guidelines.    PET 12/9/22:  IMPRESSION:     Findings suspicious for active neoplasm such as lymphoma involving lymph nodes above/below the diaphragm. The right axillary for left inguinal lymph nodes are amenable to ultrasound-guided histologic sampling if desired.        ASSESSMENT/PLAN:  Billy Carey is a 32 year old male who is referred for thrombocytopenia. 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    1) Thrombocytopenia, warm autoimmune hemolysis  -This is a new diagnosis for patient and an incidental finding when he was in for routine physical examination. He has not had any bleeding episodes.   -No new meds, recent infection, or recent surgeries.  -TSH normal at 3.85.   -HepB/C and HIV studies historically negative.   -No splenomegaly on physical examination.  -I doubt hemolysis as retic is normal as well as LFTs.  -We discussed the possibility of lab artifact, but there is no comment on platelet clumping on smear. Also, he had lab work repeated two days in a row with PLT count returning in the 40s.   -Given his history of autoimmune disease, he may have underlying ITP, which is a diagnosis of exclusion. We had discussed possible steroid therapy if platelets are to return <30K.   -Repeat CBC is showing PLTs still in the 40's. Hb is 17.9, WBC normal.   -Also is consideration for a microangiopathic hemolytic anemia. MIRA has come back positive at 3+ (which is inconsistent with MAHA) and patient does not have elevated LFTs, abnormal coag studies, nor reticulocytosis. LDH is also  normal at 240. He is afebrile. [facility] blood bank testing has returned +IgG and negative for complement, consistent with warm AIHA.   -CT CAP had returned with 2 cm LN of the retroperitoneum, inguinal, and axillary. There was also note of an abnormal pericardial fat mass. I sent for a PET, that did not show FDG avidity of the pericardial fat mass. The retroperitoneal LNs had SUV of 12-13 with the remainder LN 6-7. I sent patient for a core biopsy of the retroperitoneal LN with IR that returned in fragments with unremarkable FLOW. Bone marrow biopsy was normocellular with normal megakaryocyte morphology. I then pursued a right axillary LN biopsy, which again returned negative with unremarkable FLOW. I sent for excisional biopsy of an inguinal LN with general surgery with pathology once again returning as benign with folliculary and interfollicular hyperplasia. Congo red staining is negative. EBV, HHV8 negative. I reviewed with pathology for any morphologic evidence consistent with Castleman's disease and spoke with Dr. Kaminski who is unable to confirm this at this time. She did have an interdepartmental pathology review. I do note the sinuses contain abundant histiocytes in the microscopic description and in my differential diagnosis is also Rosai-Dorfamn or Langerhan's disease.  I spoke with Dr. Tyler who is in agreement with workup above. I reviewed with Dr. 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 I reviewed I would then move onto rituximab next if patient is not to have improvement with steroid therapy. Dr. Tyler is in agreement with this plan.   -I reviewed the above with patient and wife, Jayla. I will need to continue to monitor the patient not only for treatment response for the hemolysis, but for evolution of LAD. My plan is to recheck with PET in 6 months time and will  continue to monitor for treatment response discussed above.     2) Positive PAVAN  -Titer 1:160.  -Patient without arthralgias, skin changes.  -APLA, ds-DNA labs returned negative.  -Will refer to rheumatology.     3) History of infectious mononucleosis  -EBV negative on LN.  -EBV PCR negative.    4) DM1  -Patient managed by endocrinology.    5) History of HTN, HLD    6) -Labs pending today (possible rituximab).  -Rheum referral.   -Will send path to [facility] for review.     Hayde Lopez DO  Hematology/Oncology  [facility]

## 2023-02-15 ENCOUNTER — HOSPITAL ENCOUNTER (INPATIENT)
Facility: CLINIC | Age: 33
LOS: 3 days | Discharge: HOME | DRG: 813 | End: 2023-02-18
Attending: EMERGENCY MEDICINE | Admitting: STUDENT IN AN ORGANIZED HEALTH CARE EDUCATION/TRAINING PROGRAM
Payer: COMMERCIAL

## 2023-02-15 ENCOUNTER — TELEPHONE (OUTPATIENT)
Dept: ONCOLOGY | Facility: CLINIC | Age: 33
End: 2023-02-15

## 2023-02-15 ENCOUNTER — LAB (OUTPATIENT)
Dept: ONCOLOGY | Facility: CLINIC | Age: 33
End: 2023-02-15
Attending: INTERNAL MEDICINE
Payer: COMMERCIAL

## 2023-02-15 VITALS
BODY MASS INDEX: 33.94 KG/M2 | OXYGEN SATURATION: 99 % | WEIGHT: 273 LBS | RESPIRATION RATE: 16 BRPM | HEART RATE: 73 BPM | DIASTOLIC BLOOD PRESSURE: 89 MMHG | TEMPERATURE: 97.6 F | HEIGHT: 75 IN | SYSTOLIC BLOOD PRESSURE: 140 MMHG

## 2023-02-15 DIAGNOSIS — R76.0 ANTINUCLEAR ANTIBODY (ANA) TITER GREATER THAN 1:80: Primary | ICD-10-CM

## 2023-02-15 DIAGNOSIS — D59.10 AUTOIMMUNE HEMOLYTIC ANEMIA (H): ICD-10-CM

## 2023-02-15 DIAGNOSIS — D69.3 AUTOIMMUNE THROMBOCYTOPENIA (H): ICD-10-CM

## 2023-02-15 DIAGNOSIS — D69.6 LOW PLATELET COUNT (H): ICD-10-CM

## 2023-02-15 LAB
ABO/RH(D): NORMAL
ALBUMIN SERPL BCG-MCNC: 4.5 G/DL (ref 3.5–5.2)
ALP SERPL-CCNC: 76 U/L (ref 40–129)
ALT SERPL W P-5'-P-CCNC: 21 U/L (ref 10–50)
ANION GAP SERPL CALCULATED.3IONS-SCNC: 10 MMOL/L (ref 7–15)
ANION GAP SERPL CALCULATED.3IONS-SCNC: 10 MMOL/L (ref 7–15)
ANTIBODY SCREEN: NEGATIVE
AST SERPL W P-5'-P-CCNC: 21 U/L (ref 10–50)
BASOPHILS # BLD AUTO: 0.1 10E3/UL (ref 0–0.2)
BASOPHILS # BLD AUTO: 0.1 10E3/UL (ref 0–0.2)
BASOPHILS NFR BLD AUTO: 1 %
BASOPHILS NFR BLD AUTO: 1 %
BILIRUB SERPL-MCNC: 1.5 MG/DL
BUN SERPL-MCNC: 16.5 MG/DL (ref 6–20)
BUN SERPL-MCNC: 16.6 MG/DL (ref 6–20)
CALCIUM SERPL-MCNC: 9.4 MG/DL (ref 8.6–10)
CALCIUM SERPL-MCNC: 9.4 MG/DL (ref 8.6–10)
CHLORIDE SERPL-SCNC: 98 MMOL/L (ref 98–107)
CHLORIDE SERPL-SCNC: 99 MMOL/L (ref 98–107)
CREAT SERPL-MCNC: 1.15 MG/DL (ref 0.67–1.17)
CREAT SERPL-MCNC: 1.15 MG/DL (ref 0.67–1.17)
DEPRECATED HCO3 PLAS-SCNC: 28 MMOL/L (ref 22–29)
DEPRECATED HCO3 PLAS-SCNC: 28 MMOL/L (ref 22–29)
EOSINOPHIL # BLD AUTO: 0.3 10E3/UL (ref 0–0.7)
EOSINOPHIL # BLD AUTO: 0.4 10E3/UL (ref 0–0.7)
EOSINOPHIL NFR BLD AUTO: 3 %
EOSINOPHIL NFR BLD AUTO: 3 %
ERYTHROCYTE [DISTWIDTH] IN BLOOD BY AUTOMATED COUNT: 12.1 % (ref 10–15)
ERYTHROCYTE [DISTWIDTH] IN BLOOD BY AUTOMATED COUNT: 12.3 % (ref 10–15)
FLUAV RNA SPEC QL NAA+PROBE: NEGATIVE
FLUBV RNA RESP QL NAA+PROBE: NEGATIVE
GFR SERPL CREATININE-BSD FRML MDRD: 86 ML/MIN/1.73M2
GFR SERPL CREATININE-BSD FRML MDRD: 86 ML/MIN/1.73M2
GLUCOSE BLDC GLUCOMTR-MCNC: 150 MG/DL (ref 70–99)
GLUCOSE SERPL-MCNC: 209 MG/DL (ref 70–99)
GLUCOSE SERPL-MCNC: 306 MG/DL (ref 70–99)
HCT VFR BLD AUTO: 49.3 % (ref 40–53)
HCT VFR BLD AUTO: 51.3 % (ref 40–53)
HGB BLD-MCNC: 17.2 G/DL (ref 13.3–17.7)
HGB BLD-MCNC: 17.8 G/DL (ref 13.3–17.7)
IMM GRANULOCYTES # BLD: 0 10E3/UL
IMM GRANULOCYTES # BLD: 0.1 10E3/UL
IMM GRANULOCYTES NFR BLD: 0 %
IMM GRANULOCYTES NFR BLD: 1 %
LDH SERPL L TO P-CCNC: 209 U/L (ref 0–250)
LYMPHOCYTES # BLD AUTO: 2 10E3/UL (ref 0.8–5.3)
LYMPHOCYTES # BLD AUTO: 2.3 10E3/UL (ref 0.8–5.3)
LYMPHOCYTES NFR BLD AUTO: 18 %
LYMPHOCYTES NFR BLD AUTO: 22 %
MCH RBC QN AUTO: 29.2 PG (ref 26.5–33)
MCH RBC QN AUTO: 29.5 PG (ref 26.5–33)
MCHC RBC AUTO-ENTMCNC: 34.7 G/DL (ref 31.5–36.5)
MCHC RBC AUTO-ENTMCNC: 34.9 G/DL (ref 31.5–36.5)
MCV RBC AUTO: 84 FL (ref 78–100)
MCV RBC AUTO: 85 FL (ref 78–100)
MONOCYTES # BLD AUTO: 1.1 10E3/UL (ref 0–1.3)
MONOCYTES # BLD AUTO: 1.2 10E3/UL (ref 0–1.3)
MONOCYTES NFR BLD AUTO: 11 %
MONOCYTES NFR BLD AUTO: 11 %
NEUTROPHILS # BLD AUTO: 6.4 10E3/UL (ref 1.6–8.3)
NEUTROPHILS # BLD AUTO: 7.3 10E3/UL (ref 1.6–8.3)
NEUTROPHILS NFR BLD AUTO: 63 %
NEUTROPHILS NFR BLD AUTO: 66 %
NRBC # BLD AUTO: 0 10E3/UL
NRBC # BLD AUTO: 0 10E3/UL
NRBC BLD AUTO-RTO: 0 /100
NRBC BLD AUTO-RTO: 0 /100
PLAT MORPH BLD: NORMAL
PLATELET # BLD AUTO: 18 10E3/UL (ref 150–450)
PLATELET # BLD AUTO: 25 10E3/UL (ref 150–450)
POTASSIUM SERPL-SCNC: 4.2 MMOL/L (ref 3.4–5.3)
POTASSIUM SERPL-SCNC: 4.4 MMOL/L (ref 3.4–5.3)
PROT SERPL-MCNC: 7 G/DL (ref 6.4–8.3)
RBC # BLD AUTO: 5.9 10E6/UL (ref 4.4–5.9)
RBC # BLD AUTO: 6.04 10E6/UL (ref 4.4–5.9)
RBC MORPH BLD: NORMAL
RSV RNA SPEC NAA+PROBE: NEGATIVE
SARS-COV-2 RNA RESP QL NAA+PROBE: NEGATIVE
SODIUM SERPL-SCNC: 136 MMOL/L (ref 136–145)
SODIUM SERPL-SCNC: 137 MMOL/L (ref 136–145)
SPECIMEN EXPIRATION DATE: NORMAL
WBC # BLD AUTO: 10.2 10E3/UL (ref 4–11)
WBC # BLD AUTO: 10.9 10E3/UL (ref 4–11)

## 2023-02-15 PROCEDURE — 84999 UNLISTED CHEMISTRY PROCEDURE: CPT | Performed by: STUDENT IN AN ORGANIZED HEALTH CARE EDUCATION/TRAINING PROGRAM

## 2023-02-15 PROCEDURE — 36415 COLL VENOUS BLD VENIPUNCTURE: CPT | Performed by: EMERGENCY MEDICINE

## 2023-02-15 PROCEDURE — 85025 COMPLETE CBC W/AUTO DIFF WBC: CPT | Performed by: EMERGENCY MEDICINE

## 2023-02-15 PROCEDURE — 80053 COMPREHEN METABOLIC PANEL: CPT | Performed by: INTERNAL MEDICINE

## 2023-02-15 PROCEDURE — 86850 RBC ANTIBODY SCREEN: CPT | Performed by: EMERGENCY MEDICINE

## 2023-02-15 PROCEDURE — 258N000003 HC RX IP 258 OP 636: Performed by: EMERGENCY MEDICINE

## 2023-02-15 PROCEDURE — 86901 BLOOD TYPING SEROLOGIC RH(D): CPT | Performed by: EMERGENCY MEDICINE

## 2023-02-15 PROCEDURE — 120N000001 HC R&B MED SURG/OB

## 2023-02-15 PROCEDURE — 86880 COOMBS TEST DIRECT: CPT | Performed by: STUDENT IN AN ORGANIZED HEALTH CARE EDUCATION/TRAINING PROGRAM

## 2023-02-15 PROCEDURE — 86880 COOMBS TEST DIRECT: CPT | Performed by: INTERNAL MEDICINE

## 2023-02-15 PROCEDURE — G0463 HOSPITAL OUTPT CLINIC VISIT: HCPCS | Performed by: INTERNAL MEDICINE

## 2023-02-15 PROCEDURE — 250N000013 HC RX MED GY IP 250 OP 250 PS 637: Performed by: STUDENT IN AN ORGANIZED HEALTH CARE EDUCATION/TRAINING PROGRAM

## 2023-02-15 PROCEDURE — 85025 COMPLETE CBC W/AUTO DIFF WBC: CPT | Performed by: INTERNAL MEDICINE

## 2023-02-15 PROCEDURE — 82962 GLUCOSE BLOOD TEST: CPT

## 2023-02-15 PROCEDURE — 99223 1ST HOSP IP/OBS HIGH 75: CPT | Mod: AI | Performed by: STUDENT IN AN ORGANIZED HEALTH CARE EDUCATION/TRAINING PROGRAM

## 2023-02-15 PROCEDURE — 86901 BLOOD TYPING SEROLOGIC RH(D): CPT | Performed by: STUDENT IN AN ORGANIZED HEALTH CARE EDUCATION/TRAINING PROGRAM

## 2023-02-15 PROCEDURE — 86900 BLOOD TYPING SEROLOGIC ABO: CPT | Performed by: STUDENT IN AN ORGANIZED HEALTH CARE EDUCATION/TRAINING PROGRAM

## 2023-02-15 PROCEDURE — 99285 EMERGENCY DEPT VISIT HI MDM: CPT | Mod: 25,CS

## 2023-02-15 PROCEDURE — 83010 ASSAY OF HAPTOGLOBIN QUANT: CPT | Performed by: INTERNAL MEDICINE

## 2023-02-15 PROCEDURE — 36415 COLL VENOUS BLD VENIPUNCTURE: CPT

## 2023-02-15 PROCEDURE — 83615 LACTATE (LD) (LDH) ENZYME: CPT | Performed by: INTERNAL MEDICINE

## 2023-02-15 PROCEDURE — 82435 ASSAY OF BLOOD CHLORIDE: CPT | Performed by: EMERGENCY MEDICINE

## 2023-02-15 PROCEDURE — 87637 SARSCOV2&INF A&B&RSV AMP PRB: CPT | Performed by: EMERGENCY MEDICINE

## 2023-02-15 PROCEDURE — C9803 HOPD COVID-19 SPEC COLLECT: HCPCS

## 2023-02-15 PROCEDURE — 82374 ASSAY BLOOD CARBON DIOXIDE: CPT | Performed by: EMERGENCY MEDICINE

## 2023-02-15 PROCEDURE — 250N000011 HC RX IP 250 OP 636: Performed by: EMERGENCY MEDICINE

## 2023-02-15 PROCEDURE — 99214 OFFICE O/P EST MOD 30 MIN: CPT | Performed by: INTERNAL MEDICINE

## 2023-02-15 RX ORDER — NICOTINE POLACRILEX 4 MG
15-30 LOZENGE BUCCAL
Status: DISCONTINUED | OUTPATIENT
Start: 2023-02-15 | End: 2023-02-18 | Stop reason: HOSPADM

## 2023-02-15 RX ORDER — DEXTROSE MONOHYDRATE 25 G/50ML
25-50 INJECTION, SOLUTION INTRAVENOUS
Status: DISCONTINUED | OUTPATIENT
Start: 2023-02-15 | End: 2023-02-18 | Stop reason: HOSPADM

## 2023-02-15 RX ORDER — ONDANSETRON 4 MG/1
4 TABLET, ORALLY DISINTEGRATING ORAL EVERY 6 HOURS PRN
Status: DISCONTINUED | OUTPATIENT
Start: 2023-02-15 | End: 2023-02-18 | Stop reason: HOSPADM

## 2023-02-15 RX ORDER — INSULIN LISPRO 100 [IU]/ML
INJECTION, SOLUTION INTRAVENOUS; SUBCUTANEOUS
COMMUNITY

## 2023-02-15 RX ORDER — ACETAMINOPHEN 650 MG/1
650 SUPPOSITORY RECTAL EVERY 6 HOURS PRN
Status: DISCONTINUED | OUTPATIENT
Start: 2023-02-15 | End: 2023-02-18 | Stop reason: HOSPADM

## 2023-02-15 RX ORDER — ONDANSETRON 2 MG/ML
4 INJECTION INTRAMUSCULAR; INTRAVENOUS EVERY 6 HOURS PRN
Status: DISCONTINUED | OUTPATIENT
Start: 2023-02-15 | End: 2023-02-18 | Stop reason: HOSPADM

## 2023-02-15 RX ORDER — AMOXICILLIN 250 MG
1 CAPSULE ORAL 2 TIMES DAILY PRN
Status: DISCONTINUED | OUTPATIENT
Start: 2023-02-15 | End: 2023-02-18 | Stop reason: HOSPADM

## 2023-02-15 RX ORDER — LIDOCAINE 40 MG/G
CREAM TOPICAL
Status: DISCONTINUED | OUTPATIENT
Start: 2023-02-15 | End: 2023-02-18 | Stop reason: HOSPADM

## 2023-02-15 RX ORDER — AMOXICILLIN 250 MG
2 CAPSULE ORAL 2 TIMES DAILY PRN
Status: DISCONTINUED | OUTPATIENT
Start: 2023-02-15 | End: 2023-02-18 | Stop reason: HOSPADM

## 2023-02-15 RX ORDER — ACETAMINOPHEN 325 MG/1
650 TABLET ORAL EVERY 6 HOURS PRN
Status: DISCONTINUED | OUTPATIENT
Start: 2023-02-15 | End: 2023-02-18 | Stop reason: HOSPADM

## 2023-02-15 RX ADMIN — ACETAMINOPHEN 650 MG: 325 TABLET ORAL at 23:09

## 2023-02-15 RX ADMIN — DEXAMETHASONE SODIUM PHOSPHATE 40 MG: 10 INJECTION, SOLUTION INTRAMUSCULAR; INTRAVENOUS at 20:10

## 2023-02-15 ASSESSMENT — ENCOUNTER SYMPTOMS
BLOOD IN STOOL: 0
VOMITING: 0
RHINORRHEA: 1

## 2023-02-15 ASSESSMENT — ACTIVITIES OF DAILY LIVING (ADL)
ADLS_ACUITY_SCORE: 35

## 2023-02-15 ASSESSMENT — PAIN SCALES - GENERAL: PAINLEVEL: NO PAIN (0)

## 2023-02-15 NOTE — LETTER
2/15/2023         RE: Billy Carey  [address] [facility] [address]        Dear Colleague,    Thank you for referring your patient, Billy Carey, to the [facility]. Please see a copy of my visit note below.    [facility]    Hematology/Oncology Established Patient Note      Today's Date: 2/15/23    Reason for Consultation: Thrombocytopenia  Referring Provider: ZAHRAA Kingsley CNP      HISTORY OF PRESENT ILLNESS: Billy Carey is a 32 year old male who is referred for thrombocytopenia. 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    Historically, he has had historical normal CBC. On 11/30/22, WBC 6.0, Hb 16.5, PLT 42. Repeat CBC on 12/1/22, WBC 7.2, Hb 17.5, PLT 43. Hepatitis and HIV studies negative.     He has had eustachian tube placement without bleeding event. He has not had any other surgeries.     For DM1- he is followed by endocrinology. He has continuous blood glucose monitoring and an insulin pump.     He drinks alcohol 1-2x/week. No excessive use. No history of smoking.     Family history of VTE in his sister. He is unaware of the nuances surrounding this event. No family history of bleeding disorder or malignancy.       INTERIM HISTORY:  See my telephone notes 12/6, 12/9/22, 1/20/23.     Underwent excisional biopsy on 1/20/23.    He denies gross evidence of bleed.         REVIEW OF SYSTEMS:   A 14 point ROS was reviewed with pertinent positives and negatives in the HPI.        HOME MEDICATIONS:  Current Outpatient Medications   Medication Sig Dispense Refill     atorvastatin (LIPITOR) 10 MG tablet Take 1 tablet (10 mg) by mouth daily 90 tablet 3     blood glucose (KELL CONTOUR NEXT) test strip Test 4-5 times daily 4 Box 3     Blood Glucose Monitoring Suppl (KELL CONTOUR NEXT LINK) W/DEVICE KIT 1 kit 5 times daily. Use as directed 1 kit 1     glucagon (GLUCAGON EMERGENCY) 1 MG kit Inject 1 mg into the muscle once for  1 dose 1 mg 3     insulin aspart (FIASP) 100 UNIT/ML vial        insulin glargine (BASAGLAR KWIKPEN) 100 UNIT/ML pen INJECT 50 UNITS ONCE DAILY SUBCUTANEOUSLY 15 mL 1     insulin pen needle (BD CHRIST U/F) 32G X 4 MM miscellaneous Use 3-6 pen needles daily or as directed. 50 each 0     predniSONE (DELTASONE) 20 MG tablet Take 3 tablets by mouth (60 mg) daily x 14 weeks. 42 tablet 0     vitamin D2 (ERGOCALCIFEROL) 12639 units (1250 mcg) capsule            ALLERGIES:  Allergies   Allergen Reactions     Penicillins Rash         PAST MEDICAL HISTORY:  Past Medical History:   Diagnosis Date     Diabetes mellitus type 1, uncontrolled, insulin dependent 9/16/2013         PAST SURGICAL HISTORY:  Past Surgical History:   Procedure Laterality Date     EXCISE MASS GROIN Left 1/20/2023    Procedure: Lymph Node excisional biopsy;  Surgeon: New Burger MD;  Location: [facility] OR     MYRINGOTOMY, INSERT TUBE BILATERAL, COMBINED Bilateral     As a child         SOCIAL HISTORY:  Social History     Socioeconomic History     Marital status:      Spouse name: Jayla     Number of children: 3     Years of education: Not on file     Highest education level: Not on file   Occupational History     Occupation:      Comment: [employer]   Tobacco Use     Smoking status: Never     Smokeless tobacco: Never   Substance and Sexual Activity     Alcohol use: No     Comment: rarely     Drug use: No     Sexual activity: Yes     Partners: Female   Other Topics Concern     Parent/sibling w/ CABG, MI or angioplasty before 65F 55M? Not Asked   Social History Narrative     Not on file     Social Determinants of Health     Financial Resource Strain: Low Risk      Difficulty of Paying Living Expenses: Not hard at all   Food Insecurity: No Food Insecurity     Worried About Running Out of Food in the Last Year: Never true     Ran Out of Food in the Last Year: Never true   Transportation Needs: No Transportation Needs     Lack of  Transportation (Medical): No     Lack of Transportation (Non-Medical): No   Physical Activity: Sufficiently Active     Days of Exercise per Week: 6 days     Minutes of Exercise per Session: 40 min   Stress: No Stress Concern Present     Feeling of Stress : Only a little   Social Connections: Socially Integrated     Frequency of Communication with Friends and Family: More than three times a week     Frequency of Social Gatherings with Friends and Family: More than three times a week     Attends Caodaism Services: 1 to 4 times per year     Active Member of Clubs or Organizations: Yes     Attends Club or Organization Meetings: Not on file     Marital Status:    Intimate Partner Violence: Not on file   Housing Stability: Low Risk      Unable to Pay for Housing in the Last Year: No     Number of Places Lived in the Last Year: 1     Unstable Housing in the Last Year: No         FAMILY HISTORY:  Family History   Problem Relation Age of Onset     C.A.D. No family hx of      Diabetes No family hx of      Hypertension No family hx of      Cancer No family hx of         no skin cancer     Amblyopia No family hx of      Retinal detachment No family hx of      Thyroid Disease No family hx of      Glaucoma No family hx of      Macular Degeneration No family hx of          PHYSICAL EXAM:  Vital signs:  There were no vitals taken for this visit.   ECO  GENERAL/CONSTITUTIONAL: No acute distress.  LYMPH: No anterior cervical, posterior cervical, supraclavicular, axillary or inguinal adenopathy.   RESPIRATORY: Clear to auscultation bilaterally. No crackles or wheezing.   CARDIOVASCULAR: Regular rate and rhythm without murmurs, gallops, or rubs.  GASTROINTESTINAL: No hepatosplenomegaly, masses, or tenderness. The patient has normal bowel sounds. No guarding.  No distention.  MUSCULOSKELETAL: Warm and well-perfused, no cyanosis, clubbing, or edema.  NEUROLOGIC: Cranial nerves II-XII are intact. Alert, oriented, answers  questions appropriately.  INTEGUMENTARY: No rashes or jaundice.  GAIT: Steady, does not use assistive device.      LABS:   Latest Reference Range & Units 01/10/23 15:17   Sodium 136 - 145 mmol/L 136   Potassium 3.4 - 5.3 mmol/L 4.4   Chloride 98 - 107 mmol/L 100   Carbon Dioxide (CO2) 22 - 29 mmol/L 25   Urea Nitrogen 6.0 - 20.0 mg/dL 14.0   Creatinine 0.67 - 1.17 mg/dL 1.03   GFR Estimate >60 mL/min/1.73m2 >90   Calcium 8.6 - 10.0 mg/dL 9.3   Anion Gap 7 - 15 mmol/L 11   Albumin 3.5 - 5.2 g/dL 4.6   Protein Total 6.4 - 8.3 g/dL 6.9   Alkaline Phosphatase 40 - 129 U/L 63   ALT 10 - 50 U/L 26   AST 10 - 50 U/L 29   Bilirubin Total <=1.2 mg/dL 1.2   CRP Inflammation <5.00 mg/L <3.00   Glucose 70 - 99 mg/dL 172 (H)   Lactate Dehydrogenase 0 - 250 U/L 196   Uric Acid 3.4 - 7.0 mg/dL 5.8   WBC 4.0 - 11.0 10e3/uL 6.5   Hemoglobin 13.3 - 17.7 g/dL 16.4   Hematocrit 40.0 - 53.0 % 45.8   Platelet Count 150 - 450 10e3/uL 45 (LL)   RBC Count 4.40 - 5.90 10e6/uL 5.52   MCV 78 - 100 fL 83   MCH 26.5 - 33.0 pg 29.7   MCHC 31.5 - 36.5 g/dL 35.8   RDW 10.0 - 15.0 % 11.9   % Neutrophils % 65   % Lymphocytes % 23   % Monocytes % 10   % Eosinophils % 1   % Basophils % 1   Absolute Basophils 0.0 - 0.2 10e3/uL 0.1   Absolute Eosinophils 0.0 - 0.7 10e3/uL 0.1   Absolute Immature Granulocytes <=0.4 10e3/uL 0.0   Absolute Lymphocytes 0.8 - 5.3 10e3/uL 1.5   Absolute Monocytes 0.0 - 1.3 10e3/uL 0.7   % Immature Granulocytes % 0   Absolute Neutrophils 1.6 - 8.3 10e3/uL 4.2   Absolute NRBCs 10e3/uL 0.0   NRBCs per 100 WBC <1 /100 0   RBC Morphology  Confirmed RBC Indices   Platelet Morphology Automated Count Confirmed. Platelet morphology is normal.  Automated Count Confirmed. Platelet morphology is normal.   % Retic 0.5 - 2.0 % 1.0   Absolute Retic 0.025 - 0.095 10e6/uL 0.057   Sed Rate 0 - 15 mm/hr 6   INR 0.85 - 1.15  1.06   PTT 22 - 38 Seconds 26   Fibrinogen 170 - 490 mg/dL 269   SPECIMEN EXPIRATION DATE  [id number]   MIAR  Anti-IgG,-C3d  Positive 3+   Hep B Surface Agn Nonreactive  Nonreactive   Hepatitis B Core Margarette Nonreactive  Nonreactive   Hepatitis B Surface Antibody Instrument Value <8.00 m[IU]/mL 35.73   Hepatitis B Surface Antibody  Reactive   Hepatitis C Antibody Nonreactive  Nonreactive   HIV Antigen Antibody Combo Nonreactive  Nonreactive      Latest Reference Range & Units 01/10/23 15:17   PAVAN interpretation Negative  Positive !      01/10/23 15:17   PAVAN titer 1 1:160       Serum M-protein (g/dL):  1/10/23: 0.0       Latest Reference Range & Units 12/14/22 09:20   MIRA Anti-IgG,-C3d  Weak Positive   MIRA Anti-C3  Negative   MIRA Anti-IgG, Tube  Positive 1+   Blood Bank Chart Comment  BB Chart Comment      Latest Reference Range & Units 12/14/22 09:20   Haptoglobin 32 - 197 mg/dL 31 (L)          Latest Reference Range & Units 12/06/22 09:40   Iron 61 - 157 ug/dL 59 (L)   Iron Binding Capacity 240 - 430 ug/dL 319   Iron Sat Index 15 - 46 % 18   Lactate Dehydrogenase 0 - 250 U/L 240   INR 0.85 - 1.15  0.91   PTT 22 - 38 Seconds 25   Fibrinogen 170 - 490 mg/dL 282   SPECIMEN EXPIRATION DATE  [id number]   MIRA Anti-IgG,-C3d  Positive 3+               PATHOLOGY:  Final Diagnosis 12/1/22:   Peripheral blood, morphology:  - Marked thrombocytopenia.  - Hemoglobin quantitatively within normal limits and erythrocytes without diagnostic morphologic abnormality.  - WBC subsets quantitatively within normal limits and without diagnostic morphologic abnormality.  - Negative for schistocytes.  - Negative for overt features of myelodysplasia or circulating blasts.     Final Diagnosis 12/6/22:   Peripheral blood:  --Slight polycythemia/erythrocytosis.  --Marked thrombocytopenia.   Electronically signed by Angel Marshall MD on 12/7/2022 at 10:33 AM   Comment    The cause of this patient's thrombocytopenia is unclear from the peripheral smear. Some common causes of thrombocytopenia to consider include infections, medications, alcohol, immune  mediated mechanisms, and splenic sequestration. If splenic sequestration is of clinical concern, an accurate assessment of spleen size is recommended.         Clinical Information    Thrombocytopenia   Peripheral Smear    The red blood cells appear normochromic.  Rouleaux formation does not appear increased.  Polychromasia does not appear increased.  Poikilocytosis appears mild and nonspecific.  Platelets  are well granulated.  Lymphocytes are predominantly small with cytologically mature chromatin and appear overall polymorphous.  Neutrophils contain normal cytoplasmic granulation and unremarkable nuclear morphology.  There is no dysplasia and no circulating blasts are seen.     Final Diagnosis 12/14/22:   Bone marrow, left posterior iliac crest, decalcified trephine biopsy and aspiration:  -- Normocellular bone marrow for age (60 to 70%) with maturing trilineage hematopoiesis.  -- No evidence of marrow involvement by lymphoma or other hematopoietic neoplasm.     Peripheral blood showing:  -- Moderate thrombocytopenia.     Case Report   Flow Cytometry Report                             Case: [id number]                                   Authorizing Provider:  Hayde Lopez DO         Collected:           12/14/2022 10:06 AM           Ordering Location:     [facility]   Received:            12/14/2022 10:21 AM                                  [facility]                                                             Pathologist:           Marleny Holden MD                                                      Specimen:    Iliac Crest, Bone Marrow Aspirate, Left                                                    Flow Interpretation   A. Iliac Crest, Bone Marrow Aspirate, Left:    - Polytypic B cells  - No aberrant immunophenotype on T cells  - No increase in myeloid blasts and no abnormal myeloid blast population     Case Report   Surgical Pathology Report                         Case:  [id number]                                   Authorizing Provider:  Hayde Lopez DO         Collected:           12/15/2022 11:27 AM           Ordering Location:     [facility]   Received:            12/15/2022 11:54 AM                                  Imaging                                                                       Pathologist:           Mirna Roger                                                                Specimen:    Retroperitoneal                                                                            Final Diagnosis   A.  Retroperitoneum, biopsy:  -Three tissue fragments, entirely submitted for flow cytometry evaluation (gross examination only).       Case Report   Flow Cytometry Report                             Case: [id number]                                   Authorizing Provider:  Hayde Lopez DO         Collected:           12/15/2022 01:16 PM           Ordering Location:     [facility]   Received:            12/15/2022 01:17 PM                                  Imaging                                                                       Pathologist:           Marleny Holden MD                                                      Specimen:    Retroperitoneal                                                                            Flow Interpretation   A. Retroperitoneal :    - Polytypic B cells  - No aberrant immunophenotype on T cells         Case Report   Surgical Pathology Report                         Case: [id number]                                   Authorizing Provider:  Hayde Lopez DO         Collected:           01/04/2023 02:25 PM           Ordering Location:     [facility]   Received:            01/04/2023 02:48 PM                                  [facility]                                                                 Pathologist:           Saadia Allen,                                                                              MD                                                                            Specimen:    Lymph Node(s), Axillary, Right                                                             Final Diagnosis   Lymph node, right axillary, core biopsies:  -Partially sampled lymph node showing no evidence of Hodgkin's or non-Hodgkin's lymphoma  -Completed immunophenotyping studies show polytypic B cells and no aberrant T-cell antigen expression     Case Report   Flow Cytometry Report                             Case: [id number]                                   Authorizing Provider:  Hayde Lopez DO         Collected:           01/04/2023 02:25 PM           Ordering Location:     [facility]   Received:            01/04/2023 02:53 PM                                  [facility]                                                                 Pathologist:           Xiomara Ramsey MD                                                         Specimen:    Lymph Node(s), Axillary, Right                                                             Flow Interpretation   A. Lymph Node(s), Axillary, Right, :  -Polytypic B cells  No aberrant immunophenotype on T cells         Lymph node 1/20/23 pending:        Case Report   Flow Cytometry Report                             Case: [id number]                                   Authorizing Provider:  New Burger         Collected:           01/20/2023 02:33 PM                                  MD Ari                                                                  Ordering Location:     [facility] OR                 Received:            01/20/2023 02:33 PM           Pathologist:           Marleny Holden MD                                                      Specimen:    Lymph Node(s), Inguinal, Left                                                              Flow Interpretation   A. Lymph Node(s), Inguinal, Left:     -  Polytypic B cells  - No aberrant immunophenotype on T cells           IMAGING:  CT CAP 12/6/22:  IMPRESSION:  1.  Several enlarged lymph nodes identified at the right axilla,  retroperitoneum, left pelvis and borderline enlarged at the left  inguinal locations. While nonspecific, a neoplastic etiology is  possible including lymphoma.  2.  Mild splenomegaly.  3.  Lobulated indeterminate nodular masses at the left pericardial  fat.  4.  Indeterminate multiple bilateral pulmonary nodules. The dominant  solid nodule is at the right lower lobe measuring 1.1 cm. Neoplasm  remains in the differential and surveillance imaging and/or further  workup is recommended. See below for follow up imaging guidelines.    PET 12/9/22:  IMPRESSION:     Findings suspicious for active neoplasm such as lymphoma involving lymph nodes above/below the diaphragm. The right axillary for left inguinal lymph nodes are amenable to ultrasound-guided histologic sampling if desired.        ASSESSMENT/PLAN:  Billy Carey is a 32 year old male who is referred for thrombocytopenia. 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    1) Thrombocytopenia, warm autoimmune hemolysis  -This is a new diagnosis for patient and an incidental finding when he was in for routine physical examination. He has not had any bleeding episodes.   -No new meds, recent infection, or recent surgeries.  -TSH normal at 3.85.   -HepB/C and HIV studies historically negative.   -No splenomegaly on physical examination.  -I doubt hemolysis as retic is normal as well as LFTs.  -We discussed the possibility of lab artifact, but there is no comment on platelet clumping on smear. Also, he had lab work repeated two days in a row with PLT count returning in the 40s.   -Given his history of autoimmune disease, he may have underlying ITP, which is a diagnosis of exclusion. We had discussed possible steroid therapy if platelets are to return <30K.   -Repeat CBC is showing PLTs  still in the 40's. Hb is 17.9, WBC normal.   -Also is consideration for a microangiopathic hemolytic anemia. MIRA has come back positive at 3+ (which is inconsistent with MAHA) and patient does not have elevated LFTs, abnormal coag studies, nor reticulocytosis. LDH is also normal at 240. He is afebrile. [facility] blood bank testing has returned +IgG and negative for complement, consistent with warm AIHA.   -CT CAP had returned with 2 cm LN of the retroperitoneum, inguinal, and axillary. There was also note of an abnormal pericardial fat mass. I sent for a PET, that did not show FDG avidity of the pericardial fat mass. The retroperitoneal LNs had SUV of 12-13 with the remainder LN 6-7. I sent patient for a core biopsy of the retroperitoneal LN with IR that returned in fragments with unremarkable FLOW. Bone marrow biopsy was normocellular with normal megakaryocyte morphology. I then pursued a right axillary LN biopsy, which again returned negative with unremarkable FLOW. I sent for excisional biopsy of an inguinal LN with general surgery with pathology once again returning as benign with folliculary and interfollicular hyperplasia. Congo red staining is negative. EBV, HHV8 negative. I reviewed with pathology for any morphologic evidence consistent with Castleman's disease and spoke with Dr. Kaminski who is unable to confirm this at this time. She did have an interdepartmental pathology review. I do note the sinuses contain abundant histiocytes in the microscopic description and in my differential diagnosis is also Rosai-Dorfamn or Langerhan's disease.  I spoke with Dr. Tyler who is in agreement with workup above. I reviewed with Dr. 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 I reviewed I would then move onto rituximab next if patient is not to have improvement with steroid therapy.   Jayson is in agreement with this plan.   -I reviewed the above with patient and wife, Jayla. I will need to continue to monitor the patient not only for treatment response for the hemolysis, but for evolution of LAD. My plan is to recheck with PET in 6 months time and will continue to monitor for treatment response discussed above.     2) Positive PAVAN  -Titer 1:160.  -Patient without arthralgias, skin changes.  -APLA, ds-DNA labs returned negative.  -Will refer to rheumatology.     3) History of infectious mononucleosis  -EBV negative on LN.  -EBV PCR negative.    4) DM1  -Patient managed by endocrinology.    5) History of HTN, HLD    6) -Labs pending today (possible rituximab).  -Rheum referral.   -Will send path to [facility] for review.     Hayde Lopez DO  Hematology/Oncology  [facility]        Again, thank you for allowing me to participate in the care of your patient.        Sincerely,        Hayde Lopez DO

## 2023-02-16 ENCOUNTER — MEDICAL CORRESPONDENCE (OUTPATIENT)
Dept: HEALTH INFORMATION MANAGEMENT | Facility: CLINIC | Age: 33
End: 2023-02-16

## 2023-02-16 ENCOUNTER — PATIENT OUTREACH (OUTPATIENT)
Dept: ONCOLOGY | Facility: CLINIC | Age: 33
End: 2023-02-16

## 2023-02-16 DIAGNOSIS — R76.0 ANTINUCLEAR ANTIBODY (ANA) TITER GREATER THAN 1:80: Primary | ICD-10-CM

## 2023-02-16 LAB
ANION GAP SERPL CALCULATED.3IONS-SCNC: 12 MMOL/L (ref 7–15)
BASOPHILS # BLD AUTO: 0 10E3/UL (ref 0–0.2)
BASOPHILS NFR BLD AUTO: 0 %
BUN SERPL-MCNC: 20 MG/DL (ref 6–20)
CALCIUM SERPL-MCNC: 9.4 MG/DL (ref 8.6–10)
CHLORIDE SERPL-SCNC: 100 MMOL/L (ref 98–107)
CREAT SERPL-MCNC: 0.99 MG/DL (ref 0.67–1.17)
CRP SERPL-MCNC: 19.64 MG/L
DEPRECATED HCO3 PLAS-SCNC: 25 MMOL/L (ref 22–29)
EOSINOPHIL # BLD AUTO: 0 10E3/UL (ref 0–0.7)
EOSINOPHIL NFR BLD AUTO: 0 %
ERYTHROCYTE [DISTWIDTH] IN BLOOD BY AUTOMATED COUNT: 12 % (ref 10–15)
ERYTHROCYTE [SEDIMENTATION RATE] IN BLOOD BY WESTERGREN METHOD: 8 MM/HR (ref 0–15)
GFR SERPL CREATININE-BSD FRML MDRD: >90 ML/MIN/1.73M2
GLUCOSE BLDC GLUCOMTR-MCNC: 131 MG/DL (ref 70–99)
GLUCOSE BLDC GLUCOMTR-MCNC: 180 MG/DL (ref 70–99)
GLUCOSE BLDC GLUCOMTR-MCNC: 188 MG/DL (ref 70–99)
GLUCOSE BLDC GLUCOMTR-MCNC: 214 MG/DL (ref 70–99)
GLUCOSE BLDC GLUCOMTR-MCNC: 216 MG/DL (ref 70–99)
GLUCOSE BLDC GLUCOMTR-MCNC: 228 MG/DL (ref 70–99)
GLUCOSE SERPL-MCNC: 152 MG/DL (ref 70–99)
HAPTOGLOB SERPL-MCNC: 118 MG/DL (ref 32–197)
HCT VFR BLD AUTO: 49.9 % (ref 40–53)
HGB BLD-MCNC: 17.6 G/DL (ref 13.3–17.7)
IMM GRANULOCYTES # BLD: 0.1 10E3/UL
IMM GRANULOCYTES NFR BLD: 1 %
LYMPHOCYTES # BLD AUTO: 1 10E3/UL (ref 0.8–5.3)
LYMPHOCYTES NFR BLD AUTO: 9 %
MCH RBC QN AUTO: 29.3 PG (ref 26.5–33)
MCHC RBC AUTO-ENTMCNC: 35.3 G/DL (ref 31.5–36.5)
MCV RBC AUTO: 83 FL (ref 78–100)
MONOCYTES # BLD AUTO: 0.1 10E3/UL (ref 0–1.3)
MONOCYTES NFR BLD AUTO: 1 %
NEUTROPHILS # BLD AUTO: 9.8 10E3/UL (ref 1.6–8.3)
NEUTROPHILS NFR BLD AUTO: 89 %
NRBC # BLD AUTO: 0 10E3/UL
NRBC BLD AUTO-RTO: 0 /100
PATH REPORT.COMMENTS IMP SPEC: NORMAL
PATH REPORT.FINAL DX SPEC: NORMAL
PATH REPORT.MICROSCOPIC SPEC OTHER STN: NORMAL
PATH REPORT.MICROSCOPIC SPEC OTHER STN: NORMAL
PLAT MORPH BLD: ABNORMAL
PLATELET # BLD AUTO: 29 10E3/UL (ref 150–450)
POTASSIUM SERPL-SCNC: 4.9 MMOL/L (ref 3.4–5.3)
RBC # BLD AUTO: 6 10E6/UL (ref 4.4–5.9)
RBC MORPH BLD: ABNORMAL
RETICS # AUTO: 0.04 10E6/UL (ref 0.03–0.1)
RETICS/RBC NFR AUTO: 0.8 % (ref 0.5–2)
SODIUM SERPL-SCNC: 137 MMOL/L (ref 136–145)
SPHEROCYTES BLD QL SMEAR: SLIGHT
WBC # BLD AUTO: 11 10E3/UL (ref 4–11)

## 2023-02-16 PROCEDURE — 99232 SBSQ HOSP IP/OBS MODERATE 35: CPT | Performed by: INTERNAL MEDICINE

## 2023-02-16 PROCEDURE — 36415 COLL VENOUS BLD VENIPUNCTURE: CPT | Performed by: EMERGENCY MEDICINE

## 2023-02-16 PROCEDURE — 85060 BLOOD SMEAR INTERPRETATION: CPT | Performed by: PATHOLOGY

## 2023-02-16 PROCEDURE — 120N000001 HC R&B MED SURG/OB

## 2023-02-16 PROCEDURE — 36415 COLL VENOUS BLD VENIPUNCTURE: CPT | Performed by: INTERNAL MEDICINE

## 2023-02-16 PROCEDURE — 86038 ANTINUCLEAR ANTIBODIES: CPT | Performed by: INTERNAL MEDICINE

## 2023-02-16 PROCEDURE — 85652 RBC SED RATE AUTOMATED: CPT | Performed by: INTERNAL MEDICINE

## 2023-02-16 PROCEDURE — 86140 C-REACTIVE PROTEIN: CPT | Performed by: INTERNAL MEDICINE

## 2023-02-16 PROCEDURE — 258N000003 HC RX IP 258 OP 636: Performed by: STUDENT IN AN ORGANIZED HEALTH CARE EDUCATION/TRAINING PROGRAM

## 2023-02-16 PROCEDURE — 99223 1ST HOSP IP/OBS HIGH 75: CPT | Performed by: INTERNAL MEDICINE

## 2023-02-16 PROCEDURE — 250N000011 HC RX IP 250 OP 636: Performed by: STUDENT IN AN ORGANIZED HEALTH CARE EDUCATION/TRAINING PROGRAM

## 2023-02-16 PROCEDURE — 82962 GLUCOSE BLOOD TEST: CPT

## 2023-02-16 PROCEDURE — 85025 COMPLETE CBC W/AUTO DIFF WBC: CPT | Performed by: EMERGENCY MEDICINE

## 2023-02-16 PROCEDURE — 250N000013 HC RX MED GY IP 250 OP 250 PS 637: Performed by: INTERNAL MEDICINE

## 2023-02-16 PROCEDURE — 80048 BASIC METABOLIC PNL TOTAL CA: CPT | Performed by: STUDENT IN AN ORGANIZED HEALTH CARE EDUCATION/TRAINING PROGRAM

## 2023-02-16 PROCEDURE — 30233S1 TRANSFUSION OF NONAUTOLOGOUS GLOBULIN INTO PERIPHERAL VEIN, PERCUTANEOUS APPROACH: ICD-10-PCS | Performed by: INTERNAL MEDICINE

## 2023-02-16 PROCEDURE — 86431 RHEUMATOID FACTOR QUANT: CPT | Performed by: INTERNAL MEDICINE

## 2023-02-16 PROCEDURE — 85045 AUTOMATED RETICULOCYTE COUNT: CPT | Performed by: EMERGENCY MEDICINE

## 2023-02-16 PROCEDURE — 250N000011 HC RX IP 250 OP 636: Performed by: INTERNAL MEDICINE

## 2023-02-16 RX ORDER — DIPHENHYDRAMINE HCL 25 MG
50 CAPSULE ORAL
Status: DISCONTINUED | OUTPATIENT
Start: 2023-02-16 | End: 2023-02-18 | Stop reason: HOSPADM

## 2023-02-16 RX ORDER — DIPHENHYDRAMINE HYDROCHLORIDE 50 MG/ML
50 INJECTION INTRAMUSCULAR; INTRAVENOUS
Status: DISCONTINUED | OUTPATIENT
Start: 2023-02-16 | End: 2023-02-18 | Stop reason: HOSPADM

## 2023-02-16 RX ORDER — METHYLPREDNISOLONE SODIUM SUCCINATE 125 MG/2ML
125 INJECTION, POWDER, LYOPHILIZED, FOR SOLUTION INTRAMUSCULAR; INTRAVENOUS
Status: DISCONTINUED | OUTPATIENT
Start: 2023-02-16 | End: 2023-02-18 | Stop reason: HOSPADM

## 2023-02-16 RX ORDER — DIPHENHYDRAMINE HYDROCHLORIDE 50 MG/ML
50 INJECTION INTRAMUSCULAR; INTRAVENOUS EVERY 24 HOURS
Status: COMPLETED | OUTPATIENT
Start: 2023-02-16 | End: 2023-02-17

## 2023-02-16 RX ORDER — ACETAMINOPHEN 325 MG/1
650 TABLET ORAL
Status: DISCONTINUED | OUTPATIENT
Start: 2023-02-16 | End: 2023-02-18 | Stop reason: HOSPADM

## 2023-02-16 RX ORDER — ACETAMINOPHEN 325 MG/1
650 TABLET ORAL DAILY
Status: COMPLETED | OUTPATIENT
Start: 2023-02-16 | End: 2023-02-17

## 2023-02-16 RX ORDER — DIPHENHYDRAMINE HCL 25 MG
50 CAPSULE ORAL EVERY 24 HOURS
Status: COMPLETED | OUTPATIENT
Start: 2023-02-16 | End: 2023-02-17

## 2023-02-16 RX ADMIN — DEXAMETHASONE SODIUM PHOSPHATE 40 MG: 10 INJECTION INTRAMUSCULAR; INTRAVENOUS at 19:04

## 2023-02-16 RX ADMIN — HUMAN IMMUNOGLOBULIN G 85 G: 40 LIQUID INTRAVENOUS at 11:18

## 2023-02-16 RX ADMIN — ACETAMINOPHEN 650 MG: 325 TABLET ORAL at 10:28

## 2023-02-16 RX ADMIN — DIPHENHYDRAMINE HYDROCHLORIDE 50 MG: 25 CAPSULE ORAL at 10:27

## 2023-02-16 ASSESSMENT — ACTIVITIES OF DAILY LIVING (ADL)
ADLS_ACUITY_SCORE: 35

## 2023-02-16 NOTE — PHARMACY-ADMISSION MEDICATION HISTORY
Admission medication history interview status for this patient is complete. See EPIC admission navigator for allergy information, prior to admission medications and immunization status.     Medication history interview done, indicate source(s): Patient  Medication history resources (including written lists, pill bottles, clinic record):None  Pharmacy: [facility]    Changes made to PTA medication list:  Added: insulin pump, humalog  Changed: added sig to vit D  Reported as Not Taking: none  Removed: Fiasp    Actions taken by pharmacist (provider contacted, etc):None     Additional medication history information: Pt states he used to use Fiasp insulin in pump, but this has switched to humalog. Pt states he has Lantus at home to use in case of pump failure.    Medication reconciliation/reorder completed by provider prior to medication history?  N   (Y/N)       Prior to Admission medications    Medication Sig Last Dose Taking? Auth Provider Long Term End Date   atorvastatin (LIPITOR) 10 MG tablet Take 1 tablet (10 mg) by mouth daily 2023 at am Yes Isabella Mitchell, APRN CNP Yes    glucagon (GLUCAGON EMERGENCY) 1 MG kit Inject 1 mg into the muscle once for 1 dose prn at prn Yes Sri Pepe PA-C Yes 2/15/23   insulin lispro (HUMALOG VIAL) 100 UNIT/ML vial To be used in insulin pump 2/15/2023 Yes Unknown, Entered By History No    INSULIN PUMP - OUTPATIENT Date Last Updated: 2/15/23  Tandem  Pump Basal Rates/Times:  3264-5106: 2 units/hour  5033-1807: 2.6 units/hour  8314-6417: 2.2 units/hour  9028-2395: 1.7 units/hour  CARB RATIO:   7978-2320: 1 unit per 10 grams carbohydrates  CF / Sensitivity Times:   2121-3232: 1 unit to decrease BG by 30 mg/dL  TARGET B mg/dL 2/15/2023 Yes Unknown, Entered By History     vitamin D2 (ERGOCALCIFEROL) 46325 units (1250 mcg) capsule Take 50,000 Units by mouth once a week Past Week at  Yes Reported, Patient     blood glucose (KELL CONTOUR NEXT) test  strip Test 4-5 times daily   Sri Pepe PA-C     Blood Glucose Monitoring Suppl (KELL CONTOUR NEXT LINK) W/DEVICE KIT 1 kit 5 times daily. Use as directed   Sri Pepe PA-C     insulin glargine (BASAGLAR KWIKPEN) 100 UNIT/ML pen INJECT 50 UNITS ONCE DAILY SUBCUTANEOUSLY prn in case of pump failure at prn  Sri Pepe PA-C Yes    insulin pen needle (BD CHRIST U/F) 32G X 4 MM miscellaneous Use 3-6 pen needles daily or as directed.   Ricki Templeton MD

## 2023-02-16 NOTE — PLAN OF CARE
End of Shift Summary  For vital signs and complete assessments, please see documentation flowsheets.     Pertinent assessments: Pt admitted to floor around 0415. VSS. Afebrile. LS clear. BS x4. Pt denies pain and nausea. Regular diet. Independently ambulating. PIV - SL. . A&O. Slept well once in room.    Major Shift Events: none    Treatment Plan: Monitor Plt count, Watch for bleeding, Discharge TBD.

## 2023-02-16 NOTE — PLAN OF CARE
Goal Outcome Evaluation:                    Patient denies pain, alert and oriented patient administered own insulin bolus see MAR patient transferred to floor

## 2023-02-16 NOTE — TELEPHONE ENCOUNTER
Hematology Update:    I received a call from lab for critical value: plt 18K    In brief, this is a patient of Dr. Lopez's whom he saw today for follow up on thrombocytopenia present since 11/22 and warm autoimmune hemolytic anemia. He has had extensive work up for this which is detailed in her noted dated 2/15/23.     Pts plts are typically 40-50K. He was recently trialed on prednisone 60mg PO daily x2 weeks, which he completed approximately 2/9/23. Pt had follow up labs today which showed plt 18K. There are no labs from the date of steroid discontinuation, thus I cannot assess whether patient initially responded to steroids and then plts decreased due to being off steroids vs pt not responding to steroids at all; my concern is the former.     Pt reports one episode of epistaxis yesterday, otherwise no s/s of bleeding or bruising.     Of note, pt does not have anemia associated w/his hx of warm AIHA,   t bili stable at 1.5, .    Given pts acute drop in plt, plt<30K, epistaxis, associated risk of spontaneous bleeding, I am directing the patient to the [facility] ER and he will arrive there in approximately 30 minutes. I have called the ER and spoken to Dr. Cortés to make her aware of the above. My recommendation is to start this patient on dexamethasone 40mg IV x4 days starting today, with follow up CBC and peripheral smear in AM and hematology consultation. I have made the patient's primary hematologist aware of the above.     Loida Johnson D.O.  Hematology/Oncology  [facility]

## 2023-02-16 NOTE — PROGRESS NOTES
[facility]  Hospitalist Progress Note  Angel Roberts MD 02/16/2023    Reason for Stay (Diagnosis): ITP         Assessment and Plan:      Summary of Stay: Billy Carey is a 32 year old male with PMH significant for T1DM with retinopathy, hypertension admitted on 2/15/2023 with thrombocytopenia after having labs done with his hematologist in the outpatient setting, with platelet count of 18.  he had some mild epistaxis but no significant bleeding    Since admission the patient has been started on Decadron (first dose 2/15) and IVIG (first dose 2/16).      Plans today:  - hematology consulted  - IVIG ordered by hematology - they are planning dose today and dose tomorrow  - continue dexamethasone 40 mg IV daily as directed by hematology  - pt requesting work up for RA to eval for autoimmune link related to his ITP.  Will check PAVAN, RF, ESR, CRP.  He is also planning on f/u as outpatient with a rheumatologist  - I updated spouse and mother at bedside today     Severe thrombocytopenia/ITP:  - baseline plt count 40-50 range since first recognized in 11/22.    - work up has included lymph node removal, bone marrow biopsies, hepatitis testing, imaging, smears.    - suspected dx is ITP  - recently completed a 2-week course of prednisone which she finished on Thursday, 2/9/2023.    -hematology consultation  -IV Decadron 40 mg daily x4 days (started 2/15) as per heme  -Daily CBC with peripheral smear  -Transfuse for platelet count less than 50 if there in the presence of active significant bleeding  -Transfuse for platelet count less than 10 whether or not bleed occurring  - hgb remains normal.  A diagnosis of AIHA has also been considered previously  - pt reports his hematologist has been arranging rheumatology follow up for him to r/o autoimmune cause of ITP     Type 1 diabetes mellitus complicated by retinopathy:  - Patient self manages type 1 diabetes with an insulin pump as an outpatient.    - He  "would like to continue managing his own diabetes while inpatient.    - if develops significant hyperglycemia would start insulin gtt       DVT Prophylaxis: Ambulate every shift  Code Status: Full Code  Discharge Dispo: home  Estimated Disch Date / # of Days until Disch: 2-4 days expected, after platelet count improves and when OK with hematology        Interval History (Subjective):      No bleeding.  Overall feels well.  Asking questions about plan of care and IVIG administration.  He spoke with hematologist this AM over the phone                  Physical Exam:      Last Vital Signs:  BP (!) 141/82 (BP Location: Right arm)   Pulse 72   Temp 98.6  F (37  C) (Oral)   Resp 18   Ht 1.905 m (6' 3\")   Wt 123 kg (271 lb 2.7 oz)   SpO2 96%   BMI 33.89 kg/m        Intake/Output Summary (Last 24 hours) at 2/16/2023 1351  Last data filed at 2/16/2023 0200  Gross per 24 hour   Intake 120 ml   Output --   Net 120 ml       Constitutional: Awake, alert, cooperative, no apparent distress   Respiratory: Clear to auscultation bilaterally, no crackles or wheezing   Cardiovascular: Regular rate and rhythm, normal S1 and S2, and no murmur noted   Abdomen: Normal bowel sounds, soft, non-distended, non-tender   Skin: No rashes, no cyanosis, dry to touch   Neuro: Alert and oriented x3, no weakness, numbness, memory loss   Extremities: No edema, normal range of motion   Other(s):        All other systems: Negative          Medications:      All current medications were reviewed with changes reflected in problem list.         Data:      All new lab and imaging data was reviewed.   Labs:  Recent Labs   Lab 02/16/23  1249 02/16/23  0927 02/16/23  0657 02/15/23  2258 02/15/23  1851 02/15/23  1602   NA  --   --  137  --  137 136   POTASSIUM  --   --  4.9  --  4.4 4.2   CHLORIDE  --   --  100  --  99 98   CO2  --   --  25  --  28 28   ANIONGAP  --   --  12  --  10 10   * 131* 152*   < > 209* 306*   BUN  --   --  20.0  --  16.6 " 16.5   CR  --   --  0.99  --  1.15 1.15   GFRESTIMATED  --   --  >90  --  86 86   DEBORAH  --   --  9.4  --  9.4 9.4    < > = values in this interval not displayed.     Recent Labs   Lab 02/16/23  0657   WBC 11.0   HGB 17.6   HCT 49.9   MCV 83   PLT 29*      Imaging:   No results found for this or any previous visit (from the past 24 hour(s)).

## 2023-02-16 NOTE — PROGRESS NOTES
End of Shift Summary  For vital signs and complete assessments, please see documentation flowsheets.     Pertinent assessments: A&O x4. VSS. Regular diet. Independently ambulating. PIV - SL. , 215. Denies pain. Platelets 29. IV IG infusing, tolerating well.    Major Shift Events: IV IG infusing, tolerating well.     Treatment Plan: IV decadron, IV IG, bs checks, trend platelets     Bedside Nurse: Emma Mills RN

## 2023-02-16 NOTE — H&P
[facility]    History and Physical - Hospitalist Service       Date of Admission:  2/15/2023    Assessment & Plan      Billy Carey is a 32 year old male with PMH significant for T1DM with retinopathy, hypertension admitted on 2/15/2023 with abnormal labs.      Severe thrombocytopenia:  11/30/2022 the patient had a platelet count of 42.  Since that time he has undergone significant work-up in the oncology clinic with Dr. Lopez that has included lymph node removal, bone marrow biopsies, hepatitis testing, imaging, smears.  Leading diagnosis at this point is AIHA versus ITP.  He recently completed a 2-week course of prednisone which she finished on Thursday, 2/9/2023.  Repeat CBC today showed a platelet count of 18 and he was therefore recommended to present to the emergency department for IV Decadron.  -Oncology consultation  -Plan per their note is IV Decadron 40 mg daily x4 days  -Daily CBC with peripheral smear  -Transfuse for platelet count less than 50 if there is the presence of active bleeding  -Transfuse for platelet count less than 10 with or without active bleeding  -Defer transfusion thresholds to heme/onc    Type 1 diabetes mellitus complicated by retinopathy:  Patient self manages type 1 diabetes with an insulin pump as an outpatient.  He would like to continue managing his own diabetes while inpatient.  We did discuss the risk of significant hyperglycemia with high doses of IV steroids.  He will notify us if he is having difficulty controlling his blood sugars.  -Pharmacy consultation for insulin pump orders    Hypertension: I do not see that he is on any medications prior to arrival    Diet:  Regular  DVT Prophylaxis: Low Risk/Ambulatory with no VTE prophylaxis indicated  Werner Catheter: Not present  Lines: None     Cardiac Monitoring: None  Code Status:  Full code    Clinically Significant Risk Factors Present on Admission                # Thrombocytopenia: Lowest platelets = 18  "in last 2 days, will monitor for bleeding        # Obesity: Estimated body mass index is 34.12 kg/m  as calculated from the following:    Height as of this encounter: 1.905 m (6' 3\").    Weight as of this encounter: 123.8 kg (273 lb).           Disposition Plan      Expected Discharge Date: 02/18/2023                  Channing Martin DO  Hospitalist Service  [facility]  Securely message with [employer] (more info)  Text page via [employer] Paging/Directory     ______________________________________________________________________    Chief Complaint   Abnormal labs    History is obtained from the patient    History of Present Illness   Billy Carey is a 32 year old male with PMH significant for T1DM with retinopathy, hypertension admitted on 2/15/2023 with abnormal labs.      Patient has had low platelets since November.  Since that time he has undergone significant work-up in the oncology clinic with Dr. Lopez.  He was most recently on 2 weeks of prednisone which he completed last Thursday, 2/9/2023.  His platelets have most recently been in the 40s to 50s but in clinic today they were decreased 18.  He also reports he has had a cold with some rhinorrhea and has had occasional nonsignificant epistaxis.  Given epistaxis in a patient who is high risk for bleeding, oncology recommended that he present to the emergency department for admission for IV steroids.  Patient denies any blood in the urine, vomit, or stool.    ED work-up is notable for largely normal vitals.  The patient is afebrile.  BMP is normal.  CBC is normal apart from a platelet count that is 25.  The patient was started on IV Decadron.      Past Medical History    Past Medical History:   Diagnosis Date     Diabetes mellitus type 1, uncontrolled, insulin dependent 9/16/2013       Past Surgical History   Past Surgical History:   Procedure Laterality Date     EXCISE MASS GROIN Left 1/20/2023    Procedure: Lymph Node excisional biopsy;  " Surgeon: New Burger MD;  Location: [facility] OR     MYRINGOTOMY, INSERT TUBE BILATERAL, COMBINED Bilateral     As a child       Prior to Admission Medications   Prior to Admission Medications   Prescriptions Last Dose Informant Patient Reported? Taking?   Blood Glucose Monitoring Suppl (KELL CONTOUR NEXT LINK) W/DEVICE KIT   No No   Si kit 5 times daily. Use as directed   atorvastatin (LIPITOR) 10 MG tablet   No No   Sig: Take 1 tablet (10 mg) by mouth daily   blood glucose (KELL CONTOUR NEXT) test strip   No No   Sig: Test 4-5 times daily   glucagon (GLUCAGON EMERGENCY) 1 MG kit   No No   Sig: Inject 1 mg into the muscle once for 1 dose   insulin aspart (FIASP) 100 UNIT/ML vial   Yes No   insulin glargine (BASAGLAR KWIKPEN) 100 UNIT/ML pen   No No   Sig: INJECT 50 UNITS ONCE DAILY SUBCUTANEOUSLY   insulin pen needle (BD CHRIST U/F) 32G X 4 MM miscellaneous   No No   Sig: Use 3-6 pen needles daily or as directed.   vitamin D2 (ERGOCALCIFEROL) 63505 units (1250 mcg) capsule   Yes No      Facility-Administered Medications: None        Review of Systems    The 10 point Review of Systems is negative other than noted in the HPI or here.     Social History   I have reviewed this patient's social history and updated it with pertinent information if needed.  Social History     Tobacco Use     Smoking status: Never     Smokeless tobacco: Never   Substance Use Topics     Alcohol use: No     Comment: rarely     Drug use: No       Family History   The patient denies any significant family history.    Allergies   Allergies   Allergen Reactions     Penicillins Rash        Physical Exam   Vital Signs: Temp: 99.2  F (37.3  C) Temp src: Oral BP: (!) 144/93 Pulse: 93   Resp: 18 SpO2: 100 % O2 Device: None (Room air)    Weight: 273 lbs 0 oz    General Appearance: Appears well.  Sitting up in bed.  Respiratory: Normal work of breathing  Cardiovascular: Regular rate and rhythm  GI: Benign  Skin: No obvious rashes or  lesions on exposed skin  Other: Appropriate    Medical Decision Making     75 MINUTES SPENT BY ME on the date of service doing chart review, history, exam, documentation & further activities per the note.      Data   ------------------------- PAST 24 HR DATA REVIEWED -----------------------------------------------    I have personally reviewed the following data over the past 24 hrs:    10.2  \   17.2   / 25 (LL)     137 99 16.6 /  209 (H)   4.4 28 1.15 \       ALT: 21 AST: 21 AP: 76 TBILI: 1.5 (H)   ALB: 4.5 TOT PROTEIN: 7.0 LIPASE: N/A       Ferritin:  N/A % Retic:  N/A LDH:  209       Imaging results reviewed over the past 24 hrs:   No results found for this or any previous visit (from the past 24 hour(s)).

## 2023-02-16 NOTE — ED NOTES
"[facility]  ED Nurse Handoff Report    Billy Carey is a 32 year old male   ED Chief complaint: Abnormal Labs  . ED Diagnosis:   Final diagnoses:   Autoimmune thrombocytopenia (H) - presumed     Allergies:   Allergies   Allergen Reactions     Penicillins Rash       Code Status: Full Code  Activity level - Baseline/Home:  Independent. Activity Level - Current:   Independent. Lift room needed: No. Bariatric: No   Needed: No   Isolation: No. Infection: Not Applicable.     Vital Signs:   Vitals:    02/15/23 1842   BP: (!) 144/93   Pulse: 93   Resp: 18   Temp: 99.2  F (37.3  C)   TempSrc: Oral   SpO2: 100%   Weight: 123.8 kg (273 lb)   Height: 1.905 m (6' 3\")       Cardiac Rhythm:  ,      Pain level:    Patient confused: No. Patient Falls Risk: No.   Elimination Status:  Has not voided in ER    Patient Report - Initial Complaint: Abnormal labs. Focused Assessment: Billy Carey is a 32 year old male with a history of Type I diabetes who presents with abnormal labs. Patient reports that he has low platelets and was recommended by oncology to present to the ER for decadron. He reports that this has been an ongoing issue since November and adds that he has had multiple bone marrow biopsies. He reports that he was put on two weeks of Prednisone by his oncologist, which he finished last Thursday (2/9/23). He reports that his platelets initially were in the 40's-50's and have now decreased to 18 today. He adds that he had a runny nose three days ago after stopping steroids and reports that he had a bloody nose (left nose) yesterday. He denies any blood in urine, vomit, or stool. He has not had a transfusion of platelets before. He has had his lymph nodes removed, which was done within the [place] system. He sees Dr. Lopez in oncology   Tests Performed:   Labs Ordered and Resulted from Time of ED Arrival to Time of ED Departure   BASIC METABOLIC PANEL - Abnormal       Result Value    Sodium 137      " Potassium 4.4      Chloride 99      Carbon Dioxide (CO2) 28      Anion Gap 10      Urea Nitrogen 16.6      Creatinine 1.15      Calcium 9.4      Glucose 209 (*)     GFR Estimate 86     CBC WITH PLATELETS AND DIFFERENTIAL - Abnormal    WBC Count 10.2      RBC Count 5.90      Hemoglobin 17.2      Hematocrit 49.3      MCV 84      MCH 29.2      MCHC 34.9      RDW 12.1      Platelet Count 25 (*)     % Neutrophils 63      % Lymphocytes 22      % Monocytes 11      % Eosinophils 3      % Basophils 1      % Immature Granulocytes 0      NRBCs per 100 WBC 0      Absolute Neutrophils 6.4      Absolute Lymphocytes 2.3      Absolute Monocytes 1.1      Absolute Eosinophils 0.3      Absolute Basophils 0.1      Absolute Immature Granulocytes 0.0      Absolute NRBCs 0.0     INFLUENZA A/B & SARS-COV2 PCR MULTIPLEX   TYPE AND SCREEN, ADULT   ABO/RH TYPE AND SCREEN   LAB BLOOD MORPHOLOGY PATHOLOGIST REVIEW     No orders to display     . Abnormal Results: .   Treatments provided: decadron infusion  Family Comments: wife at bedside  OBS brochure/video discussed/provided to patient:  N/A  ED Medications:   Medications   dexamethasone (DECADRON) 40 mg in sodium chloride 0.9 % 59 mL intermittent infusion (40 mg Intravenous New Bag 2/15/23 2010)     Drips infusing:  No  For the majority of the shift, the patient's behavior Green. Interventions performed were NA.    Sepsis treatment initiated: No     Patient tested for COVID 19 prior to admission: NO    ED Nurse Name/Phone Number: Leah Lundy RN,   8:16 PM    RECEIVING UNIT ED HANDOFF REVIEW    Above ED Nurse Handoff Report was reviewed: Yes  Reviewed by: Yisel Hoffman RN on February 16, 2023 at 3:49 AM

## 2023-02-16 NOTE — ED PROVIDER NOTES
History     Chief Complaint:  Abnormal Labs     The history is provided by the patient.      Billy Carey is a 32 year old male with a history of Type I diabetes who presents with abnormal labs. Patient reports that he has low platelets and was recommended by oncology to present to the ER for decadron. He reports that this has been an ongoing issue since November and adds that he has had multiple bone marrow biopsies. He reports that he was put on two weeks of Prednisone by his oncologist, which he finished last Thursday (2/9/23). He reports that his platelets initially were in the 40's-50's and have since decreased to 18 today. He adds that he had a runny nose three days ago after stopping steroids and reports that he had a bloody nose (left nose) yesterday (2/14/23). He denies any blood in urine, vomit, or stool. He has not had a transfusion of platelets before. He has had his lymph nodes removed, which was done within the [facility] system. He sees Dr. Lopez in oncology.      Independent Historian:   None - Patient Only    Review of External Notes:   I reviewed the oncology note from today I suspicion for warm  antibody hemolytic anemia with recommendations for 4 days of pulse high-dose steroids (40 mg Decadron) with plan for CBC and peripheral smear in the morning.    ROS:  Review of Systems   HENT: Positive for nosebleeds (resolved) and rhinorrhea (resolved).    Gastrointestinal: Negative for blood in stool and vomiting.   All other systems reviewed and are negative.    Allergies:  Penicillins     Medications:    Atorvastatin   Glucagon   Insulin aspart   Insulin glargine     Past Medical History:    Diabetes Mellitus, Type I  Mild nonproliferative diabetic retinopathy of both eyes     Past Surgical History:    Myringotomy, bilateral  Lymph node excisional biopsy     Social History:  Presents with wife.   PCP: Elfego Herrera     Physical Exam     Patient Vitals for the past 24 hrs:   BP Temp Temp src  "Pulse Resp SpO2 Height Weight   02/15/23 1842 (!) 144/93 99.2  F (37.3  C) Oral 93 18 100 % 1.905 m (6' 3\") 123.8 kg (273 lb)      Physical Exam    Constitutional: Alert, attentive, GCS 15   Eyes: EOM are normal, anicteric, conjugate gaze  CV: distal extremities warm, well perfused  Chest: Non-labored breathing on RA  GI:  non tender. No distension. No guarding or rebound.    Neurological: Alert, attentive, moving all extremities equally.   Skin: Skin is warm and dry.    Emergency Department Course     Laboratory:  Labs Ordered and Resulted from Time of ED Arrival to Time of ED Departure   BASIC METABOLIC PANEL - Abnormal       Result Value    Sodium 137      Potassium 4.4      Chloride 99      Carbon Dioxide (CO2) 28      Anion Gap 10      Urea Nitrogen 16.6      Creatinine 1.15      Calcium 9.4      Glucose 209 (*)     GFR Estimate 86     CBC WITH PLATELETS AND DIFFERENTIAL - Abnormal    WBC Count 10.2      RBC Count 5.90      Hemoglobin 17.2      Hematocrit 49.3      MCV 84      MCH 29.2      MCHC 34.9      RDW 12.1      Platelet Count 25 (*)     % Neutrophils 63      % Lymphocytes 22      % Monocytes 11      % Eosinophils 3      % Basophils 1      % Immature Granulocytes 0      NRBCs per 100 WBC 0      Absolute Neutrophils 6.4      Absolute Lymphocytes 2.3      Absolute Monocytes 1.1      Absolute Eosinophils 0.3      Absolute Basophils 0.1      Absolute Immature Granulocytes 0.0      Absolute NRBCs 0.0     INFLUENZA A/B & SARS-COV2 PCR MULTIPLEX - Normal    Influenza A PCR Negative      Influenza B PCR Negative      RSV PCR Negative      SARS CoV2 PCR Negative     TYPE AND SCREEN, ADULT    ABO/RH(D) O NEG      Antibody Screen Negative      SPECIMEN EXPIRATION DATE [id number]     ABO/RH TYPE AND SCREEN   LAB BLOOD MORPHOLOGY PATHOLOGIST REVIEW      Emergency Department Course & Assessments:   Interventions:  Medications   dexamethasone (DECADRON) 40 mg in sodium chloride 0.9 % 59 mL intermittent infusion " (has no administration in time range)      Consultations/Discussion of Management or Tests:      I talked with Dr. Johnson, hematology-oncology, regarding the patient.    I talked with Dr. Martin, hospitalist, who accepts the patient for admission.     Assessments:  ED Course as of 02/15/23 2102   Wed Feb 15, 2023   1855 I obtained history and examined the patient as noted above.     I rechecked the patient.     I rechecked the patient and explained findings.     Social Determinants of Health affecting care:   None    Disposition:  The patient was admitted to the hospital under the care of Dr. Martin.     Impression & Plan      Medical Decision Makin-year-old male past medical history significant for type 1 diabetes and suspicion autoimmune thrombocytopenia which has been extensively evaluated since November including multiple bone marrow biopsies, radical lymph node resection who just completed 2 weeks of prednisone 6 days ago with outpatient platelet level today of 18,000 his tuan so far with baseline typically between 40 and 50,000.  He did have a single episode of epistaxis yesterday that resolved, no other signs or symptoms of bleeding including dizziness, lightheadedness, dyspnea, melenic stools, hematemesis or bright red blood per rectum.  His vitals are within normal limits.  Hemoglobin is stable, platelet count here is 25.  I did discuss the presentation briefly with Dr. Johnson, on-call hematologist with [place] who expressed that he will need pulse dose steroids to improve his platelets most likely versus platelets were improved briefly with steroids and he needs a longer course.  He did have a mild URI symptoms, COVID and influenza testing sent.  Do not suspect sepsis.  No indication for platelet transfusion at this point.    Diagnosis:    ICD-10-CM    1. Autoimmune thrombocytopenia (H)  D69.3     presumed         John Weeks MD  [employer]  9:06 PM  02/15/23     Scribe Disclosure:  I, DIO KATHYFLACA, am serving as a scribe at 8:07 PM on 2/15/2023 to document services personally performed by John Weeks MD based on my observations and the provider's statements to me.     2/15/2023   John Weeks MD Dunbar, John Forrest, MD  02/15/23 2107

## 2023-02-16 NOTE — PROGRESS NOTES
Aaron called into clinic requesting that his Rheumatology referral get escalated. He would also like a referral sent to Hematology at [facility] for a second opinion.     Writer spoke to LEONA Fierro in Rheumatology. She reports that PAVAN is a very nonspecific test for possible auto immune diseases and does not warrant a urgent appointment. At this time, they are only able to schedule Billy for the next available appointment, which is in September.     Writer faxed Hematology referral to the [facility] at [phone] with rightfax confirmation.     Armida Gibbons RN on 2/16/2023 at 4:59 PM

## 2023-02-16 NOTE — ED TRIAGE NOTES
Pt presents for evaluation of low platelets. Started to have low platelets (40-50) in November, was started on prednisone. Had a follow up appointment today and platelets are even lower (18). Pt asymptomatic. Sees Dr. Lopez with hematology. Epistaxis yesterday. One episode of bleeding gums after something got stuck in them. No hx of transfusions.        98.7

## 2023-02-16 NOTE — PROGRESS NOTES
Brief Hem Onc Note    Discussed w/ Billy via telephone plans for IVIG as treatment for presumed ITP / hemolytic process.  Discussed logistics of administration.  Discussed potential acute and delayed side effects.  He is in agreement to start.  Will plan dose today and tomorrow 1 g/kg.  Will plan to see later this afternoon.    Reza Sinclair MD.

## 2023-02-17 LAB
ANA SER QL IF: NEGATIVE
BASOPHILS # BLD AUTO: 0 10E3/UL (ref 0–0.2)
BASOPHILS NFR BLD AUTO: 0 %
EOSINOPHIL # BLD AUTO: 0 10E3/UL (ref 0–0.7)
EOSINOPHIL NFR BLD AUTO: 0 %
ERYTHROCYTE [DISTWIDTH] IN BLOOD BY AUTOMATED COUNT: 12.1 % (ref 10–15)
GLUCOSE BLDC GLUCOMTR-MCNC: 102 MG/DL (ref 70–99)
GLUCOSE BLDC GLUCOMTR-MCNC: 185 MG/DL (ref 70–99)
GLUCOSE BLDC GLUCOMTR-MCNC: 200 MG/DL (ref 70–99)
GLUCOSE BLDC GLUCOMTR-MCNC: 220 MG/DL (ref 70–99)
GLUCOSE BLDC GLUCOMTR-MCNC: 229 MG/DL (ref 70–99)
GLUCOSE BLDC GLUCOMTR-MCNC: 237 MG/DL (ref 70–99)
HCT VFR BLD AUTO: 45.4 % (ref 40–53)
HGB BLD-MCNC: 16.4 G/DL (ref 13.3–17.7)
IMM GRANULOCYTES # BLD: 0.1 10E3/UL
IMM GRANULOCYTES NFR BLD: 0 %
LYMPHOCYTES # BLD AUTO: 0.5 10E3/UL (ref 0.8–5.3)
LYMPHOCYTES NFR BLD AUTO: 3 %
MCH RBC QN AUTO: 29.7 PG (ref 26.5–33)
MCHC RBC AUTO-ENTMCNC: 36.1 G/DL (ref 31.5–36.5)
MCV RBC AUTO: 82 FL (ref 78–100)
MONOCYTES # BLD AUTO: 0.6 10E3/UL (ref 0–1.3)
MONOCYTES NFR BLD AUTO: 4 %
NEUTROPHILS # BLD AUTO: 14.2 10E3/UL (ref 1.6–8.3)
NEUTROPHILS NFR BLD AUTO: 93 %
NRBC # BLD AUTO: 0 10E3/UL
NRBC BLD AUTO-RTO: 0 /100
PLATELET # BLD AUTO: 66 10E3/UL (ref 150–450)
RBC # BLD AUTO: 5.53 10E6/UL (ref 4.4–5.9)
RHEUMATOID FACT SER NEPH-ACNC: <7 IU/ML
WBC # BLD AUTO: 15.4 10E3/UL (ref 4–11)

## 2023-02-17 PROCEDURE — 85025 COMPLETE CBC W/AUTO DIFF WBC: CPT | Performed by: INTERNAL MEDICINE

## 2023-02-17 PROCEDURE — 99232 SBSQ HOSP IP/OBS MODERATE 35: CPT | Performed by: PHYSICIAN ASSISTANT

## 2023-02-17 PROCEDURE — 36415 COLL VENOUS BLD VENIPUNCTURE: CPT | Performed by: INTERNAL MEDICINE

## 2023-02-17 PROCEDURE — 250N000011 HC RX IP 250 OP 636: Performed by: INTERNAL MEDICINE

## 2023-02-17 PROCEDURE — 258N000003 HC RX IP 258 OP 636: Performed by: STUDENT IN AN ORGANIZED HEALTH CARE EDUCATION/TRAINING PROGRAM

## 2023-02-17 PROCEDURE — 250N000013 HC RX MED GY IP 250 OP 250 PS 637: Performed by: INTERNAL MEDICINE

## 2023-02-17 PROCEDURE — 120N000001 HC R&B MED SURG/OB

## 2023-02-17 PROCEDURE — 99232 SBSQ HOSP IP/OBS MODERATE 35: CPT | Performed by: INTERNAL MEDICINE

## 2023-02-17 PROCEDURE — 250N000011 HC RX IP 250 OP 636: Performed by: STUDENT IN AN ORGANIZED HEALTH CARE EDUCATION/TRAINING PROGRAM

## 2023-02-17 RX ORDER — ATORVASTATIN CALCIUM 10 MG/1
10 TABLET, FILM COATED ORAL DAILY
Status: DISCONTINUED | OUTPATIENT
Start: 2023-02-17 | End: 2023-02-18 | Stop reason: HOSPADM

## 2023-02-17 RX ADMIN — ATORVASTATIN CALCIUM 10 MG: 10 TABLET, FILM COATED ORAL at 17:08

## 2023-02-17 RX ADMIN — HUMAN IMMUNOGLOBULIN G 85 G: 40 LIQUID INTRAVENOUS at 11:17

## 2023-02-17 RX ADMIN — DEXAMETHASONE SODIUM PHOSPHATE 40 MG: 10 INJECTION INTRAMUSCULAR; INTRAVENOUS at 18:35

## 2023-02-17 RX ADMIN — ACETAMINOPHEN 650 MG: 325 TABLET ORAL at 10:28

## 2023-02-17 RX ADMIN — DIPHENHYDRAMINE HYDROCHLORIDE 50 MG: 25 CAPSULE ORAL at 10:27

## 2023-02-17 ASSESSMENT — ACTIVITIES OF DAILY LIVING (ADL)
ADLS_ACUITY_SCORE: 35

## 2023-02-17 NOTE — PROGRESS NOTES
[facility]  Hospitalist Progress Note  Angel Roberts MD 02/17/2023    Reason for Stay (Diagnosis): ITP         Assessment and Plan:      Summary of Stay: Billy Carey is a 32 year old male with PMH significant for T1DM with retinopathy, hypertension admitted on 2/15/2023 with thrombocytopenia after having labs done with his hematologist in the outpatient setting, with platelet count of 18.  he had some mild epistaxis but no significant bleeding     Since admission the patient has been started on Decadron (first dose 2/15) and IVIG (first dose 2/16).      His platelet count has improved since admit and he appears to be responding to therapy.  No bleeding reported     Plans today:  - IVIG ordered by hematology today  - continue dexamethasone 40 mg IV daily as directed by hematology  - follow daily platelet count  - discharge when OK with hematology  - I updated family at bedside today     Severe thrombocytopenia/ITP:  - baseline plt count 40-50 range since first recognized in 11/22.    - pt follows with Dr. Lopez of heme/onc  - work up has included lymph node removal, bone marrow biopsies, hepatitis testing, imaging, smears.    - suspected dx is ITP  - recently completed a 2-week course of prednisone which finished on 2/9/2023.    - receiving IV Decadron 40 mg daily x4 days (started 2/15) as per heme  - receiving IVIG (2 doses planned on 2/16 and 2/17)  -Transfuse for platelet count less than 50 if there in the presence of active significant bleeding  -Transfuse for platelet count less than 10 whether or not bleed occurring  - hgb remains normal.  A diagnosis of AIHA has also been considered previously  - pt reports his hematologist has been arranging rheumatology follow up for him to r/o autoimmune cause of ITP.  He requested an evaluation for RA this admit as he has a FH of the illness.  I ordered a ESR, CRP, PAVAN, and RF.  Encouraged he f/u with a rheumatologist for further evaluation.   "    Leukocytosis  - developed after admit  - suspect steroid effect  - no fever or signs of infection     Type 1 diabetes mellitus complicated by retinopathy:  - Patient self manages type 1 diabetes with an insulin pump as an outpatient.    - He would like to continue managing his own diabetes while inpatient.    - if develops significant hyperglycemia would start insulin gtt        DVT Prophylaxis: Ambulate every shift  Code Status: Full Code  Discharge Dispo: home  Estimated Disch Date / # of Days until Disch: when OK with hematology          Interval History (Subjective):      No bleeding.  Happy about increase in platelet count.                    Physical Exam:      Last Vital Signs:  /79 (BP Location: Left arm)   Pulse 59   Temp 98.1  F (36.7  C) (Oral)   Resp 20   Ht 1.905 m (6' 3\")   Wt 123 kg (271 lb 2.7 oz)   SpO2 97%   BMI 33.89 kg/m      No intake or output data in the 24 hours ending 02/17/23 1331    Constitutional: Awake, alert, cooperative, no apparent distress   Respiratory: Clear to auscultation bilaterally, no crackles or wheezing   Cardiovascular: Regular rate and rhythm, normal S1 and S2, and no murmur noted   Abdomen: Normal bowel sounds, soft, non-distended, non-tender   Skin: No rashes, no cyanosis, dry to touch   Neuro: Alert and oriented x3, no weakness, numbness, memory loss   Extremities: No edema, normal range of motion   Other(s):        All other systems: Negative          Medications:      All current medications were reviewed with changes reflected in problem list.         Data:      All new lab and imaging data was reviewed.   Labs:  Recent Labs   Lab 02/17/23  1132 02/17/23  0729 02/17/23  0527 02/16/23  0927 02/16/23  0657 02/15/23  2258 02/15/23  1851 02/15/23  1602   NA  --   --   --   --  137  --  137 136   POTASSIUM  --   --   --   --  4.9  --  4.4 4.2   CHLORIDE  --   --   --   --  100  --  99 98   CO2  --   --   --   --  25  --  28 28   ANIONGAP  --   --   --   " --  12  --  10 10   * 200* 185*   < > 152*   < > 209* 306*   BUN  --   --   --   --  20.0  --  16.6 16.5   CR  --   --   --   --  0.99  --  1.15 1.15   GFRESTIMATED  --   --   --   --  >90  --  86 86   DEBORAH  --   --   --   --  9.4  --  9.4 9.4    < > = values in this interval not displayed.     Recent Labs   Lab 02/17/23  0633   WBC 15.4*   HGB 16.4   HCT 45.4   MCV 82   PLT 66*      Imaging:   No results found for this or any previous visit (from the past 24 hour(s)).

## 2023-02-17 NOTE — PROGRESS NOTES
Writer spoke to Billy about referrals. He doesn't have any additional questions at this time.     Armida Gibbons RN on 2/17/2023 at 10:13 AM

## 2023-02-17 NOTE — PLAN OF CARE
Pertinent assessments: Pt A&Ox4. VSS on room air. Afebrile. Up independently. Tolerating a regular diet. Denied pain.  Pt provides his own insulin via a pump with continuous monitoring. Slept well.    Major Shift Events: none.    Treatment Plan: IVIG, insulin pump, BG monitoring, IV decadron, and Hem/Onc following.

## 2023-02-17 NOTE — CONSULTS
[facility] Hematology / Oncology  Initial Visit / Consultation Note  Name: Billy Carey  :  1990  MRN:  [id number]    --------------------    Assessment / Plan:  Severe thrombocytopenia, presumed immune.  Positive MIRA, warm hemolysis, with preserved hemoglobin.  Type 1 diabetes.  Non-specific adenopathy, monitoring; reassuring evaluation.  Positive PAVAN.    1) Dr. Lopez has been incredibly thorough and searching for secondary drivers for his underlying presumed immune thrombocytopenia and positive MIRA; the results seem to indicate that he has primary immune thrombocytopenia, possibly triggered by preceding illness.  2) Despite having a positive MIRA, his hemoglobin and hemolysis markers remain preserved.  Interestingly we can sometimes see Jeferson syndrome with both hemolytic anemia and thrombocytopenia, but clinically he really just has a positive MIRA at this time and no hemolysis.  3) He has been started on steroids and will continue on such.  He will need close monitoring of his blood sugars in the setting of his type 1 diabetes.  4) Proceed with IVIG today given severe thrombocytopenia with plans to recheck a CBC in the morning.  Reviewed logistics of administration as well as potential additional dosing for more acting as a bridge towards steroid effect.  Reviewed potential side effects.    Thank you kindly for this consultation.  Reza Sinclair MD    --------------------    Interval History:  Billy present for evaluation of presumed ITP.  He was in his usual state of health when blood work showed thrombocytopenia.  His platelets down trended.  They were rechecked and found to be persistently low.  Secondary causes include negative HIV and hepatitis serologies.  CAT scans have revealed some incidental adenopathy with lymph node biopsies revealing polytypic B cells and no suggestion of occult malignancy or lymph for proliferative disorders.  He was also found to have a positive MIRA 3+ that  improved to 1+.  He was started on steroids.  He is otherwise well.  No recent epistaxis or gum bleeding.  No lower extremity petechiae and no recent GI bleeding.    --------------------    Review of Systems:  10 point ROS negative except for that above.    Past Medical / Surgical History:  Past Medical History:   Diagnosis Date     Diabetes mellitus type 1, uncontrolled, insulin dependent 9/16/2013     Past Surgical History:   Procedure Laterality Date     EXCISE MASS GROIN Left 1/20/2023    Procedure: Lymph Node excisional biopsy;  Surgeon: New Burger MD;  Location: [facility] OR     MYRINGOTOMY, INSERT TUBE BILATERAL, COMBINED Bilateral     As a child       Family History:  Family History   Problem Relation Age of Onset     C.A.D. No family hx of      Diabetes No family hx of      Hypertension No family hx of      Cancer No family hx of         no skin cancer     Amblyopia No family hx of      Retinal detachment No family hx of      Thyroid Disease No family hx of      Glaucoma No family hx of      Macular Degeneration No family hx of        Social History:  Social History     Socioeconomic History     Marital status:      Spouse name: Jayla     Number of children: 3   Occupational History     Occupation:      Comment: [employer]   Tobacco Use     Smoking status: Never     Smokeless tobacco: Never   Substance and Sexual Activity     Alcohol use: No     Comment: rarely     Drug use: No     Sexual activity: Yes     Partners: Female     Social Determinants of Health     Financial Resource Strain: Low Risk      Difficulty of Paying Living Expenses: Not hard at all   Food Insecurity: No Food Insecurity     Worried About Running Out of Food in the Last Year: Never true     Ran Out of Food in the Last Year: Never true   Transportation Needs: No Transportation Needs     Lack of Transportation (Medical): No     Lack of Transportation (Non-Medical): No   Physical Activity: Sufficiently Active      "Days of Exercise per Week: 6 days     Minutes of Exercise per Session: 40 min   Stress: No Stress Concern Present     Feeling of Stress : Only a little   Social Connections: Socially Integrated     Frequency of Communication with Friends and Family: More than three times a week     Frequency of Social Gatherings with Friends and Family: More than three times a week     Attends Orthodoxy Services: 1 to 4 times per year     Active Member of Clubs or Organizations: Yes     Marital Status:    Housing Stability: Low Risk      Unable to Pay for Housing in the Last Year: No     Number of Places Lived in the Last Year: 1     Unstable Housing in the Last Year: No       Medications / Allergies:  Reviewed in EMR.    --------------------    Physical Exam:  VS: BP (!) 152/95 (BP Location: Right arm)   Pulse 84   Temp 98.6  F (37  C) (Oral)   Resp 16   Ht 1.905 m (6' 3\")   Wt 123 kg (271 lb 2.7 oz)   SpO2 96%   BMI 33.89 kg/m    GEN: Well appearing.    Labs / Imaging / Path:  We Reviewed CBC, CMP, lymph node biopsy, CT scan results.    "

## 2023-02-17 NOTE — PROGRESS NOTES
End of Shift Summary  For vital signs and complete assessments, please see documentation flowsheets.     Pertinent assessments: Pt A&Ox4. VSS on room air. Afebrile. Up independently. Tolerating a regular diet. Denied pain.  Pt provides his own insulin via a pump with continuous monitoring. IV IG infusing, tolerating well.     Major Shift Events: none.    Treatment Plan: IVIG, insulin pump, BG monitoring, IV decadron, and Hem/Onc following.    Emma Mills RN

## 2023-02-17 NOTE — PROGRESS NOTES
[facility]    Hematology/Oncology Follow-up Note      Today's Date: 02/17/23  Date of Admission:  2/15/2023  Reason for Consult:       ASSESSMENT/PLAN:  Billy Carey is a 32 year old male withL    Severe thrombocytopenia: likely immune thrombocytopenia, possibly from previous illness. +MIRA suggesting warm autoimmune hemolysis, but rest of hemolysis markers look good. Received IVIG yesterday and again today. Started IV decadron 40mg daily x 4 days (tonight is #3). Platelets have improved from 29 to 66, still no bleeding. He is tolerating both well.     -daily CBC with diff  -continue daily IV Decadron 40mg (tomorrow will be #4) and likely ok to discharge on Sunday  -when discharged, start on prednisone 80mg daily x 7 days and then taper by 20mg weekly  -he will get [place] 2nd opinion soon  -follow up with me next week with CBC (already scheduled)    Discussed with Dr Lopez.       INTERIM HISTORY: Billy was seen in his room, feeling comfortable.  He was getting his IVIG and tolerating this well.  No fever, chills, vomiting, bleeding.       MEDICATIONS:  Current Facility-Administered Medications   Medication     acetaminophen (TYLENOL) tablet 650 mg    Or     acetaminophen (TYLENOL) Suppository 650 mg     acetaminophen (TYLENOL) tablet 650 mg     dexamethasone (DECADRON) 40 mg in sodium chloride 0.9 % 59 mL intermittent infusion     glucose gel 15-30 g    Or     dextrose 50 % injection 25-50 mL    Or     glucagon injection 1 mg     diphenhydrAMINE (BENADRYL) capsule 50 mg    Or     diphenhydrAMINE (BENADRYL) injection 50 mg     diphenhydrAMINE (BENADRYL) injection 50 mg     EPINEPHrine (ADRENALIN) kit 0.3 mg     famotidine (PEPCID) injection 20 mg     insulin basal rate from AMBULATORY PUMP     insulin bolus from AMBULATORY PUMP     insulin bolus from AMBULATORY PUMP     insulin lispro (HumaLOG) 100 UNIT/ML vial for filling pump reservoir     lidocaine (LMX4) cream     lidocaine 1 % 0.1-1 mL      "melatonin tablet 1 mg     methylPREDNISolone sodium succinate (solu-MEDROL) injection 125 mg     ondansetron (ZOFRAN ODT) ODT tab 4 mg    Or     ondansetron (ZOFRAN) injection 4 mg     senna-docusate (SENOKOT-S/PERICOLACE) 8.6-50 MG per tablet 1 tablet    Or     senna-docusate (SENOKOT-S/PERICOLACE) 8.6-50 MG per tablet 2 tablet     sodium chloride (PF) 0.9% PF flush 3 mL     sodium chloride (PF) 0.9% PF flush 3 mL         ALLERGIES:  Allergies   Allergen Reactions     Penicillins Rash         PHYSICAL EXAM:  Vital signs:  Temp: 98  F (36.7  C) Temp src: Oral BP: 139/88 Pulse: 68   Resp: 18 SpO2: 97 % O2 Device: None (Room air)   Height: 190.5 cm (6' 3\") Weight: 123 kg (271 lb 2.7 oz)  Estimated body mass index is 33.89 kg/m  as calculated from the following:    Height as of this encounter: 1.905 m (6' 3\").    Weight as of this encounter: 123 kg (271 lb 2.7 oz).    GENERAL:  Male, in no acute distress.  Alert and oriented x3. Well groomed.   HEENT:  Normocephalic, atraumatic. No conjunctival injection or eye swelling.   LUNGS:  Nonlabored breathing, no cough or audible wheezing, able to speak full sentences.  MSK: Full ROM UE.    SKIN: No rash on exposed skin.   NEURO: CN grossly intact, speech normal  PSYCH: Mentation appears normal, insight and judgement intact      LABS:  CBC RESULTS:   Recent Labs   Lab Test 02/17/23  0633   WBC 15.4*   RBC 5.53   HGB 16.4   HCT 45.4   MCV 82   MCH 29.7   MCHC 36.1   RDW 12.1   PLT 66*       Recent Labs   Lab Test 02/17/23  1132 02/17/23  0729 02/16/23  0927 02/16/23  0657 02/15/23  2258 02/15/23  1851   NA  --   --   --  137  --  137   POTASSIUM  --   --   --  4.9  --  4.4   CHLORIDE  --   --   --  100  --  99   CO2  --   --   --  25  --  28   ANIONGAP  --   --   --  12  --  10   * 200*   < > 152*   < > 209*   BUN  --   --   --  20.0  --  16.6   CR  --   --   --  0.99  --  1.15   DEBORAH  --   --   --  9.4  --  9.4    < > = values in this interval not displayed. "           Loni White PA-C  Hematology/Oncology  [facility]

## 2023-02-17 NOTE — PLAN OF CARE
Pertinent assessments: VSS on room air. Afebrile. A&Ox4. Up independently. Tolerating a regular diet. B, 228. Pt provides his own insulin via a pump with continuous monitoring.      Major Shift Events: none.    Treatment Plan: IVIG, insulin pump, BG monitoring, IV decadron, and Hem/Onc following.

## 2023-02-18 VITALS
BODY MASS INDEX: 33.72 KG/M2 | TEMPERATURE: 98 F | DIASTOLIC BLOOD PRESSURE: 79 MMHG | SYSTOLIC BLOOD PRESSURE: 144 MMHG | HEART RATE: 55 BPM | HEIGHT: 75 IN | RESPIRATION RATE: 18 BRPM | OXYGEN SATURATION: 95 % | WEIGHT: 271.17 LBS

## 2023-02-18 LAB
BASOPHILS # BLD AUTO: 0 10E3/UL (ref 0–0.2)
BASOPHILS NFR BLD AUTO: 0 %
EOSINOPHIL # BLD AUTO: 0 10E3/UL (ref 0–0.7)
EOSINOPHIL NFR BLD AUTO: 0 %
ERYTHROCYTE [DISTWIDTH] IN BLOOD BY AUTOMATED COUNT: 12.3 % (ref 10–15)
GLUCOSE BLDC GLUCOMTR-MCNC: 168 MG/DL (ref 70–99)
GLUCOSE BLDC GLUCOMTR-MCNC: 183 MG/DL (ref 70–99)
HCT VFR BLD AUTO: 43.5 % (ref 40–53)
HGB BLD-MCNC: 15.3 G/DL (ref 13.3–17.7)
IMM GRANULOCYTES # BLD: 0.1 10E3/UL
IMM GRANULOCYTES NFR BLD: 1 %
LYMPHOCYTES # BLD AUTO: 0.6 10E3/UL (ref 0.8–5.3)
LYMPHOCYTES NFR BLD AUTO: 5 %
MCH RBC QN AUTO: 29.4 PG (ref 26.5–33)
MCHC RBC AUTO-ENTMCNC: 35.2 G/DL (ref 31.5–36.5)
MCV RBC AUTO: 84 FL (ref 78–100)
MONOCYTES # BLD AUTO: 0.4 10E3/UL (ref 0–1.3)
MONOCYTES NFR BLD AUTO: 3 %
NEUTROPHILS # BLD AUTO: 11.6 10E3/UL (ref 1.6–8.3)
NEUTROPHILS NFR BLD AUTO: 91 %
NRBC # BLD AUTO: 0 10E3/UL
NRBC BLD AUTO-RTO: 0 /100
PLAT MORPH BLD: NORMAL
PLATELET # BLD AUTO: 100 10E3/UL (ref 150–450)
RBC # BLD AUTO: 5.21 10E6/UL (ref 4.4–5.9)
RBC MORPH BLD: NORMAL
WBC # BLD AUTO: 12.7 10E3/UL (ref 4–11)

## 2023-02-18 PROCEDURE — 99239 HOSP IP/OBS DSCHRG MGMT >30: CPT | Performed by: INTERNAL MEDICINE

## 2023-02-18 PROCEDURE — 85025 COMPLETE CBC W/AUTO DIFF WBC: CPT | Performed by: INTERNAL MEDICINE

## 2023-02-18 PROCEDURE — 258N000003 HC RX IP 258 OP 636: Performed by: STUDENT IN AN ORGANIZED HEALTH CARE EDUCATION/TRAINING PROGRAM

## 2023-02-18 PROCEDURE — 250N000013 HC RX MED GY IP 250 OP 250 PS 637: Performed by: INTERNAL MEDICINE

## 2023-02-18 PROCEDURE — 250N000011 HC RX IP 250 OP 636: Performed by: STUDENT IN AN ORGANIZED HEALTH CARE EDUCATION/TRAINING PROGRAM

## 2023-02-18 PROCEDURE — 36415 COLL VENOUS BLD VENIPUNCTURE: CPT | Performed by: INTERNAL MEDICINE

## 2023-02-18 RX ADMIN — ATORVASTATIN CALCIUM 10 MG: 10 TABLET, FILM COATED ORAL at 09:12

## 2023-02-18 RX ADMIN — DEXAMETHASONE SODIUM PHOSPHATE 40 MG: 10 INJECTION INTRAMUSCULAR; INTRAVENOUS at 13:24

## 2023-02-18 ASSESSMENT — ACTIVITIES OF DAILY LIVING (ADL)
ADLS_ACUITY_SCORE: 35

## 2023-02-18 NOTE — PLAN OF CARE
Goal Outcome Evaluation:    Pertinent assessments: A&O4, up ab norma. Ind with adls and insulin pump management. Tolerated IVIG and Decadron. Denied pain, VSS.    Major Shift Events: None.    Treatment Plan: IVIG, Decadron, BG monitoring. Hematology to decide discharge date.

## 2023-02-18 NOTE — PLAN OF CARE
Goal Outcome Evaluation:       To Do:  End of Shift Summary  For vital signs and complete assessments, please see documentation flowsheets.     Pertinent assessments: Pt A/Ox4. VSS. Denies pain, nausea and SOB. No bleeding reported.  Insulin pump dressing CDI. BG 99 and 168.Up ind  Major Shift Events: none  Treatment Plan: Decadron IV, insulin pump, monitor platelet ct and bleeding.   Bedside Nurse: Jayla Watters RN

## 2023-02-18 NOTE — PLAN OF CARE
Goal Outcome Evaluation: met. Pt to D/C to home.  Pt provided with d/c instructions, including new medications, when medications were last given, and when to take them again.  Pt also informed to f/u with cancer clinic on friday.  Pt verbalized understanding of all d/c and f/u instructions.  All questions were answered at this time.  Copy of paperwork sent with pt.  Medication/Scripts sent with pt.  Wife to provide transport.  All personal belongings sent with pt.

## 2023-02-18 NOTE — DISCHARGE SUMMARY
[facility] Discharge Summary    Billy Carey MRN# [id number]   Age: 32 year old YOB: 1990     Date of Admission:  2/15/2023  Date of Discharge::  2023  Admitting Physician:  Channing Martin DO  Discharge Physician:  Shukri Pryor MD     Home clinic: [facility]          Admission Diagnoses:   Autoimmune thrombocytopenia (H) [D69.3]          Discharge Diagnosis:   Principal Problem:    Autoimmune thrombocytopenia (H)  Active Problems:    Mild nonproliferative diabetic retinopathy of both eyes without macular edema associated with type 1 diabetes mellitus (H)            Procedures:   IVIG administration 1g/kg          Medications Prior to Admission:     Medications Prior to Admission   Medication Sig Dispense Refill Last Dose     atorvastatin (LIPITOR) 10 MG tablet Take 1 tablet (10 mg) by mouth daily 90 tablet 3 2023 at am     glucagon (GLUCAGON EMERGENCY) 1 MG kit Inject 1 mg into the muscle once for 1 dose 1 mg 3 prn at prn     insulin lispro (HUMALOG VIAL) 100 UNIT/ML vial To be used in insulin pump   2/15/2023     INSULIN PUMP - OUTPATIENT Date Last Updated: 2/15/23  Tandem  Pump Basal Rates/Times:  9386-6611: 2 units/hour  8479-3696: 2.6 units/hour  1124-6663: 2.2 units/hour  4803-4212: 1.7 units/hour  CARB RATIO:   4260-2701: 1 unit per 10 grams carbohydrates  CF / Sensitivity Times:   7949-2066: 1 unit to decrease BG by 30 mg/dL  TARGET B mg/dL   2/15/2023     vitamin D2 (ERGOCALCIFEROL) 17989 units (1250 mcg) capsule Take 50,000 Units by mouth once a week   Past Week at      blood glucose (KELL CONTOUR NEXT) test strip Test 4-5 times daily 4 Box 3      Blood Glucose Monitoring Suppl (KELL CONTOUR NEXT LINK) W/DEVICE KIT 1 kit 5 times daily. Use as directed 1 kit 1      insulin glargine (BASAGLAR KWIKPEN) 100 UNIT/ML pen INJECT 50 UNITS ONCE DAILY SUBCUTANEOUSLY 15 mL 1 prn in case of pump failure at prn     insulin pen needle (BD  CHRIST U/F) 32G X 4 MM miscellaneous Use 3-6 pen needles daily or as directed. 50 each 0              Discharge Medications:     Discharge Medication List as of 2023  1:59 PM      CONTINUE these medications which have NOT CHANGED    Details   atorvastatin (LIPITOR) 10 MG tablet Take 1 tablet (10 mg) by mouth daily, Disp-90 tablet, R-3, E-Prescribe      glucagon (GLUCAGON EMERGENCY) 1 MG kit Inject 1 mg into the muscle once for 1 dose, Disp-1 mg, R-3, E-Prescribe      insulin lispro (HUMALOG VIAL) 100 UNIT/ML vial To be used in insulin pump, Historical      INSULIN PUMP - OUTPATIENT Date Last Updated: 2/15/23  Tandem  Pump Basal Rates/Times:  9137-7421: 2 units/hour  2438-9006: 2.6 units/hour  0577-0410: 2.2 units/hour  9339-2172: 1.7 units/hour  CARB RATIO:   3390-9939: 1 unit per 10 grams carbohydrates  CF / Sensitivity Times:   0 000-2400: 1 unit to decrease BG by 30 mg/dL  TARGET B mg/dL, Historical      vitamin D2 (ERGOCALCIFEROL) 18547 units (1250 mcg) capsule Take 50,000 Units by mouth once a week, Historical      blood glucose (KELL CONTOUR NEXT) test strip Test 4-5 times daily, Disp-4 Box, R-3, E-Prescribe      Blood Glucose Monitoring Suppl (KELL CONTOUR NEXT LINK) W/DEVICE KIT 1 kit 5 times daily. Use as directed, 1 kit, Does not apply, 5 TIMES DAILY Starting 2012, Until Discontinued, Disp-1 kit, R-1, Fax      insulin glargine (BASAGLAR KWIKPEN) 100 UNIT/ML pen INJECT 50 UNITS ONCE DAILY SUBCUTANEOUSLY, Disp-15 mL, R-1, E-Prescribe      insulin pen needle (BD CHRIST U/F) 32G X 4 MM miscellaneous Use 3-6 pen needles daily or as directed.Disp-50 each, [id number] use in the event of pump failure                   Consultations:   Consultation during this admission received from hematology          Hospital Course:   Billy Carey is a 32-year-old man with a history of type 1 diabetes who was directed to the emergency department on 2/15/2023 after he reported to his hematologist's office  "that he was having a nosebleed.  He has been under the care of [facility] oncology over the last couple of months after he presented with a hemolytic anemia.  On this occasion, he was found to have a platelet count of 18,000, which is down from his recent baseline of approximately 40,000.    The patient has been a diagnostic puzzle.  He has undergone extensive evaluation under the direction of Dr. Lopez.  He had been given a course of prednisone that finished about 6 days prior to the date of admission.  The indication for the prednisone course of 60 mg daily x2 weeks I believe was the apparent hemolytic anemia.  The finding of low platelets was an incidental one but, it was the cause for concern that drove this hospitalization.    Mr. Carey did very well this hospitalization.  He was treated with IVIG 1 g/kg along with dexamethasone 40 mg daily x4 days.  At the time of discharge, the platelets had risen to 100,000.     After meeting Mr. Carey on the date of discharge and having a long conversation about medications for discharge, I was able to contact Dr. Lopez, his primary hematologist. Due to the patient's reservations about continuing prednisone, she offered either a taper of Dexamethasone or just monitoring. The patient, after discussion, agreed that monitroing off of additional steroids would be most desirable. He will be followed closely by Dr. Lopez in her clinic, in any case.     BP (!) 144/79 (BP Location: Left arm)   Pulse 55   Temp 98  F (36.7  C) (Oral)   Resp 18   Ht 1.905 m (6' 3\")   Wt 123 kg (271 lb 2.7 oz)   SpO2 95%   BMI 33.89 kg/m      Examination:  Mr. Carey is a remarkably robust and muscular appearing young man in no apparent distress.  HEENT: No focal muscular asymmetry.  No scleral icterus.  Lungs: No increased work of breathing.  No hoarseness or cough.  Cardiac: Peripheral perfusion is excellent.  Neurologic: Patient is fully coherent and appropriate.  He ambulates without apparent lateralizing " weakness, gait abnormality, imbalance.            Discharge Instructions and Follow-Up:   Discharge diet: Regular   Discharge activity: Activity as tolerated   Discharge follow-up: With Dr. Lopez in the hematology clinic as planned.  Patient is instructed to return to the emergency department if he has concerning bleeding.  If he has just mild bleeding, it would be appropriate to follow-up with Dr. Lopez's clinic.           Discharge Disposition:   Discharged to home      Attestation:  I have reviewed today's vital signs, notes, medications, labs and imaging.  Total time: 35 minutes    Shukri Pryor MD

## 2023-02-20 LAB
PERFORMING LABORATORY: NORMAL
TEST NAME: NORMAL

## 2023-02-21 ENCOUNTER — PATIENT OUTREACH (OUTPATIENT)
Dept: CARE COORDINATION | Facility: CLINIC | Age: 33
End: 2023-02-21
Payer: COMMERCIAL

## 2023-02-21 NOTE — PROGRESS NOTES
"[facility] offered Clinic Care Coordination to an established [facility] eligible patient and patient declined CCC at this time.    Clinic Care Coordination Contact  [facility]: Post-Discharge Note  SITUATION                                                      Admission:    Admission Date: 02/15/23   Reason for Admission: Abnormal labs, Nosebleed  Discharge:   Discharge Date: 02/18/23  Discharge Diagnosis: Autoimmune thrombocytopenia (H)    BACKGROUND                                                      Per hospital discharge summary and inpatient provider notes:    Billy Carey is a 32 year old male with a history of Type I diabetes who presents with abnormal labs. Patient reports that he has low platelets and was recommended by oncology to present to the ER for decadron. He reports that this has been an ongoing issue since November and adds that he has had multiple bone marrow biopsies. He reports that he was put on two weeks of Prednisone by his oncologist, which he finished last Thursday (2/9/23). He reports that his platelets initially were in the 40's-50's and have since decreased to 18 today. He adds that he had a runny nose three days ago after stopping steroids and reports that he had a bloody nose (left nose) yesterday (2/14/23). He denies any blood in urine, vomit, or stool. He has not had a transfusion of platelets before. He has had his lymph nodes removed, which was done within the [facility] system. He sees Dr. Lopez in oncology.     ASSESSMENT      Discharge Assessment  How are you doing now that you are home?: \"Not too bad I'm just a little lethargic.\"  How are your symptoms? (Red Flag symptoms escalate to triage hotline per guidelines): Improved  Do you feel your condition is stable enough to be safe at home until your provider visit?: Yes  Does the patient have their discharge instructions? : Yes  Does the patient have questions regarding their discharge instructions? : No  Were you started on any new " medications or were there changes to any of your previous medications? : No  Does the patient have all of their medications?: Yes  Do you have questions regarding any of your medications? : No  Do you have all of your needed medical supplies or equipment (DME)?  (i.e. oxygen tank, CPAP, cane, etc.): Yes  Discharge follow-up appointment scheduled within 14 calendar days? : Yes  Discharge Follow Up Appointment Date: 02/24/23  Discharge Follow Up Appointment Scheduled with?: Specialty Care Provider (Oncology and Labs)    Post-op ([facility] CTA Only)  If the patient had a surgery or procedure, do they have any questions for a nurse?: No    PLAN                                                      Outpatient Plan:      Follow-up and recommended labs and tests  With Dr. Lopez on Friday as planned.    Future Appointments   Date Time Provider Department Center   2/24/2023  2:15 PM  MA LAB [facility]   2/24/2023  2:30 PM Loni Cartwright PA-C [facility]   3/15/2023  2:30 PM RH MA LAB [facility]   3/15/2023  3:00 PM Hayde Lopez,  [facility]         For any urgent concerns, please contact our 24 hour nurse triage line: [phone] ([phone])       MUNDO Desai  [phone]  Connected Care Resource [facility]

## 2023-02-23 LAB
DAT POLY: NORMAL
SPECIMEN EXPIRATION DATE: NORMAL

## 2023-02-24 ENCOUNTER — ONCOLOGY VISIT (OUTPATIENT)
Dept: ONCOLOGY | Facility: CLINIC | Age: 33
End: 2023-02-24
Attending: PHYSICIAN ASSISTANT
Payer: COMMERCIAL

## 2023-02-24 VITALS
WEIGHT: 271.1 LBS | RESPIRATION RATE: 16 BRPM | DIASTOLIC BLOOD PRESSURE: 83 MMHG | HEIGHT: 75 IN | OXYGEN SATURATION: 99 % | BODY MASS INDEX: 33.71 KG/M2 | TEMPERATURE: 97.6 F | SYSTOLIC BLOOD PRESSURE: 135 MMHG | HEART RATE: 72 BPM

## 2023-02-24 DIAGNOSIS — D69.3 AUTOIMMUNE THROMBOCYTOPENIA (H): ICD-10-CM

## 2023-02-24 DIAGNOSIS — D69.6 THROMBOCYTOPENIA (H): Primary | ICD-10-CM

## 2023-02-24 DIAGNOSIS — D59.10 AUTOIMMUNE HEMOLYTIC ANEMIA (H): ICD-10-CM

## 2023-02-24 DIAGNOSIS — D69.6 LOW PLATELET COUNT (H): ICD-10-CM

## 2023-02-24 LAB
BASOPHILS # BLD AUTO: 0 10E3/UL (ref 0–0.2)
BASOPHILS NFR BLD AUTO: 0 %
BLOOD BANK CHART COMMENT: NORMAL
EOSINOPHIL # BLD AUTO: 0.1 10E3/UL (ref 0–0.7)
EOSINOPHIL NFR BLD AUTO: 1 %
ERYTHROCYTE [DISTWIDTH] IN BLOOD BY AUTOMATED COUNT: 12.2 % (ref 10–15)
HCT VFR BLD AUTO: 48.6 % (ref 40–53)
HGB BLD-MCNC: 17.1 G/DL (ref 13.3–17.7)
IMM GRANULOCYTES # BLD: 0.1 10E3/UL
IMM GRANULOCYTES NFR BLD: 1 %
LYMPHOCYTES # BLD AUTO: 1.7 10E3/UL (ref 0.8–5.3)
LYMPHOCYTES NFR BLD AUTO: 19 %
MCH RBC QN AUTO: 29.4 PG (ref 26.5–33)
MCHC RBC AUTO-ENTMCNC: 35.2 G/DL (ref 31.5–36.5)
MCV RBC AUTO: 84 FL (ref 78–100)
MONOCYTES # BLD AUTO: 0.8 10E3/UL (ref 0–1.3)
MONOCYTES NFR BLD AUTO: 9 %
NEUTROPHILS # BLD AUTO: 6.1 10E3/UL (ref 1.6–8.3)
NEUTROPHILS NFR BLD AUTO: 70 %
NRBC # BLD AUTO: 0 10E3/UL
NRBC BLD AUTO-RTO: 0 /100
PLATELET # BLD AUTO: 196 10E3/UL (ref 150–450)
RBC # BLD AUTO: 5.81 10E6/UL (ref 4.4–5.9)
SPECIMEN EXPIRATION DATE: NORMAL
WBC # BLD AUTO: 8.8 10E3/UL (ref 4–11)

## 2023-02-24 PROCEDURE — G0463 HOSPITAL OUTPT CLINIC VISIT: HCPCS | Performed by: PHYSICIAN ASSISTANT

## 2023-02-24 PROCEDURE — 84999 UNLISTED CHEMISTRY PROCEDURE: CPT | Performed by: PHYSICIAN ASSISTANT

## 2023-02-24 PROCEDURE — 86880 COOMBS TEST DIRECT: CPT | Performed by: INTERNAL MEDICINE

## 2023-02-24 PROCEDURE — 86880 COOMBS TEST DIRECT: CPT | Performed by: PHYSICIAN ASSISTANT

## 2023-02-24 PROCEDURE — 99214 OFFICE O/P EST MOD 30 MIN: CPT | Performed by: PHYSICIAN ASSISTANT

## 2023-02-24 PROCEDURE — 86901 BLOOD TYPING SEROLOGIC RH(D): CPT | Performed by: PHYSICIAN ASSISTANT

## 2023-02-24 PROCEDURE — 36415 COLL VENOUS BLD VENIPUNCTURE: CPT

## 2023-02-24 PROCEDURE — 86900 BLOOD TYPING SEROLOGIC ABO: CPT | Performed by: PHYSICIAN ASSISTANT

## 2023-02-24 PROCEDURE — 85025 COMPLETE CBC W/AUTO DIFF WBC: CPT | Performed by: PHYSICIAN ASSISTANT

## 2023-02-24 ASSESSMENT — PAIN SCALES - GENERAL: PAINLEVEL: NO PAIN (0)

## 2023-02-24 NOTE — PROGRESS NOTES
Oncology/Hematology Visit Note    Feb 24, 2023    Reason for visit: Follow-up thrombocytopenia    Oncology HPI: Billy Carey is a 32 year old male with PMH of DM1 with thrombocytopenia.  He was followed closely by endocrinology for his DM1 and was in to see his PCP for routine follow-up, CBC on 11/30/2022 revealed thrombocytopenia with platelets of 42K.  Work-up was negative for schistocytes and bone marrow biopsy with normocellular bone marrow.  MIRA came back positive at 3+, but normal LFTs, normal coag studies and normal reticulocyte count.  CT CAP revealed 2 cm lymph node of the retroperitoneum, inguinal and axillary.  Core biopsy of the retroperitoneal lymph node unremarkable, axillary lymph node biopsy and inguinal lymph node biopsy all unremarkable.  Despite positive MIRA, hemoglobin and hemolysis markers remained preserved.  He was on surveillance.    He was admitted to [facility] on 2/15/2023 for platelets of 18K and was started on dexamethasone.  There was discussion of discharge with prednisone taper, however patient was hesitant to continue steroid taper and continued on surveillance.    He is here today for close hospital follow-up and repeat CBC.    Interval History: Billy is here today with his wife, Jayla.  He has been feeling really well since being discharged.  He had the steroid high and then felt, low when it was completed, but now starting to recover.  Appetite was pretty high with the steroids, now back to normal.  He has an appointment on 5/4/2023 with [place].  No fever or chills, vomiting diarrhea, bleeding, bruising.    Review of Systems: See interval hx. Denies fevers, chills, abdominal pain, N/V, diarrhea, changes in urination, bleeding, bruising, rash.     PMHx and Social Hx reviewed per EPIC.      Medications:  Current Outpatient Medications   Medication Sig Dispense Refill     atorvastatin (LIPITOR) 10 MG tablet Take 1 tablet (10 mg) by mouth daily 90 tablet 3     blood glucose (KELL  "CONTOUR NEXT) test strip Test 4-5 times daily 4 Box 3     Blood Glucose Monitoring Suppl (KELL CONTOUR NEXT LINK) W/DEVICE KIT 1 kit 5 times daily. Use as directed 1 kit 1     insulin glargine (BASAGLAR KWIKPEN) 100 UNIT/ML pen INJECT 50 UNITS ONCE DAILY SUBCUTANEOUSLY 15 mL 1     insulin lispro (HUMALOG VIAL) 100 UNIT/ML vial To be used in insulin pump       insulin pen needle (BD CHRIST U/F) 32G X 4 MM miscellaneous Use 3-6 pen needles daily or as directed. 50 each 0     INSULIN PUMP - OUTPATIENT Date Last Updated: 2/15/23  Tandem  Pump Basal Rates/Times:  1343-7877: 2 units/hour  1492-4725: 2.6 units/hour  6176-4792: 2.2 units/hour  9817-0061: 1.7 units/hour  CARB RATIO:   1562-3605: 1 unit per 10 grams carbohydrates  CF / Sensitivity Times:   3483-6253: 1 unit to decrease BG by 30 mg/dL  TARGET B mg/dL       vitamin D2 (ERGOCALCIFEROL) 91270 units (1250 mcg) capsule Take 50,000 Units by mouth once a week       glucagon (GLUCAGON EMERGENCY) 1 MG kit Inject 1 mg into the muscle once for 1 dose 1 mg 3       Allergies   Allergen Reactions     Penicillins Rash         EXAM:    /83   Pulse 72   Temp 97.6  F (36.4  C) (Tympanic)   Resp 16   Ht 1.905 m (6' 3\")   Wt 123 kg (271 lb 1.6 oz)   SpO2 99%   BMI 33.89 kg/m      GENERAL:  Male, in no acute distress.  Alert and oriented x3. Well groomed.   HEENT:  Normocephalic, atraumatic. No conjunctival injection or eye swelling.   LUNGS:  Nonlabored breathing, no cough or audible wheezing, able to speak full sentences.  MSK: Full ROM UE.    SKIN: No rash on exposed skin. No jaundice.   NEURO: CN grossly intact, speech normal  PSYCH: Mentation appears normal, insight and judgement intact      Labs:    23 14:22   WBC 8.8   Hemoglobin 17.1   Hematocrit 48.6   Platelet Count 196   RBC Count 5.81   MCV 84   MCH 29.4   MCHC 35.2   RDW 12.2   % Neutrophils 70   % Lymphocytes 19   % Monocytes 9   % Eosinophils 1   % Basophils 0   Absolute Basophils 0.0 "   Absolute Eosinophils 0.1   Absolute Immature Granulocytes 0.1   Absolute Lymphocytes 1.7   Absolute Monocytes 0.8   % Immature Granulocytes 1   Absolute Neutrophils 6.1   Absolute NRBCs 0.0   NRBCs per 100 WBC 0   SPECIMEN EXPIRATION DATE [id number]   MIRA Anti-IgG,-C3d Positive 3+       Imaging: n/a    Impression/Plan: Billy Carey is a 32 year old male with thrombocytopenia, currently on surveillance.    Thrombocytopenia/warm autoimmune hemolysis: Extremely thorough work-up complete including bone marrow biopsy and lymph node biopsies.  Suspect this may be ITP.  Recently hospitalized for platelets of 18K, given IV Decadron and IVIG.  Platelets responded to 100 K on discharge.  Today 196 K and he is doing well.  We will hold off on rituximab, but if his platelets to drop again, we would recommend rituximab weekly x4.  His pathology slides were sent to [facility]. He has an appointment for second opinion with [facility] on 5/4/2023 and we certainly encouraged him to keep that appointment.  He may benefit from referral to [facility] benign heme specialist.  He will let me know if he would like a referral.  -plan for CBC weekly  -Dr Lopez in 1 mo with CBC    Addendum 2/27/23: Spoke to Billy again today and he agrees with Hematology referral at the [facility], so this is ordered.     Lymphadenopathy: Retroperitoneal, axillary and inguinal lymphadenopathy, biopsies benign.  Plan for repeat PET scan in August 2023.    Positive PAVAN: Asymptomatic.  We will check on previous referral to rheumatology.    Chart documentation with Dragon Voice recognition Software. Although reviewed after completion, some words and grammatical errors may remain.    30 minutes spent on the date of the encounter doing chart review, review of test results, interpretation of tests, patient visit, documentation, discussion with other provider(s) and discussion with family     Loni White PA-C  Hematology/Oncology  [facility]  Physicians

## 2023-02-24 NOTE — LETTER
2/24/2023         RE: Billy Carey  [address] [address]        Dear Colleague,    Thank you for referring your patient, Billy Carey, to the [facility]. Please see a copy of my visit note below.    Oncology/Hematology Visit Note    Feb 24, 2023    Reason for visit: Follow-up thrombocytopenia    Oncology HPI: Billy Carey is a 32 year old male with PMH of DM1 with thrombocytopenia.  He was followed closely by endocrinology for his DM1 and was in to see his PCP for routine follow-up, CBC on 11/30/2022 revealed thrombocytopenia with platelets of 42K.  Work-up was negative for schistocytes and bone marrow biopsy with normocellular bone marrow.  MIRA came back positive at 3+, but normal LFTs, normal coag studies and normal reticulocyte count.  CT CAP revealed 2 cm lymph node of the retroperitoneum, inguinal and axillary.  Core biopsy of the retroperitoneal lymph node unremarkable, axillary lymph node biopsy and inguinal lymph node biopsy all unremarkable.  Despite positive MIRA, hemoglobin and hemolysis markers remained preserved.  He was on surveillance.    He was admitted to [facility] on 2/15/2023 for platelets of 18K and was started on dexamethasone.  There was discussion of discharge with prednisone taper, however patient was hesitant to continue steroid taper and continued on surveillance.    He is here today for close hospital follow-up and repeat CBC.    Interval History: Billy is here today with his wife, Jayla.  He has been feeling really well since being discharged.  He had the steroid high and then felt, low when it was completed, but now starting to recover.  Appetite was pretty high with the steroids, now back to normal.  He has an appointment on 5/4/2023 with [place].  No fever or chills, vomiting diarrhea, bleeding, bruising.    Review of Systems: See interval hx. Denies fevers, chills, abdominal pain, N/V, diarrhea, changes in urination,  "bleeding, bruising, rash.     PMHx and Social Hx reviewed per EPIC.      Medications:  Current Outpatient Medications   Medication Sig Dispense Refill     atorvastatin (LIPITOR) 10 MG tablet Take 1 tablet (10 mg) by mouth daily 90 tablet 3     blood glucose (KELL CONTOUR NEXT) test strip Test 4-5 times daily 4 Box 3     Blood Glucose Monitoring Suppl (KELL CONTOUR NEXT LINK) W/DEVICE KIT 1 kit 5 times daily. Use as directed 1 kit 1     insulin glargine (BASAGLAR KWIKPEN) 100 UNIT/ML pen INJECT 50 UNITS ONCE DAILY SUBCUTANEOUSLY 15 mL 1     insulin lispro (HUMALOG VIAL) 100 UNIT/ML vial To be used in insulin pump       insulin pen needle (BD CHRIST U/F) 32G X 4 MM miscellaneous Use 3-6 pen needles daily or as directed. 50 each 0     INSULIN PUMP - OUTPATIENT Date Last Updated: 2/15/23  Tandem  Pump Basal Rates/Times:  2377-1619: 2 units/hour  6853-9512: 2.6 units/hour  8400-4886: 2.2 units/hour  2989-0908: 1.7 units/hour  CARB RATIO:   5885-1756: 1 unit per 10 grams carbohydrates  CF / Sensitivity Times:   3819-1200: 1 unit to decrease BG by 30 mg/dL  TARGET B mg/dL       vitamin D2 (ERGOCALCIFEROL) 85829 units (1250 mcg) capsule Take 50,000 Units by mouth once a week       glucagon (GLUCAGON EMERGENCY) 1 MG kit Inject 1 mg into the muscle once for 1 dose 1 mg 3       Allergies   Allergen Reactions     Penicillins Rash         EXAM:    /83   Pulse 72   Temp 97.6  F (36.4  C) (Tympanic)   Resp 16   Ht 1.905 m (6' 3\")   Wt 123 kg (271 lb 1.6 oz)   SpO2 99%   BMI 33.89 kg/m      GENERAL:  Male, in no acute distress.  Alert and oriented x3. Well groomed.   HEENT:  Normocephalic, atraumatic. No conjunctival injection or eye swelling.   LUNGS:  Nonlabored breathing, no cough or audible wheezing, able to speak full sentences.  MSK: Full ROM UE.    SKIN: No rash on exposed skin. No jaundice.   NEURO: CN grossly intact, speech normal  PSYCH: Mentation appears normal, insight and judgement intact      Labs: "    02/24/23 14:22   WBC 8.8   Hemoglobin 17.1   Hematocrit 48.6   Platelet Count 196   RBC Count 5.81   MCV 84   MCH 29.4   MCHC 35.2   RDW 12.2   % Neutrophils 70   % Lymphocytes 19   % Monocytes 9   % Eosinophils 1   % Basophils 0   Absolute Basophils 0.0   Absolute Eosinophils 0.1   Absolute Immature Granulocytes 0.1   Absolute Lymphocytes 1.7   Absolute Monocytes 0.8   % Immature Granulocytes 1   Absolute Neutrophils 6.1   Absolute NRBCs 0.0   NRBCs per 100 WBC 0   SPECIMEN EXPIRATION DATE [id number]   MIRA Anti-IgG,-C3d Positive 3+       Imaging: n/a    Impression/Plan: Billy Carey is a 32 year old male with thrombocytopenia, currently on surveillance.    Thrombocytopenia/warm autoimmune hemolysis: Extremely thorough work-up complete including bone marrow biopsy and lymph node biopsies.  Suspect this may be ITP.  Recently hospitalized for platelets of 18K, given IV Decadron and IVIG.  Platelets responded to 100 K on discharge.  Today 196 K and he is doing well.  We will hold off on rituximab, but if his platelets to drop again, we would recommend rituximab weekly x4.  His pathology slides were sent to [place]. He has an appointment for second opinion with [place] on 5/4/2023 and we certainly encouraged him to keep that appointment.  He may benefit from referral to [facility] benign heme specialist.  He will let me know if he would like a referral.  -plan for CBC weekly  -Dr Lopez in 1 mo with CBC    Lymphadenopathy: Retroperitoneal, axillary and inguinal lymphadenopathy, biopsies benign.  Plan for repeat PET scan in August 2023.    Positive PAVAN: Asymptomatic.  We will check on previous referral to rheumatology.    Chart documentation with Dragon Voice recognition Software. Although reviewed after completion, some words and grammatical errors may remain.    30 minutes spent on the date of the encounter doing chart review, review of test results, interpretation of tests, patient visit, documentation,  discussion with other provider(s) and discussion with family     Loni White PA-C  Hematology/Oncology  [facility]                    Again, thank you for allowing me to participate in the care of your patient.        Sincerely,        Loni White PA-C

## 2023-02-24 NOTE — PROGRESS NOTES
Medical Assistant Note:  Billy Carey presents today for blood draw.    Patient seen by provider today: Yes: Loni White.   present during visit today: Not Applicable.    Concerns: No Concerns.    Procedure:  Labs drawn    Post Assessment:  Labs drawn without difficulty: Yes.    Discharge Plan:  Departure Mode: Ambulatory.    Face to Face Time: 10 min.    Radha Burt CMA

## 2023-02-24 NOTE — NURSING NOTE
"Oncology Rooming Note    February 24, 2023 2:31 PM   Billy Carey is a 32 year old male who presents for:    Chief Complaint   Patient presents with     Oncology Clinic Visit     Autoimmune thrombocytopenia        Initial Vitals: /83   Pulse 72   Temp 97.6  F (36.4  C) (Tympanic)   Resp 16   Ht 1.905 m (6' 3\")   Wt 123 kg (271 lb 1.6 oz)   SpO2 99%   BMI 33.89 kg/m   Estimated body mass index is 33.89 kg/m  as calculated from the following:    Height as of this encounter: 1.905 m (6' 3\").    Weight as of this encounter: 123 kg (271 lb 1.6 oz). Body surface area is 2.55 meters squared.  No Pain (0) Comment: Data Unavailable   No LMP for male patient.  Allergies reviewed: Yes  Medications reviewed: Yes    Medications: Medication refills not needed today.  Pharmacy name entered into [employer]:    [id number] - [place], [place] - [address] [place] [address] [address] [facility]  [id number] [place], [place] - [address]  [id number] [place], [place] - [address] [facility] - [facility], [place] - [address]    Clinical concerns: follow up       Maddy Leigh CMA              "
History/Exam/Medical Decision Making

## 2023-02-28 LAB
PERFORMING LABORATORY: NORMAL
SPECIMEN STATUS: NORMAL
TEST NAME: NORMAL

## 2023-03-01 ENCOUNTER — PATIENT OUTREACH (OUTPATIENT)
Dept: ONCOLOGY | Facility: CLINIC | Age: 33
End: 2023-03-01
Payer: COMMERCIAL

## 2023-03-01 NOTE — TELEPHONE ENCOUNTER
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.10]    RECORDS RECEIVED FROM: INTERNAL   DATE RECEIVED: 3.1.23   NOTES STATUS DETAILS   OFFICE NOTE from referring provider 3.1.23 Loni Cartwright PA-C   OFFICE NOTE from medical oncologist 2.24.23 Epic  2.15.23 Thrombocytopenia - [facility]     OFFICE NOTE from other specialist 1.11.23 General Surgery - [facility]   DISCHARGE SUMMARY from hospital 2.15.23  1.20.23 Autoimmune Thrombocytopenia    See Operative     DISCHARGE REPORT from the ER 2.15.23    OPERATIVE REPORT 1.20.23 Lymphnode Excisional Bx LT Groin   MEDICATION LIST   3.1.23  12.7.22   Most Recent w/Referral    Infusion Therapy - Fluids     LABS     PATHOLOGY REPORTS 1.20.23  1.4.23  12.15.22 Case: [id number]    Case: [id number]       Case: [id number]      ANYTHING RELATED TO DIAGNOSIS 2.24.23 Most recent labs   IMAGING (NEED IMAGES & REPORT)     CT SCANS 12.15.22  12.7.22 Abdomen Retroperitoneal Bx  CAP   ULTRASOUND 1.10.23  1.4.23 Axillary RT  Breast Bx   PET 12.9.22 Whole Body

## 2023-03-01 NOTE — PROGRESS NOTES
[facility]: Cancer Care                                                                   Hem/Onc  Referral reviewed:    Referred By:   Loni Cartwright PA-C   [address] [address]   Phone: [phone]   Fax: [phone]   Diagnoses: Thrombocytopenia (H)   Autoimmune hemolytic anemia (H)   Order: Adult Oncology/Hematology  Referral     Hematology care at the [facility], Dr Tyler preferred     ASSESSMENT      Clinical History (per Nurse review of records provided):    32 year old male patient with thrombocytopenia.     Lives:  [address] [address]    Insurance:  Payor: [employer] / Plan: [employer] / Product Type: PPO /     PCP:   Elfego Herrera      Records Location: [facility]     Pertinent labs -- BOOKMARKED    Pertinent imaging -- BOOKMARKED    Referring provider note(s)-- BOOKMARKED    INTERVENTION(S)                                                      Oncology Nurse Navigator called patient to introduce the role or the nurse navigator  and to inform them that a referral for [facility] Hematology/Oncology department has been received for dx of thrombocytopenia from Loni White PA-C.   Patient did not answer the phone so a detailed message was left for them including NPS number. Requested callback to speak to a nurse navigator regarding referral.     Preferred [facility] Hem/Onc clinic location: [facility] with Dr. Tyler    Records team will contact pt directly if ROIs needed for records, imaging, slides    PLAN                                                      Hem/Onc consult: Message sent to Dr. Tyler to review referral and consultation on hold for 3/21/23 (Next available).    3/1/23: Message received from Jayson. Ashly to schedule patient in 3/7/23 in return to clinic slots for 60 minutes. Nurse Navigator will call patient with update and send referral to NPS team.       Che Jose RN,  BSN  Hematology/Oncology New Patient Nurse Navigator   [facility]  [phone]  [phone]

## 2023-03-02 LAB
DAT POLY: NORMAL
SPECIMEN EXPIRATION DATE: NORMAL

## 2023-03-03 ENCOUNTER — LAB (OUTPATIENT)
Dept: ONCOLOGY | Facility: CLINIC | Age: 33
End: 2023-03-03
Attending: INTERNAL MEDICINE
Payer: COMMERCIAL

## 2023-03-03 DIAGNOSIS — D69.6 THROMBOCYTOPENIA (H): ICD-10-CM

## 2023-03-03 DIAGNOSIS — D69.6 LOW PLATELET COUNT (H): ICD-10-CM

## 2023-03-03 LAB
BASOPHILS # BLD AUTO: 0.1 10E3/UL (ref 0–0.2)
BASOPHILS NFR BLD AUTO: 1 %
BLOOD BANK CHART COMMENT: NORMAL
EOSINOPHIL # BLD AUTO: 0.1 10E3/UL (ref 0–0.7)
EOSINOPHIL NFR BLD AUTO: 1 %
ERYTHROCYTE [DISTWIDTH] IN BLOOD BY AUTOMATED COUNT: 12 % (ref 10–15)
HCT VFR BLD AUTO: 47.1 % (ref 40–53)
HGB BLD-MCNC: 16.6 G/DL (ref 13.3–17.7)
IMM GRANULOCYTES # BLD: 0.1 10E3/UL
IMM GRANULOCYTES NFR BLD: 1 %
LYMPHOCYTES # BLD AUTO: 1.7 10E3/UL (ref 0.8–5.3)
LYMPHOCYTES NFR BLD AUTO: 18 %
MCH RBC QN AUTO: 29.4 PG (ref 26.5–33)
MCHC RBC AUTO-ENTMCNC: 35.2 G/DL (ref 31.5–36.5)
MCV RBC AUTO: 83 FL (ref 78–100)
MONOCYTES # BLD AUTO: 0.9 10E3/UL (ref 0–1.3)
MONOCYTES NFR BLD AUTO: 10 %
NEUTROPHILS # BLD AUTO: 6.7 10E3/UL (ref 1.6–8.3)
NEUTROPHILS NFR BLD AUTO: 69 %
NRBC # BLD AUTO: 0 10E3/UL
NRBC BLD AUTO-RTO: 0 /100
PLATELET # BLD AUTO: 162 10E3/UL (ref 150–450)
RBC # BLD AUTO: 5.65 10E6/UL (ref 4.4–5.9)
SPECIMEN EXPIRATION DATE: NORMAL
WBC # BLD AUTO: 9.5 10E3/UL (ref 4–11)

## 2023-03-03 PROCEDURE — 36415 COLL VENOUS BLD VENIPUNCTURE: CPT

## 2023-03-03 PROCEDURE — 86901 BLOOD TYPING SEROLOGIC RH(D): CPT | Performed by: INTERNAL MEDICINE

## 2023-03-03 PROCEDURE — 86880 COOMBS TEST DIRECT: CPT | Performed by: INTERNAL MEDICINE

## 2023-03-03 PROCEDURE — 84999 UNLISTED CHEMISTRY PROCEDURE: CPT | Performed by: INTERNAL MEDICINE

## 2023-03-03 PROCEDURE — 86900 BLOOD TYPING SEROLOGIC ABO: CPT | Performed by: INTERNAL MEDICINE

## 2023-03-03 PROCEDURE — 85004 AUTOMATED DIFF WBC COUNT: CPT | Performed by: PHYSICIAN ASSISTANT

## 2023-03-03 NOTE — PROGRESS NOTES
Medical Assistant Note:  Billy Carey presents today for blood draw.    Patient seen by provider today: No.   present during visit today: Not Applicable.    Concerns: No Concerns.    Procedure:  Labs drawn    Post Assessment:  Labs drawn without difficulty: Yes.    Discharge Plan:  Departure Mode: Ambulatory.    Face to Face Time: 10 min.    Radha Burt CMA

## 2023-03-05 ENCOUNTER — PATIENT OUTREACH (OUTPATIENT)
Dept: ONCOLOGY | Facility: CLINIC | Age: 33
End: 2023-03-05
Payer: COMMERCIAL

## 2023-03-05 DIAGNOSIS — R59.1 DIFFUSE LYMPHADENOPATHY: ICD-10-CM

## 2023-03-05 DIAGNOSIS — D69.6 THROMBOCYTOPENIA (H): Primary | ICD-10-CM

## 2023-03-05 DIAGNOSIS — D69.3 AUTOIMMUNE THROMBOCYTOPENIA (H): ICD-10-CM

## 2023-03-05 NOTE — PROGRESS NOTES
[facility]: Cancer Care Short Note                                                                                          Completed chart audit to assign Oncology Care Coordination enrollment status.    Per communication with Dr. Alli Joy, pt needs lab orders entered for CBC/d/p, CMP, CRP, IgG and MIRA.    Carin Kohli, RN, OCN   RN Care Coordinator   [facility]    .

## 2023-03-06 ENCOUNTER — PATIENT OUTREACH (OUTPATIENT)
Dept: ONCOLOGY | Facility: CLINIC | Age: 33
End: 2023-03-06
Payer: COMMERCIAL

## 2023-03-06 LAB
DAT POLY: NORMAL
SPECIMEN EXPIRATION DATE: NORMAL

## 2023-03-06 NOTE — PROGRESS NOTES
[facility]: Cancer Care Plan of Care Education Note                                    Discussion with Patient:                                                      RN spoke with pt, baldemar Cervantes. Introduced self and role of RN Care Coordinator at [facility]. Provided my contact information, [facility] phone number (which has options to talk with a Nurse available 24/7 - triage and RNCC via this option during business hours). Will send copy of business card to pt via [employer] message.  Discussed use of [employer] including to not use it for symptoms and time expectations.    Assessment:                                                      Assessment completed with:: Patient    Current living arrangement       Plan of Care Education   Yearly learning assessment completed?: No (As pt has in person visit tomorrow, 3/7/23, discussed completing learning assessment tomorrow with pt.)  Diagnosis:: ITP is suspected dx at this time  Does patient understand diagnosis?:  (Did not address as pt has in person visit tomorrow, 3/6/23 with Dr. Alli Joy.)  Preparing for treatment::  (Discussed need for additional labs to be drawn tomorrow, 3/7, re: ongoing trend info to help with dx & management. Discussed need to mask in clinic but can have fam or friend attend Dr. Joy appt.  Encouraged writing questions for visit.)  Plan of Care:: Lab appointment;MD follow-up appointment    Evaluation of Learning  Response:: Verbalizes understanding    Intervention/Education provided during outreach:                                                       Also discussed that Dr. Alli Joy may have been informally consulted about his care recently but RN does not know.   Plan to meet pt in person on 3/7 to into self in person as well as work with pt regarding learning assessment and consent to communicate in addition to any questions that come up after meeting with Dr. Alli Joy.    Sent  business card via [employer].     Signature:  Carin Kohli RN, OCN

## 2023-03-07 ENCOUNTER — APPOINTMENT (OUTPATIENT)
Dept: LAB | Facility: CLINIC | Age: 33
End: 2023-03-07
Attending: INTERNAL MEDICINE
Payer: COMMERCIAL

## 2023-03-07 ENCOUNTER — PRE VISIT (OUTPATIENT)
Dept: TRANSPLANT | Facility: CLINIC | Age: 33
End: 2023-03-07
Payer: COMMERCIAL

## 2023-03-07 ENCOUNTER — PATIENT OUTREACH (OUTPATIENT)
Dept: ONCOLOGY | Facility: CLINIC | Age: 33
End: 2023-03-07

## 2023-03-07 ENCOUNTER — ONCOLOGY VISIT (OUTPATIENT)
Dept: TRANSPLANT | Facility: CLINIC | Age: 33
End: 2023-03-07
Attending: PHYSICIAN ASSISTANT
Payer: COMMERCIAL

## 2023-03-07 VITALS
OXYGEN SATURATION: 98 % | TEMPERATURE: 99.5 F | WEIGHT: 272.6 LBS | RESPIRATION RATE: 16 BRPM | HEIGHT: 75 IN | SYSTOLIC BLOOD PRESSURE: 129 MMHG | BODY MASS INDEX: 33.89 KG/M2 | HEART RATE: 68 BPM | DIASTOLIC BLOOD PRESSURE: 76 MMHG

## 2023-03-07 DIAGNOSIS — R59.1 DIFFUSE LYMPHADENOPATHY: ICD-10-CM

## 2023-03-07 DIAGNOSIS — D69.6 THROMBOCYTOPENIA (H): ICD-10-CM

## 2023-03-07 DIAGNOSIS — D69.3 AUTOIMMUNE THROMBOCYTOPENIA (H): ICD-10-CM

## 2023-03-07 DIAGNOSIS — D59.10 AUTOIMMUNE HEMOLYTIC ANEMIA (H): ICD-10-CM

## 2023-03-07 LAB
ALBUMIN SERPL BCG-MCNC: 4.1 G/DL (ref 3.5–5.2)
ALP SERPL-CCNC: 64 U/L (ref 40–129)
ALT SERPL W P-5'-P-CCNC: 173 U/L (ref 10–50)
ANION GAP SERPL CALCULATED.3IONS-SCNC: 10 MMOL/L (ref 7–15)
AST SERPL W P-5'-P-CCNC: 93 U/L (ref 10–50)
BASOPHILS # BLD AUTO: 0.1 10E3/UL (ref 0–0.2)
BASOPHILS NFR BLD AUTO: 1 %
BILIRUB SERPL-MCNC: 1 MG/DL
BLOOD BANK CHART COMMENT: NORMAL
BUN SERPL-MCNC: 15.3 MG/DL (ref 6–20)
C3 SERPL-MCNC: 104 MG/DL (ref 81–157)
C4 SERPL-MCNC: 27 MG/DL (ref 13–39)
CALCIUM SERPL-MCNC: 9.1 MG/DL (ref 8.6–10)
CHLORIDE SERPL-SCNC: 101 MMOL/L (ref 98–107)
CREAT SERPL-MCNC: 1.01 MG/DL (ref 0.67–1.17)
CRP SERPL-MCNC: <3 MG/L
DAT, ANTI-C3: NEGATIVE
DAT, ANTI-IGG, TUBE: NORMAL
DEPRECATED HCO3 PLAS-SCNC: 26 MMOL/L (ref 22–29)
EOSINOPHIL # BLD AUTO: 0.1 10E3/UL (ref 0–0.7)
EOSINOPHIL NFR BLD AUTO: 2 %
ERYTHROCYTE [DISTWIDTH] IN BLOOD BY AUTOMATED COUNT: 11.9 % (ref 10–15)
GFR SERPL CREATININE-BSD FRML MDRD: >90 ML/MIN/1.73M2
GLUCOSE SERPL-MCNC: 122 MG/DL (ref 70–99)
HAPTOGLOB SERPL-MCNC: 50 MG/DL (ref 32–197)
HCT VFR BLD AUTO: 45.9 % (ref 40–53)
HGB BLD-MCNC: 16.3 G/DL (ref 13.3–17.7)
IGG SERPL-MCNC: 1371 MG/DL (ref 610–1616)
IMM GRANULOCYTES # BLD: 0 10E3/UL
IMM GRANULOCYTES NFR BLD: 0 %
LYMPHOCYTES # BLD AUTO: 1.6 10E3/UL (ref 0.8–5.3)
LYMPHOCYTES NFR BLD AUTO: 28 %
MCH RBC QN AUTO: 28.7 PG (ref 26.5–33)
MCHC RBC AUTO-ENTMCNC: 35.5 G/DL (ref 31.5–36.5)
MCV RBC AUTO: 81 FL (ref 78–100)
MONOCYTES # BLD AUTO: 0.6 10E3/UL (ref 0–1.3)
MONOCYTES NFR BLD AUTO: 11 %
NEUTROPHILS # BLD AUTO: 3.3 10E3/UL (ref 1.6–8.3)
NEUTROPHILS NFR BLD AUTO: 58 %
NRBC # BLD AUTO: 0 10E3/UL
NRBC BLD AUTO-RTO: 0 /100
PLATELET # BLD AUTO: 115 10E3/UL (ref 150–450)
POTASSIUM SERPL-SCNC: 3.8 MMOL/L (ref 3.4–5.3)
PROT SERPL-MCNC: 7.1 G/DL (ref 6.4–8.3)
RBC # BLD AUTO: 5.68 10E6/UL (ref 4.4–5.9)
RHEUMATOID FACT SER NEPH-ACNC: <7 IU/ML
SODIUM SERPL-SCNC: 137 MMOL/L (ref 136–145)
SPECIMEN EXPIRATION DATE: NORMAL
WBC # BLD AUTO: 5.8 10E3/UL (ref 4–11)

## 2023-03-07 PROCEDURE — 83010 ASSAY OF HAPTOGLOBIN QUANT: CPT | Performed by: INTERNAL MEDICINE

## 2023-03-07 PROCEDURE — 86160 COMPLEMENT ANTIGEN: CPT | Performed by: INTERNAL MEDICINE

## 2023-03-07 PROCEDURE — 82784 ASSAY IGA/IGD/IGG/IGM EACH: CPT | Performed by: INTERNAL MEDICINE

## 2023-03-07 PROCEDURE — 86880 COOMBS TEST DIRECT: CPT | Performed by: INTERNAL MEDICINE

## 2023-03-07 PROCEDURE — 86235 NUCLEAR ANTIGEN ANTIBODY: CPT | Performed by: INTERNAL MEDICINE

## 2023-03-07 PROCEDURE — 36415 COLL VENOUS BLD VENIPUNCTURE: CPT | Performed by: INTERNAL MEDICINE

## 2023-03-07 PROCEDURE — G0463 HOSPITAL OUTPT CLINIC VISIT: HCPCS | Performed by: INTERNAL MEDICINE

## 2023-03-07 PROCEDURE — 86431 RHEUMATOID FACTOR QUANT: CPT | Performed by: INTERNAL MEDICINE

## 2023-03-07 PROCEDURE — 86140 C-REACTIVE PROTEIN: CPT | Performed by: INTERNAL MEDICINE

## 2023-03-07 PROCEDURE — 85041 AUTOMATED RBC COUNT: CPT | Performed by: INTERNAL MEDICINE

## 2023-03-07 PROCEDURE — 80053 COMPREHEN METABOLIC PANEL: CPT | Performed by: INTERNAL MEDICINE

## 2023-03-07 PROCEDURE — 99205 OFFICE O/P NEW HI 60 MIN: CPT | Performed by: INTERNAL MEDICINE

## 2023-03-07 RX ORDER — PROCHLORPERAZINE 25 MG/1
SUPPOSITORY RECTAL
COMMUNITY
Start: 2023-01-11

## 2023-03-07 ASSESSMENT — PAIN SCALES - GENERAL: PAINLEVEL: NO PAIN (0)

## 2023-03-07 NOTE — PROGRESS NOTES
[facility]: Cancer Care                                                                                          Met with pt, Billy, and his wife, Jayla today.  Reminded pt he spoke with RN yesterday via phone.    Completed learning assessment with pt today.  Pt completed authorization to share PHI (consent to communicate) today.  Gave us permission to discuss scheduling, medical and billing information with Jayla as well as for her to  items.  RN gave form to BOH team for entering into EMR.    Signature:  Carin Kohli RN, OCN

## 2023-03-07 NOTE — NURSING NOTE
Chief Complaint   Patient presents with     Blood Draw     Labs drawn via  by RN. Vitals taken.     Labs drawn with  by RN. Vitals taken. Patient checked into next appointment.    Yisel Vasquez RN

## 2023-03-07 NOTE — NURSING NOTE
"Oncology Rooming Note    March 7, 2023 9:36 AM   Billy Carey is a 32 year old male who presents for:    Chief Complaint   Patient presents with     Blood Draw     Labs drawn via  by RN. Vitals taken.     Oncology Clinic Visit     [facility] NEW - THROMBOCYTOPENIA / AUTOIMMUNE HEMOLYTIC ANEMIA      Initial Vitals: /76 (BP Location: Right arm, Patient Position: Sitting, Cuff Size: Adult Large)   Pulse 68   Temp 99.5  F (37.5  C) (Oral)   Resp 16   Ht 1.905 m (6' 3\")   Wt 123.7 kg (272 lb 9.6 oz)   SpO2 98%   BMI 34.07 kg/m   Estimated body mass index is 34.07 kg/m  as calculated from the following:    Height as of this encounter: 1.905 m (6' 3\").    Weight as of this encounter: 123.7 kg (272 lb 9.6 oz). Body surface area is 2.56 meters squared.  No Pain (0) Comment: Data Unavailable   No LMP for male patient.  Allergies reviewed: Yes  Medications reviewed: Yes    Medications: Medication refills not needed today.  Pharmacy name entered into [facility]:    [id number] - [place], [place] - [address] [place] [address] [address] [facility]  [id number] - [place] - [address]  [id number] - [place], [place] - [address]  [facility] - [place], [place] - [address] [facility]    Johann Yordan Claros LPN            " Dx: Gait instability, Heart failure, LVAD (left ventricular assist device) present          Authorized # of Visits:  10         Next MD visit: none scheduled  Fall Risk: High        Precautions:  Pacemaker, O2 2L,    LVAD (brand: Heartware) Restrictions: restrictive AD, demonstrating improved gait speed for improved participation in ADL such as community ambulation.  - original goal met, progress to <10 sec  · Pt will perform DGI with score of 20/24 or greater with least restrictive AD to demonstrate abilit weighted pulley rows, 7# x 15, 3# x 15  - weighted pulley ext, 3# 2 x 15 Airex beam  - fwd tandem x 6 laps  - side step x 6 laps Airex beam  - fwd tandem x 6 laps  - side step x 6 laps   - 2# hand weights  - alt flexion x 10 R/L  -  press HELEN x 10 hurdles, airex, airex beam lateral, p/cones x 4 SBA Obstacle course: hurdles, airex SBA  - with unsteady items on plate x 4  - p/u items to put on plate x 4 Obstacle course: hurdles, airex, retrieve cone at the end x 3 laps SBA (very fatiguing) Obstacle co

## 2023-03-07 NOTE — PROGRESS NOTES
Hematology/Oncology Consult Note    Billy Carey MRN# [id number]   Age: 32 year old YOB: 1990          Reason for Consult:   thrombocytopenia         Assessment and Plan:   Billy Carey is a 32 year old   ASSESSMENT:    1.  Immune thrombocytopenic purpura.  2.  Positive MIRA, IgG and C3d.  3.  Type 1 diabetes.  4.  History of COVID.  5.  Status post lymph node biopsies and bone marrow biopsies  6.  Positive PAVAN 1:160 .     This is a very unusual presentation of immune thrombocytopenia and a positive MIRA that, if he were hemolyzing, it would say he had Hackett syndrome.Of interest he is not briskly hemolyzing and his  MIRA is now weakly positive but that may be related still to the IV IgG affecting assay or dex decreasing antibody production.  It does seem to be immune, especially because he has had an excellent response to IVIgG suggesting that Fc blockade improves the counts by blocking the clearance of antibody-coated platelets.  The lymphadenopathy is impressive and he does have an PAVAN.  Does he have some kind of an autoimmune rheumatologic disorder?  I have been impressed that autoimmune disorders following COVID are more common than we ever dreamed of.  A nice review in The Lancet in January 09, 2023 ( DOI:https://doi.org/10.1016/j.eclinm.2022.719536) shows an increased incidence of RA, lupus and other autoimmune disorders.  How COVID can elicit an autoimmune response and lead to an anergic response to an autoimmune clone is unclear to me.  I worry in Billy about long-term steroid use and I would like to propose that although high-dose dexamethasone could be used for his ITP, I think it might be most reasonable, especially with the positive PAVAN, to consider rituximab 375 mg/m2 weekly x4.  That may be able to steroid-spare him. If platelets drop to 50,000 or less would start rituxan. I would continue to watch for autoimmune diseases evolving.  I am impressed with how hot the PET scan was, suggesting  that inflammatory cells were taking up glucose quite readily.  We do have a good node biopsy from the axilla and so I am confident at this time that we cannot document a B-cell lymphoma.  I will work with Dr. Lopez regarding his course and he can primarily follow up with her and I will be available for consultation.    I spent a total of  60 minutes face to face with Billy Carey during today's office visit. Over 50% of this time was spent counseling the patient and coordinating the care regarding immunethrombocytopenia  and 30 minutes preparing to see the patient and  care coordination   Moose Joy MD  [phone]       History of Present Illness:   History obtained from chart review and confirmed with patient.    I had the pleasure of seeing Billy Carey in consultation for Dr. Hayde Lopez for evaluation of autoimmune thrombocytopenia and a history of a positive MIRA.  Billy has been a healthy young man.  Participated in track while at the [employer] including shot put who developed COVID in 05/2022 with fatigue, joint aches and fever.  He did not receive any other medications.  He had been vaccinated.  He was really fine until around Thanksgiving.  He developed a cough with fever and fatigue.  It was productive.  He did not receive an antibiotic.  He had no night sweats or fevers.  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  A repeat was 43,000.  He was seen by Dr. Hayde Lopez on 12/06.  At that time, his platelet count was 46,000, hemoglobin 17.6.  MIRA was positive +2, IgG, C3d.  Creatinine was slightly elevated.  A CT scan showed a 3 cm mass in the pericardial fat but he also had periaortic nodes, iliac nodes, axillary nodes.  His spleen was 15.5 cm.  A PET scan was done.  Showed increased uptake with SUV of 6.5 in the right axilla, 12.5 in the retroperitoneal nodes, and the  left iliac showed an SUV of 11.  An attempt at an interventional radiology needle biopsy of the retroperitoneal was done but no tissue was noted.  He subsequently underwent a biopsy of a right axillary 3 x 2 cm mass.  The biopsy showed polytypic lymphocytes and no evidence of lymphoma, extensive immunocytochemistry could not identify any clonal abnormalities.  Flow cytometry and cytogenetics were also done in December.  He had a bone marrow biopsy which showed hypercellular marrow with adequate megakaryocytes and no evidence of lymphoma.  Interestingly, despite having a continuous MIRA positivity, his hemoglobin remained 15-16gm% with minimal evidence of hemolysis. Haptoglobin was low at  22  While retic count and LDH not elevated He was given prednisone to see if his platelet count would respond, 60 mg a day, at the end of January for 2 weeks.  Again, he had no significant bleeding, no petechiae, no weight loss, no fevers, no chills.  On 02/15, his platelet count had fallen to 18,000, had nose bleed  and he was admitted and received IVIgG for 2 days and dexamethasone 40 mg a day for 4 days.  His platelet count gisell to 196,000 and he is referred here for further evaluation.  He has had an extensive evaluation for autoimmune disease.  His IgG level is 956, IgA 135 and IgM 44.  His haptoglobin was 118.  No further evidence of hemolysis.  No abnormality in kappa or lambda free light chains.  No monoclonal protein seen.  Cytogenetics have been normal.  Flow cytometry showed no immunophenotypic evidence of non-Hodgkin lymphoma on the lymph node biopsies.  He also had an extensive viral evaluation.  He was negative for COVID.  EBV DNA was negative.  Hep B surface antigen, hep B core antibody, hep B surface antibody were positive for vaccination.  Hepatitis C was negative.  HIV was negative.  Influenza was negative.  RSV was negative.  Lupus anticoagulant was absent.  PAVAN was positive at 1:160 in a nucleolar pattern.   Complement levels were drawn.  Today, he feels good with no bleeding.  He said the dexamethasone did raise his blood glucose levels, so he had to alter his insulin requirements.             Review of Systems:   A comprehensive ROS was performed with the patient and was found to be negative with the exception of that noted in the HPI above.  EYES  Some DM retinopathy  RESP Has cough since end of November with cold sx Continue to have a cough No asthma  COR  No chest pain no palpitations  GI  No GI bleeding No Reflux  ENDO Not thryoid issues Has insulin pump for DM   No UTIs  NEURO no seizure or HA  ID Had COVID in May 2022 with fatiuge joint aches, fever  PSYCH Mood ok         Past Medical History:     Past Medical History:   Diagnosis Date     Diabetes mellitus type 1, uncontrolled, insulin dependent 9/16/2013            Past Surgical History:     Past Surgical History:   Procedure Laterality Date     EXCISE MASS GROIN Left 1/20/2023    Procedure: Lymph Node excisional biopsy;  Surgeon: New Burger MD;  Location: [facility] OR     MYRINGOTOMY, INSERT TUBE BILATERAL, COMBINED Bilateral     As a child            Social History:     Social History     Socioeconomic History     Marital status:      Spouse name: Jayla     Number of children: 3     Years of education: Not on file     Highest education level: Not on file   Occupational History     Occupation:      Comment: [employer]   Tobacco Use     Smoking status: Never     Smokeless tobacco: Never   Substance and Sexual Activity     Alcohol use: No     Comment: rarely     Drug use: No     Sexual activity: Yes     Partners: Female   Other Topics Concern     Parent/sibling w/ CABG, MI or angioplasty before 65F 55M? Not Asked   Social History Narrative     Not on file     Social Determinants of Health     Financial Resource Strain: Low Risk      Difficulty of Paying Living Expenses: Not hard at all   Food Insecurity: No Food Insecurity      "Worried About Running Out of Food in the Last Year: Never true     Ran Out of Food in the Last Year: Never true   Transportation Needs: No Transportation Needs     Lack of Transportation (Medical): No     Lack of Transportation (Non-Medical): No   Physical Activity: Sufficiently Active     Days of Exercise per Week: 6 days     Minutes of Exercise per Session: 40 min   Stress: No Stress Concern Present     Feeling of Stress : Only a little   Social Connections: Socially Integrated     Frequency of Communication with Friends and Family: More than three times a week     Frequency of Social Gatherings with Friends and Family: More than three times a week     Attends [employer] Services: 1 to 4 times per year     Active Member of Clubs or Organizations: Yes     Attends Club or Organization Meetings: Not on file     Marital Status:    Intimate Partner Violence: Not on file   Housing Stability: Low Risk      Unable to Pay for Housing in the Last Year: No     Number of Places Lived in the Last Year: 1     Unstable Housing in the Last Year: No            Family History:     Family History   Problem Relation Age of Onset     C.A.D. No family hx of      Diabetes No family hx of      Hypertension No family hx of      Cancer No family hx of         no skin cancer     Amblyopia No family hx of      Retinal detachment No family hx of      Thyroid Disease No family hx of      Glaucoma No family hx of      Macular Degeneration No family hx of             Allergies:     Allergies   Allergen Reactions     Penicillins Rash            Medications:   (Not in a hospital admission)           Physical Exam:   /76 (BP Location: Right arm, Patient Position: Sitting, Cuff Size: Adult Large)   Pulse 68   Temp 99.5  F (37.5  C) (Oral)   Resp 16   Ht 1.905 m (6' 3\")   Wt 123.7 kg (272 lb 9.6 oz)   SpO2 98%   BMI 34.07 kg/m    Vitals:    03/07/23 0853   Weight: 123.7 kg (272 lb 9.6 oz)     General: Appears well, sitting in bed, " in no acute distress.  Heme/Lymph: No overt bleeding. No cervical, axillary, or supraclavicular adenopathy.  Skin: No concerning lesions, rash, jaundice, cyanosis, erythema, or ecchymoses on exposed surfaces.No petechiae  HEENT: NCAT. EOMI, anicteric sclera. Oral mucosa pink and moist with no lesions or thrush.  Respiratory: Non-labored breathing, good air exchange, lungs clear to auscultation bilaterally.  Cardiovascular: Regular rate and rhythm. No murmur or rub.   Gastrointestinal: Normoactive bowel sounds. Abdomen soft, non-distended, and non-tender. No palpable masses or organomegaly.  Extremities: No extremity edema.   Neurologic: A&O x 3, speech normal, sensation to light touch grossly WNL.  Lymph No  Cervical nodes I cannot feel axillary nodes Scar in left inguinal area with fullness         Data:   I have personally reviewed the following labs/imaging:  CBC@LABRCNTIPR(wbc:4,rbc:4,hgb:4,hct:4,mcv:4,mch:4,mchc:4,rdw:4,plt:4)@  CMP@LABRCNTIPR(na:4,potassium:4,chloride:4,co2:4,aniongap:4,g,bun:4,cr:4,gfrestimated:4,gfrestblack:4,jason:4,ma,phos:4,prottotal:4,albumin:4,bilitotal:4,alkphos:4,ast:4,alt:4)@  INR@LABRCNTIPR(inr:4)@   Latest Reference Range & Units 23 09:00   Sodium 136 - 145 mmol/L 137   Potassium 3.4 - 5.3 mmol/L 3.8   Chloride 98 - 107 mmol/L 101   Carbon Dioxide (CO2) 22 - 29 mmol/L 26   Urea Nitrogen 6.0 - 20.0 mg/dL 15.3   Creatinine 0.67 - 1.17 mg/dL 1.01   GFR Estimate >60 mL/min/1.73m2 >90   Calcium 8.6 - 10.0 mg/dL 9.1   Anion Gap 7 - 15 mmol/L 10   Albumin 3.5 - 5.2 g/dL 4.1   Protein Total 6.4 - 8.3 g/dL 7.1   Alkaline Phosphatase 40 - 129 U/L 64   ALT 10 - 50 U/L 173 (H)   AST 10 - 50 U/L 93 (H)   Bilirubin Total <=1.2 mg/dL 1.0   CRP Inflammation <5.00 mg/L <3.00   Glucose 70 - 99 mg/dL 122 (H)   Rheumatoid Factor <12 IU/mL <7   WBC 4.0 - 11.0 10e3/uL 5.8   Hemoglobin 13.3 - 17.7 g/dL 16.3   Hematocrit 40.0 - 53.0 % 45.9   Platelet Count 150 - 450 10e3/uL 115 (L)   RBC Count  4.40 - 5.90 10e6/uL 5.68   MCV 78 - 100 fL 81   MCH 26.5 - 33.0 pg 28.7   MCHC 31.5 - 36.5 g/dL 35.5   RDW 10.0 - 15.0 % 11.9   % Neutrophils % 58   % Lymphocytes % 28   % Monocytes % 11   % Eosinophils % 2   % Basophils % 1   Absolute Basophils 0.0 - 0.2 10e3/uL 0.1   Absolute Eosinophils 0.0 - 0.7 10e3/uL 0.1   Absolute Immature Granulocytes <=0.4 10e3/uL 0.0   Absolute Lymphocytes 0.8 - 5.3 10e3/uL 1.6   Absolute Monocytes 0.0 - 1.3 10e3/uL 0.6   % Immature Granulocytes % 0   Absolute Neutrophils 1.6 - 8.3 10e3/uL 3.3   Absolute NRBCs 10e3/uL 0.0   NRBCs per 100 WBC <1 /100 0   SPECIMEN EXPIRATION DATE  [id number]   MIRA Anti-IgG,-C3d  Weak Positive   MIRA Anti-C3  Negative   MIRA Anti-IgG, Tube  Weak Positive   Blood Bank Chart Comment  BB Chart Comment   Complement C3 81 - 157 mg/dL 104   Complement C4 13 - 39 mg/dL 27   Haptoglobin 32 - 197 mg/dL 50   (H): Data is abnormally high  (

## 2023-03-07 NOTE — LETTER
3/7/2023         RE: Billy Carey  [address] [address]        Dear Colleague,    Thank you for referring your patient, Billy Carey, to the [facility] [place]. Please see a copy of my visit note below.    Hematology/Oncology Consult Note    Billy Carey MRN# [id number]   Age: 32 year old YOB: 1990          Reason for Consult:   thrombocytopenia         Assessment and Plan:   Billy Carey is a 32 year old   ASSESSMENT:    1.  Immune thrombocytopenic purpura.  2.  Positive MIRA, IgG and C3d.  3.  Type 1 diabetes.  4.  History of COVID.  5.  Status post lymph node biopsies and bone marrow biopsies  6.  Positive PAVAN 1:160 .     This is a very unusual presentation of immune thrombocytopenia and a positive MIRA that, if he were hemolyzing, it would say he had Hackett syndrome.Of interest he is not briskly hemolyzing and his  MIRA is now weakly positive but that may be related still to the IV IgG affecting assay or dex decreasing antibody production.  It does seem to be immune, especially because he has had an excellent response to IVIgG suggesting that Fc blockade improves the counts by blocking the clearance of antibody-coated platelets.  The lymphadenopathy is impressive and he does have an PAVAN.  Does he have some kind of an autoimmune rheumatologic disorder?  I have been impressed that autoimmune disorders following COVID are more common than we ever dreamed of.  A nice review in The Lancet in January 09, 2023 ( DOI:https://doi.org/10.1016/j.eclinm.2022.090853) shows an increased incidence of RA, lupus and other autoimmune disorders.  How COVID can elicit an autoimmune response and lead to an anergic response to an autoimmune clone is unclear to me.  I worry in Billy about long-term steroid use and I would like to propose that although high-dose dexamethasone could be used for his ITP, I think it might be most reasonable, especially  with the positive PAVAN, to consider rituximab 375 mg/m2 weekly x4.  That may be able to steroid-spare him. If platelets drop to 50,000 or less would start rituxan. I would continue to watch for autoimmune diseases evolving.  I am impressed with how hot the PET scan was, suggesting that inflammatory cells were taking up glucose quite readily.  We do have a good node biopsy from the axilla and so I am confident at this time that we cannot document a B-cell lymphoma.  I will work with Dr. Lopez regarding his course and he can primarily follow up with her and I will be available for consultation.    I spent a total of  60 minutes face to face with Billy Carey during today's office visit. Over 50% of this time was spent counseling the patient and coordinating the care regarding immunethrombocytopenia  and 30 minutes preparing to see the patient and  care coordination   Moose Joy MD  [phone]       History of Present Illness:   History obtained from chart review and confirmed with patient.    I had the pleasure of seeing Billy Carey in consultation for Dr. Hayde Lopez for evaluation of autoimmune thrombocytopenia and a history of a positive MIRA.  Billy has been a healthy young man.  Participated in track while at the [facility] including shot put who developed COVID in 05/2022 with fatigue, joint aches and fever.  He did not receive any other medications.  He had been vaccinated.  He was really fine until around Thanksgiving.  He developed a cough with fever and fatigue.  It was productive.  He did not receive an antibiotic.  He had no night sweats or fevers.  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  A repeat was 43,000.  He was seen by Dr. Hayde Lopez on 12/06.  At that time, his platelet count was 46,000, hemoglobin 17.6.  MIRA was positive +2, IgG, C3d.  Creatinine was  slightly elevated.  A CT scan showed a 3 cm mass in the pericardial fat but he also had periaortic nodes, iliac nodes, axillary nodes.  His spleen was 15.5 cm.  A PET scan was done.  Showed increased uptake with SUV of 6.5 in the right axilla, 12.5 in the retroperitoneal nodes, and the left iliac showed an SUV of 11.  An attempt at an interventional radiology needle biopsy of the retroperitoneal was done but no tissue was noted.  He subsequently underwent a biopsy of a right axillary 3 x 2 cm mass.  The biopsy showed polytypic lymphocytes and no evidence of lymphoma, extensive immunocytochemistry could not identify any clonal abnormalities.  Flow cytometry and cytogenetics were also done in December.  He had a bone marrow biopsy which showed hypercellular marrow with adequate megakaryocytes and no evidence of lymphoma.  Interestingly, despite having a continuous MIRA positivity, his hemoglobin remained 15-16gm% with minimal evidence of hemolysis. Haptoglobin was low at  22  While retic count and LDH not elevated He was given prednisone to see if his platelet count would respond, 60 mg a day, at the end of January for 2 weeks.  Again, he had no significant bleeding, no petechiae, no weight loss, no fevers, no chills.  On 02/15, his platelet count had fallen to 18,000, had nose bleed  and he was admitted and received IVIgG for 2 days and dexamethasone 40 mg a day for 4 days.  His platelet count gisell to 196,000 and he is referred here for further evaluation.  He has had an extensive evaluation for autoimmune disease.  His IgG level is 956, IgA 135 and IgM 44.  His haptoglobin was 118.  No further evidence of hemolysis.  No abnormality in kappa or lambda free light chains.  No monoclonal protein seen.  Cytogenetics have been normal.  Flow cytometry showed no immunophenotypic evidence of non-Hodgkin lymphoma on the lymph node biopsies.  He also had an extensive viral evaluation.  He was negative for COVID.  EBV DNA was  negative.  Hep B surface antigen, hep B core antibody, hep B surface antibody were positive for vaccination.  Hepatitis C was negative.  HIV was negative.  Influenza was negative.  RSV was negative.  Lupus anticoagulant was absent.  PAVAN was positive at 1:160 in a nucleolar pattern.  Complement levels were drawn.  Today, he feels good with no bleeding.  He said the dexamethasone did raise his blood glucose levels, so he had to alter his insulin requirements.             Review of Systems:   A comprehensive ROS was performed with the patient and was found to be negative with the exception of that noted in the HPI above.  EYES  Some DM retinopathy  RESP Has cough since end of November with cold sx Continue to have a cough No asthma  COR  No chest pain no palpitations  GI  No GI bleeding No Reflux  ENDO Not thryoid issues Has insulin pump for DM   No UTIs  NEURO no seizure or HA  ID Had COVID in May 2022 with fatiuge joint aches, fever  PSYCH Mood ok         Past Medical History:     Past Medical History:   Diagnosis Date     Diabetes mellitus type 1, uncontrolled, insulin dependent 9/16/2013            Past Surgical History:     Past Surgical History:   Procedure Laterality Date     EXCISE MASS GROIN Left 1/20/2023    Procedure: Lymph Node excisional biopsy;  Surgeon: New Burger MD;  Location: [facility] OR     MYRINGOTOMY, INSERT TUBE BILATERAL, COMBINED Bilateral     As a child            Social History:     Social History     Socioeconomic History     Marital status:      Spouse name: Jayla     Number of children: 3     Years of education: Not on file     Highest education level: Not on file   Occupational History     Occupation:      Comment: [employer]   Tobacco Use     Smoking status: Never     Smokeless tobacco: Never   Substance and Sexual Activity     Alcohol use: No     Comment: rarely     Drug use: No     Sexual activity: Yes     Partners: Female   Other Topics Concern      Parent/sibling w/ CABG, MI or angioplasty before 65F 55M? Not Asked   Social History Narrative     Not on file     Social Determinants of Health     Financial Resource Strain: Low Risk      Difficulty of Paying Living Expenses: Not hard at all   Food Insecurity: No Food Insecurity     Worried About Running Out of Food in the Last Year: Never true     Ran Out of Food in the Last Year: Never true   Transportation Needs: No Transportation Needs     Lack of Transportation (Medical): No     Lack of Transportation (Non-Medical): No   Physical Activity: Sufficiently Active     Days of Exercise per Week: 6 days     Minutes of Exercise per Session: 40 min   Stress: No Stress Concern Present     Feeling of Stress : Only a little   Social Connections: Socially Integrated     Frequency of Communication with Friends and Family: More than three times a week     Frequency of Social Gatherings with Friends and Family: More than three times a week     Attends [employer] Services: 1 to 4 times per year     Active Member of Clubs or Organizations: Yes     Attends Club or Organization Meetings: Not on file     Marital Status:    Intimate Partner Violence: Not on file   Housing Stability: Low Risk      Unable to Pay for Housing in the Last Year: No     Number of Places Lived in the Last Year: 1     Unstable Housing in the Last Year: No            Family History:     Family History   Problem Relation Age of Onset     C.A.D. No family hx of      Diabetes No family hx of      Hypertension No family hx of      Cancer No family hx of         no skin cancer     Amblyopia No family hx of      Retinal detachment No family hx of      Thyroid Disease No family hx of      Glaucoma No family hx of      Macular Degeneration No family hx of             Allergies:     Allergies   Allergen Reactions     Penicillins Rash            Medications:   (Not in a hospital admission)           Physical Exam:   /76 (BP Location: Right arm, Patient  "Position: Sitting, Cuff Size: Adult Large)   Pulse 68   Temp 99.5  F (37.5  C) (Oral)   Resp 16   Ht 1.905 m (6' 3\")   Wt 123.7 kg (272 lb 9.6 oz)   SpO2 98%   BMI 34.07 kg/m    Vitals:    23 0853   Weight: 123.7 kg (272 lb 9.6 oz)     General: Appears well, sitting in bed, in no acute distress.  Heme/Lymph: No overt bleeding. No cervical, axillary, or supraclavicular adenopathy.  Skin: No concerning lesions, rash, jaundice, cyanosis, erythema, or ecchymoses on exposed surfaces.No petechiae  HEENT: NCAT. EOMI, anicteric sclera. Oral mucosa pink and moist with no lesions or thrush.  Respiratory: Non-labored breathing, good air exchange, lungs clear to auscultation bilaterally.  Cardiovascular: Regular rate and rhythm. No murmur or rub.   Gastrointestinal: Normoactive bowel sounds. Abdomen soft, non-distended, and non-tender. No palpable masses or organomegaly.  Extremities: No extremity edema.   Neurologic: A&O x 3, speech normal, sensation to light touch grossly WNL.  Lymph No  Cervical nodes I cannot feel axillary nodes Scar in left inguinal area with fullness         Data:   I have personally reviewed the following labs/imaging:  CBC@LABRCNTIPR(wbc:4,rbc:4,hgb:4,hct:4,mcv:4,mch:4,mchc:4,rdw:4,plt:4)@  CMP@LABRCNTIPR(na:4,potassium:4,chloride:4,co2:4,aniongap:4,g,bun:4,cr:4,gfrestimated:4,gfrestblack:4,jason:4,ma,phos:4,prottotal:4,albumin:4,bilitotal:4,alkphos:4,ast:4,alt:4)@  INR@LABRCNTIPR(inr:4)@   Latest Reference Range & Units 23 09:00   Sodium 136 - 145 mmol/L 137   Potassium 3.4 - 5.3 mmol/L 3.8   Chloride 98 - 107 mmol/L 101   Carbon Dioxide (CO2) 22 - 29 mmol/L 26   Urea Nitrogen 6.0 - 20.0 mg/dL 15.3   Creatinine 0.67 - 1.17 mg/dL 1.01   GFR Estimate >60 mL/min/1.73m2 >90   Calcium 8.6 - 10.0 mg/dL 9.1   Anion Gap 7 - 15 mmol/L 10   Albumin 3.5 - 5.2 g/dL 4.1   Protein Total 6.4 - 8.3 g/dL 7.1   Alkaline Phosphatase 40 - 129 U/L 64   ALT 10 - 50 U/L 173 (H)   AST 10 - 50 U/L 93 " (H)   Bilirubin Total <=1.2 mg/dL 1.0   CRP Inflammation <5.00 mg/L <3.00   Glucose 70 - 99 mg/dL 122 (H)   Rheumatoid Factor <12 IU/mL <7   WBC 4.0 - 11.0 10e3/uL 5.8   Hemoglobin 13.3 - 17.7 g/dL 16.3   Hematocrit 40.0 - 53.0 % 45.9   Platelet Count 150 - 450 10e3/uL 115 (L)   RBC Count 4.40 - 5.90 10e6/uL 5.68   MCV 78 - 100 fL 81   MCH 26.5 - 33.0 pg 28.7   MCHC 31.5 - 36.5 g/dL 35.5   RDW 10.0 - 15.0 % 11.9   % Neutrophils % 58   % Lymphocytes % 28   % Monocytes % 11   % Eosinophils % 2   % Basophils % 1   Absolute Basophils 0.0 - 0.2 10e3/uL 0.1   Absolute Eosinophils 0.0 - 0.7 10e3/uL 0.1   Absolute Immature Granulocytes <=0.4 10e3/uL 0.0   Absolute Lymphocytes 0.8 - 5.3 10e3/uL 1.6   Absolute Monocytes 0.0 - 1.3 10e3/uL 0.6   % Immature Granulocytes % 0   Absolute Neutrophils 1.6 - 8.3 10e3/uL 3.3   Absolute NRBCs 10e3/uL 0.0   NRBCs per 100 WBC <1 /100 0   SPECIMEN EXPIRATION DATE  [id number]   MIRA Anti-IgG,-C3d  Weak Positive   MIRA Anti-C3  Negative   MIRA Anti-IgG, Tube  Weak Positive   Blood Bank Chart Comment  BB Chart Comment   Complement C3 81 - 157 mg/dL 104   Complement C4 13 - 39 mg/dL 27   Haptoglobin 32 - 197 mg/dL 50   (H): Data is abnormally high  (      Moose Joy MD

## 2023-03-08 LAB
ENA SS-A AB SER IA-ACNC: 5.4 U/ML
ENA SS-A AB SER IA-ACNC: NEGATIVE

## 2023-03-09 LAB
DAT POLY: NORMAL
SPECIMEN EXPIRATION DATE: NORMAL

## 2023-03-10 ENCOUNTER — LAB (OUTPATIENT)
Dept: ONCOLOGY | Facility: CLINIC | Age: 33
End: 2023-03-10
Attending: INTERNAL MEDICINE
Payer: COMMERCIAL

## 2023-03-10 DIAGNOSIS — D69.6 LOW PLATELET COUNT (H): ICD-10-CM

## 2023-03-10 DIAGNOSIS — D69.6 THROMBOCYTOPENIA (H): ICD-10-CM

## 2023-03-10 DIAGNOSIS — D59.10 AUTOIMMUNE HEMOLYTIC ANEMIA (H): ICD-10-CM

## 2023-03-10 DIAGNOSIS — D69.3 AUTOIMMUNE THROMBOCYTOPENIA (H): ICD-10-CM

## 2023-03-10 DIAGNOSIS — R59.1 DIFFUSE LYMPHADENOPATHY: ICD-10-CM

## 2023-03-10 LAB
BASOPHILS # BLD AUTO: 0 10E3/UL (ref 0–0.2)
BASOPHILS NFR BLD AUTO: 1 %
EOSINOPHIL # BLD AUTO: 0.1 10E3/UL (ref 0–0.7)
EOSINOPHIL NFR BLD AUTO: 1 %
ERYTHROCYTE [DISTWIDTH] IN BLOOD BY AUTOMATED COUNT: 12 % (ref 10–15)
HCT VFR BLD AUTO: 44.3 % (ref 40–53)
HGB BLD-MCNC: 15.9 G/DL (ref 13.3–17.7)
IMM GRANULOCYTES # BLD: 0 10E3/UL
IMM GRANULOCYTES NFR BLD: 0 %
LYMPHOCYTES # BLD AUTO: 1.5 10E3/UL (ref 0.8–5.3)
LYMPHOCYTES NFR BLD AUTO: 20 %
MCH RBC QN AUTO: 29.1 PG (ref 26.5–33)
MCHC RBC AUTO-ENTMCNC: 35.9 G/DL (ref 31.5–36.5)
MCV RBC AUTO: 81 FL (ref 78–100)
MONOCYTES # BLD AUTO: 0.6 10E3/UL (ref 0–1.3)
MONOCYTES NFR BLD AUTO: 8 %
NEUTROPHILS # BLD AUTO: 5.4 10E3/UL (ref 1.6–8.3)
NEUTROPHILS NFR BLD AUTO: 70 %
NRBC # BLD AUTO: 0 10E3/UL
NRBC BLD AUTO-RTO: 0 /100
PERFORMING LABORATORY: NORMAL
PLATELET # BLD AUTO: 111 10E3/UL (ref 150–450)
RBC # BLD AUTO: 5.46 10E6/UL (ref 4.4–5.9)
RETICS # AUTO: 0.06 10E6/UL (ref 0.03–0.1)
RETICS/RBC NFR AUTO: 1.1 % (ref 0.5–2)
TEST NAME: NORMAL
WBC # BLD AUTO: 7.7 10E3/UL (ref 4–11)

## 2023-03-10 PROCEDURE — 86880 COOMBS TEST DIRECT: CPT | Performed by: INTERNAL MEDICINE

## 2023-03-10 PROCEDURE — 86901 BLOOD TYPING SEROLOGIC RH(D): CPT

## 2023-03-10 PROCEDURE — 86900 BLOOD TYPING SEROLOGIC ABO: CPT

## 2023-03-10 PROCEDURE — 85045 AUTOMATED RETICULOCYTE COUNT: CPT | Performed by: INTERNAL MEDICINE

## 2023-03-10 PROCEDURE — 85025 COMPLETE CBC W/AUTO DIFF WBC: CPT | Performed by: INTERNAL MEDICINE

## 2023-03-10 PROCEDURE — 86880 COOMBS TEST DIRECT: CPT

## 2023-03-10 PROCEDURE — 84999 UNLISTED CHEMISTRY PROCEDURE: CPT

## 2023-03-10 PROCEDURE — 36415 COLL VENOUS BLD VENIPUNCTURE: CPT

## 2023-03-10 NOTE — PROGRESS NOTES
Medical Assistant Note:  Billy Carey presents today for blood draw.    Patient seen by provider today: No.   present during visit today: Not Applicable.    Concerns: No Concerns.    Procedure:  Lab draw site: rt antecub, Needle type: butterfly, Gauge: 23.    Post Assessment:  Labs drawn without difficulty: Yes.    Discharge Plan:  Departure Mode: Ambulatory.    Face to Face Time: 10 mins.    Judy Jarrell CMA

## 2023-03-16 LAB
PERFORMING LABORATORY: NORMAL
TEST NAME: NORMAL

## 2023-03-17 ENCOUNTER — LAB (OUTPATIENT)
Dept: ONCOLOGY | Facility: CLINIC | Age: 33
End: 2023-03-17
Attending: INTERNAL MEDICINE
Payer: COMMERCIAL

## 2023-03-17 DIAGNOSIS — D69.6 THROMBOCYTOPENIA (H): ICD-10-CM

## 2023-03-17 LAB
BASOPHILS # BLD AUTO: 0.1 10E3/UL (ref 0–0.2)
BASOPHILS NFR BLD AUTO: 1 %
EOSINOPHIL # BLD AUTO: 0.1 10E3/UL (ref 0–0.7)
EOSINOPHIL NFR BLD AUTO: 2 %
ERYTHROCYTE [DISTWIDTH] IN BLOOD BY AUTOMATED COUNT: 12.1 % (ref 10–15)
HCT VFR BLD AUTO: 48.8 % (ref 40–53)
HGB BLD-MCNC: 17.2 G/DL (ref 13.3–17.7)
IMM GRANULOCYTES # BLD: 0 10E3/UL
IMM GRANULOCYTES NFR BLD: 0 %
LYMPHOCYTES # BLD AUTO: 2.1 10E3/UL (ref 0.8–5.3)
LYMPHOCYTES NFR BLD AUTO: 25 %
MCH RBC QN AUTO: 29.1 PG (ref 26.5–33)
MCHC RBC AUTO-ENTMCNC: 35.2 G/DL (ref 31.5–36.5)
MCV RBC AUTO: 82 FL (ref 78–100)
MONOCYTES # BLD AUTO: 0.8 10E3/UL (ref 0–1.3)
MONOCYTES NFR BLD AUTO: 10 %
NEUTROPHILS # BLD AUTO: 5.3 10E3/UL (ref 1.6–8.3)
NEUTROPHILS NFR BLD AUTO: 62 %
NRBC # BLD AUTO: 0 10E3/UL
NRBC BLD AUTO-RTO: 0 /100
PLATELET # BLD AUTO: 116 10E3/UL (ref 150–450)
RBC # BLD AUTO: 5.92 10E6/UL (ref 4.4–5.9)
WBC # BLD AUTO: 8.5 10E3/UL (ref 4–11)

## 2023-03-17 PROCEDURE — 36415 COLL VENOUS BLD VENIPUNCTURE: CPT

## 2023-03-17 PROCEDURE — 85025 COMPLETE CBC W/AUTO DIFF WBC: CPT | Performed by: PHYSICIAN ASSISTANT

## 2023-03-17 NOTE — PROGRESS NOTES
Medical Assistant Note:  Billy Carey presents today for blood draw.    Patient seen by provider today: No.   present during visit today: Not Applicable.    Concerns: No Concerns.    Procedure:  Lab draw site: left antecub, Needle type: butterfly, Gauge: 23.    Post Assessment:  Labs drawn without difficulty: Yes.    Discharge Plan:  Departure Mode: Ambulatory.    Face to Face Time: 10.    Shanna Quiroz, CMA

## 2023-03-24 ENCOUNTER — LAB (OUTPATIENT)
Dept: ONCOLOGY | Facility: CLINIC | Age: 33
End: 2023-03-24
Attending: INTERNAL MEDICINE
Payer: COMMERCIAL

## 2023-03-24 DIAGNOSIS — D69.6 THROMBOCYTOPENIA (H): ICD-10-CM

## 2023-03-24 LAB
BASOPHILS # BLD AUTO: 0.1 10E3/UL (ref 0–0.2)
BASOPHILS NFR BLD AUTO: 1 %
EOSINOPHIL # BLD AUTO: 0.1 10E3/UL (ref 0–0.7)
EOSINOPHIL NFR BLD AUTO: 1 %
ERYTHROCYTE [DISTWIDTH] IN BLOOD BY AUTOMATED COUNT: 12.4 % (ref 10–15)
HCT VFR BLD AUTO: 44.7 % (ref 40–53)
HGB BLD-MCNC: 16.1 G/DL (ref 13.3–17.7)
IMM GRANULOCYTES # BLD: 0 10E3/UL
IMM GRANULOCYTES NFR BLD: 0 %
LYMPHOCYTES # BLD AUTO: 2 10E3/UL (ref 0.8–5.3)
LYMPHOCYTES NFR BLD AUTO: 25 %
MCH RBC QN AUTO: 29.4 PG (ref 26.5–33)
MCHC RBC AUTO-ENTMCNC: 36 G/DL (ref 31.5–36.5)
MCV RBC AUTO: 82 FL (ref 78–100)
MONOCYTES # BLD AUTO: 0.8 10E3/UL (ref 0–1.3)
MONOCYTES NFR BLD AUTO: 9 %
NEUTROPHILS # BLD AUTO: 5 10E3/UL (ref 1.6–8.3)
NEUTROPHILS NFR BLD AUTO: 64 %
NRBC # BLD AUTO: 0 10E3/UL
NRBC BLD AUTO-RTO: 0 /100
PLATELET # BLD AUTO: 108 10E3/UL (ref 150–450)
RBC # BLD AUTO: 5.48 10E6/UL (ref 4.4–5.9)
WBC # BLD AUTO: 8 10E3/UL (ref 4–11)

## 2023-03-24 PROCEDURE — 85025 COMPLETE CBC W/AUTO DIFF WBC: CPT | Performed by: PHYSICIAN ASSISTANT

## 2023-03-24 PROCEDURE — 36415 COLL VENOUS BLD VENIPUNCTURE: CPT

## 2023-03-30 NOTE — PROGRESS NOTES
[facility]    Hematology/Oncology Established Patient Note      Today's Date: 3/31/23    Reason for Consultation: Thrombocytopenia  Referring Provider: ZAHRAA Kingsley CNP      HISTORY OF PRESENT ILLNESS: Billy Carey is a 32 year old male who is referred for thrombocytopenia. 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    Historically, he has had historical normal CBC. On 11/30/22, WBC 6.0, Hb 16.5, PLT 42. Repeat CBC on 12/1/22, WBC 7.2, Hb 17.5, PLT 43. Hepatitis and HIV studies negative.     He has had eustachian tube placement without bleeding event. He has not had any other surgeries.     For DM1- he is followed by endocrinology. He has continuous blood glucose monitoring and an insulin pump.     He drinks alcohol 1-2x/week. No excessive use. No history of smoking.     Family history of VTE in his sister. He is unaware of the nuances surrounding this event. No family history of bleeding disorder or malignancy.       INTERIM HISTORY:  Last visit, platelets returned 18K. He received decadron 40 mg x4 and IVIG 1g/kg. PLTs have been >100K since then.         REVIEW OF SYSTEMS:   A 14 point ROS was reviewed with pertinent positives and negatives in the HPI.        HOME MEDICATIONS:  Current Outpatient Medications   Medication Sig Dispense Refill     atorvastatin (LIPITOR) 10 MG tablet Take 1 tablet (10 mg) by mouth daily 90 tablet 3     blood glucose (KELL CONTOUR NEXT) test strip Test 4-5 times daily 4 Box 3     Blood Glucose Monitoring Suppl (KELL CONTOUR NEXT LINK) W/DEVICE KIT 1 kit 5 times daily. Use as directed 1 kit 1     Continuous Blood Gluc Sensor (DEXCOM G6 SENSOR) MISC USE 1 SENSOR EVERY 10 DAYS       glucagon (GLUCAGON EMERGENCY) 1 MG kit Inject 1 mg into the muscle once for 1 dose (Patient not taking: Reported on 3/7/2023) 1 mg 3     insulin glargine (BASAGLAR KWIKPEN) 100 UNIT/ML pen INJECT 50 UNITS ONCE DAILY SUBCUTANEOUSLY 15 mL 1     insulin lispro  (HUMALOG VIAL) 100 UNIT/ML vial To be used in insulin pump (Patient not taking: Reported on 3/7/2023)       insulin pen needle (BD CHRIST U/F) 32G X 4 MM miscellaneous Use 3-6 pen needles daily or as directed. 50 each 0     INSULIN PUMP - OUTPATIENT Date Last Updated: 2/15/23  Tandem  Pump Basal Rates/Times:  7110-3635: 2 units/hour  3602-8377: 2.6 units/hour  5420-7249: 2.2 units/hour  5642-3979: 1.7 units/hour  CARB RATIO:   3514-2008: 1 unit per 10 grams carbohydrates  CF / Sensitivity Times:   4129-2233: 1 unit to decrease BG by 30 mg/dL  TARGET B mg/dL       vitamin D2 (ERGOCALCIFEROL) 34528 units (1250 mcg) capsule Take 50,000 Units by mouth once a week           ALLERGIES:  Allergies   Allergen Reactions     Penicillins Rash         PAST MEDICAL HISTORY:  Past Medical History:   Diagnosis Date     Diabetes mellitus type 1, uncontrolled, insulin dependent 2013         PAST SURGICAL HISTORY:  Past Surgical History:   Procedure Laterality Date     EXCISE MASS GROIN Left 2023    Procedure: Lymph Node excisional biopsy;  Surgeon: New Burger MD;  Location: [facility] OR     MYRINGOTOMY, INSERT TUBE BILATERAL, COMBINED Bilateral     As a child         SOCIAL HISTORY:  Social History     Socioeconomic History     Marital status:      Spouse name: Jayla     Number of children: 3     Years of education: Not on file     Highest education level: Not on file   Occupational History     Occupation:      Comment: [employer]   Tobacco Use     Smoking status: Never     Smokeless tobacco: Never   Substance and Sexual Activity     Alcohol use: No     Comment: rarely     Drug use: No     Sexual activity: Yes     Partners: Female   Other Topics Concern     Parent/sibling w/ CABG, MI or angioplasty before 65F 55M? Not Asked   Social History Narrative     Not on file     Social Determinants of Health     Financial Resource Strain: Low Risk      Difficulty of Paying Living Expenses: Not hard  "at all   Food Insecurity: No Food Insecurity     Worried About Running Out of Food in the Last Year: Never true     Ran Out of Food in the Last Year: Never true   Transportation Needs: No Transportation Needs     Lack of Transportation (Medical): No     Lack of Transportation (Non-Medical): No   Physical Activity: Sufficiently Active     Days of Exercise per Week: 6 days     Minutes of Exercise per Session: 40 min   Stress: No Stress Concern Present     Feeling of Stress : Only a little   Social Connections: Socially Integrated     Frequency of Communication with Friends and Family: More than three times a week     Frequency of Social Gatherings with Friends and Family: More than three times a week     Attends Druze Services: 1 to 4 times per year     Active Member of Clubs or Organizations: Yes     Attends Club or Organization Meetings: Not on file     Marital Status:    Intimate Partner Violence: Not on file   Housing Stability: Low Risk      Unable to Pay for Housing in the Last Year: No     Number of Places Lived in the Last Year: 1     Unstable Housing in the Last Year: No         FAMILY HISTORY:  Family History   Problem Relation Age of Onset     C.A.D. No family hx of      Diabetes No family hx of      Hypertension No family hx of      Cancer No family hx of         no skin cancer     Amblyopia No family hx of      Retinal detachment No family hx of      Thyroid Disease No family hx of      Glaucoma No family hx of      Macular Degeneration No family hx of          PHYSICAL EXAM:  Vital signs:  BP (!) 140/80   Pulse 67   Temp 97.4  F (36.3  C) (Tympanic)   Resp 16   Ht 1.905 m (6' 3\")   Wt 128 kg (282 lb 1.6 oz)   SpO2 96%   BMI 35.26 kg/m     ECO  GENERAL/CONSTITUTIONAL: No acute distress.  LYMPH: No anterior cervical, posterior cervical, supraclavicular, axillary or inguinal adenopathy.   RESPIRATORY: Clear to auscultation bilaterally. No crackles or wheezing.   CARDIOVASCULAR: Regular " rate and rhythm without murmurs, gallops, or rubs.  GASTROINTESTINAL: No hepatosplenomegaly, masses, or tenderness. The patient has normal bowel sounds. No guarding.  No distention.  MUSCULOSKELETAL: Warm and well-perfused, no cyanosis, clubbing, or edema.  NEUROLOGIC: Cranial nerves II-XII are intact. Alert, oriented, answers questions appropriately.  INTEGUMENTARY: No rashes or jaundice.  GAIT: Steady, does not use assistive device.      LABS:   Latest Reference Range & Units 03/31/23 15:14   WBC 4.0 - 11.0 10e3/uL 8.3   Hemoglobin 13.3 - 17.7 g/dL 16.4   Hematocrit 40.0 - 53.0 % 46.8   Platelet Count 150 - 450 10e3/uL 114 (L)   RBC Count 4.40 - 5.90 10e6/uL 5.58   MCV 78 - 100 fL 84   MCH 26.5 - 33.0 pg 29.4   MCHC 31.5 - 36.5 g/dL 35.0   RDW 10.0 - 15.0 % 12.7   % Neutrophils % 63   % Lymphocytes % 25   % Monocytes % 9   % Eosinophils % 1   % Basophils % 1   Absolute Basophils 0.0 - 0.2 10e3/uL 0.1   Absolute Eosinophils 0.0 - 0.7 10e3/uL 0.1   Absolute Immature Granulocytes <=0.4 10e3/uL 0.0   Absolute Lymphocytes 0.8 - 5.3 10e3/uL 2.1   Absolute Monocytes 0.0 - 1.3 10e3/uL 0.8   % Immature Granulocytes % 1   Absolute Neutrophils 1.6 - 8.3 10e3/uL 5.3   Absolute NRBCs 10e3/uL 0.0   NRBCs per 100 WBC <1 /100 0   (L): Data is abnormally low     Latest Reference Range & Units 01/10/23 15:17   SPECIMEN EXPIRATION DATE  [id number]   MIRA Anti-IgG,-C3d  Positive 3+   Hep B Surface Agn Nonreactive  Nonreactive   Hepatitis B Core Margarette Nonreactive  Nonreactive   Hepatitis B Surface Antibody Instrument Value <8.00 m[IU]/mL 35.73   Hepatitis B Surface Antibody  Reactive   Hepatitis C Antibody Nonreactive  Nonreactive   HIV Antigen Antibody Combo Nonreactive  Nonreactive      Latest Reference Range & Units 01/10/23 15:17   PAVAN interpretation Negative  Positive !      01/10/23 15:17   PAVAN titer 1 1:160       Serum M-protein (g/dL):  1/10/23: 0.0       Latest Reference Range & Units 12/14/22 09:20   MIRA Anti-IgG,-C3d  Weak  Positive   MIRA Anti-C3  Negative   MIRA Anti-IgG, Tube  Positive 1+   Blood Bank Chart Comment  BB Chart Comment      Latest Reference Range & Units 12/14/22 09:20   Haptoglobin 32 - 197 mg/dL 31 (L)          Latest Reference Range & Units 12/06/22 09:40   Iron 61 - 157 ug/dL 59 (L)   Iron Binding Capacity 240 - 430 ug/dL 319   Iron Sat Index 15 - 46 % 18   Lactate Dehydrogenase 0 - 250 U/L 240   INR 0.85 - 1.15  0.91   PTT 22 - 38 Seconds 25   Fibrinogen 170 - 490 mg/dL 282   SPECIMEN EXPIRATION DATE  [id number]   MIRA Anti-IgG,-C3d  Positive 3+               PATHOLOGY:  Final Diagnosis 12/1/22:   Peripheral blood, morphology:  - Marked thrombocytopenia.  - Hemoglobin quantitatively within normal limits and erythrocytes without diagnostic morphologic abnormality.  - WBC subsets quantitatively within normal limits and without diagnostic morphologic abnormality.  - Negative for schistocytes.  - Negative for overt features of myelodysplasia or circulating blasts.     Final Diagnosis 12/6/22:   Peripheral blood:  --Slight polycythemia/erythrocytosis.  --Marked thrombocytopenia.   Electronically signed by Angel Marshall MD on 12/7/2022 at 10:33 AM   Comment    The cause of this patient's thrombocytopenia is unclear from the peripheral smear. Some common causes of thrombocytopenia to consider include infections, medications, alcohol, immune mediated mechanisms, and splenic sequestration. If splenic sequestration is of clinical concern, an accurate assessment of spleen size is recommended.         Clinical Information    Thrombocytopenia   Peripheral Smear    The red blood cells appear normochromic.  Rouleaux formation does not appear increased.  Polychromasia does not appear increased.  Poikilocytosis appears mild and nonspecific.  Platelets  are well granulated.  Lymphocytes are predominantly small with cytologically mature chromatin and appear overall polymorphous.  Neutrophils contain normal cytoplasmic granulation  and unremarkable nuclear morphology.  There is no dysplasia and no circulating blasts are seen.     Final Diagnosis 12/14/22:   Bone marrow, left posterior iliac crest, decalcified trephine biopsy and aspiration:  -- Normocellular bone marrow for age (60 to 70%) with maturing trilineage hematopoiesis.  -- No evidence of marrow involvement by lymphoma or other hematopoietic neoplasm.     Peripheral blood showing:  -- Moderate thrombocytopenia.     Case Report   Flow Cytometry Report                             Case: [id number]                                   Authorizing Provider:  Hayde Lopez DO         Collected:           12/14/2022 10:06 AM           Ordering Location:     [facility]   Received:            12/14/2022 10:21 AM                                  [facility]                                                             Pathologist:           Marleny Holden MD                                                      Specimen:    Iliac Crest, Bone Marrow Aspirate, Left                                                    Flow Interpretation   A. Iliac Crest, Bone Marrow Aspirate, Left:    - Polytypic B cells  - No aberrant immunophenotype on T cells  - No increase in myeloid blasts and no abnormal myeloid blast population     Case Report   Surgical Pathology Report                         Case: [id number]                                   Authorizing Provider:  Hayde Lopez DO         Collected:           12/15/2022 11:27 AM           Ordering Location:     [facility]   Received:            12/15/2022 11:54 AM                                  Imaging                                                                       Pathologist:           Mirna Roger                                                                Specimen:    Retroperitoneal                                                                            Final Diagnosis   A.  Retroperitoneum,  biopsy:  -Three tissue fragments, entirely submitted for flow cytometry evaluation (gross examination only).       Case Report   Flow Cytometry Report                             Case: [id number]                                   Authorizing Provider:  Hadye Lopez DO         Collected:           12/15/2022 01:16 PM           Ordering Location:     [facility]   Received:            12/15/2022 01:17 PM                                  Imaging                                                                       Pathologist:           Marleny Holden MD                                                      Specimen:    Retroperitoneal                                                                            Flow Interpretation   A. Retroperitoneal :    - Polytypic B cells  - No aberrant immunophenotype on T cells         Case Report   Surgical Pathology Report                         Case: [id number]                                   Authorizing Provider:  Hayde Lopez DO         Collected:           01/04/2023 02:25 PM           Ordering Location:     [facility]   Received:            01/04/2023 02:48 PM                                  [facility]                                                                 Pathologist:           Saadia Allen MD                                                                            Specimen:    Lymph Node(s), Axillary, Right                                                             Final Diagnosis   Lymph node, right axillary, core biopsies:  -Partially sampled lymph node showing no evidence of Hodgkin's or non-Hodgkin's lymphoma  -Completed immunophenotyping studies show polytypic B cells and no aberrant T-cell antigen expression     Case Report   Flow Cytometry Report                             Case: [id number]                                    Authorizing Provider:  Hayde Lopez DO         Collected:           01/04/2023 02:25 PM           Ordering Location:     [facility]   Received:            01/04/2023 02:53 PM                                  [facility]                                                                 Pathologist:           Xiomara Ramsey MD                                                         Specimen:    Lymph Node(s), Axillary, Right                                                             Flow Interpretation   A. Lymph Node(s), Axillary, Right, :  -Polytypic B cells  No aberrant immunophenotype on T cells         Case Report   Surgical Pathology Report                         Case: [id number]                                   Authorizing Provider:  New Burger         Collected:           01/20/2023 02:16 PM                                  MD Ari                                                                  Ordering Location:     [facility] OR                 Received:            01/20/2023 02:29 PM           Pathologist:           Isabela Kaminski MD                                                     Specimen:    Lymph Node(s), Inguinal, Left, partial lymph node left groin                               Addendum   This addendum is issued to document that Dr. Isabela Kaminski discussed biopsy findings with Dr. Hayde Lopez on 1/26/23 at 12:30.    Addendum electronically signed by Isabela Kaminski MD on 1/26/2023 at 12:30 PM   Final Diagnosis   A. Left groin, lymph node, partial lymphadenectomy:  -Benign lymph node with follicular and interfollicular hyperplasia  -Rare small noncaseating granulomas  -No morphologic evidence of malignancy  -Negative for select microorganisms on stained tissue  -See comment   Electronically signed by Isabela Kaminski MD on 1/26/2023 at 12:16 PM   Comment  [id number]     There is no definitive morphologic or immunophenotypic evidence for malignancy  in this biopsy. Instead, the findings (which include follicular and interfollicular hyperplasia and features of progressive transformation of germinal centers) are favored to be reactive.     Evaluation for select microorganisms was performed, with negative results for EBV (by in situ hybridization), HHV8 and toxoplasma (by immunohistochemistry), and acid fast and fungal organisms (by special stains).    Concurrent flow cytometry ( [id number]) demonstrated polytypic B cells and no aberrant immunophenotype on T cells.         Case Report   Flow Cytometry Report                             Case: [id number]                                   Authorizing Provider:  New Burger         Collected:           01/20/2023 02:33 PM                                  MD Ari                                                                  Ordering Location:      [facility] OR                 Received:            01/20/2023 02:33 PM           Pathologist:           Marleny Holden MD                                                      Specimen:    Lymph Node(s), Inguinal, Left                                                              Flow Interpretation   A. Lymph Node(s), Inguinal, Left:     - Polytypic B cells  - No aberrant immunophenotype on T cells           IMAGING:  CT CAP 12/6/22:  IMPRESSION:  1.  Several enlarged lymph nodes identified at the right axilla,  retroperitoneum, left pelvis and borderline enlarged at the left  inguinal locations. While nonspecific, a neoplastic etiology is  possible including lymphoma.  2.  Mild splenomegaly.  3.  Lobulated indeterminate nodular masses at the left pericardial  fat.  4.  Indeterminate multiple bilateral pulmonary nodules. The dominant  solid nodule is at the right lower lobe measuring 1.1 cm. Neoplasm  remains in the differential and surveillance imaging and/or further  workup is recommended. See below for follow up imaging guidelines.    PET  12/9/22:  IMPRESSION:     Findings suspicious for active neoplasm such as lymphoma involving lymph nodes above/below the diaphragm. The right axillary for left inguinal lymph nodes are amenable to ultrasound-guided histologic sampling if desired.        ASSESSMENT/PLAN:  Billy Carey is a 32 year old male who is referred for thrombocytopenia. 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    1) Thrombocytopenia, warm autoimmune hemolysis  -This is a new diagnosis for patient and an incidental finding when he was in for routine physical examination. He has not had any bleeding episodes.   -No new meds, recent infection, or recent surgeries.  -TSH normal at 3.85.   -HepB/C and HIV studies historically negative.   -No splenomegaly on physical examination.  -I doubt hemolysis as retic is normal as well as LFTs.  -We discussed the possibility of lab artifact, but there is no comment on platelet clumping on smear. Also, he had lab work repeated two days in a row with PLT count returning in the 40s.   -Given his history of autoimmune disease, he may have underlying ITP, which is a diagnosis of exclusion. We had discussed possible steroid therapy if platelets are to return <30K.   -Repeat CBC is showing PLTs still in the 40's. Hb is 17.9, WBC normal.   -Also is consideration for a microangiopathic hemolytic anemia. MIRA has come back positive at 3+ (which is inconsistent with MAHA) and patient does not have elevated LFTs, abnormal coag studies, nor reticulocytosis. LDH is also normal at 240. He is afebrile. [facility] blood bank testing has returned +IgG and negative for complement, consistent with warm AIHA.   -CT CAP had returned with 2 cm LN of the retroperitoneum, inguinal, and axillary. There was also note of an abnormal pericardial fat mass. I sent for a PET, that did not show FDG avidity of the pericardial fat mass. The retroperitoneal LNs had SUV of 12-13 with the remainder LN 6-7. I sent patient for a core  biopsy of the retroperitoneal LN with IR that returned in fragments with unremarkable FLOW. Bone marrow biopsy was normocellular with normal megakaryocyte morphology. I then pursued a right axillary LN biopsy, which again returned negative with unremarkable FLOW. I sent for excisional biopsy of an inguinal LN with general surgery with pathology once again returning as benign with folliculary and interfollicular hyperplasia. Congo red staining is negative. EBV, HHV8 negative. I reviewed with pathology for any morphologic evidence consistent with Castleman's disease and spoke with Dr. Kaminski who is unable to confirm this at this time. She did have an interdepartmental pathology review. I do note the sinuses contain abundant histiocytes in the microscopic description and in my differential diagnosis is also Rosai-Dorfamn or Langerhan's disease.  I spoke with Dr. Tyler who is in agreement with workup above. I reviewed with Dr. 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 I reviewed I would then move onto rituximab next if patient is not to have improvement with steroid therapy. Dr. Tyler is in agreement with this plan.   -I reviewed the above with patient and wife, Jayla. I will need to continue to monitor the patient not only for treatment response for the hemolysis, but for evolution of LAD. My plan is to recheck with PET in 6 months time and will continue to monitor for treatment response discussed above.   -After our last visit, PLTs returned at 18K. Patient was hospitalized and treated with decadron 40 mg x4 and IVIG. His platelets have remained >100K since then. He has met with Dr. Tyler as well who is in agreement with next line rituximab. Patient would like to continue to think on this. We discussed the risk of bronchospasm and hypersensitivity.    Weekly rituximab x4 weeks,  then possible maintenance:  -Rituximab 375 mg/m2 IV    2) Positive PAVAN  -Titer 1:160.  -Patient without arthralgias, skin changes.  -APLA, ds-DNA labs returned negative.  -Will refer to rheumatology.     3) History of infectious mononucleosis  -EBV negative on LN.  -EBV PCR negative.    4) DM1  -Patient managed by endocrinology.    5) History of HTN, HLD    6) -Repeat CBC in 2 weeks with follow up.   -Repeat PET/CT in June 2023 (ordered).      Hayde Lopez DO  Hematology/Oncology  [facility]

## 2023-03-31 ENCOUNTER — LAB (OUTPATIENT)
Dept: ONCOLOGY | Facility: CLINIC | Age: 33
End: 2023-03-31
Attending: INTERNAL MEDICINE
Payer: COMMERCIAL

## 2023-03-31 VITALS
RESPIRATION RATE: 16 BRPM | HEIGHT: 75 IN | TEMPERATURE: 97.4 F | WEIGHT: 282.1 LBS | DIASTOLIC BLOOD PRESSURE: 80 MMHG | OXYGEN SATURATION: 96 % | SYSTOLIC BLOOD PRESSURE: 140 MMHG | HEART RATE: 67 BPM | BODY MASS INDEX: 35.08 KG/M2

## 2023-03-31 DIAGNOSIS — D69.6 THROMBOCYTOPENIA (H): ICD-10-CM

## 2023-03-31 DIAGNOSIS — D59.10 AUTOIMMUNE HEMOLYTIC ANEMIA (H): ICD-10-CM

## 2023-03-31 DIAGNOSIS — R59.1 DIFFUSE LYMPHADENOPATHY: Primary | ICD-10-CM

## 2023-03-31 LAB
BASOPHILS # BLD AUTO: 0.1 10E3/UL (ref 0–0.2)
BASOPHILS NFR BLD AUTO: 1 %
EOSINOPHIL # BLD AUTO: 0.1 10E3/UL (ref 0–0.7)
EOSINOPHIL NFR BLD AUTO: 1 %
ERYTHROCYTE [DISTWIDTH] IN BLOOD BY AUTOMATED COUNT: 12.7 % (ref 10–15)
HCT VFR BLD AUTO: 46.8 % (ref 40–53)
HGB BLD-MCNC: 16.4 G/DL (ref 13.3–17.7)
IMM GRANULOCYTES # BLD: 0 10E3/UL
IMM GRANULOCYTES NFR BLD: 1 %
LYMPHOCYTES # BLD AUTO: 2.1 10E3/UL (ref 0.8–5.3)
LYMPHOCYTES NFR BLD AUTO: 25 %
MCH RBC QN AUTO: 29.4 PG (ref 26.5–33)
MCHC RBC AUTO-ENTMCNC: 35 G/DL (ref 31.5–36.5)
MCV RBC AUTO: 84 FL (ref 78–100)
MONOCYTES # BLD AUTO: 0.8 10E3/UL (ref 0–1.3)
MONOCYTES NFR BLD AUTO: 9 %
NEUTROPHILS # BLD AUTO: 5.3 10E3/UL (ref 1.6–8.3)
NEUTROPHILS NFR BLD AUTO: 63 %
NRBC # BLD AUTO: 0 10E3/UL
NRBC BLD AUTO-RTO: 0 /100
PLATELET # BLD AUTO: 114 10E3/UL (ref 150–450)
RBC # BLD AUTO: 5.58 10E6/UL (ref 4.4–5.9)
WBC # BLD AUTO: 8.3 10E3/UL (ref 4–11)

## 2023-03-31 PROCEDURE — 36415 COLL VENOUS BLD VENIPUNCTURE: CPT

## 2023-03-31 PROCEDURE — 99214 OFFICE O/P EST MOD 30 MIN: CPT | Performed by: INTERNAL MEDICINE

## 2023-03-31 PROCEDURE — G0463 HOSPITAL OUTPT CLINIC VISIT: HCPCS | Performed by: INTERNAL MEDICINE

## 2023-03-31 PROCEDURE — 85014 HEMATOCRIT: CPT | Performed by: PHYSICIAN ASSISTANT

## 2023-03-31 ASSESSMENT — PAIN SCALES - GENERAL: PAINLEVEL: NO PAIN (0)

## 2023-03-31 NOTE — NURSING NOTE
"Oncology Rooming Note    March 31, 2023 3:25 PM   Billy Carey is a 32 year old male who presents for:    Chief Complaint   Patient presents with     Oncology Clinic Visit     Autoimmune thrombocytopenia       Initial Vitals: BP (!) 140/80   Pulse 67   Temp 97.4  F (36.3  C) (Tympanic)   Resp 16   Ht 1.905 m (6' 3\")   Wt 128 kg (282 lb 1.6 oz)   SpO2 96%   BMI 35.26 kg/m   Estimated body mass index is 35.26 kg/m  as calculated from the following:    Height as of this encounter: 1.905 m (6' 3\").    Weight as of this encounter: 128 kg (282 lb 1.6 oz). Body surface area is 2.6 meters squared.  No Pain (0) Comment: Data Unavailable   No LMP for male patient.  Allergies reviewed: Yes  Medications reviewed: Yes    Medications: Medication refills not needed today.  Pharmacy name entered into Evernote:    [id number] - [place], [place] - [address] [place] [address] [address] [address]  [id number] - [place], [place] - [address]  [id number] - [place], [place] - [address] [address] [facility] - . [place], [place] - [address] [facility]    Clinical concerns: follow up         Maddy Leigh CMA              "

## 2023-03-31 NOTE — LETTER
3/31/2023         RE: Billy Carey  [address] [address]        Dear Colleague,    Thank you for referring your patient, Billy Carey, to the [facility]. Please see a copy of my visit note below.    [facility]    Hematology/Oncology Established Patient Note      Today's Date: 3/31/23    Reason for Consultation: Thrombocytopenia  Referring Provider: ZAHRAA Kingsley CNP      HISTORY OF PRESENT ILLNESS: Billy Carey is a 32 year old male who is referred for thrombocytopenia. 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    Historically, he has had historical normal CBC. On 11/30/22, WBC 6.0, Hb 16.5, PLT 42. Repeat CBC on 12/1/22, WBC 7.2, Hb 17.5, PLT 43. Hepatitis and HIV studies negative.     He has had eustachian tube placement without bleeding event. He has not had any other surgeries.     For DM1- he is followed by endocrinology. He has continuous blood glucose monitoring and an insulin pump.     He drinks alcohol 1-2x/week. No excessive use. No history of smoking.     Family history of VTE in his sister. He is unaware of the nuances surrounding this event. No family history of bleeding disorder or malignancy.       INTERIM HISTORY:  Last visit, platelets returned 18K. He received decadron 40 mg x4 and IVIG 1g/kg. PLTs have been >100K since then.         REVIEW OF SYSTEMS:   A 14 point ROS was reviewed with pertinent positives and negatives in the HPI.        HOME MEDICATIONS:  Current Outpatient Medications   Medication Sig Dispense Refill     atorvastatin (LIPITOR) 10 MG tablet Take 1 tablet (10 mg) by mouth daily 90 tablet 3     blood glucose (KELL CONTOUR NEXT) test strip Test 4-5 times daily 4 Box 3     Blood Glucose Monitoring Suppl (KELL CONTOUR NEXT LINK) W/DEVICE KIT 1 kit 5 times daily. Use as directed 1 kit 1     Continuous Blood Gluc Sensor (DEXCOM G6 SENSOR) MISC USE 1 SENSOR EVERY 10 DAYS        glucagon (GLUCAGON EMERGENCY) 1 MG kit Inject 1 mg into the muscle once for 1 dose (Patient not taking: Reported on 3/7/2023) 1 mg 3     insulin glargine (BASAGLAR KWIKPEN) 100 UNIT/ML pen INJECT 50 UNITS ONCE DAILY SUBCUTANEOUSLY 15 mL 1     insulin lispro (HUMALOG VIAL) 100 UNIT/ML vial To be used in insulin pump (Patient not taking: Reported on 3/7/2023)       insulin pen needle (BD CHRIST U/F) 32G X 4 MM miscellaneous Use 3-6 pen needles daily or as directed. 50 each 0     INSULIN PUMP - OUTPATIENT Date Last Updated: 2/15/23  Tandem  Pump Basal Rates/Times:  8856-1819: 2 units/hour  2776-9280: 2.6 units/hour  3541-6930: 2.2 units/hour  4584-9142: 1.7 units/hour  CARB RATIO:   2488-8497: 1 unit per 10 grams carbohydrates  CF / Sensitivity Times:   3082-3427: 1 unit to decrease BG by 30 mg/dL  TARGET B mg/dL       vitamin D2 (ERGOCALCIFEROL) 09854 units (1250 mcg) capsule Take 50,000 Units by mouth once a week           ALLERGIES:  Allergies   Allergen Reactions     Penicillins Rash         PAST MEDICAL HISTORY:  Past Medical History:   Diagnosis Date     Diabetes mellitus type 1, uncontrolled, insulin dependent 2013         PAST SURGICAL HISTORY:  Past Surgical History:   Procedure Laterality Date     EXCISE MASS GROIN Left 2023    Procedure: Lymph Node excisional biopsy;  Surgeon: New Burger MD;  Location: [facility] OR     MYRINGOTOMY, INSERT TUBE BILATERAL, COMBINED Bilateral     As a child         SOCIAL HISTORY:  Social History     Socioeconomic History     Marital status:      Spouse name: Jayla     Number of children: 3     Years of education: Not on file     Highest education level: Not on file   Occupational History     Occupation:      Comment: [employer]   Tobacco Use     Smoking status: Never     Smokeless tobacco: Never   Substance and Sexual Activity     Alcohol use: No     Comment: rarely     Drug use: No     Sexual activity: Yes     Partners: Female   Other  "Topics Concern     Parent/sibling w/ CABG, MI or angioplasty before 65F 55M? Not Asked   Social History Narrative     Not on file     Social Determinants of Health     Financial Resource Strain: Low Risk      Difficulty of Paying Living Expenses: Not hard at all   Food Insecurity: No Food Insecurity     Worried About Running Out of Food in the Last Year: Never true     Ran Out of Food in the Last Year: Never true   Transportation Needs: No Transportation Needs     Lack of Transportation (Medical): No     Lack of Transportation (Non-Medical): No   Physical Activity: Sufficiently Active     Days of Exercise per Week: 6 days     Minutes of Exercise per Session: 40 min   Stress: No Stress Concern Present     Feeling of Stress : Only a little   Social Connections: Socially Integrated     Frequency of Communication with Friends and Family: More than three times a week     Frequency of Social Gatherings with Friends and Family: More than three times a week     Attends Shinto Services: 1 to 4 times per year     Active Member of Clubs or Organizations: Yes     Attends Club or Organization Meetings: Not on file     Marital Status:    Intimate Partner Violence: Not on file   Housing Stability: Low Risk      Unable to Pay for Housing in the Last Year: No     Number of Places Lived in the Last Year: 1     Unstable Housing in the Last Year: No         FAMILY HISTORY:  Family History   Problem Relation Age of Onset     C.A.D. No family hx of      Diabetes No family hx of      Hypertension No family hx of      Cancer No family hx of         no skin cancer     Amblyopia No family hx of      Retinal detachment No family hx of      Thyroid Disease No family hx of      Glaucoma No family hx of      Macular Degeneration No family hx of          PHYSICAL EXAM:  Vital signs:  BP (!) 140/80   Pulse 67   Temp 97.4  F (36.3  C) (Tympanic)   Resp 16   Ht 1.905 m (6' 3\")   Wt 128 kg (282 lb 1.6 oz)   SpO2 96%   BMI 35.26 kg/m  "    ECO  GENERAL/CONSTITUTIONAL: No acute distress.  LYMPH: No anterior cervical, posterior cervical, supraclavicular, axillary or inguinal adenopathy.   RESPIRATORY: Clear to auscultation bilaterally. No crackles or wheezing.   CARDIOVASCULAR: Regular rate and rhythm without murmurs, gallops, or rubs.  GASTROINTESTINAL: No hepatosplenomegaly, masses, or tenderness. The patient has normal bowel sounds. No guarding.  No distention.  MUSCULOSKELETAL: Warm and well-perfused, no cyanosis, clubbing, or edema.  NEUROLOGIC: Cranial nerves II-XII are intact. Alert, oriented, answers questions appropriately.  INTEGUMENTARY: No rashes or jaundice.  GAIT: Steady, does not use assistive device.      LABS:   Latest Reference Range & Units 23 15:14   WBC 4.0 - 11.0 10e3/uL 8.3   Hemoglobin 13.3 - 17.7 g/dL 16.4   Hematocrit 40.0 - 53.0 % 46.8   Platelet Count 150 - 450 10e3/uL 114 (L)   RBC Count 4.40 - 5.90 10e6/uL 5.58   MCV 78 - 100 fL 84   MCH 26.5 - 33.0 pg 29.4   MCHC 31.5 - 36.5 g/dL 35.0   RDW 10.0 - 15.0 % 12.7   % Neutrophils % 63   % Lymphocytes % 25   % Monocytes % 9   % Eosinophils % 1   % Basophils % 1   Absolute Basophils 0.0 - 0.2 10e3/uL 0.1   Absolute Eosinophils 0.0 - 0.7 10e3/uL 0.1   Absolute Immature Granulocytes <=0.4 10e3/uL 0.0   Absolute Lymphocytes 0.8 - 5.3 10e3/uL 2.1   Absolute Monocytes 0.0 - 1.3 10e3/uL 0.8   % Immature Granulocytes % 1   Absolute Neutrophils 1.6 - 8.3 10e3/uL 5.3   Absolute NRBCs 10e3/uL 0.0   NRBCs per 100 WBC <1 /100 0   (L): Data is abnormally low     Latest Reference Range & Units 01/10/23 15:17   SPECIMEN EXPIRATION DATE  [id number]   MIRA Anti-IgG,-C3d  Positive 3+   Hep B Surface Agn Nonreactive  Nonreactive   Hepatitis B Core Margarette Nonreactive  Nonreactive   Hepatitis B Surface Antibody Instrument Value <8.00 m[IU]/mL 35.73   Hepatitis B Surface Antibody  Reactive   Hepatitis C Antibody Nonreactive  Nonreactive   HIV Antigen Antibody Combo Nonreactive   Nonreactive      Latest Reference Range & Units 01/10/23 15:17   PAVAN interpretation Negative  Positive !      01/10/23 15:17   PAVAN titer 1 1:160       Serum M-protein (g/dL):  1/10/23: 0.0       Latest Reference Range & Units 12/14/22 09:20   MIRA Anti-IgG,-C3d  Weak Positive   MIRA Anti-C3  Negative   MIRA Anti-IgG, Tube  Positive 1+   Blood Bank Chart Comment  BB Chart Comment      Latest Reference Range & Units 12/14/22 09:20   Haptoglobin 32 - 197 mg/dL 31 (L)          Latest Reference Range & Units 12/06/22 09:40   Iron 61 - 157 ug/dL 59 (L)   Iron Binding Capacity 240 - 430 ug/dL 319   Iron Sat Index 15 - 46 % 18   Lactate Dehydrogenase 0 - 250 U/L 240   INR 0.85 - 1.15  0.91   PTT 22 - 38 Seconds 25   Fibrinogen 170 - 490 mg/dL 282   SPECIMEN EXPIRATION DATE  [id number]   MIRA Anti-IgG,-C3d  Positive 3+               PATHOLOGY:  Final Diagnosis 12/1/22:   Peripheral blood, morphology:  - Marked thrombocytopenia.  - Hemoglobin quantitatively within normal limits and erythrocytes without diagnostic morphologic abnormality.  - WBC subsets quantitatively within normal limits and without diagnostic morphologic abnormality.  - Negative for schistocytes.  - Negative for overt features of myelodysplasia or circulating blasts.     Final Diagnosis 12/6/22:   Peripheral blood:  --Slight polycythemia/erythrocytosis.  --Marked thrombocytopenia.   Electronically signed by Angel Marshall MD on 12/7/2022 at 10:33 AM   Comment    The cause of this patient's thrombocytopenia is unclear from the peripheral smear. Some common causes of thrombocytopenia to consider include infections, medications, alcohol, immune mediated mechanisms, and splenic sequestration. If splenic sequestration is of clinical concern, an accurate assessment of spleen size is recommended.         Clinical Information    Thrombocytopenia   Peripheral Smear    The red blood cells appear normochromic.  Rouleaux formation does not appear increased.   Polychromasia does not appear increased.  Poikilocytosis appears mild and nonspecific.  Platelets  are well granulated.  Lymphocytes are predominantly small with cytologically mature chromatin and appear overall polymorphous.  Neutrophils contain normal cytoplasmic granulation and unremarkable nuclear morphology.  There is no dysplasia and no circulating blasts are seen.     Final Diagnosis 12/14/22:   Bone marrow, left posterior iliac crest, decalcified trephine biopsy and aspiration:  -- Normocellular bone marrow for age (60 to 70%) with maturing trilineage hematopoiesis.  -- No evidence of marrow involvement by lymphoma or other hematopoietic neoplasm.     Peripheral blood showing:  -- Moderate thrombocytopenia.     Case Report   Flow Cytometry Report                             Case: [id number]                                   Authorizing Provider:  Hayde Lopez DO         Collected:           12/14/2022 10:06 AM           Ordering Location:     [facility]   Received:            12/14/2022 10:21 AM                                  [facility]                                                             Pathologist:           Marleny Holden MD                                                      Specimen:    Iliac Crest, Bone Marrow Aspirate, Left                                                    Flow Interpretation   A. Iliac Crest, Bone Marrow Aspirate, Left:    - Polytypic B cells  - No aberrant immunophenotype on T cells  - No increase in myeloid blasts and no abnormal myeloid blast population     Case Report   Surgical Pathology Report                         Case: [id number]                                   Authorizing Provider:  Hayde Lopez DO         Collected:           12/15/2022 11:27 AM           Ordering Location:     [facility]   Received:            12/15/2022 11:54 AM                                  Imaging                                                                        Pathologist:           Mirna Roger                                                                Specimen:    Retroperitoneal                                                                            Final Diagnosis   A.  Retroperitoneum, biopsy:  -Three tissue fragments, entirely submitted for flow cytometry evaluation (gross examination only).       Case Report   Flow Cytometry Report                             Case: [id number]                                   Authorizing Provider:  Hayde Lopez DO         Collected:           12/15/2022 01:16 PM           Ordering Location:     [facility]   Received:            12/15/2022 01:17 PM                                  Imaging                                                                       Pathologist:           Marleny Holden MD                                                      Specimen:    Retroperitoneal                                                                            Flow Interpretation   A. Retroperitoneal :    - Polytypic B cells  - No aberrant immunophenotype on T cells         Case Report   Surgical Pathology Report                         Case: [id number]                                   Authorizing Provider:  Hayde Lopez DO         Collected:           01/04/2023 02:25 PM           Ordering Location:     [facility]   Received:            01/04/2023 02:48 PM                                  [facility]                                                                 Pathologist:           Saadia Allen MD                                                                            Specimen:    Lymph Node(s), Axillary, Right                                                             Final Diagnosis   Lymph node, right axillary, core biopsies:  -Partially sampled lymph node  showing no evidence of Hodgkin's or non-Hodgkin's lymphoma  -Completed immunophenotyping studies show polytypic B cells and no aberrant T-cell antigen expression     Case Report   Flow Cytometry Report                             Case: [id number]                                   Authorizing Provider:  Hayde Lopez DO         Collected:           01/04/2023 02:25 PM           Ordering Location:     [facility]   Received:            01/04/2023 02:53 PM                                  [facility]                                                                 Pathologist:           Xiomara Ramsey MD                                                         Specimen:    Lymph Node(s), Axillary, Right                                                             Flow Interpretation   A. Lymph Node(s), Axillary, Right, :  -Polytypic B cells  No aberrant immunophenotype on T cells         Case Report   Surgical Pathology Report                         Case: [id number]                                   Authorizing Provider:  New Burger         Collected:           01/20/2023 02:16 PM                                  MD Ari                                                                  Ordering Location:     [facility]                 Received:            01/20/2023 02:29 PM           Pathologist:           Isabela Kaminski MD                                                     Specimen:    Lymph Node(s), Inguinal, Left, partial lymph node left groin                               Addendum   This addendum is issued to document that Dr. Isabela Kaminski discussed biopsy findings with Dr. Hayde Lopez on 1/26/23 at 12:30.    Addendum electronically signed by Isabela Kaminski MD on 1/26/2023 at 12:30 PM   Final Diagnosis   A. Left groin, lymph node, partial lymphadenectomy:  -Benign lymph node with follicular and interfollicular hyperplasia  -Rare small noncaseating granulomas  -No  morphologic evidence of malignancy  -Negative for select microorganisms on stained tissue  -See comment   Electronically signed by Isabela Kaminski MD on 1/26/2023 at 12:16 PM   Comment  [facility]     There is no definitive morphologic or immunophenotypic evidence for malignancy in this biopsy. Instead, the findings (which include follicular and interfollicular hyperplasia and features of progressive transformation of germinal centers) are favored to be reactive.     Evaluation for select microorganisms was performed, with negative results for EBV (by in situ hybridization), HHV8 and toxoplasma (by immunohistochemistry), and acid fast and fungal organisms (by special stains).    Concurrent flow cytometry ([id number]) demonstrated polytypic B cells and no aberrant immunophenotype on T cells.         Case Report   Flow Cytometry Report                             Case: [id number]                                   Authorizing Provider:  New Burger         Collected:           01/20/2023 02:33 PM                                  MD Ari                                                                  Ordering Location:      [facility]                 Received:            01/20/2023 02:33 PM           Pathologist:           Marleny Holden MD                                                      Specimen:    Lymph Node(s), Inguinal, Left                                                              Flow Interpretation   A. Lymph Node(s), Inguinal, Left:     - Polytypic B cells  - No aberrant immunophenotype on T cells           IMAGING:  CT CAP 12/6/22:  IMPRESSION:  1.  Several enlarged lymph nodes identified at the right axilla,  retroperitoneum, left pelvis and borderline enlarged at the left  inguinal locations. While nonspecific, a neoplastic etiology is  possible including lymphoma.  2.  Mild splenomegaly.  3.  Lobulated indeterminate nodular masses at the left pericardial  fat.  4.   Indeterminate multiple bilateral pulmonary nodules. The dominant  solid nodule is at the right lower lobe measuring 1.1 cm. Neoplasm  remains in the differential and surveillance imaging and/or further  workup is recommended. See below for follow up imaging guidelines.    PET 12/9/22:  IMPRESSION:     Findings suspicious for active neoplasm such as lymphoma involving lymph nodes above/below the diaphragm. The right axillary for left inguinal lymph nodes are amenable to ultrasound-guided histologic sampling if desired.        ASSESSMENT/PLAN:  Billy Carey is a 32 year old male who is referred for thrombocytopenia. 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    1) Thrombocytopenia, warm autoimmune hemolysis  -This is a new diagnosis for patient and an incidental finding when he was in for routine physical examination. He has not had any bleeding episodes.   -No new meds, recent infection, or recent surgeries.  -TSH normal at 3.85.   -HepB/C and HIV studies historically negative.   -No splenomegaly on physical examination.  -I doubt hemolysis as retic is normal as well as LFTs.  -We discussed the possibility of lab artifact, but there is no comment on platelet clumping on smear. Also, he had lab work repeated two days in a row with PLT count returning in the 40s.   -Given his history of autoimmune disease, he may have underlying ITP, which is a diagnosis of exclusion. We had discussed possible steroid therapy if platelets are to return <30K.   -Repeat CBC is showing PLTs still in the 40's. Hb is 17.9, WBC normal.   -Also is consideration for a microangiopathic hemolytic anemia. MIRA has come back positive at 3+ (which is inconsistent with MAHA) and patient does not have elevated LFTs, abnormal coag studies, nor reticulocytosis. LDH is also normal at 240. He is afebrile. [facility] blood bank testing has returned +IgG and negative for complement, consistent with warm AIHA.   -CT CAP had returned with 2 cm LN  of the retroperitoneum, inguinal, and axillary. There was also note of an abnormal pericardial fat mass. I sent for a PET, that did not show FDG avidity of the pericardial fat mass. The retroperitoneal LNs had SUV of 12-13 with the remainder LN 6-7. I sent patient for a core biopsy of the retroperitoneal LN with IR that returned in fragments with unremarkable FLOW. Bone marrow biopsy was normocellular with normal megakaryocyte morphology. I then pursued a right axillary LN biopsy, which again returned negative with unremarkable FLOW. I sent for excisional biopsy of an inguinal LN with general surgery with pathology once again returning as benign with folliculary and interfollicular hyperplasia. Congo red staining is negative. EBV, HHV8 negative. I reviewed with pathology for any morphologic evidence consistent with Castleman's disease and spoke with Dr. Kaminski who is unable to confirm this at this time. She did have an interdepartmental pathology review. I do note the sinuses contain abundant histiocytes in the microscopic description and in my differential diagnosis is also Rosai-Dorfamn or Langerhan's disease.  I spoke with Dr. Tyler who is in agreement with workup above. I reviewed with Dr. 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 I reviewed I would then move onto rituximab next if patient is not to have improvement with steroid therapy. Dr. Tyler is in agreement with this plan.   -I reviewed the above with patient and wife, Jayla. I will need to continue to monitor the patient not only for treatment response for the hemolysis, but for evolution of LAD. My plan is to recheck with PET in 6 months time and will continue to monitor for treatment response discussed above.   -After our last visit, PLTs returned at 18K. Patient was hospitalized and treated with decadron 40 mg x4 and  IVIG. His platelets have remained >100K since then. He has met with Dr. Tyler as well who is in agreement with next line rituximab. Patient would like to continue to think on this. We discussed the risk of bronchospasm and hypersensitivity.    Weekly rituximab x4 weeks, then possible maintenance:  -Rituximab 375 mg/m2 IV    2) Positive PAVAN  -Titer 1:160.  -Patient without arthralgias, skin changes.  -APLA, ds-DNA labs returned negative.  -Will refer to rheumatology.     3) History of infectious mononucleosis  -EBV negative on LN.  -EBV PCR negative.    4) DM1  -Patient managed by endocrinology.    5) History of HTN, HLD    6) -Repeat CBC in 2 weeks with follow up.   -Repeat PET/CT in June 2023 (ordered).      Hayde Lopez DO  Hematology/Oncology  [facility]        Again, thank you for allowing me to participate in the care of your patient.        Sincerely,        Hayde Lopez DO

## 2023-04-05 NOTE — PATIENT INSTRUCTIONS
Billy is scheduled for labs and a follow up with Dr. Lopez on 04/13/23. He is also scheduled for a PET scan on 06/06/23.    Armida Gibbons RN on 4/5/2023 at 4:21 PM

## 2023-04-13 ENCOUNTER — LAB (OUTPATIENT)
Dept: ONCOLOGY | Facility: CLINIC | Age: 33
End: 2023-04-13
Attending: INTERNAL MEDICINE
Payer: COMMERCIAL

## 2023-04-13 VITALS
HEART RATE: 54 BPM | BODY MASS INDEX: 33.92 KG/M2 | OXYGEN SATURATION: 97 % | RESPIRATION RATE: 16 BRPM | SYSTOLIC BLOOD PRESSURE: 133 MMHG | HEIGHT: 75 IN | TEMPERATURE: 97.2 F | WEIGHT: 272.8 LBS | DIASTOLIC BLOOD PRESSURE: 79 MMHG

## 2023-04-13 DIAGNOSIS — D69.6 THROMBOCYTOPENIA (H): ICD-10-CM

## 2023-04-13 DIAGNOSIS — D69.3 AUTOIMMUNE THROMBOCYTOPENIA (H): Primary | ICD-10-CM

## 2023-04-13 LAB
BASOPHILS # BLD AUTO: 0.1 10E3/UL (ref 0–0.2)
BASOPHILS NFR BLD AUTO: 1 %
EOSINOPHIL # BLD AUTO: 0.1 10E3/UL (ref 0–0.7)
EOSINOPHIL NFR BLD AUTO: 1 %
ERYTHROCYTE [DISTWIDTH] IN BLOOD BY AUTOMATED COUNT: 12.4 % (ref 10–15)
HCT VFR BLD AUTO: 45.6 % (ref 40–53)
HGB BLD-MCNC: 16.6 G/DL (ref 13.3–17.7)
IMM GRANULOCYTES # BLD: 0 10E3/UL
IMM GRANULOCYTES NFR BLD: 0 %
LYMPHOCYTES # BLD AUTO: 1.9 10E3/UL (ref 0.8–5.3)
LYMPHOCYTES NFR BLD AUTO: 23 %
MCH RBC QN AUTO: 29.5 PG (ref 26.5–33)
MCHC RBC AUTO-ENTMCNC: 36.4 G/DL (ref 31.5–36.5)
MCV RBC AUTO: 81 FL (ref 78–100)
MONOCYTES # BLD AUTO: 0.7 10E3/UL (ref 0–1.3)
MONOCYTES NFR BLD AUTO: 8 %
NEUTROPHILS # BLD AUTO: 5.6 10E3/UL (ref 1.6–8.3)
NEUTROPHILS NFR BLD AUTO: 67 %
NRBC # BLD AUTO: 0 10E3/UL
NRBC BLD AUTO-RTO: 0 /100
PLATELET # BLD AUTO: 114 10E3/UL (ref 150–450)
RBC # BLD AUTO: 5.63 10E6/UL (ref 4.4–5.9)
WBC # BLD AUTO: 8.2 10E3/UL (ref 4–11)

## 2023-04-13 PROCEDURE — 99214 OFFICE O/P EST MOD 30 MIN: CPT | Performed by: INTERNAL MEDICINE

## 2023-04-13 PROCEDURE — G0463 HOSPITAL OUTPT CLINIC VISIT: HCPCS | Performed by: INTERNAL MEDICINE

## 2023-04-13 PROCEDURE — 36415 COLL VENOUS BLD VENIPUNCTURE: CPT

## 2023-04-13 PROCEDURE — 85025 COMPLETE CBC W/AUTO DIFF WBC: CPT | Performed by: PHYSICIAN ASSISTANT

## 2023-04-13 ASSESSMENT — PAIN SCALES - GENERAL: PAINLEVEL: NO PAIN (0)

## 2023-04-13 NOTE — LETTER
4/13/2023         RE: Billy Carey  [address] [place] [address]        Dear Colleague,    Thank you for referring your patient, Billy Carey, to the [facility]. Please see a copy of my visit note below.    [facility]    Hematology/Oncology Established Patient Note      Today's Date: 4/13/23    Reason for Consultation: Thrombocytopenia  Referring Provider: ZAHRAA Kingsley CNP      HISTORY OF PRESENT ILLNESS: Billy Carey is a 32 year old male who is referred for thrombocytopenia. 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    Historically, he has had historical normal CBC. On 11/30/22, WBC 6.0, Hb 16.5, PLT 42. Repeat CBC on 12/1/22, WBC 7.2, Hb 17.5, PLT 43. Hepatitis and HIV studies negative.     He has had eustachian tube placement without bleeding event. He has not had any other surgeries.     For DM1- he is followed by endocrinology. He has continuous blood glucose monitoring and an insulin pump.     He drinks alcohol 1-2x/week. No excessive use. No history of smoking.     Family history of VTE in his sister. He is unaware of the nuances surrounding this event. No family history of bleeding disorder or malignancy.       INTERIM HISTORY:  Last visit, platelets returned 18K. He received decadron 40 mg x4 and IVIG 1g/kg. PLTs have been >100K since then. No evidence of bleed. Patient would like to continue to hold on rituximab.         REVIEW OF SYSTEMS:   A 14 point ROS was reviewed with pertinent positives and negatives in the HPI.        HOME MEDICATIONS:  Current Outpatient Medications   Medication Sig Dispense Refill     atorvastatin (LIPITOR) 10 MG tablet Take 1 tablet (10 mg) by mouth daily 90 tablet 3     blood glucose (KELL CONTOUR NEXT) test strip Test 4-5 times daily 4 Box 3     Blood Glucose Monitoring Suppl (KELL CONTOUR NEXT LINK) W/DEVICE KIT 1 kit 5 times daily. Use as directed 1 kit 1     Continuous  Blood Gluc Sensor (DEXCOM G6 SENSOR) MISC USE 1 SENSOR EVERY 10 DAYS       glucagon (GLUCAGON EMERGENCY) 1 MG kit Inject 1 mg into the muscle once for 1 dose (Patient not taking: Reported on 3/7/2023) 1 mg 3     insulin glargine (BASAGLAR KWIKPEN) 100 UNIT/ML pen INJECT 50 UNITS ONCE DAILY SUBCUTANEOUSLY 15 mL 1     insulin lispro (HUMALOG VIAL) 100 UNIT/ML vial To be used in insulin pump       insulin pen needle (BD CHRIST U/F) 32G X 4 MM miscellaneous Use 3-6 pen needles daily or as directed. 50 each 0     INSULIN PUMP - OUTPATIENT Date Last Updated: 2/15/23  Tandem  Pump Basal Rates/Times:  0746-2451: 2 units/hour  9239-8240: 2.6 units/hour  2585-8352: 2.2 units/hour  1025-2878: 1.7 units/hour  CARB RATIO:   7652-2855: 1 unit per 10 grams carbohydrates  CF / Sensitivity Times:   4781-7308: 1 unit to decrease BG by 30 mg/dL  TARGET B mg/dL       vitamin D2 (ERGOCALCIFEROL) 29427 units (1250 mcg) capsule Take 50,000 Units by mouth once a week           ALLERGIES:  Allergies   Allergen Reactions     Penicillins Rash         PAST MEDICAL HISTORY:  Past Medical History:   Diagnosis Date     Diabetes mellitus type 1, uncontrolled, insulin dependent 2013         PAST SURGICAL HISTORY:  Past Surgical History:   Procedure Laterality Date     EXCISE MASS GROIN Left 2023    Procedure: Lymph Node excisional biopsy;  Surgeon: New Burgre MD;  Location: [facility] OR     MYRINGOTOMY, INSERT TUBE BILATERAL, COMBINED Bilateral     As a child         SOCIAL HISTORY:  Social History     Socioeconomic History     Marital status:      Spouse name: Jayla     Number of children: 3     Years of education: Not on file     Highest education level: Not on file   Occupational History     Occupation:      Comment: [employer]   Tobacco Use     Smoking status: Never     Smokeless tobacco: Never   Vaping Use     Vaping status: Not on file   Substance and Sexual Activity     Alcohol use: No     Comment:  rarely     Drug use: No     Sexual activity: Yes     Partners: Female   Other Topics Concern     Parent/sibling w/ CABG, MI or angioplasty before 65F 55M? Not Asked   Social History Narrative     Not on file     Social Determinants of Health     Financial Resource Strain: Low Risk  (1/12/2023)    Overall Financial Resource Strain (CARDIA)      Difficulty of Paying Living Expenses: Not hard at all   Food Insecurity: No Food Insecurity (1/12/2023)    Hunger Vital Sign      Worried About Running Out of Food in the Last Year: Never true      Ran Out of Food in the Last Year: Never true   Transportation Needs: No Transportation Needs (1/12/2023)    PRAPARE - Transportation      Lack of Transportation (Medical): No      Lack of Transportation (Non-Medical): No   Physical Activity: Sufficiently Active (1/12/2023)    Exercise Vital Sign      Days of Exercise per Week: 6 days      Minutes of Exercise per Session: 40 min   Stress: No Stress Concern Present (1/12/2023)    [place] [employer] - Occupational Stress Questionnaire      Feeling of Stress : Only a little   Social Connections: Socially Integrated (1/12/2023)    Social Connection and Isolation Panel [NHANES]      Frequency of Communication with Friends and Family: More than three times a week      Frequency of Social Gatherings with Friends and Family: More than three times a week      Attends Caodaism Services: 1 to 4 times per year      Active Member of Clubs or Organizations: Yes      Attends Club or Organization Meetings: Not on file      Marital Status:    Intimate Partner Violence: Not At Risk (3/31/2023)    Humiliation, Afraid, Rape, and Kick questionnaire      Fear of Current or Ex-Partner: No      Emotionally Abused: No      Physically Abused: No      Sexually Abused: No   Housing Stability: Low Risk  (1/12/2023)    Housing Stability Vital Sign      Unable to Pay for Housing in the Last Year: No      Number of Places Lived in the  "Last Year: 1      Unstable Housing in the Last Year: No         FAMILY HISTORY:  Family History   Problem Relation Age of Onset     C.A.D. No family hx of      Diabetes No family hx of      Hypertension No family hx of      Cancer No family hx of         no skin cancer     Amblyopia No family hx of      Retinal detachment No family hx of      Thyroid Disease No family hx of      Glaucoma No family hx of      Macular Degeneration No family hx of          PHYSICAL EXAM:  Vital signs:  /79   Pulse 54   Temp 97.2  F (36.2  C) (Tympanic)   Resp 16   Ht 1.905 m (6' 3\")   Wt 123.7 kg (272 lb 12.8 oz)   SpO2 97%   BMI 34.10 kg/m     ECO  GENERAL/CONSTITUTIONAL: No acute distress.  LYMPH: No anterior cervical, posterior cervical, supraclavicular, axillary or inguinal adenopathy.   RESPIRATORY: Clear to auscultation bilaterally. No crackles or wheezing.   CARDIOVASCULAR: Regular rate and rhythm without murmurs, gallops, or rubs.  GASTROINTESTINAL: No hepatosplenomegaly, masses, or tenderness. The patient has normal bowel sounds. No guarding.  No distention.  MUSCULOSKELETAL: Warm and well-perfused, no cyanosis, clubbing, or edema.  NEUROLOGIC: Cranial nerves II-XII are intact. Alert, oriented, answers questions appropriately.  INTEGUMENTARY: No rashes or jaundice.  GAIT: Steady, does not use assistive device.      LABS:   Latest Reference Range & Units 23 15:38   WBC 4.0 - 11.0 10e3/uL 8.2   Hemoglobin 13.3 - 17.7 g/dL 16.6   Hematocrit 40.0 - 53.0 % 45.6   Platelet Count 150 - 450 10e3/uL 114 (L)   RBC Count 4.40 - 5.90 10e6/uL 5.63   MCV 78 - 100 fL 81   MCH 26.5 - 33.0 pg 29.5   MCHC 31.5 - 36.5 g/dL 36.4   RDW 10.0 - 15.0 % 12.4   % Neutrophils % 67   % Lymphocytes % 23   % Monocytes % 8   % Eosinophils % 1   % Basophils % 1   Absolute Basophils 0.0 - 0.2 10e3/uL 0.1   Absolute Eosinophils 0.0 - 0.7 10e3/uL 0.1   Absolute Immature Granulocytes <=0.4 10e3/uL 0.0   Absolute Lymphocytes 0.8 - 5.3 " 10e3/uL 1.9   Absolute Monocytes 0.0 - 1.3 10e3/uL 0.7   % Immature Granulocytes % 0   Absolute Neutrophils 1.6 - 8.3 10e3/uL 5.6   Absolute NRBCs 10e3/uL 0.0   NRBCs per 100 WBC <1 /100 0      Latest Reference Range & Units 12/06/22 09:40 12/14/22 09:20 01/10/23 15:17 01/26/23 14:47 02/15/23 16:02 03/07/23 09:00   Haptoglobin 32 - 197 mg/dL 37 31 (L) 22 (L) 31 (L) 118 50        Latest Reference Range & Units 01/10/23 15:17   SPECIMEN EXPIRATION DATE  20230113235900   MIRA Anti-IgG,-C3d  Positive 3+   Hep B Surface Agn Nonreactive  Nonreactive   Hepatitis B Core Margarette Nonreactive  Nonreactive   Hepatitis B Surface Antibody Instrument Value <8.00 m[IU]/mL 35.73   Hepatitis B Surface Antibody  Reactive   Hepatitis C Antibody Nonreactive  Nonreactive   HIV Antigen Antibody Combo Nonreactive  Nonreactive      Latest Reference Range & Units 01/10/23 15:17   PAVAN interpretation Negative  Positive !      01/10/23 15:17   PAVAN titer 1 1:160     Serum M-protein (g/dL):  1/10/23: 0.0    Total IgG (mg/dL), IgA (mg/dL), IgM (mg/dL):  1/10/23: 956, 135, 44    Free kappa light chains (mg/dL), lambda (mg/dL), kappa/lambda ratio:  1/10/23: 1.33, 1.45, 0.92       Latest Reference Range & Units 12/14/22 09:20   MIRA Anti-IgG,-C3d  Weak Positive   MIRA Anti-C3  Negative   MIRA Anti-IgG, Tube  Positive 1+   Blood Bank Chart Comment  BB Chart Comment            Latest Reference Range & Units 12/06/22 09:40   Iron 61 - 157 ug/dL 59 (L)   Iron Binding Capacity 240 - 430 ug/dL 319   Iron Sat Index 15 - 46 % 18   Lactate Dehydrogenase 0 - 250 U/L 240   INR 0.85 - 1.15  0.91   PTT 22 - 38 Seconds 25   Fibrinogen 170 - 490 mg/dL 282   SPECIMEN EXPIRATION DATE  20221209235900   MIRA Anti-IgG,-C3d  Positive 3+               PATHOLOGY:  Final Diagnosis 12/1/22:   Peripheral blood, morphology:  - Marked thrombocytopenia.  - Hemoglobin quantitatively within normal limits and erythrocytes without diagnostic morphologic abnormality.  - WBC subsets  quantitatively within normal limits and without diagnostic morphologic abnormality.  - Negative for schistocytes.  - Negative for overt features of myelodysplasia or circulating blasts.     Final Diagnosis 12/6/22:   Peripheral blood:  --Slight polycythemia/erythrocytosis.  --Marked thrombocytopenia.   Electronically signed by Angel Marshall MD on 12/7/2022 at 10:33 AM   Comment    The cause of this patient's thrombocytopenia is unclear from the peripheral smear. Some common causes of thrombocytopenia to consider include infections, medications, alcohol, immune mediated mechanisms, and splenic sequestration. If splenic sequestration is of clinical concern, an accurate assessment of spleen size is recommended.         Clinical Information    Thrombocytopenia   Peripheral Smear    The red blood cells appear normochromic.  Rouleaux formation does not appear increased.  Polychromasia does not appear increased.  Poikilocytosis appears mild and nonspecific.  Platelets  are well granulated.  Lymphocytes are predominantly small with cytologically mature chromatin and appear overall polymorphous.  Neutrophils contain normal cytoplasmic granulation and unremarkable nuclear morphology.  There is no dysplasia and no circulating blasts are seen.     Final Diagnosis 12/14/22:   Bone marrow, left posterior iliac crest, decalcified trephine biopsy and aspiration:  -- Normocellular bone marrow for age (60 to 70%) with maturing trilineage hematopoiesis.  -- No evidence of marrow involvement by lymphoma or other hematopoietic neoplasm.     Peripheral blood showing:  -- Moderate thrombocytopenia.     Case Report   Flow Cytometry Report                             Case: [id number]                                   Authorizing Provider:  Hayde Lopez DO         Collected:           12/14/2022 10:06 AM           Ordering Location:     [facility]   Received:            12/14/2022 10:21 AM                                   [facility]                                                             Pathologist:           Marleny Holden MD                                                      Specimen:    Iliac Crest, Bone Marrow Aspirate, Left                                                    Flow Interpretation   A. Iliac Crest, Bone Marrow Aspirate, Left:    - Polytypic B cells  - No aberrant immunophenotype on T cells  - No increase in myeloid blasts and no abnormal myeloid blast population     Case Report   Surgical Pathology Report                         Case: [id number]                                   Authorizing Provider:  Hayde Lopez DO         Collected:           12/15/2022 11:27 AM           Ordering Location:     [facility]   Received:            12/15/2022 11:54 AM                                  Imaging                                                                       Pathologist:           Mirna Roger                                                                Specimen:    Retroperitoneal                                                                            Final Diagnosis   A.  Retroperitoneum, biopsy:  -Three tissue fragments, entirely submitted for flow cytometry evaluation (gross examination only).       Case Report   Flow Cytometry Report                             Case: [id number]                                   Authorizing Provider:  Hayde Lopez DO         Collected:           12/15/2022 01:16 PM           Ordering Location:     [facility]   Received:            12/15/2022 01:17 PM                                  Imaging                                                                       Pathologist:           Marleny Holden MD                                                      Specimen:    Retroperitoneal                                                                            Flow Interpretation   A. Retroperitoneal :    - Polytypic  B cells  - No aberrant immunophenotype on T cells         Case Report   Surgical Pathology Report                         Case: [id number]                                   Authorizing Provider:  Hayde Lopez DO         Collected:           01/04/2023 02:25 PM           Ordering Location:     [facility]   Received:            01/04/2023 02:48 PM                                  [facility]                                                                 Pathologist:           Saadia Allen MD                                                                            Specimen:    Lymph Node(s), Axillary, Right                                                             Final Diagnosis   Lymph node, right axillary, core biopsies:  -Partially sampled lymph node showing no evidence of Hodgkin's or non-Hodgkin's lymphoma  -Completed immunophenotyping studies show polytypic B cells and no aberrant T-cell antigen expression     Case Report   Flow Cytometry Report                             Case: [id number]                                   Authorizing Provider:  Hayde Lopez DO         Collected:           01/04/2023 02:25 PM           Ordering Location:     [facility]   Received:            01/04/2023 02:53 PM                                  [facility]                                                                 Pathologist:           Xiomara Ramsey MD                                                         Specimen:    Lymph Node(s), Axillary, Right                                                             Flow Interpretation   A. Lymph Node(s), Axillary, Right, :  -Polytypic B cells  No aberrant immunophenotype on T cells         Case Report   Surgical Pathology Report                         Case: [id number]                                   Authorizing Provider:  New Burger          Collected:           01/20/2023 02:16 PM                                  MD Ari                                                                  Ordering Location:      [facility] OR                 Received:            01/20/2023 02:29 PM           Pathologist:           Isabela Kaminski MD                                                     Specimen:    Lymph Node(s), Inguinal, Left, partial lymph node left groin                               Addendum   This addendum is issued to document that Dr. Isabela Kaminski discussed biopsy findings with Dr. Hayde Lopez on 1/26/23 at 12:30.    Addendum electronically signed by Isabela Kaminski MD on 1/26/2023 at 12:30 PM   Final Diagnosis   A. Left groin, lymph node, partial lymphadenectomy:  -Benign lymph node with follicular and interfollicular hyperplasia  -Rare small noncaseating granulomas  -No morphologic evidence of malignancy  -Negative for select microorganisms on stained tissue  -See comment   Electronically signed by Isabela Kaminski MD on 1/26/2023 at 12:16 PM   Comment  [id number]     There is no definitive morphologic or immunophenotypic evidence for malignancy in this biopsy. Instead, the findings (which include follicular and interfollicular hyperplasia and features of progressive transformation of germinal centers) are favored to be reactive.     Evaluation for select microorganisms was performed, with negative results for EBV (by in situ hybridization), HHV8 and toxoplasma (by immunohistochemistry), and acid fast and fungal organisms (by special stains).    Concurrent flow cytometry ([id number]) demonstrated polytypic B cells and no aberrant immunophenotype on T cells.         Case Report   Flow Cytometry Report                             Case: [id number]                                   Authorizing Provider:  New Burger         Collected:           01/20/2023 02:33 PM                                  MD Ari                                                                   Ordering Location:      [facility] OR                 Received:            01/20/2023 02:33 PM           Pathologist:           Marleny Holden MD                                                      Specimen:    Lymph Node(s), Inguinal, Left                                                              Flow Interpretation   A. Lymph Node(s), Inguinal, Left:     - Polytypic B cells  - No aberrant immunophenotype on T cells           IMAGING:  CT CAP 12/6/22:  IMPRESSION:  1.  Several enlarged lymph nodes identified at the right axilla,  retroperitoneum, left pelvis and borderline enlarged at the left  inguinal locations. While nonspecific, a neoplastic etiology is  possible including lymphoma.  2.  Mild splenomegaly.  3.  Lobulated indeterminate nodular masses at the left pericardial  fat.  4.  Indeterminate multiple bilateral pulmonary nodules. The dominant  solid nodule is at the right lower lobe measuring 1.1 cm. Neoplasm  remains in the differential and surveillance imaging and/or further  workup is recommended. See below for follow up imaging guidelines.    PET 12/9/22:  IMPRESSION:     Findings suspicious for active neoplasm such as lymphoma involving lymph nodes above/below the diaphragm. The right axillary for left inguinal lymph nodes are amenable to ultrasound-guided histologic sampling if desired.        ASSESSMENT/PLAN:  Billy Carey is a 32 year old male who is referred for thrombocytopenia. 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    1) Thrombocytopenia, warm autoimmune hemolysis  -This is a new diagnosis for patient and an incidental finding when he was in for routine physical examination. He has not had any bleeding episodes.   -No new meds, recent infection, or recent surgeries.  -TSH normal at 3.85.   -HepB/C and HIV studies historically negative.   -No splenomegaly on physical examination.  -I doubt hemolysis as retic is normal as  well as LFTs.  -We discussed the possibility of lab artifact, but there is no comment on platelet clumping on smear. Also, he had lab work repeated two days in a row with PLT count returning in the 40s.   -Given his history of autoimmune disease, he may have underlying ITP, which is a diagnosis of exclusion. We had discussed possible steroid therapy if platelets are to return <30K.   -Repeat CBC is showing PLTs still in the 40's. Hb is 17.9, WBC normal.   -Also is consideration for a microangiopathic hemolytic anemia. MIRA has come back positive at 3+ (which is inconsistent with MAHA) and patient does not have elevated LFTs, abnormal coag studies, nor reticulocytosis. LDH is also normal at 240. He is afebrile. [facility] blood bank testing has returned +IgG and negative for complement, consistent with warm AIHA.   -CT CAP had returned with 2 cm LN of the retroperitoneum, inguinal, and axillary. There was also note of an abnormal pericardial fat mass. I sent for a PET, that did not show FDG avidity of the pericardial fat mass. The retroperitoneal LNs had SUV of 12-13 with the remainder LN 6-7. I sent patient for a core biopsy of the retroperitoneal LN with IR that returned in fragments with unremarkable FLOW. Bone marrow biopsy was normocellular with normal megakaryocyte morphology. I then pursued a right axillary LN biopsy, which again returned negative with unremarkable FLOW. I sent for excisional biopsy of an inguinal LN with general surgery with pathology once again returning as benign with folliculary and interfollicular hyperplasia. Congo red staining is negative. EBV, HHV8 negative. I reviewed with pathology for any morphologic evidence consistent with Castleman's disease and spoke with Dr. Kaminski who is unable to confirm this at this time. She did have an interdepartmental pathology review. I do note the sinuses contain abundant histiocytes in the microscopic description and in my differential diagnosis is also  Rosai-Dorfamn or Langerhan's disease.  I spoke with Dr. Tyler who is in agreement with workup above. I reviewed with Dr. 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 I reviewed I would then move onto rituximab next if patient is not to have improvement with steroid therapy. Dr. Tyler is in agreement with this plan.   -I reviewed the above with patient and wife, Jayla. I will need to continue to monitor the patient not only for treatment response for the hemolysis, but for evolution of LAD. My plan is to recheck with PET in 6 months time and will continue to monitor for treatment response discussed above.   -After our last visit, PLTs returned at 18K. Patient was hospitalized and treated with decadron 40 mg x4 and IVIG. His platelets have remained >100K since then. He has met with Dr. Tyler as well who is in agreement with next line rituximab. Patient would like to continue to think on this. We discussed the risk of bronchospasm and hypersensitivity.    Weekly rituximab x4 weeks, then possible maintenance:  -Rituximab 375 mg/m2 IV    -Platelets have been stable >100K. Patient would like to continue to monitor for now.     2) Positive PAVAN  -Titer 1:160.  -Patient without arthralgias, skin changes.  -APLA, ds-DNA labs returned negative.  -Will refer to rheumatology.     3) History of infectious mononucleosis  -EBV negative on LN.  -EBV PCR negative.    4) DM1  -Patient managed by endocrinology.    5) History of HTN, HLD    6) -Repeat CBC in 4 weeks with follow up.   -Repeat PET/CT in June 2023 (ordered).      Hayde Lopez DO  Hematology/Oncology  [facility]        Again, thank you for allowing me to participate in the care of your patient.        Sincerely,        Hayde Lopez DO

## 2023-04-13 NOTE — PROGRESS NOTES
Medical Assistant Note:  Billy Carey presents today for blood draw.    Patient seen by provider today: Yes: .   present during visit today: Not Applicable.    Concerns: No Concerns.    Procedure:  Lab draw site: left antecub, Needle type: butrterfly, Gauge: 23.    Post Assessment:  Labs drawn without difficulty: Yes.    Discharge Plan:  Departure Mode: Ambulatory.    Face to Face Time: 10.    Shanna Quiroz, CMA

## 2023-04-13 NOTE — NURSING NOTE
"Oncology Rooming Note    April 13, 2023 3:42 PM   Billy Carey is a 32 year old male who presents for:    Chief Complaint   Patient presents with     Oncology Clinic Visit     Initial Vitals: /79   Pulse 54   Temp 97.2  F (36.2  C) (Tympanic)   Resp 16   Ht 1.905 m (6' 3\")   Wt 123.7 kg (272 lb 12.8 oz)   SpO2 97%   BMI 34.10 kg/m   Estimated body mass index is 34.1 kg/m  as calculated from the following:    Height as of this encounter: 1.905 m (6' 3\").    Weight as of this encounter: 123.7 kg (272 lb 12.8 oz). Body surface area is 2.56 meters squared.  No Pain (0) Comment: Data Unavailable   No LMP for male patient.  Allergies reviewed: Yes  Medications reviewed: Yes    Medications: Medication refills not needed today.  Pharmacy name entered into [facility]:    [id number] - [place], [place] - [address] [place] [address] [address] [facility]  [id number] [place], [place] - [address]  [id number] [place], [place] - [address] [facility] - [facility], [place] - [address] [facility]    Clinical concerns: f/u       Shanna Quiroz CMA              "

## 2023-04-13 NOTE — PROGRESS NOTES
[facility]    Hematology/Oncology Established Patient Note      Today's Date: 4/13/23    Reason for Consultation: Thrombocytopenia  Referring Provider: ZAHRAA Kingsley CNP      HISTORY OF PRESENT ILLNESS: Billy Carey is a 32 year old male who is referred for thrombocytopenia. 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    Historically, he has had historical normal CBC. On 11/30/22, WBC 6.0, Hb 16.5, PLT 42. Repeat CBC on 12/1/22, WBC 7.2, Hb 17.5, PLT 43. Hepatitis and HIV studies negative.     He has had eustachian tube placement without bleeding event. He has not had any other surgeries.     For DM1- he is followed by endocrinology. He has continuous blood glucose monitoring and an insulin pump.     He drinks alcohol 1-2x/week. No excessive use. No history of smoking.     Family history of VTE in his sister. He is unaware of the nuances surrounding this event. No family history of bleeding disorder or malignancy.       INTERIM HISTORY:  Last visit, platelets returned 18K. He received decadron 40 mg x4 and IVIG 1g/kg. PLTs have been >100K since then. No evidence of bleed. Patient would like to continue to hold on rituximab.         REVIEW OF SYSTEMS:   A 14 point ROS was reviewed with pertinent positives and negatives in the HPI.        HOME MEDICATIONS:  Current Outpatient Medications   Medication Sig Dispense Refill     atorvastatin (LIPITOR) 10 MG tablet Take 1 tablet (10 mg) by mouth daily 90 tablet 3     blood glucose (KELL CONTOUR NEXT) test strip Test 4-5 times daily 4 Box 3     Blood Glucose Monitoring Suppl (KELL CONTOUR NEXT LINK) W/DEVICE KIT 1 kit 5 times daily. Use as directed 1 kit 1     Continuous Blood Gluc Sensor (DEXCOM G6 SENSOR) MISC USE 1 SENSOR EVERY 10 DAYS       glucagon (GLUCAGON EMERGENCY) 1 MG kit Inject 1 mg into the muscle once for 1 dose (Patient not taking: Reported on 3/7/2023) 1 mg 3     insulin glargine (BASAGLAR KWIKPEN) 100 UNIT/ML  pen INJECT 50 UNITS ONCE DAILY SUBCUTANEOUSLY 15 mL 1     insulin lispro (HUMALOG VIAL) 100 UNIT/ML vial To be used in insulin pump       insulin pen needle (BD CHRIST U/F) 32G X 4 MM miscellaneous Use 3-6 pen needles daily or as directed. 50 each 0     INSULIN PUMP - OUTPATIENT Date Last Updated: 2/15/23  Tandem  Pump Basal Rates/Times:  6774-2453: 2 units/hour  3646-9108: 2.6 units/hour  7642-8095: 2.2 units/hour  2433-0040: 1.7 units/hour  CARB RATIO:   4566-4663: 1 unit per 10 grams carbohydrates  CF / Sensitivity Times:   2626-2302: 1 unit to decrease BG by 30 mg/dL  TARGET B mg/dL       vitamin D2 (ERGOCALCIFEROL) 77350 units (1250 mcg) capsule Take 50,000 Units by mouth once a week           ALLERGIES:  Allergies   Allergen Reactions     Penicillins Rash         PAST MEDICAL HISTORY:  Past Medical History:   Diagnosis Date     Diabetes mellitus type 1, uncontrolled, insulin dependent 2013         PAST SURGICAL HISTORY:  Past Surgical History:   Procedure Laterality Date     EXCISE MASS GROIN Left 2023    Procedure: Lymph Node excisional biopsy;  Surgeon: New Burger MD;  Location: [facility] OR     MYRINGOTOMY, INSERT TUBE BILATERAL, COMBINED Bilateral     As a child         SOCIAL HISTORY:  Social History     Socioeconomic History     Marital status:      Spouse name: Jayla     Number of children: 3     Years of education: Not on file     Highest education level: Not on file   Occupational History     Occupation:      Comment: [employer]   Tobacco Use     Smoking status: Never     Smokeless tobacco: Never   Vaping Use     Vaping status: Not on file   Substance and Sexual Activity     Alcohol use: No     Comment: rarely     Drug use: No     Sexual activity: Yes     Partners: Female   Other Topics Concern     Parent/sibling w/ CABG, MI or angioplasty before 65F 55M? Not Asked   Social History Narrative     Not on file     Social Determinants of Health     Financial  Resource Strain: Low Risk  (1/12/2023)    Overall Financial Resource Strain (CARDIA)      Difficulty of Paying Living Expenses: Not hard at all   Food Insecurity: No Food Insecurity (1/12/2023)    Hunger Vital Sign      Worried About Running Out of Food in the Last Year: Never true      Ran Out of Food in the Last Year: Never true   Transportation Needs: No Transportation Needs (1/12/2023)    PRAPARE - Transportation      Lack of Transportation (Medical): No      Lack of Transportation (Non-Medical): No   Physical Activity: Sufficiently Active (1/12/2023)    Exercise Vital Sign      Days of Exercise per Week: 6 days      Minutes of Exercise per Session: 40 min   Stress: No Stress Concern Present (1/12/2023)    [employer] - Occupational Stress Questionnaire      Feeling of Stress : Only a little   Social Connections: Socially Integrated (1/12/2023)    Social Connection and Isolation Panel [NHANES]      Frequency of Communication with Friends and Family: More than three times a week      Frequency of Social Gatherings with Friends and Family: More than three times a week      Attends Taoist Services: 1 to 4 times per year      Active Member of Clubs or Organizations: Yes      Attends Club or Organization Meetings: Not on file      Marital Status:    Intimate Partner Violence: Not At Risk (3/31/2023)    Humiliation, Afraid, Rape, and Kick questionnaire      Fear of Current or Ex-Partner: No      Emotionally Abused: No      Physically Abused: No      Sexually Abused: No   Housing Stability: Low Risk  (1/12/2023)    Housing Stability Vital Sign      Unable to Pay for Housing in the Last Year: No      Number of Places Lived in the Last Year: 1      Unstable Housing in the Last Year: No         FAMILY HISTORY:  Family History   Problem Relation Age of Onset     C.A.D. No family hx of      Diabetes No family hx of      Hypertension No family hx of      Cancer No family hx of          "no skin cancer     Amblyopia No family hx of      Retinal detachment No family hx of      Thyroid Disease No family hx of      Glaucoma No family hx of      Macular Degeneration No family hx of          PHYSICAL EXAM:  Vital signs:  /79   Pulse 54   Temp 97.2  F (36.2  C) (Tympanic)   Resp 16   Ht 1.905 m (6' 3\")   Wt 123.7 kg (272 lb 12.8 oz)   SpO2 97%   BMI 34.10 kg/m     ECO  GENERAL/CONSTITUTIONAL: No acute distress.  LYMPH: No anterior cervical, posterior cervical, supraclavicular, axillary or inguinal adenopathy.   RESPIRATORY: Clear to auscultation bilaterally. No crackles or wheezing.   CARDIOVASCULAR: Regular rate and rhythm without murmurs, gallops, or rubs.  GASTROINTESTINAL: No hepatosplenomegaly, masses, or tenderness. The patient has normal bowel sounds. No guarding.  No distention.  MUSCULOSKELETAL: Warm and well-perfused, no cyanosis, clubbing, or edema.  NEUROLOGIC: Cranial nerves II-XII are intact. Alert, oriented, answers questions appropriately.  INTEGUMENTARY: No rashes or jaundice.  GAIT: Steady, does not use assistive device.      LABS:   Latest Reference Range & Units 23 15:38   WBC 4.0 - 11.0 10e3/uL 8.2   Hemoglobin 13.3 - 17.7 g/dL 16.6   Hematocrit 40.0 - 53.0 % 45.6   Platelet Count 150 - 450 10e3/uL 114 (L)   RBC Count 4.40 - 5.90 10e6/uL 5.63   MCV 78 - 100 fL 81   MCH 26.5 - 33.0 pg 29.5   MCHC 31.5 - 36.5 g/dL 36.4   RDW 10.0 - 15.0 % 12.4   % Neutrophils % 67   % Lymphocytes % 23   % Monocytes % 8   % Eosinophils % 1   % Basophils % 1   Absolute Basophils 0.0 - 0.2 10e3/uL 0.1   Absolute Eosinophils 0.0 - 0.7 10e3/uL 0.1   Absolute Immature Granulocytes <=0.4 10e3/uL 0.0   Absolute Lymphocytes 0.8 - 5.3 10e3/uL 1.9   Absolute Monocytes 0.0 - 1.3 10e3/uL 0.7   % Immature Granulocytes % 0   Absolute Neutrophils 1.6 - 8.3 10e3/uL 5.6   Absolute NRBCs 10e3/uL 0.0   NRBCs per 100 WBC <1 /100 0      Latest Reference Range & Units 22 09:40 22 09:20 " 01/10/23 15:17 01/26/23 14:47 02/15/23 16:02 03/07/23 09:00   Haptoglobin 32 - 197 mg/dL 37 31 (L) 22 (L) 31 (L) 118 50        Latest Reference Range & Units 01/10/23 15:17   SPECIMEN EXPIRATION DATE  20230113235900   MIRA Anti-IgG,-C3d  Positive 3+   Hep B Surface Agn Nonreactive  Nonreactive   Hepatitis B Core Margarette Nonreactive  Nonreactive   Hepatitis B Surface Antibody Instrument Value <8.00 m[IU]/mL 35.73   Hepatitis B Surface Antibody  Reactive   Hepatitis C Antibody Nonreactive  Nonreactive   HIV Antigen Antibody Combo Nonreactive  Nonreactive      Latest Reference Range & Units 01/10/23 15:17   PAVAN interpretation Negative  Positive !      01/10/23 15:17   PAVAN titer 1 1:160     Serum M-protein (g/dL):  1/10/23: 0.0    Total IgG (mg/dL), IgA (mg/dL), IgM (mg/dL):  1/10/23: 956, 135, 44    Free kappa light chains (mg/dL), lambda (mg/dL), kappa/lambda ratio:  1/10/23: 1.33, 1.45, 0.92       Latest Reference Range & Units 12/14/22 09:20   MIRA Anti-IgG,-C3d  Weak Positive   MIRA Anti-C3  Negative   MIRA Anti-IgG, Tube  Positive 1+   Blood Bank Chart Comment  BB Chart Comment            Latest Reference Range & Units 12/06/22 09:40   Iron 61 - 157 ug/dL 59 (L)   Iron Binding Capacity 240 - 430 ug/dL 319   Iron Sat Index 15 - 46 % 18   Lactate Dehydrogenase 0 - 250 U/L 240   INR 0.85 - 1.15  0.91   PTT 22 - 38 Seconds 25   Fibrinogen 170 - 490 mg/dL 282   SPECIMEN EXPIRATION DATE  20221209235900   MIRA Anti-IgG,-C3d  Positive 3+               PATHOLOGY:  Final Diagnosis 12/1/22:   Peripheral blood, morphology:  - Marked thrombocytopenia.  - Hemoglobin quantitatively within normal limits and erythrocytes without diagnostic morphologic abnormality.  - WBC subsets quantitatively within normal limits and without diagnostic morphologic abnormality.  - Negative for schistocytes.  - Negative for overt features of myelodysplasia or circulating blasts.     Final Diagnosis 12/6/22:   Peripheral blood:  --Slight  polycythemia/erythrocytosis.  --Marked thrombocytopenia.   Electronically signed by Angel Marshall MD on 12/7/2022 at 10:33 AM   Comment    The cause of this patient's thrombocytopenia is unclear from the peripheral smear. Some common causes of thrombocytopenia to consider include infections, medications, alcohol, immune mediated mechanisms, and splenic sequestration. If splenic sequestration is of clinical concern, an accurate assessment of spleen size is recommended.         Clinical Information    Thrombocytopenia   Peripheral Smear    The red blood cells appear normochromic.  Rouleaux formation does not appear increased.  Polychromasia does not appear increased.  Poikilocytosis appears mild and nonspecific.  Platelets  are well granulated.  Lymphocytes are predominantly small with cytologically mature chromatin and appear overall polymorphous.  Neutrophils contain normal cytoplasmic granulation and unremarkable nuclear morphology.  There is no dysplasia and no circulating blasts are seen.     Final Diagnosis 12/14/22:   Bone marrow, left posterior iliac crest, decalcified trephine biopsy and aspiration:  -- Normocellular bone marrow for age (60 to 70%) with maturing trilineage hematopoiesis.  -- No evidence of marrow involvement by lymphoma or other hematopoietic neoplasm.     Peripheral blood showing:  -- Moderate thrombocytopenia.     Case Report   Flow Cytometry Report                             Case: [id number]                                   Authorizing Provider:  Hayde Lopez DO         Collected:           12/14/2022 10:06 AM           Ordering Location:     [facility]   Received:            12/14/2022 10:21 AM                                  [facility]                                                             Pathologist:           Marleny Holden MD                                                      Specimen:    Iliac Crest, Bone Marrow Aspirate, Left                                                     Flow Interpretation   A. Iliac Crest, Bone Marrow Aspirate, Left:    - Polytypic B cells  - No aberrant immunophenotype on T cells  - No increase in myeloid blasts and no abnormal myeloid blast population     Case Report   Surgical Pathology Report                         Case: [id number]                                   Authorizing Provider:  Hayde Lopez DO         Collected:           12/15/2022 11:27 AM           Ordering Location:     [facility]   Received:            12/15/2022 11:54 AM                                  Imaging                                                                       Pathologist:           Mirna Roger                                                                Specimen:    Retroperitoneal                                                                            Final Diagnosis   A.  Retroperitoneum, biopsy:  -Three tissue fragments, entirely submitted for flow cytometry evaluation (gross examination only).       Case Report   Flow Cytometry Report                             Case: [id number]                                   Authorizing Provider:  Hayde Lopez DO         Collected:           12/15/2022 01:16 PM           Ordering Location:     [facility]   Received:            12/15/2022 01:17 PM                                  Imaging                                                                       Pathologist:           Marleny Holden MD                                                      Specimen:    Retroperitoneal                                                                            Flow Interpretation   A. Retroperitoneal :    - Polytypic B cells  - No aberrant immunophenotype on T cells         Case Report   Surgical Pathology Report                         Case: [id number]                                   Authorizing Provider:  Hayde Lopez DO         Collected:            01/04/2023 02:25 PM           Ordering Location:     [facility]   Received:            01/04/2023 02:48 PM                                  [facility]                                                                 Pathologist:           Saadia Allen MD                                                                            Specimen:    Lymph Node(s), Axillary, Right                                                             Final Diagnosis   Lymph node, right axillary, core biopsies:  -Partially sampled lymph node showing no evidence of Hodgkin's or non-Hodgkin's lymphoma  -Completed immunophenotyping studies show polytypic B cells and no aberrant T-cell antigen expression     Case Report   Flow Cytometry Report                             Case: [id number]                                   Authorizing Provider:  Hayde Lopze DO         Collected:           01/04/2023 02:25 PM           Ordering Location:     [facility]   Received:            01/04/2023 02:53 PM                                  [facility]                                                                 Pathologist:           Xiomara Ramsey MD                                                         Specimen:    Lymph Node(s), Axillary, Right                                                             Flow Interpretation   A. Lymph Node(s), Axillary, Right, :  -Polytypic B cells  No aberrant immunophenotype on T cells         Case Report   Surgical Pathology Report                         Case: [id number]                                   Authorizing Provider:  New Burger         Collected:           01/20/2023 02:16 PM                                  MD Ari                                                                  Ordering Location:     [facility] OR                 Received:            01/20/2023  02:29 PM           Pathologist:           Isabela Kaminski MD                                                     Specimen:    Lymph Node(s), Inguinal, Left, partial lymph node left groin                               Addendum   This addendum is issued to document that Dr. Isabela Kaminski discussed biopsy findings with Dr. Hayde Lopez on 1/26/23 at 12:30.    Addendum electronically signed by Isabela Kaminski MD on 1/26/2023 at 12:30 PM   Final Diagnosis   A. Left groin, lymph node, partial lymphadenectomy:  -Benign lymph node with follicular and interfollicular hyperplasia  -Rare small noncaseating granulomas  -No morphologic evidence of malignancy  -Negative for select microorganisms on stained tissue  -See comment   Electronically signed by Isabela Kaminski MD on 1/26/2023 at 12:16 PM   Comment  [facility]     There is no definitive morphologic or immunophenotypic evidence for malignancy in this biopsy. Instead, the findings (which include follicular and interfollicular hyperplasia and features of progressive transformation of germinal centers) are favored to be reactive.     Evaluation for select microorganisms was performed, with negative results for EBV (by in situ hybridization), HHV8 and toxoplasma (by immunohistochemistry), and acid fast and fungal organisms (by special stains).    Concurrent flow cytometry ([id number]) demonstrated polytypic B cells and no aberrant immunophenotype on T cells.         Case Report   Flow Cytometry Report                             Case: [id number]                                   Authorizing Provider:  New Burger         Collected:           01/20/2023 02:33 PM                                  MD Ari                                                                  Ordering Location:     [facility] OR                 Received:            01/20/2023 02:33 PM           Pathologist:           Marleny Holden MD                                                       Specimen:    Lymph Node(s), Inguinal, Left                                                              Flow Interpretation   A. Lymph Node(s), Inguinal, Left:     - Polytypic B cells  - No aberrant immunophenotype on T cells           IMAGING:  CT CAP 12/6/22:  IMPRESSION:  1.  Several enlarged lymph nodes identified at the right axilla,  retroperitoneum, left pelvis and borderline enlarged at the left  inguinal locations. While nonspecific, a neoplastic etiology is  possible including lymphoma.  2.  Mild splenomegaly.  3.  Lobulated indeterminate nodular masses at the left pericardial  fat.  4.  Indeterminate multiple bilateral pulmonary nodules. The dominant  solid nodule is at the right lower lobe measuring 1.1 cm. Neoplasm  remains in the differential and surveillance imaging and/or further  workup is recommended. See below for follow up imaging guidelines.    PET 12/9/22:  IMPRESSION:     Findings suspicious for active neoplasm such as lymphoma involving lymph nodes above/below the diaphragm. The right axillary for left inguinal lymph nodes are amenable to ultrasound-guided histologic sampling if desired.        ASSESSMENT/PLAN:  Billy Carey is a 32 year old male who is referred for thrombocytopenia. 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    1) Thrombocytopenia, warm autoimmune hemolysis  -This is a new diagnosis for patient and an incidental finding when he was in for routine physical examination. He has not had any bleeding episodes.   -No new meds, recent infection, or recent surgeries.  -TSH normal at 3.85.   -HepB/C and HIV studies historically negative.   -No splenomegaly on physical examination.  -I doubt hemolysis as retic is normal as well as LFTs.  -We discussed the possibility of lab artifact, but there is no comment on platelet clumping on smear. Also, he had lab work repeated two days in a row with PLT count returning in the 40s.   -Given his history of autoimmune  disease, he may have underlying ITP, which is a diagnosis of exclusion. We had discussed possible steroid therapy if platelets are to return <30K.   -Repeat CBC is showing PLTs still in the 40's. Hb is 17.9, WBC normal.   -Also is consideration for a microangiopathic hemolytic anemia. MIRA has come back positive at 3+ (which is inconsistent with MAHA) and patient does not have elevated LFTs, abnormal coag studies, nor reticulocytosis. LDH is also normal at 240. He is afebrile. [facility] blood bank testing has returned +IgG and negative for complement, consistent with warm AIHA.   -CT CAP had returned with 2 cm LN of the retroperitoneum, inguinal, and axillary. There was also note of an abnormal pericardial fat mass. I sent for a PET, that did not show FDG avidity of the pericardial fat mass. The retroperitoneal LNs had SUV of 12-13 with the remainder LN 6-7. I sent patient for a core biopsy of the retroperitoneal LN with IR that returned in fragments with unremarkable FLOW. Bone marrow biopsy was normocellular with normal megakaryocyte morphology. I then pursued a right axillary LN biopsy, which again returned negative with unremarkable FLOW. I sent for excisional biopsy of an inguinal LN with general surgery with pathology once again returning as benign with folliculary and interfollicular hyperplasia. Congo red staining is negative. EBV, HHV8 negative. I reviewed with pathology for any morphologic evidence consistent with Castleman's disease and spoke with Dr. Kaminski who is unable to confirm this at this time. She did have an interdepartmental pathology review. I do note the sinuses contain abundant histiocytes in the microscopic description and in my differential diagnosis is also Rosai-Dorfamn or Langerhan's disease.  I spoke with Dr. Tyler who is in agreement with workup above. I reviewed with Dr. Tyler my plans to initiate steroid therapy at this time for the warm autoimmune hemolysis as platelets  remain in the 40s with MIRA 3+ and undetectable haptoglobin. I reviewed I would then move onto rituximab next if patient is not to have improvement with steroid therapy. Dr. Tyler is in agreement with this plan.   -I reviewed the above with patient and wife, Jayla. I will need to continue to monitor the patient not only for treatment response for the hemolysis, but for evolution of LAD. My plan is to recheck with PET in 6 months time and will continue to monitor for treatment response discussed above.   -After our last visit, PLTs returned at 18K. Patient was hospitalized and treated with decadron 40 mg x4 and IVIG. His platelets have remained >100K since then. He has met with Dr. Tyler as well who is in agreement with next line rituximab. Patient would like to continue to think on this. We discussed the risk of bronchospasm and hypersensitivity.    Weekly rituximab x4 weeks, then possible maintenance:  -Rituximab 375 mg/m2 IV    -Platelets have been stable >100K. Patient would like to continue to monitor for now.     2) Positive PAVAN  -Titer 1:160.  -Patient without arthralgias, skin changes.  -APLA, ds-DNA labs returned negative.  -Will refer to rheumatology.     3) History of infectious mononucleosis  -EBV negative on LN.  -EBV PCR negative.    4) DM1  -Patient managed by endocrinology.    5) History of HTN, HLD    6) -Repeat CBC in 4 weeks with follow up.   -Repeat PET/CT in June 2023 (ordered).      Hayde Lopez DO  Hematology/Oncology  [facility]

## 2023-05-06 NOTE — PATIENT INSTRUCTIONS
Billy is scheduled for the following:    - Labs on 05/12/23  - Follow up with Dr. Lopez on 05/12/23  - PET scan on 06/06/23  - Labs on 06/09/23  - Follow up with Dr. Lopez on 06/09/23    Armida Gibbons RN on 5/6/2023 at 5:49 PM

## 2023-05-12 ENCOUNTER — ONCOLOGY VISIT (OUTPATIENT)
Dept: ONCOLOGY | Facility: CLINIC | Age: 33
End: 2023-05-12
Attending: INTERNAL MEDICINE
Payer: COMMERCIAL

## 2023-05-12 VITALS
OXYGEN SATURATION: 97 % | HEIGHT: 75 IN | HEART RATE: 84 BPM | SYSTOLIC BLOOD PRESSURE: 147 MMHG | TEMPERATURE: 98.3 F | WEIGHT: 272.2 LBS | DIASTOLIC BLOOD PRESSURE: 75 MMHG | RESPIRATION RATE: 16 BRPM | BODY MASS INDEX: 33.85 KG/M2

## 2023-05-12 DIAGNOSIS — D69.6 THROMBOCYTOPENIA (H): ICD-10-CM

## 2023-05-12 DIAGNOSIS — D69.3 AUTOIMMUNE THROMBOCYTOPENIA (H): ICD-10-CM

## 2023-05-12 LAB
BASOPHILS # BLD AUTO: 0 10E3/UL (ref 0–0.2)
BASOPHILS NFR BLD AUTO: 0 %
EOSINOPHIL # BLD AUTO: 0.2 10E3/UL (ref 0–0.7)
EOSINOPHIL NFR BLD AUTO: 2 %
ERYTHROCYTE [DISTWIDTH] IN BLOOD BY AUTOMATED COUNT: 11.9 % (ref 10–15)
HCT VFR BLD AUTO: 44.7 % (ref 40–53)
HGB BLD-MCNC: 16.3 G/DL (ref 13.3–17.7)
IMM GRANULOCYTES # BLD: 0 10E3/UL
IMM GRANULOCYTES NFR BLD: 0 %
LYMPHOCYTES # BLD AUTO: 1.2 10E3/UL (ref 0.8–5.3)
LYMPHOCYTES NFR BLD AUTO: 16 %
MCH RBC QN AUTO: 29.5 PG (ref 26.5–33)
MCHC RBC AUTO-ENTMCNC: 36.5 G/DL (ref 31.5–36.5)
MCV RBC AUTO: 81 FL (ref 78–100)
MONOCYTES # BLD AUTO: 0.6 10E3/UL (ref 0–1.3)
MONOCYTES NFR BLD AUTO: 8 %
NEUTROPHILS # BLD AUTO: 5.5 10E3/UL (ref 1.6–8.3)
NEUTROPHILS NFR BLD AUTO: 74 %
NRBC # BLD AUTO: 0 10E3/UL
NRBC BLD AUTO-RTO: 0 /100
PLATELET # BLD AUTO: 202 10E3/UL (ref 150–450)
RBC # BLD AUTO: 5.52 10E6/UL (ref 4.4–5.9)
WBC # BLD AUTO: 7.5 10E3/UL (ref 4–11)

## 2023-05-12 PROCEDURE — G0463 HOSPITAL OUTPT CLINIC VISIT: HCPCS | Performed by: INTERNAL MEDICINE

## 2023-05-12 PROCEDURE — 99214 OFFICE O/P EST MOD 30 MIN: CPT | Performed by: INTERNAL MEDICINE

## 2023-05-12 PROCEDURE — 36415 COLL VENOUS BLD VENIPUNCTURE: CPT

## 2023-05-12 PROCEDURE — 85025 COMPLETE CBC W/AUTO DIFF WBC: CPT | Performed by: PHYSICIAN ASSISTANT

## 2023-05-12 ASSESSMENT — PAIN SCALES - GENERAL: PAINLEVEL: NO PAIN (0)

## 2023-05-12 NOTE — PROGRESS NOTES
[facility]    Hematology/Oncology Established Patient Note      Today's Date: 5/12/23    Reason for Consultation: Thrombocytopenia  Referring Provider: ZAHRAA Kingsley CNP      HISTORY OF PRESENT ILLNESS: Billy Carey is a 32 year old male who is referred for thrombocytopenia. 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    Historically, he has had historical normal CBC. On 11/30/22, WBC 6.0, Hb 16.5, PLT 42. Repeat CBC on 12/1/22, WBC 7.2, Hb 17.5, PLT 43. Hepatitis and HIV studies negative.     He has had eustachian tube placement without bleeding event. He has not had any other surgeries.     For DM1- he is followed by endocrinology. He has continuous blood glucose monitoring and an insulin pump.     He drinks alcohol 1-2x/week. No excessive use. No history of smoking.     Family history of VTE in his sister. He is unaware of the nuances surrounding this event. No family history of bleeding disorder or malignancy.       INTERIM HISTORY:  Last visit, platelets returned 18K. He received decadron 40 mg x4 and IVIG 1g/kg while hospitalized. PLTs have been >100K since then. Patient would like to continue to hold on rituximab.    Billy had COVID two weeks ago. No fever, petechiae, or gross evidence of bleed.     REVIEW OF SYSTEMS:   A 14 point ROS was reviewed with pertinent positives and negatives in the HPI.        HOME MEDICATIONS:  Current Outpatient Medications   Medication Sig Dispense Refill     atorvastatin (LIPITOR) 10 MG tablet Take 1 tablet (10 mg) by mouth daily 90 tablet 3     blood glucose (KELL CONTOUR NEXT) test strip Test 4-5 times daily 4 Box 3     Blood Glucose Monitoring Suppl (KELL CONTOUR NEXT LINK) W/DEVICE KIT 1 kit 5 times daily. Use as directed 1 kit 1     Continuous Blood Gluc Sensor (DEXCOM G6 SENSOR) MISC USE 1 SENSOR EVERY 10 DAYS       insulin lispro (HUMALOG VIAL) 100 UNIT/ML vial To be used in insulin pump       insulin pen needle ([employer] CHRIST  U/F) 32G X 4 MM miscellaneous Use 3-6 pen needles daily or as directed. 50 each 0     INSULIN PUMP - OUTPATIENT Date Last Updated: 2/15/23  Tandem  Pump Basal Rates/Times:  2318-7336: 2 units/hour  2722-0001: 2.6 units/hour  3797-8471: 2.2 units/hour  5649-7951: 1.7 units/hour  CARB RATIO:   2910-8144: 1 unit per 10 grams carbohydrates  CF / Sensitivity Times:   0074-6592: 1 unit to decrease BG by 30 mg/dL  TARGET B mg/dL       vitamin D2 (ERGOCALCIFEROL) 21516 units (1250 mcg) capsule Take 50,000 Units by mouth once a week       glucagon (GLUCAGON EMERGENCY) 1 MG kit Inject 1 mg into the muscle once for 1 dose (Patient not taking: Reported on 3/7/2023) 1 mg 3         ALLERGIES:  Allergies   Allergen Reactions     Penicillins Rash         PAST MEDICAL HISTORY:  Past Medical History:   Diagnosis Date     Diabetes mellitus type 1, uncontrolled, insulin dependent 2013         PAST SURGICAL HISTORY:  Past Surgical History:   Procedure Laterality Date     EXCISE MASS GROIN Left 2023    Procedure: Lymph Node excisional biopsy;  Surgeon: New Burger MD;  Location: [facility] OR     MYRINGOTOMY, INSERT TUBE BILATERAL, COMBINED Bilateral     As a child         SOCIAL HISTORY:  Social History     Socioeconomic History     Marital status:      Spouse name: Jayla     Number of children: 3     Years of education: Not on file     Highest education level: Not on file   Occupational History     Occupation:      Comment: [employer]   Tobacco Use     Smoking status: Never     Smokeless tobacco: Never   Vaping Use     Vaping status: Not on file   Substance and Sexual Activity     Alcohol use: No     Comment: rarely     Drug use: No     Sexual activity: Yes     Partners: Female   Other Topics Concern     Parent/sibling w/ CABG, MI or angioplasty before 65F 55M? Not Asked   Social History Narrative     Not on file     Social Determinants of Health     Financial Resource Strain: Low Risk   (1/12/2023)    Overall Financial Resource Strain (CARDIA)      Difficulty of Paying Living Expenses: Not hard at all   Food Insecurity: No Food Insecurity (1/12/2023)    Hunger Vital Sign      Worried About Running Out of Food in the Last Year: Never true      Ran Out of Food in the Last Year: Never true   Transportation Needs: No Transportation Needs (1/12/2023)    PRAPARE - Transportation      Lack of Transportation (Medical): No      Lack of Transportation (Non-Medical): No   Physical Activity: Sufficiently Active (1/12/2023)    Exercise Vital Sign      Days of Exercise per Week: 6 days      Minutes of Exercise per Session: 40 min   Stress: No Stress Concern Present (1/12/2023)    [place] [employer] - Occupational Stress Questionnaire      Feeling of Stress : Only a little   Social Connections: Socially Integrated (1/12/2023)    Social Connection and Isolation Panel [NHANES]      Frequency of Communication with Friends and Family: More than three times a week      Frequency of Social Gatherings with Friends and Family: More than three times a week      Attends Temple Services: 1 to 4 times per year      Active Member of Clubs or Organizations: Yes      Attends Club or Organization Meetings: Not on file      Marital Status:    Intimate Partner Violence: Not At Risk (3/31/2023)    Humiliation, Afraid, Rape, and Kick questionnaire      Fear of Current or Ex-Partner: No      Emotionally Abused: No      Physically Abused: No      Sexually Abused: No   Housing Stability: Low Risk  (1/12/2023)    Housing Stability Vital Sign      Unable to Pay for Housing in the Last Year: No      Number of Places Lived in the Last Year: 1      Unstable Housing in the Last Year: No         FAMILY HISTORY:  Family History   Problem Relation Age of Onset     C.A.D. No family hx of      Diabetes No family hx of      Hypertension No family hx of      Cancer No family hx of         no skin cancer     Amblyopia  "No family hx of      Retinal detachment No family hx of      Thyroid Disease No family hx of      Glaucoma No family hx of      Macular Degeneration No family hx of          PHYSICAL EXAM:  Vital signs:  BP (!) 147/75   Pulse 84   Temp 98.3  F (36.8  C) (Tympanic)   Resp 16   Ht 1.905 m (6' 3\")   Wt 123.5 kg (272 lb 3.2 oz)   SpO2 97%   BMI 34.02 kg/m     ECO  GENERAL/CONSTITUTIONAL: No acute distress.  LYMPH: No anterior cervical, posterior cervical, supraclavicular, axillary or inguinal adenopathy.   MUSCULOSKELETAL: Warm and well-perfused, no cyanosis, clubbing, or edema.  NEUROLOGIC: Cranial nerves II-XII are intact. Alert, oriented, answers questions appropriately.  INTEGUMENTARY: No rashes or jaundice.  GAIT: Steady, does not use assistive device.      LABS:   Latest Reference Range & Units 23 14:59   WBC 4.0 - 11.0 10e3/uL 7.5   Hemoglobin 13.3 - 17.7 g/dL 16.3   Hematocrit 40.0 - 53.0 % 44.7   Platelet Count 150 - 450 10e3/uL 202   RBC Count 4.40 - 5.90 10e6/uL 5.52   MCV 78 - 100 fL 81   MCH 26.5 - 33.0 pg 29.5   MCHC 31.5 - 36.5 g/dL 36.5   RDW 10.0 - 15.0 % 11.9   % Neutrophils % 74   % Lymphocytes % 16   % Monocytes % 8   % Eosinophils % 2   % Basophils % 0   Absolute Basophils 0.0 - 0.2 10e3/uL 0.0   Absolute Eosinophils 0.0 - 0.7 10e3/uL 0.2   Absolute Immature Granulocytes <=0.4 10e3/uL 0.0   Absolute Lymphocytes 0.8 - 5.3 10e3/uL 1.2   Absolute Monocytes 0.0 - 1.3 10e3/uL 0.6   % Immature Granulocytes % 0   Absolute Neutrophils 1.6 - 8.3 10e3/uL 5.5   Absolute NRBCs 10e3/uL 0.0   NRBCs per 100 WBC <1 /100 0      Latest Reference Range & Units 22 09:40 22 09:20 01/10/23 15:17 23 14:47 02/15/23 16:02 23 09:00   Haptoglobin 32 - 197 mg/dL 37 31 (L) 22 (L) 31 (L) 118 50        Latest Reference Range & Units 01/10/23 15:17   SPECIMEN EXPIRATION DATE  [id number]   MIRA Anti-IgG,-C3d  Positive 3+   Hep B Surface Agn Nonreactive  Nonreactive   Hepatitis B Core Margarette " Nonreactive  Nonreactive   Hepatitis B Surface Antibody Instrument Value <8.00 m[IU]/mL 35.73   Hepatitis B Surface Antibody  Reactive   Hepatitis C Antibody Nonreactive  Nonreactive   HIV Antigen Antibody Combo Nonreactive  Nonreactive      Latest Reference Range & Units 01/10/23 15:17   PAVAN interpretation Negative  Positive !      01/10/23 15:17   PAVAN titer 1 1:160     Serum M-protein (g/dL):  1/10/23: 0.0    Total IgG (mg/dL), IgA (mg/dL), IgM (mg/dL):  1/10/23: 956, 135, 44    Free kappa light chains (mg/dL), lambda (mg/dL), kappa/lambda ratio:  1/10/23: 1.33, 1.45, 0.92       Latest Reference Range & Units 12/14/22 09:20   MIRA Anti-IgG,-C3d  Weak Positive   MIRA Anti-C3  Negative   MIRA Anti-IgG, Tube  Positive 1+   Blood Bank Chart Comment  BB Chart Comment            Latest Reference Range & Units 12/06/22 09:40   Iron 61 - 157 ug/dL 59 (L)   Iron Binding Capacity 240 - 430 ug/dL 319   Iron Sat Index 15 - 46 % 18   Lactate Dehydrogenase 0 - 250 U/L 240   INR 0.85 - 1.15  0.91   PTT 22 - 38 Seconds 25   Fibrinogen 170 - 490 mg/dL 282   SPECIMEN EXPIRATION DATE  [id number]   MIRA Anti-IgG,-C3d  Positive 3+               PATHOLOGY:  Final Diagnosis 12/1/22:   Peripheral blood, morphology:  - Marked thrombocytopenia.  - Hemoglobin quantitatively within normal limits and erythrocytes without diagnostic morphologic abnormality.  - WBC subsets quantitatively within normal limits and without diagnostic morphologic abnormality.  - Negative for schistocytes.  - Negative for overt features of myelodysplasia or circulating blasts.     Final Diagnosis 12/6/22:   Peripheral blood:  --Slight polycythemia/erythrocytosis.  --Marked thrombocytopenia.   Electronically signed by Angel Marshall MD on 12/7/2022 at 10:33 AM   Comment    The cause of this patient's thrombocytopenia is unclear from the peripheral smear. Some common causes of thrombocytopenia to consider include infections, medications, alcohol, immune mediated  mechanisms, and splenic sequestration. If splenic sequestration is of clinical concern, an accurate assessment of spleen size is recommended.         Clinical Information    Thrombocytopenia   Peripheral Smear    The red blood cells appear normochromic.  Rouleaux formation does not appear increased.  Polychromasia does not appear increased.  Poikilocytosis appears mild and nonspecific.  Platelets  are well granulated.  Lymphocytes are predominantly small with cytologically mature chromatin and appear overall polymorphous.  Neutrophils contain normal cytoplasmic granulation and unremarkable nuclear morphology.  There is no dysplasia and no circulating blasts are seen.     Final Diagnosis 12/14/22:   Bone marrow, left posterior iliac crest, decalcified trephine biopsy and aspiration:  -- Normocellular bone marrow for age (60 to 70%) with maturing trilineage hematopoiesis.  -- No evidence of marrow involvement by lymphoma or other hematopoietic neoplasm.     Peripheral blood showing:  -- Moderate thrombocytopenia.     Case Report   Flow Cytometry Report                             Case: [id number]                                   Authorizing Provider:  Hayde Lopez DO         Collected:           12/14/2022 10:06 AM           Ordering Location:     [facility]   Received:            12/14/2022 10:21 AM                                  [facility]                                                             Pathologist:           Marleny Holden MD                                                      Specimen:    Iliac Crest, Bone Marrow Aspirate, Left                                                    Flow Interpretation   A. Iliac Crest, Bone Marrow Aspirate, Left:    - Polytypic B cells  - No aberrant immunophenotype on T cells  - No increase in myeloid blasts and no abnormal myeloid blast population     Case Report   Surgical Pathology Report                         Case: [id number]                                    Authorizing Provider:  Hayde Lopez DO         Collected:           12/15/2022 11:27 AM           Ordering Location:     [facility]   Received:            12/15/2022 11:54 AM                                  Imaging                                                                       Pathologist:           Mirna Roger                                                                Specimen:    Retroperitoneal                                                                            Final Diagnosis   A.  Retroperitoneum, biopsy:  -Three tissue fragments, entirely submitted for flow cytometry evaluation (gross examination only).       Case Report   Flow Cytometry Report                             Case: [id number]                                   Authorizing Provider:  Hayde Lopez DO         Collected:           12/15/2022 01:16 PM           Ordering Location:     [facility]   Received:            12/15/2022 01:17 PM                                  Imaging                                                                       Pathologist:           Marleny Holden MD                                                      Specimen:    Retroperitoneal                                                                            Flow Interpretation   A. Retroperitoneal :    - Polytypic B cells  - No aberrant immunophenotype on T cells         Case Report   Surgical Pathology Report                         Case: [id number]                                   Authorizing Provider:  Hayde Lopez DO         Collected:           01/04/2023 02:25 PM           Ordering Location:     [facility]   Received:            01/04/2023 02:48 PM                                  [facility]                                                                 Pathologist:           Saadia Allen,                                                                              MD                                                                            Specimen:    Lymph Node(s), Axillary, Right                                                             Final Diagnosis   Lymph node, right axillary, core biopsies:  -Partially sampled lymph node showing no evidence of Hodgkin's or non-Hodgkin's lymphoma  -Completed immunophenotyping studies show polytypic B cells and no aberrant T-cell antigen expression     Case Report   Flow Cytometry Report                             Case: [id number]                                   Authorizing Provider:  Hayde Lopez DO         Collected:           01/04/2023 02:25 PM           Ordering Location:     [facility]   Received:            01/04/2023 02:53 PM                                  [facility]                                                                 Pathologist:           Xiomara Ramsey MD                                                         Specimen:    Lymph Node(s), Axillary, Right                                                             Flow Interpretation   A. Lymph Node(s), Axillary, Right, :  -Polytypic B cells  No aberrant immunophenotype on T cells         Case Report   Surgical Pathology Report                         Case: [id number]                                   Authorizing Provider:  New Burger         Collected:           01/20/2023 02:16 PM                                  MD Ari                                                                  Ordering Location:     [facility]                 Received:            01/20/2023 02:29 PM           Pathologist:           Isabela Kaminski MD                                                     Specimen:    Lymph Node(s), Inguinal, Left, partial lymph node left groin                               Addendum   This addendum is issued to document that Dr. Isabela Kaminski discussed biopsy findings with Dr. Lock  John on 1/26/23 at 12:30.    Addendum electronically signed by Isabela Kaminski MD on 1/26/2023 at 12:30 PM   Final Diagnosis   A. Left groin, lymph node, partial lymphadenectomy:  -Benign lymph node with follicular and interfollicular hyperplasia  -Rare small noncaseating granulomas  -No morphologic evidence of malignancy  -Negative for select microorganisms on stained tissue  -See comment   Electronically signed by Isabela Kaminski MD on 1/26/2023 at 12:16 PM   Comment  [facility]     There is no definitive morphologic or immunophenotypic evidence for malignancy in this biopsy. Instead, the findings (which include follicular and interfollicular hyperplasia and features of progressive transformation of germinal centers) are favored to be reactive.     Evaluation for select microorganisms was performed, with negative results for EBV (by in situ hybridization), HHV8 and toxoplasma (by immunohistochemistry), and acid fast and fungal organisms (by special stains).    Concurrent flow cytometry ([id number]) demonstrated polytypic B cells and no aberrant immunophenotype on T cells.         Case Report   Flow Cytometry Report                             Case: [id number]                                   Authorizing Provider:  New Burger         Collected:           01/20/2023 02:33 PM                                  MD Ari                                                                  Ordering Location:      [facility]                 Received:            01/20/2023 02:33 PM           Pathologist:           Marleny Holden MD                                                      Specimen:    Lymph Node(s), Inguinal, Left                                                              Flow Interpretation   A. Lymph Node(s), Inguinal, Left:     - Polytypic B cells  - No aberrant immunophenotype on T cells           IMAGING:  CT CAP 12/6/22:  IMPRESSION:  1.  Several enlarged lymph nodes identified  at the right axilla,  retroperitoneum, left pelvis and borderline enlarged at the left  inguinal locations. While nonspecific, a neoplastic etiology is  possible including lymphoma.  2.  Mild splenomegaly.  3.  Lobulated indeterminate nodular masses at the left pericardial  fat.  4.  Indeterminate multiple bilateral pulmonary nodules. The dominant  solid nodule is at the right lower lobe measuring 1.1 cm. Neoplasm  remains in the differential and surveillance imaging and/or further  workup is recommended. See below for follow up imaging guidelines.    PET 12/9/22:  IMPRESSION:     Findings suspicious for active neoplasm such as lymphoma involving lymph nodes above/below the diaphragm. The right axillary for left inguinal lymph nodes are amenable to ultrasound-guided histologic sampling if desired.        ASSESSMENT/PLAN:  Billy Carey is a 32 year old male who is referred for thrombocytopenia. 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    1) Thrombocytopenia, warm autoimmune hemolysis  -This is a new diagnosis for patient and an incidental finding when he was in for routine physical examination. He has not had any bleeding episodes.   -No new meds, recent infection, or recent surgeries.  -TSH normal at 3.85.   -HepB/C and HIV studies historically negative.   -No splenomegaly on physical examination.  -I doubt hemolysis as retic is normal as well as LFTs.  -We discussed the possibility of lab artifact, but there is no comment on platelet clumping on smear. Also, he had lab work repeated two days in a row with PLT count returning in the 40s.   -Given his history of autoimmune disease, he may have underlying ITP, which is a diagnosis of exclusion. We had discussed possible steroid therapy if platelets are to return <30K.   -Repeat CBC is showing PLTs still in the 40's. Hb is 17.9, WBC normal.   -Also is consideration for a microangiopathic hemolytic anemia. MIRA has come back positive at 3+ (which is  inconsistent with MAHA) and patient does not have elevated LFTs, abnormal coag studies, nor reticulocytosis. LDH is also normal at 240. He is afebrile. [facility] blood bank testing has returned +IgG and negative for complement, consistent with warm AIHA.   -CT CAP had returned with 2 cm LN of the retroperitoneum, inguinal, and axillary. There was also note of an abnormal pericardial fat mass. I sent for a PET, that did not show FDG avidity of the pericardial fat mass. The retroperitoneal LNs had SUV of 12-13 with the remainder LN 6-7. I sent patient for a core biopsy of the retroperitoneal LN with IR that returned in fragments with unremarkable FLOW. Bone marrow biopsy was normocellular with normal megakaryocyte morphology. I then pursued a right axillary LN biopsy, which again returned negative with unremarkable FLOW. I sent for excisional biopsy of an inguinal LN with general surgery with pathology once again returning as benign with folliculary and interfollicular hyperplasia. Congo red staining is negative. EBV, HHV8 negative. I reviewed with pathology for any morphologic evidence consistent with Castleman's disease and spoke with Dr. Kaminski who is unable to confirm this at this time. She did have an interdepartmental pathology review. I do note the sinuses contain abundant histiocytes in the microscopic description and in my differential diagnosis is also Rosai-Dorfamn or Langerhan's disease.  I spoke with Dr. Tyler who is in agreement with workup above. I reviewed with Dr. 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 I reviewed I would then move onto rituximab next if patient is not to have improvement with steroid therapy. Dr. Tyler is in agreement with this plan.   -I reviewed the above with patient and wife, Jayla. I will need to continue to monitor the patient not only for  treatment response for the hemolysis, but for evolution of LAD. My plan is to recheck with PET in 6 months time and will continue to monitor for treatment response discussed above.   -After our last visit, PLTs returned at 18K. Patient was hospitalized and treated with decadron 40 mg x4 and IVIG. His platelets have remained >100K since then. He has met with Dr. Tyler as well who is in agreement with next line rituximab. Patient would like to continue to think on this. We discussed the risk of bronchospasm and hypersensitivity.    Weekly rituximab x4 weeks, then possible maintenance:  -Rituximab 375 mg/m2 IV    -Platelets have been stable >100K. Patient would like to continue to monitor for now.     2) Lymphadenopathy  -See above.  -Path reviewed at [facility] and returning as benign reactive folllicular hyperplasia. No evidence of malignancy.     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  -Patient without arthralgias, skin changes.  -APLA, ds-DNA labs returned negative.  -Will refer to rheumatology.     4) History of infectious mononucleosis  -EBV negative on LN.  -EBV PCR negative.    5) DM1  -Patient managed by endocrinology.    6) History of HTN, HLD    7) -Repeat CBC in 4 weeks with follow up.   -Repeat PET/CT in June 2023 (ordered).      Hayde Lopez DO  Hematology/Oncology  [facility]

## 2023-05-12 NOTE — PROGRESS NOTES
Medical Assistant Note:  Billy Carey presents today for blood draw.    Patient seen by provider today: Yes: MARTY.   present during visit today: Not Applicable.    Concerns: No Concerns.    Procedure:  Lab draw site: right antecub, Needle type: butterfly, Gauge: 23.    Post Assessment:  Labs drawn without difficulty: Yes.    Discharge Plan:  Departure Mode: Ambulatory.    Face to Face Time: 10.    Kelsey Rebollar

## 2023-05-12 NOTE — NURSING NOTE
"Oncology Rooming Note    May 12, 2023 3:07 PM   Billy Carey is a 32 year old male who presents for:    Chief Complaint   Patient presents with     Oncology Clinic Visit     Autoimmune thrombocytopenia      Initial Vitals: BP (!) 147/75   Pulse 84   Temp 98.3  F (36.8  C) (Tympanic)   Resp 16   Ht 1.905 m (6' 3\")   Wt 123.5 kg (272 lb 3.2 oz)   SpO2 97%   BMI 34.02 kg/m   Estimated body mass index is 34.02 kg/m  as calculated from the following:    Height as of this encounter: 1.905 m (6' 3\").    Weight as of this encounter: 123.5 kg (272 lb 3.2 oz). Body surface area is 2.56 meters squared.  No Pain (0) Comment: Data Unavailable   No LMP for male patient.  Allergies reviewed: Yes  Medications reviewed: Yes    Medications: Medication refills not needed today.  Pharmacy name entered into [employer]:    [id number] - [place], [place] - [address] [place] [address] [address] [place] [address]  [id number] - [place], [place] - [address]  [id number] - [place], [place] - [address] [address] [facility] - [facility], [place] - [address] [place] [address]    Clinical concerns: f/u       Shanna Quiroz CMA              "

## 2023-05-12 NOTE — LETTER
5/12/2023         RE: Billy Carey  [address] [facility] [id number]        Dear Colleague,    Thank you for referring your patient, Billy Carey, to the [facility]. Please see a copy of my visit note below.    [facility]    Hematology/Oncology Established Patient Note      Today's Date: 5/12/23    Reason for Consultation: Thrombocytopenia  Referring Provider: ZAHRAA Kingsley CNP      HISTORY OF PRESENT ILLNESS: Billy Carey is a 32 year old male who is referred for thrombocytopenia. 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    Historically, he has had historical normal CBC. On 11/30/22, WBC 6.0, Hb 16.5, PLT 42. Repeat CBC on 12/1/22, WBC 7.2, Hb 17.5, PLT 43. Hepatitis and HIV studies negative.     He has had eustachian tube placement without bleeding event. He has not had any other surgeries.     For DM1- he is followed by endocrinology. He has continuous blood glucose monitoring and an insulin pump.     He drinks alcohol 1-2x/week. No excessive use. No history of smoking.     Family history of VTE in his sister. He is unaware of the nuances surrounding this event. No family history of bleeding disorder or malignancy.       INTERIM HISTORY:  Last visit, platelets returned 18K. He received decadron 40 mg x4 and IVIG 1g/kg while hospitalized. PLTs have been >100K since then. Patient would like to continue to hold on rituximab.    Billy had COVID two weeks ago. No fever, petechiae, or gross evidence of bleed.     REVIEW OF SYSTEMS:   A 14 point ROS was reviewed with pertinent positives and negatives in the HPI.        HOME MEDICATIONS:  Current Outpatient Medications   Medication Sig Dispense Refill     atorvastatin (LIPITOR) 10 MG tablet Take 1 tablet (10 mg) by mouth daily 90 tablet 3     blood glucose (KELL CONTOUR NEXT) test strip Test 4-5 times daily 4 Box 3     Blood Glucose Monitoring Suppl (KELL CONTOUR NEXT LINK)  W/DEVICE KIT 1 kit 5 times daily. Use as directed 1 kit 1     Continuous Blood Gluc Sensor (DEXCOM G6 SENSOR) MISC USE 1 SENSOR EVERY 10 DAYS       insulin lispro (HUMALOG VIAL) 100 UNIT/ML vial To be used in insulin pump       insulin pen needle (BD CHRIST U/F) 32G X 4 MM miscellaneous Use 3-6 pen needles daily or as directed. 50 each 0     INSULIN PUMP - OUTPATIENT Date Last Updated: 2/15/23  Tandem  Pump Basal Rates/Times:  1901-4117: 2 units/hour  8037-6903: 2.6 units/hour  1298-2138: 2.2 units/hour  1419-1993: 1.7 units/hour  CARB RATIO:   3861-6980: 1 unit per 10 grams carbohydrates  CF / Sensitivity Times:   1708-8672: 1 unit to decrease BG by 30 mg/dL  TARGET B mg/dL       vitamin D2 (ERGOCALCIFEROL) 97340 units (1250 mcg) capsule Take 50,000 Units by mouth once a week       glucagon (GLUCAGON EMERGENCY) 1 MG kit Inject 1 mg into the muscle once for 1 dose (Patient not taking: Reported on 3/7/2023) 1 mg 3         ALLERGIES:  Allergies   Allergen Reactions     Penicillins Rash         PAST MEDICAL HISTORY:  Past Medical History:   Diagnosis Date     Diabetes mellitus type 1, uncontrolled, insulin dependent 2013         PAST SURGICAL HISTORY:  Past Surgical History:   Procedure Laterality Date     EXCISE MASS GROIN Left 2023    Procedure: Lymph Node excisional biopsy;  Surgeon: New Burger MD;  Location: [facility] OR     MYRINGOTOMY, INSERT TUBE BILATERAL, COMBINED Bilateral     As a child         SOCIAL HISTORY:  Social History     Socioeconomic History     Marital status:      Spouse name: Jayla     Number of children: 3     Years of education: Not on file     Highest education level: Not on file   Occupational History     Occupation:      Comment: [employer]   Tobacco Use     Smoking status: Never     Smokeless tobacco: Never   Vaping Use     Vaping status: Not on file   Substance and Sexual Activity     Alcohol use: No     Comment: rarely     Drug use: No     Sexual  activity: Yes     Partners: Female   Other Topics Concern     Parent/sibling w/ CABG, MI or angioplasty before 65F 55M? Not Asked   Social History Narrative     Not on file     Social Determinants of Health     Financial Resource Strain: Low Risk  (1/12/2023)    Overall Financial Resource Strain (CARDIA)      Difficulty of Paying Living Expenses: Not hard at all   Food Insecurity: No Food Insecurity (1/12/2023)    Hunger Vital Sign      Worried About Running Out of Food in the Last Year: Never true      Ran Out of Food in the Last Year: Never true   Transportation Needs: No Transportation Needs (1/12/2023)    PRAPARE - Transportation      Lack of Transportation (Medical): No      Lack of Transportation (Non-Medical): No   Physical Activity: Sufficiently Active (1/12/2023)    Exercise Vital Sign      Days of Exercise per Week: 6 days      Minutes of Exercise per Session: 40 min   Stress: No Stress Concern Present (1/12/2023)    [place] [employer] - Occupational Stress Questionnaire      Feeling of Stress : Only a little   Social Connections: Socially Integrated (1/12/2023)    Social Connection and Isolation Panel [NHANES]      Frequency of Communication with Friends and Family: More than three times a week      Frequency of Social Gatherings with Friends and Family: More than three times a week      Attends Religion Services: 1 to 4 times per year      Active Member of Clubs or Organizations: Yes      Attends Club or Organization Meetings: Not on file      Marital Status:    Intimate Partner Violence: Not At Risk (3/31/2023)    Humiliation, Afraid, Rape, and Kick questionnaire      Fear of Current or Ex-Partner: No      Emotionally Abused: No      Physically Abused: No      Sexually Abused: No   Housing Stability: Low Risk  (1/12/2023)    Housing Stability Vital Sign      Unable to Pay for Housing in the Last Year: No      Number of Places Lived in the Last Year: 1      Unstable Housing  "in the Last Year: No         FAMILY HISTORY:  Family History   Problem Relation Age of Onset     C.A.D. No family hx of      Diabetes No family hx of      Hypertension No family hx of      Cancer No family hx of         no skin cancer     Amblyopia No family hx of      Retinal detachment No family hx of      Thyroid Disease No family hx of      Glaucoma No family hx of      Macular Degeneration No family hx of          PHYSICAL EXAM:  Vital signs:  BP (!) 147/75   Pulse 84   Temp 98.3  F (36.8  C) (Tympanic)   Resp 16   Ht 1.905 m (6' 3\")   Wt 123.5 kg (272 lb 3.2 oz)   SpO2 97%   BMI 34.02 kg/m     ECO  GENERAL/CONSTITUTIONAL: No acute distress.  LYMPH: No anterior cervical, posterior cervical, supraclavicular, axillary or inguinal adenopathy.   MUSCULOSKELETAL: Warm and well-perfused, no cyanosis, clubbing, or edema.  NEUROLOGIC: Cranial nerves II-XII are intact. Alert, oriented, answers questions appropriately.  INTEGUMENTARY: No rashes or jaundice.  GAIT: Steady, does not use assistive device.      LABS:   Latest Reference Range & Units 23 14:59   WBC 4.0 - 11.0 10e3/uL 7.5   Hemoglobin 13.3 - 17.7 g/dL 16.3   Hematocrit 40.0 - 53.0 % 44.7   Platelet Count 150 - 450 10e3/uL 202   RBC Count 4.40 - 5.90 10e6/uL 5.52   MCV 78 - 100 fL 81   MCH 26.5 - 33.0 pg 29.5   MCHC 31.5 - 36.5 g/dL 36.5   RDW 10.0 - 15.0 % 11.9   % Neutrophils % 74   % Lymphocytes % 16   % Monocytes % 8   % Eosinophils % 2   % Basophils % 0   Absolute Basophils 0.0 - 0.2 10e3/uL 0.0   Absolute Eosinophils 0.0 - 0.7 10e3/uL 0.2   Absolute Immature Granulocytes <=0.4 10e3/uL 0.0   Absolute Lymphocytes 0.8 - 5.3 10e3/uL 1.2   Absolute Monocytes 0.0 - 1.3 10e3/uL 0.6   % Immature Granulocytes % 0   Absolute Neutrophils 1.6 - 8.3 10e3/uL 5.5   Absolute NRBCs 10e3/uL 0.0   NRBCs per 100 WBC <1 /100 0      Latest Reference Range & Units 22 09:40 22 09:20 01/10/23 15:17 23 14:47 02/15/23 16:02 23 09:00 "   Haptoglobin 32 - 197 mg/dL 37 31 (L) 22 (L) 31 (L) 118 50        Latest Reference Range & Units 01/10/23 15:17   SPECIMEN EXPIRATION DATE  20230113235900   MIRA Anti-IgG,-C3d  Positive 3+   Hep B Surface Agn Nonreactive  Nonreactive   Hepatitis B Core Margarette Nonreactive  Nonreactive   Hepatitis B Surface Antibody Instrument Value <8.00 m[IU]/mL 35.73   Hepatitis B Surface Antibody  Reactive   Hepatitis C Antibody Nonreactive  Nonreactive   HIV Antigen Antibody Combo Nonreactive  Nonreactive      Latest Reference Range & Units 01/10/23 15:17   PAVAN interpretation Negative  Positive !      01/10/23 15:17   PAVAN titer 1 1:160     Serum M-protein (g/dL):  1/10/23: 0.0    Total IgG (mg/dL), IgA (mg/dL), IgM (mg/dL):  1/10/23: 956, 135, 44    Free kappa light chains (mg/dL), lambda (mg/dL), kappa/lambda ratio:  1/10/23: 1.33, 1.45, 0.92       Latest Reference Range & Units 12/14/22 09:20   MIRA Anti-IgG,-C3d  Weak Positive   MIRA Anti-C3  Negative   MIRA Anti-IgG, Tube  Positive 1+   Blood Bank Chart Comment  BB Chart Comment            Latest Reference Range & Units 12/06/22 09:40   Iron 61 - 157 ug/dL 59 (L)   Iron Binding Capacity 240 - 430 ug/dL 319   Iron Sat Index 15 - 46 % 18   Lactate Dehydrogenase 0 - 250 U/L 240   INR 0.85 - 1.15  0.91   PTT 22 - 38 Seconds 25   Fibrinogen 170 - 490 mg/dL 282   SPECIMEN EXPIRATION DATE  20221209235900   MIRA Anti-IgG,-C3d  Positive 3+               PATHOLOGY:  Final Diagnosis 12/1/22:   Peripheral blood, morphology:  - Marked thrombocytopenia.  - Hemoglobin quantitatively within normal limits and erythrocytes without diagnostic morphologic abnormality.  - WBC subsets quantitatively within normal limits and without diagnostic morphologic abnormality.  - Negative for schistocytes.  - Negative for overt features of myelodysplasia or circulating blasts.     Final Diagnosis 12/6/22:   Peripheral blood:  --Slight polycythemia/erythrocytosis.  --Marked thrombocytopenia.   Electronically signed  by Angel Marshall MD on 12/7/2022 at 10:33 AM   Comment    The cause of this patient's thrombocytopenia is unclear from the peripheral smear. Some common causes of thrombocytopenia to consider include infections, medications, alcohol, immune mediated mechanisms, and splenic sequestration. If splenic sequestration is of clinical concern, an accurate assessment of spleen size is recommended.         Clinical Information    Thrombocytopenia   Peripheral Smear    The red blood cells appear normochromic.  Rouleaux formation does not appear increased.  Polychromasia does not appear increased.  Poikilocytosis appears mild and nonspecific.  Platelets  are well granulated.  Lymphocytes are predominantly small with cytologically mature chromatin and appear overall polymorphous.  Neutrophils contain normal cytoplasmic granulation and unremarkable nuclear morphology.  There is no dysplasia and no circulating blasts are seen.     Final Diagnosis 12/14/22:   Bone marrow, left posterior iliac crest, decalcified trephine biopsy and aspiration:  -- Normocellular bone marrow for age (60 to 70%) with maturing trilineage hematopoiesis.  -- No evidence of marrow involvement by lymphoma or other hematopoietic neoplasm.     Peripheral blood showing:  -- Moderate thrombocytopenia.     Case Report   Flow Cytometry Report                             Case: [id number]                                   Authorizing Provider:  Hayde Lopez DO         Collected:           12/14/2022 10:06 AM           Ordering Location:     [facility]   Received:            12/14/2022 10:21 AM                                  [facility]                                                             Pathologist:           Marleny Holden MD                                                      Specimen:    Iliac Crest, Bone Marrow Aspirate, Left                                                    Flow Interpretation   A. Iliac Crest, Bone  Marrow Aspirate, Left:    - Polytypic B cells  - No aberrant immunophenotype on T cells  - No increase in myeloid blasts and no abnormal myeloid blast population     Case Report   Surgical Pathology Report                         Case: [id number]                                   Authorizing Provider:  Hayde Lopez DO         Collected:           12/15/2022 11:27 AM           Ordering Location:     [facility]   Received:            12/15/2022 11:54 AM                                  Imaging                                                                       Pathologist:           Mirna Roger                                                                Specimen:    Retroperitoneal                                                                            Final Diagnosis   A.  Retroperitoneum, biopsy:  -Three tissue fragments, entirely submitted for flow cytometry evaluation (gross examination only).       Case Report   Flow Cytometry Report                             Case: [id number]                                   Authorizing Provider:  Hayde Lopez DO         Collected:           12/15/2022 01:16 PM           Ordering Location:     [facility]   Received:            12/15/2022 01:17 PM                                  Imaging                                                                       Pathologist:           Marleny Holden MD                                                      Specimen:    Retroperitoneal                                                                            Flow Interpretation   A. Retroperitoneal :    - Polytypic B cells  - No aberrant immunophenotype on T cells         Case Report   Surgical Pathology Report                         Case: [id number]                                   Authorizing Provider:  Hayde Lopez DO         Collected:           01/04/2023 02:25 PM           Ordering Location:     [facility]    Received:            01/04/2023 02:48 PM                                  [facility]                                                                 Pathologist:           Saadia Allen MD                                                                            Specimen:    Lymph Node(s), Axillary, Right                                                             Final Diagnosis   Lymph node, right axillary, core biopsies:  -Partially sampled lymph node showing no evidence of Hodgkin's or non-Hodgkin's lymphoma  -Completed immunophenotyping studies show polytypic B cells and no aberrant T-cell antigen expression     Case Report   Flow Cytometry Report                             Case: [id number]                                   Authorizing Provider:  Hayde Lpoez DO         Collected:           01/04/2023 02:25 PM           Ordering Location:     [facility]   Received:            01/04/2023 02:53 PM                                  [facility]                                                                 Pathologist:           Xiomara Ramsey MD                                                         Specimen:    Lymph Node(s), Axillary, Right                                                             Flow Interpretation   A. Lymph Node(s), Axillary, Right, :  -Polytypic B cells  No aberrant immunophenotype on T cells         Case Report   Surgical Pathology Report                         Case: [id number]                                   Authorizing Provider:  New Burger         Collected:           01/20/2023 02:16 PM                                  MD Ari                                                                  Ordering Location:     [facility]                 Received:            01/20/2023 02:29 PM           Pathologist:           Isabela Kaminski MD                                                      Specimen:    Lymph Node(s), Inguinal, Left, partial lymph node left groin                               Addendum   This addendum is issued to document that Dr. Isabela Kaminski discussed biopsy findings with Dr. Hayde Lopez on 1/26/23 at 12:30.    Addendum electronically signed by Isabela Kaminski MD on 1/26/2023 at 12:30 PM   Final Diagnosis   A. Left groin, lymph node, partial lymphadenectomy:  -Benign lymph node with follicular and interfollicular hyperplasia  -Rare small noncaseating granulomas  -No morphologic evidence of malignancy  -Negative for select microorganisms on stained tissue  -See comment   Electronically signed by Isabela Kaminski MD on 1/26/2023 at 12:16 PM   Comment  [facility]     There is no definitive morphologic or immunophenotypic evidence for malignancy in this biopsy. Instead, the findings (which include follicular and interfollicular hyperplasia and features of progressive transformation of germinal centers) are favored to be reactive.     Evaluation for select microorganisms was performed, with negative results for EBV (by in situ hybridization), HHV8 and toxoplasma (by immunohistochemistry), and acid fast and fungal organisms (by special stains).    Concurrent flow cytometry ([id number]) demonstrated polytypic B cells and no aberrant immunophenotype on T cells.         Case Report   Flow Cytometry Report                             Case: [id number]                                   Authorizing Provider:  New Burger         Collected:           01/20/2023 02:33 PM                                  MD Ari                                                                  Ordering Location:      [facility]                 Received:            01/20/2023 02:33 PM           Pathologist:           Marleny Holden MD                                                      Specimen:    Lymph Node(s), Inguinal, Left                                                               Flow Interpretation   A. Lymph Node(s), Inguinal, Left:     - Polytypic B cells  - No aberrant immunophenotype on T cells           IMAGING:  CT CAP 12/6/22:  IMPRESSION:  1.  Several enlarged lymph nodes identified at the right axilla,  retroperitoneum, left pelvis and borderline enlarged at the left  inguinal locations. While nonspecific, a neoplastic etiology is  possible including lymphoma.  2.  Mild splenomegaly.  3.  Lobulated indeterminate nodular masses at the left pericardial  fat.  4.  Indeterminate multiple bilateral pulmonary nodules. The dominant  solid nodule is at the right lower lobe measuring 1.1 cm. Neoplasm  remains in the differential and surveillance imaging and/or further  workup is recommended. See below for follow up imaging guidelines.    PET 12/9/22:  IMPRESSION:     Findings suspicious for active neoplasm such as lymphoma involving lymph nodes above/below the diaphragm. The right axillary for left inguinal lymph nodes are amenable to ultrasound-guided histologic sampling if desired.        ASSESSMENT/PLAN:  Billy Carey is a 32 year old male who is referred for thrombocytopenia. 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    1) Thrombocytopenia, warm autoimmune hemolysis  -This is a new diagnosis for patient and an incidental finding when he was in for routine physical examination. He has not had any bleeding episodes.   -No new meds, recent infection, or recent surgeries.  -TSH normal at 3.85.   -HepB/C and HIV studies historically negative.   -No splenomegaly on physical examination.  -I doubt hemolysis as retic is normal as well as LFTs.  -We discussed the possibility of lab artifact, but there is no comment on platelet clumping on smear. Also, he had lab work repeated two days in a row with PLT count returning in the 40s.   -Given his history of autoimmune disease, he may have underlying ITP, which is a diagnosis of exclusion. We had  discussed possible steroid therapy if platelets are to return <30K.   -Repeat CBC is showing PLTs still in the 40's. Hb is 17.9, WBC normal.   -Also is consideration for a microangiopathic hemolytic anemia. MIRA has come back positive at 3+ (which is inconsistent with MAHA) and patient does not have elevated LFTs, abnormal coag studies, nor reticulocytosis. LDH is also normal at 240. He is afebrile. [facility] blood bank testing has returned +IgG and negative for complement, consistent with warm AIHA.   -CT CAP had returned with 2 cm LN of the retroperitoneum, inguinal, and axillary. There was also note of an abnormal pericardial fat mass. I sent for a PET, that did not show FDG avidity of the pericardial fat mass. The retroperitoneal LNs had SUV of 12-13 with the remainder LN 6-7. I sent patient for a core biopsy of the retroperitoneal LN with IR that returned in fragments with unremarkable FLOW. Bone marrow biopsy was normocellular with normal megakaryocyte morphology. I then pursued a right axillary LN biopsy, which again returned negative with unremarkable FLOW. I sent for excisional biopsy of an inguinal LN with general surgery with pathology once again returning as benign with folliculary and interfollicular hyperplasia. Congo red staining is negative. EBV, HHV8 negative. I reviewed with pathology for any morphologic evidence consistent with Castleman's disease and spoke with Dr. Kaminski who is unable to confirm this at this time. She did have an interdepartmental pathology review. I do note the sinuses contain abundant histiocytes in the microscopic description and in my differential diagnosis is also Rosai-Dorfamn or Langerhan's disease.  I spoke with Dr. Tyler who is in agreement with workup above. I reviewed with Dr. 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 I reviewed I would  then move onto rituximab next if patient is not to have improvement with steroid therapy. Dr. Tyler is in agreement with this plan.   -I reviewed the above with patient and wife, Jayla. I will need to continue to monitor the patient not only for treatment response for the hemolysis, but for evolution of LAD. My plan is to recheck with PET in 6 months time and will continue to monitor for treatment response discussed above.   -After our last visit, PLTs returned at 18K. Patient was hospitalized and treated with decadron 40 mg x4 and IVIG. His platelets have remained >100K since then. He has met with Dr. Tyler as well who is in agreement with next line rituximab. Patient would like to continue to think on this. We discussed the risk of bronchospasm and hypersensitivity.    Weekly rituximab x4 weeks, then possible maintenance:  -Rituximab 375 mg/m2 IV    -Platelets have been stable >100K. Patient would like to continue to monitor for now.     2) Lymphadenopathy  -See above.  -Path reviewed at [facility] and returning as benign reactive folllicular hyperplasia. No evidence of malignancy.     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  -Patient without arthralgias, skin changes.  -APLA, ds-DNA labs returned negative.  -Will refer to rheumatology.     4) History of infectious mononucleosis  -EBV negative on LN.  -EBV PCR negative.    5) DM1  -Patient managed by endocrinology.    6) History of HTN, HLD    7) -Repeat CBC in 4 weeks with follow up.   -Repeat PET/CT in June 2023 (ordered).      Hayde Lopez DO  Hematology/Oncology  [facility]        Again, thank you for allowing me to participate in the care of your patient.        Sincerely,        Hayde Lopez DO

## 2023-05-26 NOTE — PATIENT INSTRUCTIONS
Billy is scheduled for a PET scan on 06/06/23. He is also scheduled for labs and a follow up with Dr. Lopez on 06/09/23.    Armida Gibbons RN on 5/26/2023 at 12:00 PM

## 2023-06-22 ENCOUNTER — HOSPITAL ENCOUNTER (OUTPATIENT)
Dept: PET IMAGING | Facility: CLINIC | Age: 33
Setting detail: NUCLEAR MEDICINE
Discharge: HOME | End: 2023-06-22
Attending: INTERNAL MEDICINE | Admitting: INTERNAL MEDICINE
Payer: COMMERCIAL

## 2023-06-22 ENCOUNTER — ANCILLARY ORDERS (OUTPATIENT)
Dept: ONCOLOGY | Facility: CLINIC | Age: 33
End: 2023-06-22

## 2023-06-22 DIAGNOSIS — D69.6 THROMBOCYTOPENIA: ICD-10-CM

## 2023-06-22 DIAGNOSIS — D59.10 AUTOIMMUNE HEMOLYTIC ANEMIA (H): ICD-10-CM

## 2023-06-22 DIAGNOSIS — R59.1 DIFFUSE LYMPHADENOPATHY: ICD-10-CM

## 2023-06-22 DIAGNOSIS — D69.6 THROMBOCYTOPENIA (H): ICD-10-CM

## 2023-06-22 PROCEDURE — 343N000001 HC RX 343: Performed by: INTERNAL MEDICINE

## 2023-06-22 PROCEDURE — A9552 F18 FDG: HCPCS | Performed by: INTERNAL MEDICINE

## 2023-06-22 PROCEDURE — 78815 PET IMAGE W/CT SKULL-THIGH: CPT | Mod: 26 | Performed by: RADIOLOGY

## 2023-06-22 PROCEDURE — 78815 PET IMAGE W/CT SKULL-THIGH: CPT | Mod: PS

## 2023-06-22 RX ADMIN — FLUDEOXYGLUCOSE F-18 16.05 MILLICURIE: 500 INJECTION, SOLUTION INTRAVENOUS at 13:24

## 2023-06-23 ENCOUNTER — LAB (OUTPATIENT)
Dept: ONCOLOGY | Facility: CLINIC | Age: 33
End: 2023-06-23
Attending: INTERNAL MEDICINE
Payer: COMMERCIAL

## 2023-06-23 VITALS
DIASTOLIC BLOOD PRESSURE: 80 MMHG | RESPIRATION RATE: 16 BRPM | WEIGHT: 274 LBS | BODY MASS INDEX: 34.07 KG/M2 | TEMPERATURE: 98.5 F | HEART RATE: 67 BPM | OXYGEN SATURATION: 96 % | HEIGHT: 75 IN | SYSTOLIC BLOOD PRESSURE: 132 MMHG

## 2023-06-23 DIAGNOSIS — D69.3 AUTOIMMUNE THROMBOCYTOPENIA (H): Primary | ICD-10-CM

## 2023-06-23 DIAGNOSIS — R59.1 DIFFUSE LYMPHADENOPATHY: ICD-10-CM

## 2023-06-23 DIAGNOSIS — R21 RASH: ICD-10-CM

## 2023-06-23 LAB
ALBUMIN SERPL BCG-MCNC: 4.8 G/DL (ref 3.5–5.2)
ALP SERPL-CCNC: 64 U/L (ref 40–129)
ALT SERPL W P-5'-P-CCNC: 23 U/L (ref 0–70)
ANION GAP SERPL CALCULATED.3IONS-SCNC: 11 MMOL/L (ref 7–15)
APTT PPP: 26 SECONDS (ref 22–38)
AST SERPL W P-5'-P-CCNC: 37 U/L (ref 0–45)
BASOPHILS # BLD AUTO: 0 10E3/UL (ref 0–0.2)
BASOPHILS NFR BLD AUTO: 1 %
BILIRUB SERPL-MCNC: 1.4 MG/DL
BUN SERPL-MCNC: 15.5 MG/DL (ref 6–20)
CALCIUM SERPL-MCNC: 9.4 MG/DL (ref 8.6–10)
CHLORIDE SERPL-SCNC: 102 MMOL/L (ref 98–107)
CREAT SERPL-MCNC: 1.2 MG/DL (ref 0.67–1.17)
DEPRECATED HCO3 PLAS-SCNC: 24 MMOL/L (ref 22–29)
EOSINOPHIL # BLD AUTO: 0 10E3/UL (ref 0–0.7)
EOSINOPHIL NFR BLD AUTO: 1 %
ERYTHROCYTE [DISTWIDTH] IN BLOOD BY AUTOMATED COUNT: 11.7 % (ref 10–15)
FIBRINOGEN PPP-MCNC: 254 MG/DL (ref 170–490)
GFR SERPL CREATININE-BSD FRML MDRD: 81 ML/MIN/1.73M2
GLUCOSE SERPL-MCNC: 106 MG/DL (ref 70–99)
HCT VFR BLD AUTO: 47.1 % (ref 40–53)
HGB BLD-MCNC: 17.1 G/DL (ref 13.3–17.7)
IMM GRANULOCYTES # BLD: 0 10E3/UL
IMM GRANULOCYTES NFR BLD: 0 %
INR PPP: 1.07 (ref 0.85–1.15)
LDH SERPL L TO P-CCNC: 236 U/L (ref 0–250)
LYMPHOCYTES # BLD AUTO: 1.4 10E3/UL (ref 0.8–5.3)
LYMPHOCYTES NFR BLD AUTO: 18 %
MCH RBC QN AUTO: 29.3 PG (ref 26.5–33)
MCHC RBC AUTO-ENTMCNC: 36.3 G/DL (ref 31.5–36.5)
MCV RBC AUTO: 81 FL (ref 78–100)
MONOCYTES # BLD AUTO: 0.6 10E3/UL (ref 0–1.3)
MONOCYTES NFR BLD AUTO: 8 %
MONOCYTES NFR BLD AUTO: NEGATIVE %
NEUTROPHILS # BLD AUTO: 5.5 10E3/UL (ref 1.6–8.3)
NEUTROPHILS NFR BLD AUTO: 72 %
NRBC # BLD AUTO: 0 10E3/UL
NRBC BLD AUTO-RTO: 0 /100
PLATELET # BLD AUTO: 128 10E3/UL (ref 150–450)
POTASSIUM SERPL-SCNC: 4.2 MMOL/L (ref 3.4–5.3)
PROT SERPL-MCNC: 7 G/DL (ref 6.4–8.3)
RBC # BLD AUTO: 5.83 10E6/UL (ref 4.4–5.9)
SODIUM SERPL-SCNC: 137 MMOL/L (ref 136–145)
TOTAL PROTEIN SERUM FOR ELP: 6.7 G/DL (ref 6.4–8.3)
WBC # BLD AUTO: 7.6 10E3/UL (ref 4–11)

## 2023-06-23 PROCEDURE — 86334 IMMUNOFIX E-PHORESIS SERUM: CPT | Performed by: STUDENT IN AN ORGANIZED HEALTH CARE EDUCATION/TRAINING PROGRAM

## 2023-06-23 PROCEDURE — G0463 HOSPITAL OUTPT CLINIC VISIT: HCPCS | Performed by: INTERNAL MEDICINE

## 2023-06-23 PROCEDURE — 85730 THROMBOPLASTIN TIME PARTIAL: CPT | Performed by: INTERNAL MEDICINE

## 2023-06-23 PROCEDURE — 83615 LACTATE (LD) (LDH) ENZYME: CPT | Performed by: INTERNAL MEDICINE

## 2023-06-23 PROCEDURE — 84165 PROTEIN E-PHORESIS SERUM: CPT | Mod: TC | Performed by: STUDENT IN AN ORGANIZED HEALTH CARE EDUCATION/TRAINING PROGRAM

## 2023-06-23 PROCEDURE — 85610 PROTHROMBIN TIME: CPT | Performed by: INTERNAL MEDICINE

## 2023-06-23 PROCEDURE — 86665 EPSTEIN-BARR CAPSID VCA: CPT | Performed by: INTERNAL MEDICINE

## 2023-06-23 PROCEDURE — 83010 ASSAY OF HAPTOGLOBIN QUANT: CPT | Performed by: INTERNAL MEDICINE

## 2023-06-23 PROCEDURE — 99215 OFFICE O/P EST HI 40 MIN: CPT | Performed by: INTERNAL MEDICINE

## 2023-06-23 PROCEDURE — 83521 IG LIGHT CHAINS FREE EACH: CPT | Mod: 59 | Performed by: INTERNAL MEDICINE

## 2023-06-23 PROCEDURE — 82784 ASSAY IGA/IGD/IGG/IGM EACH: CPT | Performed by: INTERNAL MEDICINE

## 2023-06-23 PROCEDURE — 84165 PROTEIN E-PHORESIS SERUM: CPT | Mod: 26

## 2023-06-23 PROCEDURE — 36415 COLL VENOUS BLD VENIPUNCTURE: CPT | Performed by: INTERNAL MEDICINE

## 2023-06-23 PROCEDURE — 86308 HETEROPHILE ANTIBODY SCREEN: CPT | Performed by: INTERNAL MEDICINE

## 2023-06-23 PROCEDURE — 85004 AUTOMATED DIFF WBC COUNT: CPT | Performed by: INTERNAL MEDICINE

## 2023-06-23 PROCEDURE — 85384 FIBRINOGEN ACTIVITY: CPT | Performed by: INTERNAL MEDICINE

## 2023-06-23 PROCEDURE — 80053 COMPREHEN METABOLIC PANEL: CPT | Performed by: INTERNAL MEDICINE

## 2023-06-23 PROCEDURE — 84155 ASSAY OF PROTEIN SERUM: CPT | Performed by: INTERNAL MEDICINE

## 2023-06-23 PROCEDURE — 86334 IMMUNOFIX E-PHORESIS SERUM: CPT | Mod: 26

## 2023-06-23 RX ORDER — ALBUTEROL SULFATE 0.83 MG/ML
2.5 SOLUTION RESPIRATORY (INHALATION)
Status: CANCELLED | OUTPATIENT
Start: 2023-07-14

## 2023-06-23 RX ORDER — DIPHENHYDRAMINE HYDROCHLORIDE 50 MG/ML
50 INJECTION INTRAMUSCULAR; INTRAVENOUS
Status: CANCELLED
Start: 2023-06-23

## 2023-06-23 RX ORDER — ALBUTEROL SULFATE 90 UG/1
1-2 AEROSOL, METERED RESPIRATORY (INHALATION)
Status: CANCELLED
Start: 2023-06-23

## 2023-06-23 RX ORDER — METHYLPREDNISOLONE SODIUM SUCCINATE 125 MG/2ML
125 INJECTION, POWDER, LYOPHILIZED, FOR SOLUTION INTRAMUSCULAR; INTRAVENOUS
Status: CANCELLED | OUTPATIENT
Start: 2023-06-23

## 2023-06-23 RX ORDER — HEPARIN SODIUM,PORCINE 10 UNIT/ML
5-20 VIAL (ML) INTRAVENOUS DAILY PRN
Status: CANCELLED | OUTPATIENT
Start: 2023-06-30

## 2023-06-23 RX ORDER — EPINEPHRINE 1 MG/ML
0.3 INJECTION, SOLUTION INTRAMUSCULAR; SUBCUTANEOUS EVERY 5 MIN PRN
Status: CANCELLED | OUTPATIENT
Start: 2023-06-30

## 2023-06-23 RX ORDER — DIPHENHYDRAMINE HCL 25 MG
50 CAPSULE ORAL ONCE
Status: CANCELLED | OUTPATIENT
Start: 2023-07-07

## 2023-06-23 RX ORDER — MEPERIDINE HYDROCHLORIDE 25 MG/ML
25 INJECTION INTRAMUSCULAR; INTRAVENOUS; SUBCUTANEOUS EVERY 30 MIN PRN
Status: CANCELLED | OUTPATIENT
Start: 2023-07-07

## 2023-06-23 RX ORDER — ALBUTEROL SULFATE 0.83 MG/ML
2.5 SOLUTION RESPIRATORY (INHALATION)
Status: CANCELLED | OUTPATIENT
Start: 2023-06-30

## 2023-06-23 RX ORDER — ACETAMINOPHEN 325 MG/1
650 TABLET ORAL ONCE
Status: CANCELLED | OUTPATIENT
Start: 2023-06-30

## 2023-06-23 RX ORDER — HEPARIN SODIUM (PORCINE) LOCK FLUSH IV SOLN 100 UNIT/ML 100 UNIT/ML
5 SOLUTION INTRAVENOUS
Status: CANCELLED | OUTPATIENT
Start: 2023-07-07

## 2023-06-23 RX ORDER — METHYLPREDNISOLONE SODIUM SUCCINATE 125 MG/2ML
125 INJECTION, POWDER, LYOPHILIZED, FOR SOLUTION INTRAMUSCULAR; INTRAVENOUS
Status: CANCELLED | OUTPATIENT
Start: 2023-07-07

## 2023-06-23 RX ORDER — EPINEPHRINE 1 MG/ML
0.3 INJECTION, SOLUTION INTRAMUSCULAR; SUBCUTANEOUS EVERY 5 MIN PRN
Status: CANCELLED | OUTPATIENT
Start: 2023-07-14

## 2023-06-23 RX ORDER — DIPHENHYDRAMINE HYDROCHLORIDE 50 MG/ML
50 INJECTION INTRAMUSCULAR; INTRAVENOUS
Status: CANCELLED
Start: 2023-06-30

## 2023-06-23 RX ORDER — HEPARIN SODIUM (PORCINE) LOCK FLUSH IV SOLN 100 UNIT/ML 100 UNIT/ML
5 SOLUTION INTRAVENOUS
Status: CANCELLED | OUTPATIENT
Start: 2023-07-14

## 2023-06-23 RX ORDER — HEPARIN SODIUM (PORCINE) LOCK FLUSH IV SOLN 100 UNIT/ML 100 UNIT/ML
5 SOLUTION INTRAVENOUS
Status: CANCELLED | OUTPATIENT
Start: 2023-06-23

## 2023-06-23 RX ORDER — LORAZEPAM 2 MG/ML
0.5 INJECTION INTRAMUSCULAR EVERY 4 HOURS PRN
Status: CANCELLED | OUTPATIENT
Start: 2023-06-30

## 2023-06-23 RX ORDER — TRIAMCINOLONE ACETONIDE 5 MG/G
OINTMENT TOPICAL
Qty: 15 G | Refills: 0 | Status: SHIPPED | OUTPATIENT
Start: 2023-06-23 | End: 2023-07-14

## 2023-06-23 RX ORDER — DIPHENHYDRAMINE HYDROCHLORIDE 50 MG/ML
50 INJECTION INTRAMUSCULAR; INTRAVENOUS
Status: CANCELLED
Start: 2023-07-14

## 2023-06-23 RX ORDER — ALBUTEROL SULFATE 0.83 MG/ML
2.5 SOLUTION RESPIRATORY (INHALATION)
Status: CANCELLED | OUTPATIENT
Start: 2023-06-23

## 2023-06-23 RX ORDER — MEPERIDINE HYDROCHLORIDE 25 MG/ML
25 INJECTION INTRAMUSCULAR; INTRAVENOUS; SUBCUTANEOUS EVERY 30 MIN PRN
Status: CANCELLED | OUTPATIENT
Start: 2023-06-23

## 2023-06-23 RX ORDER — MEPERIDINE HYDROCHLORIDE 25 MG/ML
25 INJECTION INTRAMUSCULAR; INTRAVENOUS; SUBCUTANEOUS
Status: CANCELLED | OUTPATIENT
Start: 2023-07-07

## 2023-06-23 RX ORDER — LORAZEPAM 2 MG/ML
0.5 INJECTION INTRAMUSCULAR EVERY 4 HOURS PRN
Status: CANCELLED | OUTPATIENT
Start: 2023-07-07

## 2023-06-23 RX ORDER — ALBUTEROL SULFATE 90 UG/1
1-2 AEROSOL, METERED RESPIRATORY (INHALATION)
Status: CANCELLED
Start: 2023-06-30

## 2023-06-23 RX ORDER — HEPARIN SODIUM,PORCINE 10 UNIT/ML
5-20 VIAL (ML) INTRAVENOUS DAILY PRN
Status: CANCELLED | OUTPATIENT
Start: 2023-07-14

## 2023-06-23 RX ORDER — METHYLPREDNISOLONE SODIUM SUCCINATE 125 MG/2ML
125 INJECTION, POWDER, LYOPHILIZED, FOR SOLUTION INTRAMUSCULAR; INTRAVENOUS
Status: CANCELLED
Start: 2023-07-07

## 2023-06-23 RX ORDER — ACETAMINOPHEN 325 MG/1
650 TABLET ORAL ONCE
Status: CANCELLED | OUTPATIENT
Start: 2023-07-07

## 2023-06-23 RX ORDER — DIPHENHYDRAMINE HCL 25 MG
50 CAPSULE ORAL ONCE
Status: CANCELLED | OUTPATIENT
Start: 2023-07-14

## 2023-06-23 RX ORDER — MEPERIDINE HYDROCHLORIDE 25 MG/ML
25 INJECTION INTRAMUSCULAR; INTRAVENOUS; SUBCUTANEOUS EVERY 30 MIN PRN
Status: CANCELLED | OUTPATIENT
Start: 2023-06-30

## 2023-06-23 RX ORDER — METHYLPREDNISOLONE SODIUM SUCCINATE 125 MG/2ML
125 INJECTION, POWDER, LYOPHILIZED, FOR SOLUTION INTRAMUSCULAR; INTRAVENOUS
Status: CANCELLED | OUTPATIENT
Start: 2023-07-14

## 2023-06-23 RX ORDER — ALBUTEROL SULFATE 90 UG/1
1-2 AEROSOL, METERED RESPIRATORY (INHALATION)
Status: CANCELLED
Start: 2023-07-14

## 2023-06-23 RX ORDER — HEPARIN SODIUM,PORCINE 10 UNIT/ML
5-20 VIAL (ML) INTRAVENOUS DAILY PRN
Status: CANCELLED | OUTPATIENT
Start: 2023-06-23

## 2023-06-23 RX ORDER — METHYLPREDNISOLONE SODIUM SUCCINATE 125 MG/2ML
125 INJECTION, POWDER, LYOPHILIZED, FOR SOLUTION INTRAMUSCULAR; INTRAVENOUS
Status: CANCELLED | OUTPATIENT
Start: 2023-06-30

## 2023-06-23 RX ORDER — MEPERIDINE HYDROCHLORIDE 25 MG/ML
25 INJECTION INTRAMUSCULAR; INTRAVENOUS; SUBCUTANEOUS EVERY 30 MIN PRN
Status: CANCELLED | OUTPATIENT
Start: 2023-07-14

## 2023-06-23 RX ORDER — DIPHENHYDRAMINE HCL 25 MG
50 CAPSULE ORAL ONCE
Status: CANCELLED | OUTPATIENT
Start: 2023-06-30

## 2023-06-23 RX ORDER — ALBUTEROL SULFATE 0.83 MG/ML
2.5 SOLUTION RESPIRATORY (INHALATION)
Status: CANCELLED | OUTPATIENT
Start: 2023-07-07

## 2023-06-23 RX ORDER — METHYLPREDNISOLONE SODIUM SUCCINATE 125 MG/2ML
125 INJECTION, POWDER, LYOPHILIZED, FOR SOLUTION INTRAMUSCULAR; INTRAVENOUS
Status: CANCELLED
Start: 2023-07-14

## 2023-06-23 RX ORDER — LORAZEPAM 2 MG/ML
0.5 INJECTION INTRAMUSCULAR EVERY 4 HOURS PRN
Status: CANCELLED | OUTPATIENT
Start: 2023-07-14

## 2023-06-23 RX ORDER — ACETAMINOPHEN 325 MG/1
650 TABLET ORAL ONCE
Status: CANCELLED | OUTPATIENT
Start: 2023-06-23

## 2023-06-23 RX ORDER — MEPERIDINE HYDROCHLORIDE 25 MG/ML
25 INJECTION INTRAMUSCULAR; INTRAVENOUS; SUBCUTANEOUS
Status: CANCELLED | OUTPATIENT
Start: 2023-06-30

## 2023-06-23 RX ORDER — ACETAMINOPHEN 325 MG/1
650 TABLET ORAL ONCE
Status: CANCELLED | OUTPATIENT
Start: 2023-07-14

## 2023-06-23 RX ORDER — MEPERIDINE HYDROCHLORIDE 25 MG/ML
25 INJECTION INTRAMUSCULAR; INTRAVENOUS; SUBCUTANEOUS
Status: CANCELLED | OUTPATIENT
Start: 2023-06-23

## 2023-06-23 RX ORDER — DIPHENHYDRAMINE HYDROCHLORIDE 50 MG/ML
50 INJECTION INTRAMUSCULAR; INTRAVENOUS
Status: CANCELLED
Start: 2023-07-07

## 2023-06-23 RX ORDER — METHYLPREDNISOLONE SODIUM SUCCINATE 125 MG/2ML
125 INJECTION, POWDER, LYOPHILIZED, FOR SOLUTION INTRAMUSCULAR; INTRAVENOUS
Status: CANCELLED
Start: 2023-06-23

## 2023-06-23 RX ORDER — MEPERIDINE HYDROCHLORIDE 25 MG/ML
25 INJECTION INTRAMUSCULAR; INTRAVENOUS; SUBCUTANEOUS
Status: CANCELLED | OUTPATIENT
Start: 2023-07-14

## 2023-06-23 RX ORDER — DIPHENHYDRAMINE HCL 25 MG
50 CAPSULE ORAL ONCE
Status: CANCELLED | OUTPATIENT
Start: 2023-06-23

## 2023-06-23 RX ORDER — METHYLPREDNISOLONE SODIUM SUCCINATE 125 MG/2ML
125 INJECTION, POWDER, LYOPHILIZED, FOR SOLUTION INTRAMUSCULAR; INTRAVENOUS
Status: CANCELLED
Start: 2023-06-30

## 2023-06-23 RX ORDER — EPINEPHRINE 1 MG/ML
0.3 INJECTION, SOLUTION INTRAMUSCULAR; SUBCUTANEOUS EVERY 5 MIN PRN
Status: CANCELLED | OUTPATIENT
Start: 2023-07-07

## 2023-06-23 RX ORDER — HEPARIN SODIUM,PORCINE 10 UNIT/ML
5-20 VIAL (ML) INTRAVENOUS DAILY PRN
Status: CANCELLED | OUTPATIENT
Start: 2023-07-07

## 2023-06-23 RX ORDER — EPINEPHRINE 1 MG/ML
0.3 INJECTION, SOLUTION INTRAMUSCULAR; SUBCUTANEOUS EVERY 5 MIN PRN
Status: CANCELLED | OUTPATIENT
Start: 2023-06-23

## 2023-06-23 RX ORDER — HYDROCORTISONE 25 MG/G
OINTMENT TOPICAL 2 TIMES DAILY
Qty: 30 G | Refills: 1 | Status: CANCELLED | OUTPATIENT
Start: 2023-06-23

## 2023-06-23 RX ORDER — LORAZEPAM 2 MG/ML
0.5 INJECTION INTRAMUSCULAR EVERY 4 HOURS PRN
Status: CANCELLED | OUTPATIENT
Start: 2023-06-23

## 2023-06-23 RX ORDER — HEPARIN SODIUM (PORCINE) LOCK FLUSH IV SOLN 100 UNIT/ML 100 UNIT/ML
5 SOLUTION INTRAVENOUS
Status: CANCELLED | OUTPATIENT
Start: 2023-06-30

## 2023-06-23 RX ORDER — ALBUTEROL SULFATE 90 UG/1
1-2 AEROSOL, METERED RESPIRATORY (INHALATION)
Status: CANCELLED
Start: 2023-07-07

## 2023-06-23 ASSESSMENT — PAIN SCALES - GENERAL: PAINLEVEL: NO PAIN (0)

## 2023-06-23 NOTE — LETTER
6/23/2023         RE: Billy Carey  [id number]  [id number]        Dear Colleague,    Thank you for referring your patient, Billy Carey, to the [facility]. Please see a copy of my visit note below.    [facility]    Hematology/Oncology Established Patient Note      Today's Date: 6/23/23    Reason for Consultation: Thrombocytopenia  Referring Provider: ZAHRAA Kingsley CNP      HISTORY OF PRESENT ILLNESS: Billy Carey is a 33 year old male who is referred for thrombocytopenia. 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    Historically, he has had historical normal CBC. On 11/30/22, WBC 6.0, Hb 16.5, PLT 42. Repeat CBC on 12/1/22, WBC 7.2, Hb 17.5, PLT 43. Hepatitis and HIV studies negative.     He has had eustachian tube placement without bleeding event. He has not had any other surgeries.     For DM1- he is followed by endocrinology. He has continuous blood glucose monitoring and an insulin pump.     He drinks alcohol 1-2x/week. No excessive use. No history of smoking.     Family history of VTE in his sister. He is unaware of the nuances surrounding this event. No family history of bleeding disorder or malignancy.       INTERIM HISTORY:  Patient has sought second opinion at [facility]. He has developed new rash of the hands and arms. No B-symptoms.       REVIEW OF SYSTEMS:   A 14 point ROS was reviewed with pertinent positives and negatives in the HPI.        HOME MEDICATIONS:  Current Outpatient Medications   Medication Sig Dispense Refill     atorvastatin (LIPITOR) 10 MG tablet Take 1 tablet (10 mg) by mouth daily 90 tablet 3     blood glucose (KELL CONTOUR NEXT) test strip Test 4-5 times daily 4 Box 3     Blood Glucose Monitoring Suppl (KELL CONTOUR NEXT LINK) W/DEVICE KIT 1 kit 5 times daily. Use as directed 1 kit 1     Continuous Blood Gluc Sensor (DEXCOM G6 SENSOR) MISC USE 1 SENSOR EVERY 10 DAYS        glucagon (GLUCAGON EMERGENCY) 1 MG kit Inject 1 mg into the muscle once for 1 dose (Patient not taking: Reported on 3/7/2023) 1 mg 3     insulin lispro (HUMALOG VIAL) 100 UNIT/ML vial To be used in insulin pump       insulin pen needle (BD CHRIST U/F) 32G X 4 MM miscellaneous Use 3-6 pen needles daily or as directed. 50 each 0     INSULIN PUMP - OUTPATIENT Date Last Updated: 2/15/23  Tandem  Pump Basal Rates/Times:  0464-8446: 2 units/hour  9040-6791: 2.6 units/hour  2219-0222: 2.2 units/hour  6456-2319: 1.7 units/hour  CARB RATIO:   9213-4613: 1 unit per 10 grams carbohydrates  CF / Sensitivity Times:   9201-2527: 1 unit to decrease BG by 30 mg/dL  TARGET B mg/dL       vitamin D2 (ERGOCALCIFEROL) 38215 units (1250 mcg) capsule Take 50,000 Units by mouth once a week           ALLERGIES:  Allergies   Allergen Reactions     Penicillins Rash         PAST MEDICAL HISTORY:  Past Medical History:   Diagnosis Date     Diabetes mellitus type 1, uncontrolled, insulin dependent 2013         PAST SURGICAL HISTORY:  Past Surgical History:   Procedure Laterality Date     EXCISE MASS GROIN Left 2023    Procedure: Lymph Node excisional biopsy;  Surgeon: New Burger MD;  Location: [facility] OR     MYRINGOTOMY, INSERT TUBE BILATERAL, COMBINED Bilateral     As a child         SOCIAL HISTORY:  Social History     Socioeconomic History     Marital status:      Spouse name: Jayla     Number of children: 3     Years of education: Not on file     Highest education level: Not on file   Occupational History     Occupation:      Comment: [employer]   Tobacco Use     Smoking status: Never     Smokeless tobacco: Never   Substance and Sexual Activity     Alcohol use: No     Comment: rarely     Drug use: No     Sexual activity: Yes     Partners: Female   Other Topics Concern     Parent/sibling w/ CABG, MI or angioplasty before 65F 55M? Not Asked   Social History Narrative     Not on file     Social  Determinants of Health     Financial Resource Strain: Low Risk  (1/12/2023)    Overall Financial Resource Strain (CARDIA)      Difficulty of Paying Living Expenses: Not hard at all   Food Insecurity: No Food Insecurity (1/12/2023)    Hunger Vital Sign      Worried About Running Out of Food in the Last Year: Never true      Ran Out of Food in the Last Year: Never true   Transportation Needs: No Transportation Needs (1/12/2023)    PRAPARE - Transportation      Lack of Transportation (Medical): No      Lack of Transportation (Non-Medical): No   Physical Activity: Sufficiently Active (1/12/2023)    Exercise Vital Sign      Days of Exercise per Week: 6 days      Minutes of Exercise per Session: 40 min   Stress: No Stress Concern Present (1/12/2023)    [employer] [employer] [place] - Occupational Stress Questionnaire      Feeling of Stress : Only a little   Social Connections: Socially Integrated (1/12/2023)    Social Connection and Isolation Panel [NHANES]      Frequency of Communication with Friends and Family: More than three times a week      Frequency of Social Gatherings with Friends and Family: More than three times a week      Attends Quaker Services: 1 to 4 times per year      Active Member of Clubs or Organizations: Yes      Attends Club or Organization Meetings: Not on file      Marital Status:    Intimate Partner Violence: Not At Risk (3/31/2023)    Humiliation, Afraid, Rape, and Kick questionnaire      Fear of Current or Ex-Partner: No      Emotionally Abused: No      Physically Abused: No      Sexually Abused: No   Housing Stability: Low Risk  (1/12/2023)    Housing Stability Vital Sign      Unable to Pay for Housing in the Last Year: No      Number of Places Lived in the Last Year: 1      Unstable Housing in the Last Year: No         FAMILY HISTORY:  Family History   Problem Relation Age of Onset     C.A.D. No family hx of      Diabetes No family hx of      Hypertension No family hx of   "    Cancer No family hx of         no skin cancer     Amblyopia No family hx of      Retinal detachment No family hx of      Thyroid Disease No family hx of      Glaucoma No family hx of      Macular Degeneration No family hx of          PHYSICAL EXAM:  Vital signs:  /80   Pulse 67   Temp 98.5  F (36.9  C) (Oral)   Resp 16   Ht 1.905 m (6' 3\")   Wt 124.3 kg (274 lb)   SpO2 96%   BMI 34.25 kg/m     ECO  GENERAL/CONSTITUTIONAL: No acute distress.  LYMPH: No anterior cervical, posterior cervical, supraclavicular, axillary or inguinal adenopathy.   MUSCULOSKELETAL: Warm and well-perfused, no cyanosis, clubbing, or edema.  NEUROLOGIC: Cranial nerves II-XII are intact. Alert, oriented, answers questions appropriately.  INTEGUMENTARY: No rashes or jaundice.  GAIT: Steady, does not use assistive device.      LABS:   Latest Reference Range & Units 23 15:33   Sodium 136 - 145 mmol/L 137   Potassium 3.4 - 5.3 mmol/L 4.2   Chloride 98 - 107 mmol/L 102   Carbon Dioxide (CO2) 22 - 29 mmol/L 24   Urea Nitrogen 6.0 - 20.0 mg/dL 15.5   Creatinine 0.67 - 1.17 mg/dL 1.20 (H)   GFR Estimate >60 mL/min/1.73m2 81   Calcium 8.6 - 10.0 mg/dL 9.4   Anion Gap 7 - 15 mmol/L 11   Albumin 3.5 - 5.2 g/dL 4.8   Protein Total 6.4 - 8.3 g/dL 7.0   Alkaline Phosphatase 40 - 129 U/L 64   ALT 0 - 70 U/L 23   AST 0 - 45 U/L 37   Bilirubin Total <=1.2 mg/dL 1.4 (H)   Glucose 70 - 99 mg/dL 106 (H)   Lactate Dehydrogenase 0 - 250 U/L 236   WBC 4.0 - 11.0 10e3/uL 7.6   Hemoglobin 13.3 - 17.7 g/dL 17.1   Hematocrit 40.0 - 53.0 % 47.1   Platelet Count 150 - 450 10e3/uL 128 (L)   RBC Count 4.40 - 5.90 10e6/uL 5.83   MCV 78 - 100 fL 81   MCH 26.5 - 33.0 pg 29.3   MCHC 31.5 - 36.5 g/dL 36.3   RDW 10.0 - 15.0 % 11.7   % Neutrophils % 72   % Lymphocytes % 18   % Monocytes % 8   % Eosinophils % 1   % Basophils % 1   Absolute Basophils 0.0 - 0.2 10e3/uL 0.0   Absolute Eosinophils 0.0 - 0.7 10e3/uL 0.0   Absolute Immature Granulocytes <=0.4 " 10e3/uL 0.0   Absolute Lymphocytes 0.8 - 5.3 10e3/uL 1.4   Absolute Monocytes 0.0 - 1.3 10e3/uL 0.6   % Immature Granulocytes % 0   Absolute Neutrophils 1.6 - 8.3 10e3/uL 5.5   Absolute NRBCs 10e3/uL 0.0   NRBCs per 100 WBC <1 /100 0   INR 0.85 - 1.15  1.07   PTT 22 - 38 Seconds 26   Fibrinogen 170 - 490 mg/dL 254   Mononucleosis Screen Negative  Negative      Latest Reference Range & Units 12/06/22 09:40 12/14/22 09:20 01/10/23 15:17 01/26/23 14:47 02/15/23 16:02 03/07/23 09:00   Haptoglobin 32 - 197 mg/dL 37 31 (L) 22 (L) 31 (L) 118 50        Latest Reference Range & Units 01/10/23 15:17   SPECIMEN EXPIRATION DATE  [id number]   MIRA Anti-IgG,-C3d  Positive 3+   Hep B Surface Agn Nonreactive  Nonreactive   Hepatitis B Core Margarette Nonreactive  Nonreactive   Hepatitis B Surface Antibody Instrument Value <8.00 m[IU]/mL 35.73   Hepatitis B Surface Antibody  Reactive   Hepatitis C Antibody Nonreactive  Nonreactive   HIV Antigen Antibody Combo Nonreactive  Nonreactive      Latest Reference Range & Units 01/10/23 15:17   PAVAN interpretation Negative  Positive !      01/10/23 15:17   PAVAN titer 1 1:160     Serum M-protein (g/dL):  1/10/23: 0.0    Total IgG (mg/dL), IgA (mg/dL), IgM (mg/dL):  1/10/23: 956, 135, 44    Free kappa light chains (mg/dL), lambda (mg/dL), kappa/lambda ratio:  1/10/23: 1.33, 1.45, 0.92       Latest Reference Range & Units 12/14/22 09:20   MIRA Anti-IgG,-C3d  Weak Positive   MIRA Anti-C3  Negative   MIRA Anti-IgG, Tube  Positive 1+   Blood Bank Chart Comment  BB Chart Comment            Latest Reference Range & Units 12/06/22 09:40   Iron 61 - 157 ug/dL 59 (L)   Iron Binding Capacity 240 - 430 ug/dL 319   Iron Sat Index 15 - 46 % 18   Lactate Dehydrogenase 0 - 250 U/L 240   INR 0.85 - 1.15  0.91   PTT 22 - 38 Seconds 25   Fibrinogen 170 - 490 mg/dL 282   SPECIMEN EXPIRATION DATE  [id number]   MIRA Anti-IgG,-C3d  Positive 3+               PATHOLOGY:  Final Diagnosis 12/1/22:   Peripheral blood,  morphology:  - Marked thrombocytopenia.  - Hemoglobin quantitatively within normal limits and erythrocytes without diagnostic morphologic abnormality.  - WBC subsets quantitatively within normal limits and without diagnostic morphologic abnormality.  - Negative for schistocytes.  - Negative for overt features of myelodysplasia or circulating blasts.     Final Diagnosis 12/6/22:   Peripheral blood:  --Slight polycythemia/erythrocytosis.  --Marked thrombocytopenia.   Electronically signed by Angel Marshall MD on 12/7/2022 at 10:33 AM   Comment    The cause of this patient's thrombocytopenia is unclear from the peripheral smear. Some common causes of thrombocytopenia to consider include infections, medications, alcohol, immune mediated mechanisms, and splenic sequestration. If splenic sequestration is of clinical concern, an accurate assessment of spleen size is recommended.         Clinical Information    Thrombocytopenia   Peripheral Smear    The red blood cells appear normochromic.  Rouleaux formation does not appear increased.  Polychromasia does not appear increased.  Poikilocytosis appears mild and nonspecific.  Platelets  are well granulated.  Lymphocytes are predominantly small with cytologically mature chromatin and appear overall polymorphous.  Neutrophils contain normal cytoplasmic granulation and unremarkable nuclear morphology.  There is no dysplasia and no circulating blasts are seen.     Final Diagnosis 12/14/22:   Bone marrow, left posterior iliac crest, decalcified trephine biopsy and aspiration:  -- Normocellular bone marrow for age (60 to 70%) with maturing trilineage hematopoiesis.  -- No evidence of marrow involvement by lymphoma or other hematopoietic neoplasm.     Peripheral blood showing:  -- Moderate thrombocytopenia.     Case Report   Flow Cytometry Report                             Case: [id number]                                   Authorizing Provider:  Hayde Lopez DO         Collected:            12/14/2022 10:06 AM           Ordering Location:     [facility]   Received:            12/14/2022 10:21 AM                                  [facility]                                                             Pathologist:           Marleny Holden MD                                                      Specimen:    Iliac Crest, Bone Marrow Aspirate, Left                                                    Flow Interpretation   A. Iliac Crest, Bone Marrow Aspirate, Left:    - Polytypic B cells  - No aberrant immunophenotype on T cells  - No increase in myeloid blasts and no abnormal myeloid blast population     Case Report   Surgical Pathology Report                         Case: [id number]                                   Authorizing Provider:  Hayde Lopez DO         Collected:           12/15/2022 11:27 AM           Ordering Location:     [facility]   Received:            12/15/2022 11:54 AM                                  Imaging                                                                       Pathologist:           Mirna Roger                                                                Specimen:    Retroperitoneal                                                                            Final Diagnosis   A.  Retroperitoneum, biopsy:  -Three tissue fragments, entirely submitted for flow cytometry evaluation (gross examination only).       Case Report   Flow Cytometry Report                             Case: [id number]                                   Authorizing Provider:  Hayde Lopez DO         Collected:           12/15/2022 01:16 PM           Ordering Location:     [facility]   Received:            12/15/2022 01:17 PM                                  Imaging                                                                       Pathologist:           Marleny Holden MD                                                       Specimen:    Retroperitoneal                                                                            Flow Interpretation   A. Retroperitoneal :    - Polytypic B cells  - No aberrant immunophenotype on T cells         Case Report   Surgical Pathology Report                         Case: [id number]                                   Authorizing Provider:  Hayde Lopez DO         Collected:           01/04/2023 02:25 PM           Ordering Location:     [facility]   Received:            01/04/2023 02:48 PM                                  [facility]                                                                 Pathologist:           Saadia Allen MD                                                                            Specimen:    Lymph Node(s), Axillary, Right                                                             Final Diagnosis   Lymph node, right axillary, core biopsies:  -Partially sampled lymph node showing no evidence of Hodgkin's or non-Hodgkin's lymphoma  -Completed immunophenotyping studies show polytypic B cells and no aberrant T-cell antigen expression     Case Report   Flow Cytometry Report                             Case: [id number]                                   Authorizing Provider:  Hayde Lopez DO         Collected:           01/04/2023 02:25 PM           Ordering Location:     [facility]   Received:            01/04/2023 02:53 PM                                  [facility]                                                                 Pathologist:           Xiomara Ramsey MD                                                         Specimen:    Lymph Node(s), Axillary, Right                                                             Flow Interpretation   A. Lymph Node(s), Axillary, Right, :  -Polytypic B cells  No aberrant immunophenotype on T cells          Case Report   Surgical Pathology Report                         Case: [id number]                                   Authorizing Provider:  New Burger         Collected:           01/20/2023 02:16 PM                                  MD Ari                                                                  Ordering Location:      [facility] OR                 Received:            01/20/2023 02:29 PM           Pathologist:           Isabela Kaminski MD                                                     Specimen:    Lymph Node(s), Inguinal, Left, partial lymph node left groin                               Addendum   This addendum is issued to document that Dr. Isabela Kaminski discussed biopsy findings with Dr. Hayde Lopez on 1/26/23 at 12:30.    Addendum electronically signed by Isabela Kaminski MD on 1/26/2023 at 12:30 PM   Final Diagnosis   A. Left groin, lymph node, partial lymphadenectomy:  -Benign lymph node with follicular and interfollicular hyperplasia  -Rare small noncaseating granulomas  -No morphologic evidence of malignancy  -Negative for select microorganisms on stained tissue  -See comment   Electronically signed by Isabela Kaminski MD on 1/26/2023 at 12:16 PM   Comment  [id number]     There is no definitive morphologic or immunophenotypic evidence for malignancy in this biopsy. Instead, the findings (which include follicular and interfollicular hyperplasia and features of progressive transformation of germinal centers) are favored to be reactive.     Evaluation for select microorganisms was performed, with negative results for EBV (by in situ hybridization), HHV8 and toxoplasma (by immunohistochemistry), and acid fast and fungal organisms (by special stains).    Concurrent flow cytometry ( [id number]) demonstrated polytypic B cells and no aberrant immunophenotype on T cells.         Case Report   Flow Cytometry Report                             Case: [id number]                                    Authorizing Provider:  Rejimeredith Christchichi         Collected:           01/20/2023 02:33 PM                                  MD Ari                                                                  Ordering Location:     [facility]                 Received:            01/20/2023 02:33 PM           Pathologist:           Marleny Holden MD                                                      Specimen:    Lymph Node(s), Inguinal, Left                                                              Flow Interpretation   A. Lymph Node(s), Inguinal, Left:     - Polytypic B cells  - No aberrant immunophenotype on T cells           IMAGING:  CT CAP 12/6/22:  IMPRESSION:  1.  Several enlarged lymph nodes identified at the right axilla,  retroperitoneum, left pelvis and borderline enlarged at the left  inguinal locations. While nonspecific, a neoplastic etiology is  possible including lymphoma.  2.  Mild splenomegaly.  3.  Lobulated indeterminate nodular masses at the left pericardial  fat.  4.  Indeterminate multiple bilateral pulmonary nodules. The dominant  solid nodule is at the right lower lobe measuring 1.1 cm. Neoplasm  remains in the differential and surveillance imaging and/or further  workup is recommended. See below for follow up imaging guidelines.    PET 12/9/22:  IMPRESSION:     Findings suspicious for active neoplasm such as lymphoma involving lymph nodes above/below the diaphragm. The right axillary for left inguinal lymph nodes are amenable to ultrasound-guided histologic sampling if desired.        ASSESSMENT/PLAN:  Billy Carey is a 32 year old male who is referred for thrombocytopenia. 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    1) Thrombocytopenia, warm autoimmune hemolysis  -This is a new diagnosis for patient and an incidental finding when he was in for routine physical examination. He has not had any bleeding episodes.   -No new meds, recent infection, or recent  surgeries.  -TSH normal at 3.85.   -HepB/C and HIV studies historically negative.   -No splenomegaly on physical examination.  -I doubt hemolysis as retic is normal as well as LFTs.  -We discussed the possibility of lab artifact, but there is no comment on platelet clumping on smear. Also, he had lab work repeated two days in a row with PLT count returning in the 40s.   -Given his history of autoimmune disease, he may have underlying ITP, which is a diagnosis of exclusion. We had discussed possible steroid therapy if platelets are to return <30K.   -Repeat CBC is showing PLTs still in the 40's. Hb is 17.9, WBC normal.   -Also is consideration for a microangiopathic hemolytic anemia. MIRA has come back positive at 3+ (which is inconsistent with MAHA) and patient does not have elevated LFTs, abnormal coag studies, nor reticulocytosis. LDH is also normal at 240. He is afebrile. [facility] blood bank testing has returned +IgG and negative for complement, consistent with warm AIHA.   -CT CAP had returned with 2 cm LN of the retroperitoneum, inguinal, and axillary. There was also note of an abnormal pericardial fat mass. I sent for a PET, that did not show FDG avidity of the pericardial fat mass. The retroperitoneal LNs had SUV of 12-13 with the remainder LN 6-7. I sent patient for a core biopsy of the retroperitoneal LN with IR that returned in fragments with unremarkable FLOW. Bone marrow biopsy was normocellular with normal megakaryocyte morphology. I then pursued a right axillary LN biopsy, which again returned negative with unremarkable FLOW. I sent for excisional biopsy of an inguinal LN with general surgery with pathology once again returning as benign with folliculary and interfollicular hyperplasia. Congo red staining is negative. EBV, HHV8 negative. I reviewed with pathology for any morphologic evidence consistent with Castleman's disease and spoke with Dr. Kaminski who is unable to confirm this at this time. She  did have an interdepartmental pathology review. I do note the sinuses contain abundant histiocytes in the microscopic description and in my differential diagnosis is also Rosai-Dorfamn or Langerhan's disease.  I spoke with Dr. Tyler who is in agreement with workup above. I reviewed with Dr. 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 I reviewed I would then move onto rituximab next if patient is not to have improvement with steroid therapy. Dr. Tyler is in agreement with this plan.   -I reviewed the above with patient and wife, Jayla. I will need to continue to monitor the patient not only for treatment response for the hemolysis, but for evolution of LAD. My plan is to recheck with PET in 6 months time and will continue to monitor for treatment response discussed above.   -After our last visit, PLTs returned at 18K. Patient was hospitalized and treated with decadron 40 mg x4 and IVIG. His platelets have remained >100K since then. He has met with Dr. Tyler as well who is in agreement with next line rituximab. Patient would like to continue to think on this. We discussed the risk of bronchospasm and hypersensitivity.  -Patient has sought second opinion at [facility] (Dr. Ayon) who is in agreement with workup and plan for rituximab in the future.     Weekly rituximab x4 weeks, then possible maintenance:  -Rituximab 375 mg/m2 IV    -Platelets have been stable >100K. Patient would like to continue to monitor for now.   -PET is showing 13 x 6 mm right level IIb LN with SUV 6.9, 6 mm left supraclavicular LN with SUV 4.4, decreased size and FDG avidity of a 12 mm right axillary LN. He has waxing/waning RP, bilateral iliac chain, and left inguinal LAD with both increased/decreased SUV. He has increase in splenomegaly to 17.9 x 9.6 x 5.6 cm with mild FDG avidity, SUV 5. There are no  clear lesions of the spleen for biopsy and will hold on random biopsy due to risk of bleed. He has developed new rash. This was a previous symptom prior to thrombocytopenia. Currently, CBC has returned with PLT 128K. This, in conjunction with elevated Cr, elevated T. Bili. This will be a pattern to monitor in the future for relapse.   -As PLTs are 128K, will schedule for weekly CBC. If platelets continue to downtrend, will plan on initiation of rituximab 375 mg/m2 once weekly x4 (with possible maintenance). We reviewed the mechanism of action of rituximab. There is risk of bronchospasm and hypersensitivity with treatment. He has consented to treatment in the eventuality he will require initiation. We discussed possible splenectomy, but this would be further down the line for refractory disease. Repeat EBV, SPIEP testing pending. I spoke with Dr. Tyler today who is in agreement.     2) Lymphadenopathy  -See above.  -Path reviewed at [facility] and returning as benign reactive folllicular hyperplasia. No evidence of malignancy.     3)  Positive PAVNA  -Titer 1:160.  -Patient without arthralgias, skin changes.  -APLA, ds-DNA labs returned negative.  -Rheumatology referral upcoming.     4) History of infectious mononucleosis  -EBV negative on LN.  -EBV PCR negative.    5) DM1  -Patient managed by endocrinology.    6) History of HTN, HLD    7) Rash  -Derm referral for biopsy. Discussed with patient and spouse still need to monitor for the possibility of cutaneous lymphomas.     8) -Repeat weekly CBC.   -Follow up with [facility] in 2 weeks.   -May need to start rituximab if platelets continue to downtrend.   -Repeat PET/CT in December 2023 (ordered).      Hayde Lopez DO  Hematology/Oncology  [facility]    Total time spent on day of visit, including review of tests, obtaining/reviewing separately obtained history, ordering medications/tests/procedures, communicating with PCP/consultants, and  "documenting in electronic medical record: 50 minutes. Adequate time was dedicated to patient questions and answered to the expressed satisfaction of the patient and family members.         Oncology Rooming Note    June 23, 2023 3:36 PM   Billy Carey is a 33 year old male who presents for:    Chief Complaint   Patient presents with     Oncology Clinic Visit     Initial Vitals: /80   Pulse 67   Temp 98.5  F (36.9  C) (Oral)   Resp 16   Ht 1.905 m (6' 3\")   Wt 124.3 kg (274 lb)   SpO2 96%   BMI 34.25 kg/m   Estimated body mass index is 34.25 kg/m  as calculated from the following:    Height as of this encounter: 1.905 m (6' 3\").    Weight as of this encounter: 124.3 kg (274 lb). Body surface area is 2.56 meters squared.  No Pain (0) Comment: Data Unavailable   No LMP for male patient.  Allergies reviewed: Yes  Medications reviewed: Yes    Medications: Medication refills not needed today.  Pharmacy name entered into [employer]    [id number] - [address] [facility]  [id number] [place], [place] - [address]  [id number] [place], [place] - [address] [address] [employer] [facility] - [facility], [place] - [address] [place] [address]    Clinical concerns: follow up    Kelsey Rebollar                Again, thank you for allowing me to participate in the care of your patient.        Sincerely,        Hayde Lopez, DO    "

## 2023-06-23 NOTE — PROGRESS NOTES
"Oncology Rooming Note    June 23, 2023 3:36 PM   Billy Carey is a 33 year old male who presents for:    Chief Complaint   Patient presents with     Oncology Clinic Visit     Initial Vitals: /80   Pulse 67   Temp 98.5  F (36.9  C) (Oral)   Resp 16   Ht 1.905 m (6' 3\")   Wt 124.3 kg (274 lb)   SpO2 96%   BMI 34.25 kg/m   Estimated body mass index is 34.25 kg/m  as calculated from the following:    Height as of this encounter: 1.905 m (6' 3\").    Weight as of this encounter: 124.3 kg (274 lb). Body surface area is 2.56 meters squared.  No Pain (0) Comment: Data Unavailable   No LMP for male patient.  Allergies reviewed: Yes  Medications reviewed: Yes    Medications: Medication refills not needed today.  Pharmacy name entered into [email or web address]:    [id number] - [place], [place] - [address] [place] [address] [address] [facility]  [id number] - [place], [place] - [address]  [id number] - [place], [place] - [address] [address] [facility] - [facility], [place] - [address] [facility]    Clinical concerns: follow up    Kelsey Rebollar            "

## 2023-06-23 NOTE — PROGRESS NOTES
Medical Assistant Note:  Billy Carye presents today for blood draw.    Patient seen by provider today: Yes: Dr Lopez.   present during visit today: Not Applicable.    Concerns: No Concerns.    Procedure:  Lab draw site: lt antecub, Needle type: butterfly, Gauge: 23.    Post Assessment:  Labs drawn without difficulty: Yes.    Discharge Plan:  Departure Mode: Ambulatory.    Face to Face Time: 10.    Judy Jarrell CMA

## 2023-06-23 NOTE — PROGRESS NOTES
[facility]    Hematology/Oncology Established Patient Note      Today's Date: 6/23/23    Reason for Consultation: Thrombocytopenia  Referring Provider: ZAHRAA Kingsley CNP      HISTORY OF PRESENT ILLNESS: Billy Carey is a 33 year old male who is referred for thrombocytopenia. 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    Historically, he has had historical normal CBC. On 11/30/22, WBC 6.0, Hb 16.5, PLT 42. Repeat CBC on 12/1/22, WBC 7.2, Hb 17.5, PLT 43. Hepatitis and HIV studies negative.     He has had eustachian tube placement without bleeding event. He has not had any other surgeries.     For DM1- he is followed by endocrinology. He has continuous blood glucose monitoring and an insulin pump.     He drinks alcohol 1-2x/week. No excessive use. No history of smoking.     Family history of VTE in his sister. He is unaware of the nuances surrounding this event. No family history of bleeding disorder or malignancy.       INTERIM HISTORY:  Patient has sought second opinion at [facility]. He has developed new rash of the hands and arms. No B-symptoms.       REVIEW OF SYSTEMS:   A 14 point ROS was reviewed with pertinent positives and negatives in the HPI.        HOME MEDICATIONS:  Current Outpatient Medications   Medication Sig Dispense Refill     atorvastatin (LIPITOR) 10 MG tablet Take 1 tablet (10 mg) by mouth daily 90 tablet 3     blood glucose (KELL CONTOUR NEXT) test strip Test 4-5 times daily 4 Box 3     Blood Glucose Monitoring Suppl (KELL CONTOUR NEXT LINK) W/DEVICE KIT 1 kit 5 times daily. Use as directed 1 kit 1     Continuous Blood Gluc Sensor (DEXCOM G6 SENSOR) MISC USE 1 SENSOR EVERY 10 DAYS       glucagon (GLUCAGON EMERGENCY) 1 MG kit Inject 1 mg into the muscle once for 1 dose (Patient not taking: Reported on 3/7/2023) 1 mg 3     insulin lispro (HUMALOG VIAL) 100 UNIT/ML vial To be used in insulin pump       insulin pen needle (BD CHRIST U/F) 32G X 4 MM  miscellaneous Use 3-6 pen needles daily or as directed. 50 each 0     INSULIN PUMP - OUTPATIENT Date Last Updated: 2/15/23  Tandem  Pump Basal Rates/Times:  3206-8039: 2 units/hour  6948-6048: 2.6 units/hour  5089-0876: 2.2 units/hour  2871-6681: 1.7 units/hour  CARB RATIO:   3684-5715: 1 unit per 10 grams carbohydrates  CF / Sensitivity Times:   4891-0800: 1 unit to decrease BG by 30 mg/dL  TARGET B mg/dL       vitamin D2 (ERGOCALCIFEROL) 58384 units (1250 mcg) capsule Take 50,000 Units by mouth once a week           ALLERGIES:  Allergies   Allergen Reactions     Penicillins Rash         PAST MEDICAL HISTORY:  Past Medical History:   Diagnosis Date     Diabetes mellitus type 1, uncontrolled, insulin dependent 2013         PAST SURGICAL HISTORY:  Past Surgical History:   Procedure Laterality Date     EXCISE MASS GROIN Left 2023    Procedure: Lymph Node excisional biopsy;  Surgeon: New Burger MD;  Location: [facility] OR     MYRINGOTOMY, INSERT TUBE BILATERAL, COMBINED Bilateral     As a child         SOCIAL HISTORY:  Social History     Socioeconomic History     Marital status:      Spouse name: Jayla     Number of children: 3     Years of education: Not on file     Highest education level: Not on file   Occupational History     Occupation:      Comment: [employer]   Tobacco Use     Smoking status: Never     Smokeless tobacco: Never   Substance and Sexual Activity     Alcohol use: No     Comment: rarely     Drug use: No     Sexual activity: Yes     Partners: Female   Other Topics Concern     Parent/sibling w/ CABG, MI or angioplasty before 65F 55M? Not Asked   Social History Narrative     Not on file     Social Determinants of Health     Financial Resource Strain: Low Risk  (2023)    Overall Financial Resource Strain (CARDIA)      Difficulty of Paying Living Expenses: Not hard at all   Food Insecurity: No Food Insecurity (2023)    Hunger Vital Sign      Worried  About Running Out of Food in the Last Year: Never true      Ran Out of Food in the Last Year: Never true   Transportation Needs: No Transportation Needs (1/12/2023)    PRAPARE - Transportation      Lack of Transportation (Medical): No      Lack of Transportation (Non-Medical): No   Physical Activity: Sufficiently Active (1/12/2023)    Exercise Vital Sign      Days of Exercise per Week: 6 days      Minutes of Exercise per Session: 40 min   Stress: No Stress Concern Present (1/12/2023)    [employer] - Occupational Stress Questionnaire      Feeling of Stress : Only a little   Social Connections: Socially Integrated (1/12/2023)    Social Connection and Isolation Panel [NHANES]      Frequency of Communication with Friends and Family: More than three times a week      Frequency of Social Gatherings with Friends and Family: More than three times a week      Attends Yazidi Services: 1 to 4 times per year      Active Member of Clubs or Organizations: Yes      Attends Club or Organization Meetings: Not on file      Marital Status:    Intimate Partner Violence: Not At Risk (3/31/2023)    Humiliation, Afraid, Rape, and Kick questionnaire      Fear of Current or Ex-Partner: No      Emotionally Abused: No      Physically Abused: No      Sexually Abused: No   Housing Stability: Low Risk  (1/12/2023)    Housing Stability Vital Sign      Unable to Pay for Housing in the Last Year: No      Number of Places Lived in the Last Year: 1      Unstable Housing in the Last Year: No         FAMILY HISTORY:  Family History   Problem Relation Age of Onset     C.A.D. No family hx of      Diabetes No family hx of      Hypertension No family hx of      Cancer No family hx of         no skin cancer     Amblyopia No family hx of      Retinal detachment No family hx of      Thyroid Disease No family hx of      Glaucoma No family hx of      Macular Degeneration No family hx of          PHYSICAL EXAM:  Vital  "signs:  /80   Pulse 67   Temp 98.5  F (36.9  C) (Oral)   Resp 16   Ht 1.905 m (6' 3\")   Wt 124.3 kg (274 lb)   SpO2 96%   BMI 34.25 kg/m     ECO  GENERAL/CONSTITUTIONAL: No acute distress.  LYMPH: No anterior cervical, posterior cervical, supraclavicular, axillary or inguinal adenopathy.   MUSCULOSKELETAL: Warm and well-perfused, no cyanosis, clubbing, or edema.  NEUROLOGIC: Cranial nerves II-XII are intact. Alert, oriented, answers questions appropriately.  INTEGUMENTARY: No rashes or jaundice.  GAIT: Steady, does not use assistive device.      LABS:   Latest Reference Range & Units 23 15:33   Sodium 136 - 145 mmol/L 137   Potassium 3.4 - 5.3 mmol/L 4.2   Chloride 98 - 107 mmol/L 102   Carbon Dioxide (CO2) 22 - 29 mmol/L 24   Urea Nitrogen 6.0 - 20.0 mg/dL 15.5   Creatinine 0.67 - 1.17 mg/dL 1.20 (H)   GFR Estimate >60 mL/min/1.73m2 81   Calcium 8.6 - 10.0 mg/dL 9.4   Anion Gap 7 - 15 mmol/L 11   Albumin 3.5 - 5.2 g/dL 4.8   Protein Total 6.4 - 8.3 g/dL 7.0   Alkaline Phosphatase 40 - 129 U/L 64   ALT 0 - 70 U/L 23   AST 0 - 45 U/L 37   Bilirubin Total <=1.2 mg/dL 1.4 (H)   Glucose 70 - 99 mg/dL 106 (H)   Lactate Dehydrogenase 0 - 250 U/L 236   WBC 4.0 - 11.0 10e3/uL 7.6   Hemoglobin 13.3 - 17.7 g/dL 17.1   Hematocrit 40.0 - 53.0 % 47.1   Platelet Count 150 - 450 10e3/uL 128 (L)   RBC Count 4.40 - 5.90 10e6/uL 5.83   MCV 78 - 100 fL 81   MCH 26.5 - 33.0 pg 29.3   MCHC 31.5 - 36.5 g/dL 36.3   RDW 10.0 - 15.0 % 11.7   % Neutrophils % 72   % Lymphocytes % 18   % Monocytes % 8   % Eosinophils % 1   % Basophils % 1   Absolute Basophils 0.0 - 0.2 10e3/uL 0.0   Absolute Eosinophils 0.0 - 0.7 10e3/uL 0.0   Absolute Immature Granulocytes <=0.4 10e3/uL 0.0   Absolute Lymphocytes 0.8 - 5.3 10e3/uL 1.4   Absolute Monocytes 0.0 - 1.3 10e3/uL 0.6   % Immature Granulocytes % 0   Absolute Neutrophils 1.6 - 8.3 10e3/uL 5.5   Absolute NRBCs 10e3/uL 0.0   NRBCs per 100 WBC <1 /100 0   INR 0.85 - 1.15  1.07 "   PTT 22 - 38 Seconds 26   Fibrinogen 170 - 490 mg/dL 254   Mononucleosis Screen Negative  Negative      Latest Reference Range & Units 12/06/22 09:40 12/14/22 09:20 01/10/23 15:17 01/26/23 14:47 02/15/23 16:02 03/07/23 09:00   Haptoglobin 32 - 197 mg/dL 37 31 (L) 22 (L) 31 (L) 118 50        Latest Reference Range & Units 01/10/23 15:17   SPECIMEN EXPIRATION DATE  20230113235900   MIRA Anti-IgG,-C3d  Positive 3+   Hep B Surface Agn Nonreactive  Nonreactive   Hepatitis B Core Margarette Nonreactive  Nonreactive   Hepatitis B Surface Antibody Instrument Value <8.00 m[IU]/mL 35.73   Hepatitis B Surface Antibody  Reactive   Hepatitis C Antibody Nonreactive  Nonreactive   HIV Antigen Antibody Combo Nonreactive  Nonreactive      Latest Reference Range & Units 01/10/23 15:17   PAVAN interpretation Negative  Positive !      01/10/23 15:17   PAVAN titer 1 1:160     Serum M-protein (g/dL):  1/10/23: 0.0    Total IgG (mg/dL), IgA (mg/dL), IgM (mg/dL):  1/10/23: 956, 135, 44    Free kappa light chains (mg/dL), lambda (mg/dL), kappa/lambda ratio:  1/10/23: 1.33, 1.45, 0.92       Latest Reference Range & Units 12/14/22 09:20   MIRA Anti-IgG,-C3d  Weak Positive   MIRA Anti-C3  Negative   MIRA Anti-IgG, Tube  Positive 1+   Blood Bank Chart Comment  BB Chart Comment            Latest Reference Range & Units 12/06/22 09:40   Iron 61 - 157 ug/dL 59 (L)   Iron Binding Capacity 240 - 430 ug/dL 319   Iron Sat Index 15 - 46 % 18   Lactate Dehydrogenase 0 - 250 U/L 240   INR 0.85 - 1.15  0.91   PTT 22 - 38 Seconds 25   Fibrinogen 170 - 490 mg/dL 282   SPECIMEN EXPIRATION DATE  20221209235900   MIRA Anti-IgG,-C3d  Positive 3+               PATHOLOGY:  Final Diagnosis 12/1/22:   Peripheral blood, morphology:  - Marked thrombocytopenia.  - Hemoglobin quantitatively within normal limits and erythrocytes without diagnostic morphologic abnormality.  - WBC subsets quantitatively within normal limits and without diagnostic morphologic abnormality.  - Negative for  schistocytes.  - Negative for overt features of myelodysplasia or circulating blasts.     Final Diagnosis 12/6/22:   Peripheral blood:  --Slight polycythemia/erythrocytosis.  --Marked thrombocytopenia.   Electronically signed by Angel Marshall MD on 12/7/2022 at 10:33 AM   Comment    The cause of this patient's thrombocytopenia is unclear from the peripheral smear. Some common causes of thrombocytopenia to consider include infections, medications, alcohol, immune mediated mechanisms, and splenic sequestration. If splenic sequestration is of clinical concern, an accurate assessment of spleen size is recommended.         Clinical Information    Thrombocytopenia   Peripheral Smear    The red blood cells appear normochromic.  Rouleaux formation does not appear increased.  Polychromasia does not appear increased.  Poikilocytosis appears mild and nonspecific.  Platelets  are well granulated.  Lymphocytes are predominantly small with cytologically mature chromatin and appear overall polymorphous.  Neutrophils contain normal cytoplasmic granulation and unremarkable nuclear morphology.  There is no dysplasia and no circulating blasts are seen.     Final Diagnosis 12/14/22:   Bone marrow, left posterior iliac crest, decalcified trephine biopsy and aspiration:  -- Normocellular bone marrow for age (60 to 70%) with maturing trilineage hematopoiesis.  -- No evidence of marrow involvement by lymphoma or other hematopoietic neoplasm.     Peripheral blood showing:  -- Moderate thrombocytopenia.     Case Report   Flow Cytometry Report                             Case: [id number]                                   Authorizing Provider:  Hayde Lopez DO         Collected:           12/14/2022 10:06 AM           Ordering Location:     [facility]   Received:            12/14/2022 10:21 AM                                  [facility]                                                             Pathologist:            Marleny Holden MD                                                      Specimen:    Iliac Crest, Bone Marrow Aspirate, Left                                                    Flow Interpretation   A. Iliac Crest, Bone Marrow Aspirate, Left:    - Polytypic B cells  - No aberrant immunophenotype on T cells  - No increase in myeloid blasts and no abnormal myeloid blast population     Case Report   Surgical Pathology Report                         Case: [id number]                                   Authorizing Provider:  Hayde Lopez DO         Collected:           12/15/2022 11:27 AM           Ordering Location:     [facility]   Received:            12/15/2022 11:54 AM                                  Imaging                                                                       Pathologist:           Mirna Roger                                                                Specimen:    Retroperitoneal                                                                            Final Diagnosis   A.  Retroperitoneum, biopsy:  -Three tissue fragments, entirely submitted for flow cytometry evaluation (gross examination only).       Case Report   Flow Cytometry Report                             Case: [id number]                                   Authorizing Provider:  Hayde Lopez DO         Collected:           12/15/2022 01:16 PM           Ordering Location:     [facility]   Received:            12/15/2022 01:17 PM                                  Imaging                                                                       Pathologist:           Marleny Holden MD                                                      Specimen:    Retroperitoneal                                                                            Flow Interpretation   A. Retroperitoneal :    - Polytypic B cells  - No aberrant immunophenotype on T cells         Case Report   Surgical Pathology Report                          Case: [id number]                                   Authorizing Provider:  Hayde Lopez DO         Collected:           01/04/2023 02:25 PM           Ordering Location:     [facility]   Received:            01/04/2023 02:48 PM                                  [facility]                                                                 Pathologist:           Saadia Allen MD                                                                            Specimen:    Lymph Node(s), Axillary, Right                                                             Final Diagnosis   Lymph node, right axillary, core biopsies:  -Partially sampled lymph node showing no evidence of Hodgkin's or non-Hodgkin's lymphoma  -Completed immunophenotyping studies show polytypic B cells and no aberrant T-cell antigen expression     Case Report   Flow Cytometry Report                             Case: [id number]                                   Authorizing Provider:  Hayde Lopez DO         Collected:           01/04/2023 02:25 PM           Ordering Location:     [facility]   Received:            01/04/2023 02:53 PM                                  [facility]                                                                 Pathologist:           Xiomara Ramsey MD                                                         Specimen:    Lymph Node(s), Axillary, Right                                                             Flow Interpretation   A. Lymph Node(s), Axillary, Right, :  -Polytypic B cells  No aberrant immunophenotype on T cells         Case Report   Surgical Pathology Report                         Case: [id number]                                   Authorizing Provider:  New Burger         Collected:           01/20/2023 02:16 PM                                  MD Ari                                                                   Ordering Location:     [facility]                 Received:            01/20/2023 02:29 PM           Pathologist:           Isabela Kaminski MD                                                     Specimen:    Lymph Node(s), Inguinal, Left, partial lymph node left groin                               Addendum   This addendum is issued to document that Dr. Isabela Kaminski discussed biopsy findings with Dr. Hayde Lopez on 1/26/23 at 12:30.    Addendum electronically signed by Isabela Kaimnski MD on 1/26/2023 at 12:30 PM   Final Diagnosis   A. Left groin, lymph node, partial lymphadenectomy:  -Benign lymph node with follicular and interfollicular hyperplasia  -Rare small noncaseating granulomas  -No morphologic evidence of malignancy  -Negative for select microorganisms on stained tissue  -See comment   Electronically signed by Isabela Kaminski MD on 1/26/2023 at 12:16 PM   Comment  [facility]     There is no definitive morphologic or immunophenotypic evidence for malignancy in this biopsy. Instead, the findings (which include follicular and interfollicular hyperplasia and features of progressive transformation of germinal centers) are favored to be reactive.     Evaluation for select microorganisms was performed, with negative results for EBV (by in situ hybridization), HHV8 and toxoplasma (by immunohistochemistry), and acid fast and fungal organisms (by special stains).    Concurrent flow cytometry ([id number]) demonstrated polytypic B cells and no aberrant immunophenotype on T cells.         Case Report   Flow Cytometry Report                             Case: [id number]                                   Authorizing Provider:  New Burger         Collected:           01/20/2023 02:33 PM                                  MD Ari                                                                  Ordering Location:     [facility] OR                  Received:            01/20/2023 02:33 PM           Pathologist:           Marleny Holden MD                                                      Specimen:    Lymph Node(s), Inguinal, Left                                                              Flow Interpretation   A. Lymph Node(s), Inguinal, Left:     - Polytypic B cells  - No aberrant immunophenotype on T cells           IMAGING:  CT CAP 12/6/22:  IMPRESSION:  1.  Several enlarged lymph nodes identified at the right axilla,  retroperitoneum, left pelvis and borderline enlarged at the left  inguinal locations. While nonspecific, a neoplastic etiology is  possible including lymphoma.  2.  Mild splenomegaly.  3.  Lobulated indeterminate nodular masses at the left pericardial  fat.  4.  Indeterminate multiple bilateral pulmonary nodules. The dominant  solid nodule is at the right lower lobe measuring 1.1 cm. Neoplasm  remains in the differential and surveillance imaging and/or further  workup is recommended. See below for follow up imaging guidelines.    PET 12/9/22:  IMPRESSION:     Findings suspicious for active neoplasm such as lymphoma involving lymph nodes above/below the diaphragm. The right axillary for left inguinal lymph nodes are amenable to ultrasound-guided histologic sampling if desired.        ASSESSMENT/PLAN:  Billy Carey is a 32 year old male who is referred for thrombocytopenia. 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    1) Thrombocytopenia, warm autoimmune hemolysis  -This is a new diagnosis for patient and an incidental finding when he was in for routine physical examination. He has not had any bleeding episodes.   -No new meds, recent infection, or recent surgeries.  -TSH normal at 3.85.   -HepB/C and HIV studies historically negative.   -No splenomegaly on physical examination.  -I doubt hemolysis as retic is normal as well as LFTs.  -We discussed the possibility of lab artifact, but there is no comment on platelet  clumping on smear. Also, he had lab work repeated two days in a row with PLT count returning in the 40s.   -Given his history of autoimmune disease, he may have underlying ITP, which is a diagnosis of exclusion. We had discussed possible steroid therapy if platelets are to return <30K.   -Repeat CBC is showing PLTs still in the 40's. Hb is 17.9, WBC normal.   -Also is consideration for a microangiopathic hemolytic anemia. MIRA has come back positive at 3+ (which is inconsistent with MAHA) and patient does not have elevated LFTs, abnormal coag studies, nor reticulocytosis. LDH is also normal at 240. He is afebrile. [facility] blood bank testing has returned +IgG and negative for complement, consistent with warm AIHA.   -CT CAP had returned with 2 cm LN of the retroperitoneum, inguinal, and axillary. There was also note of an abnormal pericardial fat mass. I sent for a PET, that did not show FDG avidity of the pericardial fat mass. The retroperitoneal LNs had SUV of 12-13 with the remainder LN 6-7. I sent patient for a core biopsy of the retroperitoneal LN with IR that returned in fragments with unremarkable FLOW. Bone marrow biopsy was normocellular with normal megakaryocyte morphology. I then pursued a right axillary LN biopsy, which again returned negative with unremarkable FLOW. I sent for excisional biopsy of an inguinal LN with general surgery with pathology once again returning as benign with folliculary and interfollicular hyperplasia. Congo red staining is negative. EBV, HHV8 negative. I reviewed with pathology for any morphologic evidence consistent with Castleman's disease and spoke with Dr. Kaminski who is unable to confirm this at this time. She did have an interdepartmental pathology review. I do note the sinuses contain abundant histiocytes in the microscopic description and in my differential diagnosis is also Rosai-Dorfamn or Langerhan's disease.  I spoke with Dr. [facility] who is in agreement with  workup above. I reviewed with Dr. 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 I reviewed I would then move onto rituximab next if patient is not to have improvement with steroid therapy. Dr. [facility] is in agreement with this plan.   -I reviewed the above with patient and wife, Jayla. I will need to continue to monitor the patient not only for treatment response for the hemolysis, but for evolution of LAD. My plan is to recheck with PET in 6 months time and will continue to monitor for treatment response discussed above.   -After our last visit, PLTs returned at 18K. Patient was hospitalized and treated with decadron 40 mg x4 and IVIG. His platelets have remained >100K since then. He has met with Dr. [facility] as well who is in agreement with next line rituximab. Patient would like to continue to think on this. We discussed the risk of bronchospasm and hypersensitivity.  -Patient has sought second opinion at [facility] (Dr. Ayon) who is in agreement with workup and plan for rituximab in the future.     Weekly rituximab x4 weeks, then possible maintenance:  -Rituximab 375 mg/m2 IV    -Platelets have been stable >100K. Patient would like to continue to monitor for now.   -PET is showing 13 x 6 mm right level IIb LN with SUV 6.9, 6 mm left supraclavicular LN with SUV 4.4, decreased size and FDG avidity of a 12 mm right axillary LN. He has waxing/waning RP, bilateral iliac chain, and left inguinal LAD with both increased/decreased SUV. He has increase in splenomegaly to 17.9 x 9.6 x 5.6 cm with mild FDG avidity, SUV 5. There are no clear lesions of the spleen for biopsy and will hold on random biopsy due to risk of bleed. He has developed new rash. This was a previous symptom prior to thrombocytopenia. Currently, CBC has returned with PLT 128K. This, in conjunction with elevated Cr, elevated  GABRIEL Tsai. This will be a pattern to monitor in the future for relapse.   -As PLTs are 128K, will schedule for weekly CBC. If platelets continue to downtrend, will plan on initiation of rituximab 375 mg/m2 once weekly x4 (with possible maintenance). We reviewed the mechanism of action of rituximab. There is risk of bronchospasm and hypersensitivity with treatment. He has consented to treatment in the eventuality he will require initiation. We discussed possible splenectomy, but this would be further down the line for refractory disease. Repeat EBV, SPIEP testing pending. I spoke with Dr. [facility] today who is in agreement.     2) Lymphadenopathy  -See above.  -Path [facility] at [facility] Center and returning as benign reactive folllicular hyperplasia. No evidence of malignancy.     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  -Patient without arthralgias, skin changes.  -APLA, ds-DNA labs returned negative.  -Rheumatology referral upcoming.     4) History of infectious mononucleosis  -EBV negative on LN.  -EBV PCR negative.    5) DM1  -Patient managed by endocrinology.    6) History of HTN, HLD    7) Rash  -Derm referral for biopsy. Discussed with patient and spouse still need to monitor for the possibility of cutaneous lymphomas.     8) -Repeat weekly CBC.   -Follow up with DAPHNE in 2 weeks.   -May need to start rituximab if platelets continue to downtrend.   -Repeat PET/CT in December 2023 (ordered).      Hayde Lopez DO  Hematology/Oncology  [facility]    Total time spent on day of visit, including review of tests, obtaining/reviewing separately obtained history, ordering medications/tests/procedures, communicating with PCP/consultants, and documenting in electronic medical record: 50 minutes. Adequate time was dedicated to patient questions and answered to the expressed satisfaction of the patient and family members.

## 2023-06-26 ENCOUNTER — TELEPHONE (OUTPATIENT)
Dept: DERMATOLOGY | Facility: CLINIC | Age: 33
End: 2023-06-26
Payer: COMMERCIAL

## 2023-06-26 LAB
ALBUMIN SERPL ELPH-MCNC: 4.7 G/DL (ref 3.7–5.1)
ALPHA1 GLOB SERPL ELPH-MCNC: 0.3 G/DL (ref 0.2–0.4)
ALPHA2 GLOB SERPL ELPH-MCNC: 0.5 G/DL (ref 0.5–0.9)
B-GLOBULIN SERPL ELPH-MCNC: 0.6 G/DL (ref 0.6–1)
EBV VCA IGG SER IA-ACNC: 382 U/ML
EBV VCA IGG SER IA-ACNC: POSITIVE
EBV VCA IGM SER IA-ACNC: <10 U/ML
EBV VCA IGM SER IA-ACNC: NORMAL
GAMMA GLOB SERPL ELPH-MCNC: 0.7 G/DL (ref 0.7–1.6)
HAPTOGLOB SERPL-MCNC: 4 MG/DL (ref 32–197)
IGA SERPL-MCNC: 109 MG/DL (ref 84–499)
IGG SERPL-MCNC: 856 MG/DL (ref 610–1616)
IGM SERPL-MCNC: 44 MG/DL (ref 35–242)
KAPPA LC FREE SER-MCNC: 1.16 MG/DL (ref 0.33–1.94)
KAPPA LC FREE/LAMBDA FREE SER NEPH: 0.84 {RATIO} (ref 0.26–1.65)
LAMBDA LC FREE SERPL-MCNC: 1.38 MG/DL (ref 0.57–2.63)
M PROTEIN SERPL ELPH-MCNC: 0 G/DL
PROT PATTERN SERPL ELPH-IMP: NORMAL
PROT PATTERN SERPL IFE-IMP: NORMAL

## 2023-06-26 NOTE — TELEPHONE ENCOUNTER
Called and spoke with pt and scheduled appt.    Thank you,  Brandi GRIGSBY RN  Dermatology   [phone]

## 2023-06-26 NOTE — TELEPHONE ENCOUNTER
This encounter is being sent to inform the clinic that this patient has a referral from Hayde Lopez DO in  [facility] for the diagnoses of Rash; Rash - New; Biopsy of skin rash and has requested that this patient be seen within Urgent: 3-5 Days and/or with Urgent: 3-5 Days.  Based on the availability of our provider(s), we are unable to accommodate this request.      Were all sites offered this patient?  Yes  1st choice - [facility] in [place]  2nd choice - [facility] in [place]  3rd choice - [facility] in [place]    Does scheduling algorithm request to schedule next available?  Patient has been scheduled for the first available opening with Channing Espino MD at [facility] in [place] on 09/28/2023.  We have informed the patient that the clinic will review their referral and reach out if a sooner appointment is medically necessary.        Referral Order in Epic  Records in Epic  No outside Records      Pt can come any day and any time    Please review and call Pt if you can move up his Appointment    Thank you!!

## 2023-07-06 NOTE — PATIENT INSTRUCTIONS
Billy is scheduled for weekly labs and a follow up with SAYRA Ivey on 07/14/23.     Armida Gibbons RN on 7/6/2023 at 10:43 AM

## 2023-07-07 ENCOUNTER — LAB (OUTPATIENT)
Dept: ONCOLOGY | Facility: CLINIC | Age: 33
End: 2023-07-07
Attending: INTERNAL MEDICINE
Payer: COMMERCIAL

## 2023-07-07 DIAGNOSIS — D69.6 THROMBOCYTOPENIA (H): ICD-10-CM

## 2023-07-07 LAB
BASOPHILS # BLD AUTO: 0.1 10E3/UL (ref 0–0.2)
BASOPHILS NFR BLD AUTO: 1 %
EOSINOPHIL # BLD AUTO: 0.1 10E3/UL (ref 0–0.7)
EOSINOPHIL NFR BLD AUTO: 2 %
ERYTHROCYTE [DISTWIDTH] IN BLOOD BY AUTOMATED COUNT: 11.9 % (ref 10–15)
HCT VFR BLD AUTO: 47.2 % (ref 40–53)
HGB BLD-MCNC: 17.1 G/DL (ref 13.3–17.7)
IMM GRANULOCYTES # BLD: 0 10E3/UL
IMM GRANULOCYTES NFR BLD: 0 %
LYMPHOCYTES # BLD AUTO: 2 10E3/UL (ref 0.8–5.3)
LYMPHOCYTES NFR BLD AUTO: 28 %
MCH RBC QN AUTO: 29.1 PG (ref 26.5–33)
MCHC RBC AUTO-ENTMCNC: 36.2 G/DL (ref 31.5–36.5)
MCV RBC AUTO: 80 FL (ref 78–100)
MONOCYTES # BLD AUTO: 0.8 10E3/UL (ref 0–1.3)
MONOCYTES NFR BLD AUTO: 11 %
NEUTROPHILS # BLD AUTO: 4 10E3/UL (ref 1.6–8.3)
NEUTROPHILS NFR BLD AUTO: 58 %
NRBC # BLD AUTO: 0 10E3/UL
NRBC BLD AUTO-RTO: 0 /100
PLATELET # BLD AUTO: 144 10E3/UL (ref 150–450)
RBC # BLD AUTO: 5.88 10E6/UL (ref 4.4–5.9)
WBC # BLD AUTO: 7 10E3/UL (ref 4–11)

## 2023-07-07 PROCEDURE — 36415 COLL VENOUS BLD VENIPUNCTURE: CPT

## 2023-07-07 PROCEDURE — 85025 COMPLETE CBC W/AUTO DIFF WBC: CPT | Performed by: PHYSICIAN ASSISTANT

## 2023-07-07 NOTE — PROGRESS NOTES
Medical Assistant Note:  Billy Carey presents today for blood draw.    Patient seen by provider today: No.   present during visit today: Not Applicable.    Concerns: No Concerns.    Procedure:  Lab draw site: lt antecub, Needle type: butterfly, Gauge: 23.    Post Assessment:  Labs drawn without difficulty: Yes.    Discharge Plan:  Departure Mode: Ambulatory.    Face to Face Time: 10.    Judy Jarrell [facility]

## 2023-07-09 ENCOUNTER — HEALTH MAINTENANCE LETTER (OUTPATIENT)
Age: 33
End: 2023-07-09

## 2023-07-13 NOTE — PROGRESS NOTES
Oncology/Hematology Visit Note    Jul 14, 2023    Reason for visit: Follow-up thrombocytopenia    Oncology HPI: Billy Carey is a 33 year old male with PMH of DM1 with thrombocytopenia.  He was followed closely by endocrinology for his DM1 and was in to see his PCP for routine follow-up, CBC on 11/30/2022 revealed thrombocytopenia with platelets of 42K.  Work-up was negative for schistocytes and bone marrow biopsy with normocellular bone marrow.  MIRA came back positive at 3+, but normal LFTs, normal coag studies and normal reticulocyte count.  CT CAP revealed 2 cm lymph node of the retroperitoneum, inguinal and axillary.  Core biopsy of the retroperitoneal lymph node unremarkable, axillary lymph node biopsy and inguinal lymph node biopsy all unremarkable.  Despite positive MIRA, hemoglobin and hemolysis markers remained preserved.  He was on surveillance.    He was admitted to [facility] on 2/15/2023 for platelets of 18K and was started on dexamethasone.  There was discussion of discharge with prednisone taper, however patient was hesitant to continue steroid taper and continued on surveillance.    He is here today for close hospital follow-up and repeat CBC and possible rituximab.    Interval History: Billy is unaccompanied today and doing well.  He is monitoring his labs closely.  No bleeding, fever, chills or other new concerns.    Review of Systems: See interval hx. Denies fevers, chills, abdominal pain, N/V, diarrhea, changes in urination, bleeding, bruising, rash.     PMHx and Social Hx reviewed per EPIC.      Medications:  Current Outpatient Medications   Medication Sig Dispense Refill     atorvastatin (LIPITOR) 10 MG tablet Take 1 tablet (10 mg) by mouth daily 90 tablet 3     blood glucose (KELL CONTOUR NEXT) test strip Test 4-5 times daily 4 Box 3     Blood Glucose Monitoring Suppl (KELL CONTOUR NEXT LINK) W/DEVICE KIT 1 kit 5 times daily. Use as directed 1 kit 1     Continuous Blood Gluc Sensor  (DEXCOM G6 SENSOR) MISC USE 1 SENSOR EVERY 10 DAYS       insulin lispro (HUMALOG VIAL) 100 UNIT/ML vial To be used in insulin pump       insulin pen needle (BD CHRIST U/F) 32G X 4 MM miscellaneous Use 3-6 pen needles daily or as directed. 50 each 0     INSULIN PUMP - OUTPATIENT Date Last Updated: 2/15/23  Tandem  Pump Basal Rates/Times:  2032-5837: 2 units/hour  1238-9278: 2.6 units/hour  5903-5590: 2.2 units/hour  6945-6682: 1.7 units/hour  CARB RATIO:   1749-7033: 1 unit per 10 grams carbohydrates  CF / Sensitivity Times:   0818-5615: 1 unit to decrease BG by 30 mg/dL  TARGET B mg/dL       triamcinolone (KENALOG) 0.5 % external ointment Apply a thin layer to affected areas twice daily as needed. 15 g 0     vitamin D2 (ERGOCALCIFEROL) 15646 units (1250 mcg) capsule Take 50,000 Units by mouth once a week       glucagon (GLUCAGON EMERGENCY) 1 MG kit Inject 1 mg into the muscle once for 1 dose (Patient not taking: Reported on 3/7/2023) 1 mg 3       Allergies   Allergen Reactions     Penicillins Rash         EXAM:    /80   Pulse 62   Temp 98.2  F (36.8  C) (Tympanic)   Resp 16   Wt 122 kg (268 lb 14.4 oz)   SpO2 98%   BMI 33.61 kg/m      GENERAL:  Male, in no acute distress.  Alert and oriented x3. Well groomed.   HEENT:  Normocephalic, atraumatic. No conjunctival injection or eye swelling.   LUNGS:  Nonlabored breathing, no cough or audible wheezing, able to speak full sentences.  MSK: Full ROM UE.    SKIN: No rash on exposed skin.   NEURO: CN grossly intact, speech normal  PSYCH: Mentation appears normal, insight and judgement intact    Labs:    23 15:02   WBC 7.3   Hemoglobin 16.5   Hematocrit 46.7   Platelet Count 120 (L)   RBC Count 5.70   MCV 82   MCH 28.9   MCHC 35.3   RDW 11.9   % Neutrophils 68   % Lymphocytes 21   % Monocytes 9   % Eosinophils 1   % Basophils 1   Absolute Basophils 0.1   Absolute Eosinophils 0.1   Absolute Immature Granulocytes 0.0   Absolute Lymphocytes 1.5   Absolute  Monocytes 0.7   % Immature Granulocytes 0   Absolute Neutrophils 5.0   Absolute NRBCs 0.0   NRBCs per 100 WBC 0     Imaging: n/a    Impression/Plan: Billy Carey is a 32 year old male with thrombocytopenia, currently on surveillance.    Thrombocytopenia/warm autoimmune hemolysis: Extremely thorough work-up complete including bone marrow biopsy and lymph node biopsies.  Initially thought may be ITP, but did have positive MIRA, concerning for hemolysis.  He was on surveillance for a while, then platelets dropped to 18 K, hospitalized and treated with Decadron and IVIG.  Platelets have remained greater than 100 K and discussion is  Rituximab.  Dr. Lopez and patient have discussed this multiple times and we will continue on surveillance, but if platelets drop below 100 K, then plan for rituximab.  Today, platelets 120 K and no bleeding.  Rash a few weeks ago, improving.  He met with Dr Joy at the [facility], who agreed with the recommendations. We will continue hold off on rituximab and continue labs every 2 weeks with provider visits monthly.  He will call sooner if needed.  -CBC every 2 weeks  -MD/DAPHNE monthly, possible rituximab    Lymphadenopathy: Retroperitoneal, axillary and inguinal lymphadenopathy, biopsies benign.   -PET scheduled December 2023    Positive PAVAN: Asymptomatic.  Schedule with rheumatology August 2023.    Chart documentation with Dragon Voice recognition Software. Although reviewed after completion, some words and grammatical errors may remain.    20 minutes spent on the date of the encounter doing chart review, review of test results, interpretation of tests, patient visit and documentation     Loni White PA-C  Hematology/Oncology  [facility]

## 2023-07-14 ENCOUNTER — LAB (OUTPATIENT)
Dept: ONCOLOGY | Facility: CLINIC | Age: 33
End: 2023-07-14
Attending: INTERNAL MEDICINE
Payer: COMMERCIAL

## 2023-07-14 ENCOUNTER — ONCOLOGY VISIT (OUTPATIENT)
Dept: ONCOLOGY | Facility: CLINIC | Age: 33
End: 2023-07-14
Attending: PHYSICIAN ASSISTANT
Payer: COMMERCIAL

## 2023-07-14 VITALS
WEIGHT: 268.9 LBS | TEMPERATURE: 98.2 F | BODY MASS INDEX: 33.61 KG/M2 | RESPIRATION RATE: 16 BRPM | DIASTOLIC BLOOD PRESSURE: 80 MMHG | SYSTOLIC BLOOD PRESSURE: 133 MMHG | HEART RATE: 62 BPM | OXYGEN SATURATION: 98 %

## 2023-07-14 DIAGNOSIS — D69.6 THROMBOCYTOPENIA (H): ICD-10-CM

## 2023-07-14 DIAGNOSIS — D69.3 AUTOIMMUNE THROMBOCYTOPENIA (H): Primary | ICD-10-CM

## 2023-07-14 LAB
BASOPHILS # BLD AUTO: 0.1 10E3/UL (ref 0–0.2)
BASOPHILS NFR BLD AUTO: 1 %
EOSINOPHIL # BLD AUTO: 0.1 10E3/UL (ref 0–0.7)
EOSINOPHIL NFR BLD AUTO: 1 %
ERYTHROCYTE [DISTWIDTH] IN BLOOD BY AUTOMATED COUNT: 11.9 % (ref 10–15)
HCT VFR BLD AUTO: 46.7 % (ref 40–53)
HGB BLD-MCNC: 16.5 G/DL (ref 13.3–17.7)
IMM GRANULOCYTES # BLD: 0 10E3/UL
IMM GRANULOCYTES NFR BLD: 0 %
LYMPHOCYTES # BLD AUTO: 1.5 10E3/UL (ref 0.8–5.3)
LYMPHOCYTES NFR BLD AUTO: 21 %
MCH RBC QN AUTO: 28.9 PG (ref 26.5–33)
MCHC RBC AUTO-ENTMCNC: 35.3 G/DL (ref 31.5–36.5)
MCV RBC AUTO: 82 FL (ref 78–100)
MONOCYTES # BLD AUTO: 0.7 10E3/UL (ref 0–1.3)
MONOCYTES NFR BLD AUTO: 9 %
NEUTROPHILS # BLD AUTO: 5 10E3/UL (ref 1.6–8.3)
NEUTROPHILS NFR BLD AUTO: 68 %
NRBC # BLD AUTO: 0 10E3/UL
NRBC BLD AUTO-RTO: 0 /100
PLATELET # BLD AUTO: 120 10E3/UL (ref 150–450)
RBC # BLD AUTO: 5.7 10E6/UL (ref 4.4–5.9)
WBC # BLD AUTO: 7.3 10E3/UL (ref 4–11)

## 2023-07-14 PROCEDURE — 99213 OFFICE O/P EST LOW 20 MIN: CPT | Performed by: PHYSICIAN ASSISTANT

## 2023-07-14 PROCEDURE — G0463 HOSPITAL OUTPT CLINIC VISIT: HCPCS | Performed by: PHYSICIAN ASSISTANT

## 2023-07-14 PROCEDURE — 85025 COMPLETE CBC W/AUTO DIFF WBC: CPT | Performed by: PHYSICIAN ASSISTANT

## 2023-07-14 PROCEDURE — 36415 COLL VENOUS BLD VENIPUNCTURE: CPT

## 2023-07-14 RX ORDER — TRIAMCINOLONE ACETONIDE 5 MG/G
OINTMENT TOPICAL
Qty: 15 G | Refills: 3 | Status: SHIPPED | OUTPATIENT
Start: 2023-07-14 | End: 2024-02-16

## 2023-07-14 ASSESSMENT — PAIN SCALES - GENERAL: PAINLEVEL: NO PAIN (0)

## 2023-07-14 NOTE — PROGRESS NOTES
Infusion Nursing Note:  Billyamerico Carey presents today for   Chief Complaint   Patient presents with     Lab Only     Lab        Patient seen by provider today: No   present during visit today: Not Applicable.    Note: Labs for Loni.      Intravenous Access:  Labs drawn without difficulty.    Discharge Plan:   Patient discharged in stable condition accompanied by: self.  Departure Mode: Ambulatory.      Nancy Johnston CMA

## 2023-07-14 NOTE — NURSING NOTE
"Oncology Rooming Note    July 14, 2023 3:09 PM   Billy Carey is a 33 year old male who presents for:    Chief Complaint   Patient presents with     Oncology Clinic Visit     Initial Vitals: /80   Pulse 62   Temp 98.2  F (36.8  C) (Tympanic)   Resp 16   Wt 122 kg (268 lb 14.4 oz)   SpO2 98%   BMI 33.61 kg/m   Estimated body mass index is 33.61 kg/m  as calculated from the following:    Height as of 6/23/23: 1.905 m (6' 3\").    Weight as of this encounter: 122 kg (268 lb 14.4 oz). Body surface area is 2.54 meters squared.  No Pain (0) Comment: Data Unavailable   No LMP for male patient.  Allergies reviewed: Yes  Medications reviewed: Yes    Medications: Medication refills not needed today.  Pharmacy name entered into [facility]:    [id number] - [address] [place] [address]  [id number] - [place], [place] - [address]  [id number] - [place], [place] - [address] [facility] - [facility], [place] [address]    Clinical concerns: f/u       Shanna Quiroz CMA              "

## 2023-07-14 NOTE — LETTER
7/14/2023         RE: Billy Carey  [address] [place] [address]        Dear Colleague,    Thank you for referring your patient, Billy Carey, to the [facility]. Please see a copy of my visit note below.    Oncology/Hematology Visit Note    Jul 14, 2023    Reason for visit: Follow-up thrombocytopenia    Oncology HPI: Billy Carey is a 33 year old male with PMH of DM1 with thrombocytopenia.  He was followed closely by endocrinology for his DM1 and was in to see his PCP for routine follow-up, CBC on 11/30/2022 revealed thrombocytopenia with platelets of 42K.  Work-up was negative for schistocytes and bone marrow biopsy with normocellular bone marrow.  MIRA came back positive at 3+, but normal LFTs, normal coag studies and normal reticulocyte count.  CT CAP revealed 2 cm lymph node of the retroperitoneum, inguinal and axillary.  Core biopsy of the retroperitoneal lymph node unremarkable, axillary lymph node biopsy and inguinal lymph node biopsy all unremarkable.  Despite positive MIRA, hemoglobin and hemolysis markers remained preserved.  He was on surveillance.    He was admitted to [facility] on 2/15/2023 for platelets of 18K and was started on dexamethasone.  There was discussion of discharge with prednisone taper, however patient was hesitant to continue steroid taper and continued on surveillance.    He is here today for close hospital follow-up and repeat CBC and possible rituximab.    Interval History: Billy is unaccompanied today and doing well.  He is monitoring his labs closely.  No bleeding, fever, chills or other new concerns.    Review of Systems: See interval hx. Denies fevers, chills, abdominal pain, N/V, diarrhea, changes in urination, bleeding, bruising, rash.     PMHx and Social Hx reviewed per EPIC.      Medications:  Current Outpatient Medications   Medication Sig Dispense Refill     atorvastatin (LIPITOR) 10 MG tablet Take 1 tablet (10 mg) by  mouth daily 90 tablet 3     blood glucose (KELL CONTOUR NEXT) test strip Test 4-5 times daily 4 Box 3     Blood Glucose Monitoring Suppl (KELL CONTOUR NEXT LINK) W/DEVICE KIT 1 kit 5 times daily. Use as directed 1 kit 1     Continuous Blood Gluc Sensor (DEXCOM G6 SENSOR) MISC USE 1 SENSOR EVERY 10 DAYS       insulin lispro (HUMALOG VIAL) 100 UNIT/ML vial To be used in insulin pump       insulin pen needle (BD CHRIST U/F) 32G X 4 MM miscellaneous Use 3-6 pen needles daily or as directed. 50 each 0     INSULIN PUMP - OUTPATIENT Date Last Updated: 2/15/23  Tandem  Pump Basal Rates/Times:  3320-3209: 2 units/hour  9673-2286: 2.6 units/hour  0058-4411: 2.2 units/hour  1612-0569: 1.7 units/hour  CARB RATIO:   5877-6949: 1 unit per 10 grams carbohydrates  CF / Sensitivity Times:   9947-3957: 1 unit to decrease BG by 30 mg/dL  TARGET B mg/dL       triamcinolone (KENALOG) 0.5 % external ointment Apply a thin layer to affected areas twice daily as needed. 15 g 0     vitamin D2 (ERGOCALCIFEROL) 80242 units (1250 mcg) capsule Take 50,000 Units by mouth once a week       glucagon (GLUCAGON EMERGENCY) 1 MG kit Inject 1 mg into the muscle once for 1 dose (Patient not taking: Reported on 3/7/2023) 1 mg 3       Allergies   Allergen Reactions     Penicillins Rash         EXAM:    /80   Pulse 62   Temp 98.2  F (36.8  C) (Tympanic)   Resp 16   Wt 122 kg (268 lb 14.4 oz)   SpO2 98%   BMI 33.61 kg/m      GENERAL:  Male, in no acute distress.  Alert and oriented x3. Well groomed.   HEENT:  Normocephalic, atraumatic. No conjunctival injection or eye swelling.   LUNGS:  Nonlabored breathing, no cough or audible wheezing, able to speak full sentences.  MSK: Full ROM UE.    SKIN: No rash on exposed skin.   NEURO: CN grossly intact, speech normal  PSYCH: Mentation appears normal, insight and judgement intact    Labs:    23 15:02   WBC 7.3   Hemoglobin 16.5   Hematocrit 46.7   Platelet Count 120 (L)   RBC Count 5.70    MCV 82   MCH 28.9   MCHC 35.3   RDW 11.9   % Neutrophils 68   % Lymphocytes 21   % Monocytes 9   % Eosinophils 1   % Basophils 1   Absolute Basophils 0.1   Absolute Eosinophils 0.1   Absolute Immature Granulocytes 0.0   Absolute Lymphocytes 1.5   Absolute Monocytes 0.7   % Immature Granulocytes 0   Absolute Neutrophils 5.0   Absolute NRBCs 0.0   NRBCs per 100 WBC 0     Imaging: n/a    Impression/Plan: Billy Carey is a 32 year old male with thrombocytopenia, currently on surveillance.    Thrombocytopenia/warm autoimmune hemolysis: Extremely thorough work-up complete including bone marrow biopsy and lymph node biopsies.  Initially thought may be ITP, but did have positive MIRA, concerning for hemolysis.  He was on surveillance for a while, then platelets dropped to 18 K, hospitalized and treated with Decadron and IVIG.  Platelets have remained greater than 100 K and discussion is  Rituximab.  Dr. Lopez and patient have discussed this multiple times and we will continue on surveillance, but if platelets drop below 100 K, then plan for rituximab.  Today, platelets 120 K and no bleeding.  Rash a few weeks ago, improving.  He met with Dr Joy at the [facility], who agreed with the recommendations. We will continue hold off on rituximab and continue labs every 2 weeks with provider visits monthly.  He will call sooner if needed.  -CBC every 2 weeks  -MD/DAPHNE monthly, possible rituximab    Lymphadenopathy: Retroperitoneal, axillary and inguinal lymphadenopathy, biopsies benign.   -PET scheduled December 2023    Positive PAVAN: Asymptomatic.  Schedule with rheumatology August 2023.    Chart documentation with Dragon Voice recognition Software. Although reviewed after completion, some words and grammatical errors may remain.    20 minutes spent on the date of the encounter doing chart review, review of test results, interpretation of tests, patient visit and documentation     Loni White  ADAM  Hematology/Oncology  [facility]                  Again, thank you for allowing me to participate in the care of your patient.        Sincerely,        Loni White PA-C

## 2023-07-26 ENCOUNTER — LAB (OUTPATIENT)
Dept: LAB | Facility: CLINIC | Age: 33
End: 2023-07-26
Payer: COMMERCIAL

## 2023-07-26 DIAGNOSIS — E10.3293 MILD NONPROLIFERATIVE DIABETIC RETINOPATHY OF BOTH EYES WITHOUT MACULAR EDEMA ASSOCIATED WITH TYPE 1 DIABETES MELLITUS (H): ICD-10-CM

## 2023-07-26 DIAGNOSIS — D69.6 THROMBOCYTOPENIA (H): ICD-10-CM

## 2023-07-26 LAB
BASOPHILS # BLD AUTO: 0.1 10E3/UL (ref 0–0.2)
BASOPHILS NFR BLD AUTO: 1 %
EOSINOPHIL # BLD AUTO: 0.1 10E3/UL (ref 0–0.7)
EOSINOPHIL NFR BLD AUTO: 1 %
ERYTHROCYTE [DISTWIDTH] IN BLOOD BY AUTOMATED COUNT: 11.9 % (ref 10–15)
HBA1C MFR BLD: 6.6 %
HCT VFR BLD AUTO: 46.5 % (ref 40–53)
HGB BLD-MCNC: 16.5 G/DL (ref 13.3–17.7)
IMM GRANULOCYTES # BLD: 0 10E3/UL
IMM GRANULOCYTES NFR BLD: 0 %
LYMPHOCYTES # BLD AUTO: 1.8 10E3/UL (ref 0.8–5.3)
LYMPHOCYTES NFR BLD AUTO: 24 %
MCH RBC QN AUTO: 28.7 PG (ref 26.5–33)
MCHC RBC AUTO-ENTMCNC: 35.5 G/DL (ref 31.5–36.5)
MCV RBC AUTO: 81 FL (ref 78–100)
MONOCYTES # BLD AUTO: 0.7 10E3/UL (ref 0–1.3)
MONOCYTES NFR BLD AUTO: 9 %
NEUTROPHILS # BLD AUTO: 5 10E3/UL (ref 1.6–8.3)
NEUTROPHILS NFR BLD AUTO: 65 %
NRBC # BLD AUTO: 0 10E3/UL
NRBC BLD AUTO-RTO: 0 /100
PLATELET # BLD AUTO: 145 10E3/UL (ref 150–450)
RBC # BLD AUTO: 5.74 10E6/UL (ref 4.4–5.9)
WBC # BLD AUTO: 7.6 10E3/UL (ref 4–11)

## 2023-07-26 PROCEDURE — 36415 COLL VENOUS BLD VENIPUNCTURE: CPT

## 2023-07-26 PROCEDURE — 85025 COMPLETE CBC W/AUTO DIFF WBC: CPT

## 2023-07-26 PROCEDURE — 83036 HEMOGLOBIN GLYCOSYLATED A1C: CPT

## 2023-08-14 DIAGNOSIS — D69.6 THROMBOCYTOPENIA (H): Primary | ICD-10-CM

## 2023-08-15 ENCOUNTER — LAB (OUTPATIENT)
Dept: LAB | Facility: CLINIC | Age: 33
End: 2023-08-15
Payer: COMMERCIAL

## 2023-08-15 DIAGNOSIS — D69.6 THROMBOCYTOPENIA (H): ICD-10-CM

## 2023-08-15 LAB
BASOPHILS # BLD AUTO: 0.1 10E3/UL (ref 0–0.2)
BASOPHILS NFR BLD AUTO: 1 %
EOSINOPHIL # BLD AUTO: 0.1 10E3/UL (ref 0–0.7)
EOSINOPHIL NFR BLD AUTO: 2 %
ERYTHROCYTE [DISTWIDTH] IN BLOOD BY AUTOMATED COUNT: 12.2 % (ref 10–15)
HCT VFR BLD AUTO: 46.8 % (ref 40–53)
HGB BLD-MCNC: 16.6 G/DL (ref 13.3–17.7)
IMM GRANULOCYTES # BLD: 0 10E3/UL
IMM GRANULOCYTES NFR BLD: 0 %
LYMPHOCYTES # BLD AUTO: 1.7 10E3/UL (ref 0.8–5.3)
LYMPHOCYTES NFR BLD AUTO: 31 %
MCH RBC QN AUTO: 29 PG (ref 26.5–33)
MCHC RBC AUTO-ENTMCNC: 35.5 G/DL (ref 31.5–36.5)
MCV RBC AUTO: 82 FL (ref 78–100)
MONOCYTES # BLD AUTO: 0.8 10E3/UL (ref 0–1.3)
MONOCYTES NFR BLD AUTO: 13 %
NEUTROPHILS # BLD AUTO: 2.9 10E3/UL (ref 1.6–8.3)
NEUTROPHILS NFR BLD AUTO: 53 %
NRBC # BLD AUTO: 0 10E3/UL
NRBC BLD AUTO-RTO: 0 /100
PLATELET # BLD AUTO: 124 10E3/UL (ref 150–450)
RBC # BLD AUTO: 5.72 10E6/UL (ref 4.4–5.9)
WBC # BLD AUTO: 5.6 10E3/UL (ref 4–11)

## 2023-08-15 PROCEDURE — 85025 COMPLETE CBC W/AUTO DIFF WBC: CPT

## 2023-08-15 PROCEDURE — 36415 COLL VENOUS BLD VENIPUNCTURE: CPT

## 2023-08-18 ENCOUNTER — ONCOLOGY VISIT (OUTPATIENT)
Dept: ONCOLOGY | Facility: CLINIC | Age: 33
End: 2023-08-18
Attending: INTERNAL MEDICINE
Payer: COMMERCIAL

## 2023-08-18 VITALS
DIASTOLIC BLOOD PRESSURE: 84 MMHG | OXYGEN SATURATION: 100 % | WEIGHT: 261 LBS | SYSTOLIC BLOOD PRESSURE: 136 MMHG | BODY MASS INDEX: 32.62 KG/M2 | RESPIRATION RATE: 16 BRPM | HEART RATE: 54 BPM | TEMPERATURE: 98 F

## 2023-08-18 DIAGNOSIS — D69.3 AUTOIMMUNE THROMBOCYTOPENIA (H): Primary | ICD-10-CM

## 2023-08-18 PROCEDURE — G0463 HOSPITAL OUTPT CLINIC VISIT: HCPCS | Performed by: INTERNAL MEDICINE

## 2023-08-18 PROCEDURE — 99214 OFFICE O/P EST MOD 30 MIN: CPT | Performed by: INTERNAL MEDICINE

## 2023-08-18 ASSESSMENT — PAIN SCALES - GENERAL: PAINLEVEL: NO PAIN (0)

## 2023-08-18 NOTE — PROGRESS NOTES
[facility]    Hematology/Oncology Established Patient Note      Today's Date: 8/18/23    Reason for Consultation: Thrombocytopenia  Referring Provider: ZAHRAA Kingsley CNP      HISTORY OF PRESENT ILLNESS: Billy Carey is a 33 year old male who is referred for thrombocytopenia. 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    Historically, he has had historical normal CBC. On 11/30/22, WBC 6.0, Hb 16.5, PLT 42. Repeat CBC on 12/1/22, WBC 7.2, Hb 17.5, PLT 43. Hepatitis and HIV studies negative.     He has had eustachian tube placement without bleeding event. He has not had any other surgeries.     For DM1- he is followed by endocrinology. He has continuous blood glucose monitoring and an insulin pump.     He drinks alcohol 1-2x/week. No excessive use. No history of smoking.     Family history of VTE in his sister. He is unaware of the nuances surrounding this event. No family history of bleeding disorder or malignancy.       INTERIM HISTORY:  Patient has sought second opinion at [facility].     Since our last visit, platelets have ranged 120K-145K. Rash has improved and patient has not yet met with dermatology. He will meet with rheumatology on 8/23/23. He feels fine and is looking forward to a trip to [facility] [place] [place] this weekend.       REVIEW OF SYSTEMS:   A 14 point ROS was reviewed with pertinent positives and negatives in the HPI.        HOME MEDICATIONS:  Current Outpatient Medications   Medication Sig Dispense Refill    atorvastatin (LIPITOR) 10 MG tablet Take 1 tablet (10 mg) by mouth daily 90 tablet 3    blood glucose (KELL CONTOUR NEXT) test strip Test 4-5 times daily 4 Box 3    Blood Glucose Monitoring Suppl (KELL CONTOUR NEXT LINK) W/DEVICE KIT 1 kit 5 times daily. Use as directed 1 kit 1    Continuous Blood Gluc Sensor (DEXCOM G6 SENSOR) MISC USE 1 SENSOR EVERY 10 DAYS      glucagon (GLUCAGON EMERGENCY) 1 MG kit Inject 1 mg into the muscle once for 1 dose  (Patient not taking: Reported on 3/7/2023) 1 mg 3    insulin lispro (HUMALOG VIAL) 100 UNIT/ML vial To be used in insulin pump      insulin pen needle (BD CHRIST U/F) 32G X 4 MM miscellaneous Use 3-6 pen needles daily or as directed. 50 each 0    INSULIN PUMP - OUTPATIENT Date Last Updated: 2/15/23  Tandem  Pump Basal Rates/Times:  3028-7330: 2 units/hour  9779-4019: 2.6 units/hour  8872-2119: 2.2 units/hour  0082-0462: 1.7 units/hour  CARB RATIO:   8089-5858: 1 unit per 10 grams carbohydrates  CF / Sensitivity Times:   5784-9266: 1 unit to decrease BG by 30 mg/dL  TARGET B mg/dL      triamcinolone (KENALOG) 0.5 % external ointment Apply a thin layer to affected areas twice daily as needed. 15 g 3    vitamin D2 (ERGOCALCIFEROL) 51325 units (1250 mcg) capsule Take 50,000 Units by mouth once a week           ALLERGIES:  Allergies   Allergen Reactions    Penicillins Rash         PAST MEDICAL HISTORY:  Past Medical History:   Diagnosis Date    Diabetes mellitus type 1, uncontrolled, insulin dependent 2013         PAST SURGICAL HISTORY:  Past Surgical History:   Procedure Laterality Date    EXCISE MASS GROIN Left 2023    Procedure: Lymph Node excisional biopsy;  Surgeon: New Burger MD;  Location: [facility] OR    MYRINGOTOMY, INSERT TUBE BILATERAL, COMBINED Bilateral     As a child         SOCIAL HISTORY:  Social History     Socioeconomic History    Marital status:      Spouse name: Jayla    Number of children: 3    Years of education: Not on file    Highest education level: Not on file   Occupational History    Occupation:      Comment: [employer]   Tobacco Use    Smoking status: Never    Smokeless tobacco: Never   Substance and Sexual Activity    Alcohol use: No     Comment: rarely    Drug use: No    Sexual activity: Yes     Partners: Female   Other Topics Concern    Parent/sibling w/ CABG, MI or angioplasty before 65F 55M? Not Asked   Social History Narrative    Not on file      Social Determinants of Health     Financial Resource Strain: Low Risk  (1/12/2023)    Overall Financial Resource Strain (CARDIA)     Difficulty of Paying Living Expenses: Not hard at all   Food Insecurity: No Food Insecurity (1/12/2023)    Hunger Vital Sign     Worried About Running Out of Food in the Last Year: Never true     Ran Out of Food in the Last Year: Never true   Transportation Needs: No Transportation Needs (1/12/2023)    PRAPARE - Transportation     Lack of Transportation (Medical): No     Lack of Transportation (Non-Medical): No   Physical Activity: Sufficiently Active (1/12/2023)    Exercise Vital Sign     Days of Exercise per Week: 6 days     Minutes of Exercise per Session: 40 min   Stress: No Stress Concern Present (1/12/2023)    [employer] [employer] - Occupational Stress Questionnaire     Feeling of Stress : Only a little   Social Connections: Socially Integrated (1/12/2023)    Social Connection and Isolation Panel [NHANES]     Frequency of Communication with Friends and Family: More than three times a week     Frequency of Social Gatherings with Friends and Family: More than three times a week     Attends Zoroastrian Services: 1 to 4 times per year     Active Member of Clubs or Organizations: Yes     Attends Club or Organization Meetings: Not on file     Marital Status:    Intimate Partner Violence: Not At Risk (3/31/2023)    Humiliation, Afraid, Rape, and Kick questionnaire     Fear of Current or Ex-Partner: No     Emotionally Abused: No     Physically Abused: No     Sexually Abused: No   Housing Stability: Low Risk  (1/12/2023)    Housing Stability Vital Sign     Unable to Pay for Housing in the Last Year: No     Number of Places Lived in the Last Year: 1     Unstable Housing in the Last Year: No         FAMILY HISTORY:  Family History   Problem Relation Age of Onset    C.A.D. No family hx of     Diabetes No family hx of     Hypertension No family hx of     Cancer No  family hx of         no skin cancer    Amblyopia No family hx of     Retinal detachment No family hx of     Thyroid Disease No family hx of     Glaucoma No family hx of     Macular Degeneration No family hx of          PHYSICAL EXAM:  Vital signs:  /84   Pulse 54   Temp 98  F (36.7  C) (Oral)   Resp 16   Wt 118.4 kg (261 lb)   SpO2 100%   BMI 32.62 kg/m     ECO  GENERAL/CONSTITUTIONAL: No acute distress.  LYMPH: No anterior cervical, posterior cervical, supraclavicular, axillary or inguinal adenopathy.   MUSCULOSKELETAL: Warm and well-perfused, no cyanosis, clubbing, or edema.  NEUROLOGIC: Cranial nerves II-XII are intact. Alert, oriented, answers questions appropriately.  INTEGUMENTARY: No rashes or jaundice.  GAIT: Steady, does not use assistive device.      LABS:   Latest Reference Range & Units 23 15:33 23 16:01 23 15:02 23 17:57 08/15/23 12:33   WBC 4.0 - 11.0 10e3/uL 7.6 7.0 7.3 7.6 5.6   Hemoglobin 13.3 - 17.7 g/dL 17.1 17.1 16.5 16.5 16.6   Hematocrit 40.0 - 53.0 % 47.1 47.2 46.7 46.5 46.8   Platelet Count 150 - 450 10e3/uL 128 (L) 144 (L) 120 (L) 145 (L) 124 (L)   RBC Count 4.40 - 5.90 10e6/uL 5.83 5.88 5.70 5.74 5.72   MCV 78 - 100 fL 81 80 82 81 82   MCH 26.5 - 33.0 pg 29.3 29.1 28.9 28.7 29.0   MCHC 31.5 - 36.5 g/dL 36.3 36.2 35.3 35.5 35.5   RDW 10.0 - 15.0 % 11.7 11.9 11.9 11.9 12.2   % Neutrophils % 72 58 68 65 53   % Lymphocytes % 18 28 21 24 31   % Monocytes % 8 11 9 9 13   % Eosinophils % 1 2 1 1 2   % Basophils % 1 1 1 1 1   Absolute Basophils 0.0 - 0.2 10e3/uL 0.0 0.1 0.1 0.1 0.1   Absolute Eosinophils 0.0 - 0.7 10e3/uL 0.0 0.1 0.1 0.1 0.1   Absolute Immature Granulocytes <=0.4 10e3/uL 0.0 0.0 0.0 0.0 0.0   Absolute Lymphocytes 0.8 - 5.3 10e3/uL 1.4 2.0 1.5 1.8 1.7   Absolute Monocytes 0.0 - 1.3 10e3/uL 0.6 0.8 0.7 0.7 0.8   % Immature Granulocytes % 0 0 0 0 0   Absolute Neutrophils 1.6 - 8.3 10e3/uL 5.5 4.0 5.0 5.0 2.9   Absolute NRBCs 10e3/uL 0.0 0.0 0.0  0.0 0.0   NRBCs per 100 WBC <1 /100 0 0 0 0 0   (L): Data is abnormally low   Latest Reference Range & Units 12/06/22 09:40 12/14/22 09:20 01/10/23 15:17 01/26/23 14:47 02/15/23 16:02 03/07/23 09:00   Haptoglobin 32 - 197 mg/dL 37 31 (L) 22 (L) 31 (L) 118 50        Latest Reference Range & Units 01/10/23 15:17   SPECIMEN EXPIRATION DATE  [id number]   MIRA Anti-IgG,-C3d  Positive 3+   Hep B Surface Agn Nonreactive  Nonreactive   Hepatitis B Core Margarette Nonreactive  Nonreactive   Hepatitis B Surface Antibody Instrument Value <8.00 m[IU]/mL 35.73   Hepatitis B Surface Antibody  Reactive   Hepatitis C Antibody Nonreactive  Nonreactive   HIV Antigen Antibody Combo Nonreactive  Nonreactive      Latest Reference Range & Units 01/10/23 15:17   PAVAN interpretation Negative  Positive !      01/10/23 15:17   PAVAN titer 1 1:160     Serum M-protein (g/dL):  1/10/23: 0.0    Total IgG (mg/dL), IgA (mg/dL), IgM (mg/dL):  1/10/23: 956, 135, 44    Free kappa light chains (mg/dL), lambda (mg/dL), kappa/lambda ratio:  1/10/23: 1.33, 1.45, 0.92       Latest Reference Range & Units 12/14/22 09:20   MIRA Anti-IgG,-C3d  Weak Positive   MIRA Anti-C3  Negative   MIRA Anti-IgG, Tube  Positive 1+   Blood Bank Chart Comment  BB Chart Comment            Latest Reference Range & Units 12/06/22 09:40   Iron 61 - 157 ug/dL 59 (L)   Iron Binding Capacity 240 - 430 ug/dL 319   Iron Sat Index 15 - 46 % 18   Lactate Dehydrogenase 0 - 250 U/L 240   INR 0.85 - 1.15  0.91   PTT 22 - 38 Seconds 25   Fibrinogen 170 - 490 mg/dL 282   SPECIMEN EXPIRATION DATE  [id number]   MIRA Anti-IgG,-C3d  Positive 3+               PATHOLOGY:  Final Diagnosis 12/1/22:   Peripheral blood, morphology:  - Marked thrombocytopenia.  - Hemoglobin quantitatively within normal limits and erythrocytes without diagnostic morphologic abnormality.  - WBC subsets quantitatively within normal limits and without diagnostic morphologic abnormality.  - Negative for schistocytes.  - Negative  for overt features of myelodysplasia or circulating blasts.     Final Diagnosis 12/6/22:   Peripheral blood:  --Slight polycythemia/erythrocytosis.  --Marked thrombocytopenia.   Electronically signed by Angel Marshall MD on 12/7/2022 at 10:33 AM   Comment    The cause of this patient's thrombocytopenia is unclear from the peripheral smear. Some common causes of thrombocytopenia to consider include infections, medications, alcohol, immune mediated mechanisms, and splenic sequestration. If splenic sequestration is of clinical concern, an accurate assessment of spleen size is recommended.         Clinical Information    Thrombocytopenia   Peripheral Smear    The red blood cells appear normochromic.  Rouleaux formation does not appear increased.  Polychromasia does not appear increased.  Poikilocytosis appears mild and nonspecific.  Platelets  are well granulated.  Lymphocytes are predominantly small with cytologically mature chromatin and appear overall polymorphous.  Neutrophils contain normal cytoplasmic granulation and unremarkable nuclear morphology.  There is no dysplasia and no circulating blasts are seen.     Final Diagnosis 12/14/22:   Bone marrow, left posterior iliac crest, decalcified trephine biopsy and aspiration:  -- Normocellular bone marrow for age (60 to 70%) with maturing trilineage hematopoiesis.  -- No evidence of marrow involvement by lymphoma or other hematopoietic neoplasm.     Peripheral blood showing:  -- Moderate thrombocytopenia.     Case Report   Flow Cytometry Report                             Case: [id number]                                   Authorizing Provider:  Hayde Lopez DO         Collected:           12/14/2022 10:06 AM           Ordering Location:     [facility]   Received:            12/14/2022 10:21 AM                                  [facility]                                                             Pathologist:           Marleny Holden MD                                                       Specimen:    Iliac Crest, Bone Marrow Aspirate, Left                                                    Flow Interpretation   A. Iliac Crest, Bone Marrow Aspirate, Left:    - Polytypic B cells  - No aberrant immunophenotype on T cells  - No increase in myeloid blasts and no abnormal myeloid blast population     Case Report   Surgical Pathology Report                         Case: [id number]                                   Authorizing Provider:  Hayde Lopez DO         Collected:           12/15/2022 11:27 AM           Ordering Location:     [facility]   Received:            12/15/2022 11:54 AM                                  Imaging                                                                       Pathologist:           Mirna Roger                                                                Specimen:    Retroperitoneal                                                                            Final Diagnosis   A.  Retroperitoneum, biopsy:  -Three tissue fragments, entirely submitted for flow cytometry evaluation (gross examination only).       Case Report   Flow Cytometry Report                             Case: [id number]                                   Authorizing Provider:  Hayde Lopez DO         Collected:           12/15/2022 01:16 PM           Ordering Location:     [facility]   Received:            12/15/2022 01:17 PM                                  Imaging                                                                       Pathologist:           Marleny Holden MD                                                      Specimen:    Retroperitoneal                                                                            Flow Interpretation   A. Retroperitoneal :    - Polytypic B cells  - No aberrant immunophenotype on T cells         Case Report   Surgical Pathology Report                         Case:  [id number]                                   Authorizing Provider:  Hayde Lopez DO         Collected:           01/04/2023 02:25 PM           Ordering Location:     [facility]   Received:            01/04/2023 02:48 PM                                  [facility]                                                                 Pathologist:           Saadia Allen MD                                                                            Specimen:    Lymph Node(s), Axillary, Right                                                             Final Diagnosis   Lymph node, right axillary, core biopsies:  -Partially sampled lymph node showing no evidence of Hodgkin's or non-Hodgkin's lymphoma  -Completed immunophenotyping studies show polytypic B cells and no aberrant T-cell antigen expression     Case Report   Flow Cytometry Report                             Case: [id number]                                   Authorizing Provider:  Hayde Lopez DO         Collected:           01/04/2023 02:25 PM           Ordering Location:     [facility]   Received:            01/04/2023 02:53 PM                                  [facility]                                                                 Pathologist:           Xiomara Ramsey MD                                                         Specimen:    Lymph Node(s), Axillary, Right                                                             Flow Interpretation   A. Lymph Node(s), Axillary, Right, :  -Polytypic B cells  No aberrant immunophenotype on T cells         Case Report   Surgical Pathology Report                         Case: [id number]                                   Authorizing Provider:  New Burger         Collected:           01/20/2023 02:16 PM                                  MD Ari                                                                   Ordering Location:      [facility]                 Received:            01/20/2023 02:29 PM           Pathologist:           Isabela Kaminski MD                                                     Specimen:    Lymph Node(s), Inguinal, Left, partial lymph node left groin                               Addendum   This addendum is issued to document that Dr. Isabela Kaminski discussed biopsy findings with Dr. Hayde Lopez on 1/26/23 at 12:30.    Addendum electronically signed by Isabela Kaminski MD on 1/26/2023 at 12:30 PM   Final Diagnosis   A. Left groin, lymph node, partial lymphadenectomy:  -Benign lymph node with follicular and interfollicular hyperplasia  -Rare small noncaseating granulomas  -No morphologic evidence of malignancy  -Negative for select microorganisms on stained tissue  -See comment   Electronically signed by Isabela Kaminski MD on 1/26/2023 at 12:16 PM   Comment  [facility]   There is no definitive morphologic or immunophenotypic evidence for malignancy in this biopsy. Instead, the findings (which include follicular and interfollicular hyperplasia and features of progressive transformation of germinal centers) are favored to be reactive.   Evaluation for select microorganisms was performed, with negative results for EBV (by in situ hybridization), HHV8 and toxoplasma (by immunohistochemistry), and acid fast and fungal organisms (by special stains).  Concurrent flow cytometry ([id number]) demonstrated polytypic B cells and no aberrant immunophenotype on T cells.         Case Report   Flow Cytometry Report                             Case: [id number]                                   Authorizing Provider:  New Burger         Collected:           01/20/2023 02:33 PM                                  MD Ari                                                                  Ordering Location:     [facility] OR                 Received:            01/20/2023  02:33 PM           Pathologist:           Marleny Holden MD                                                      Specimen:    Lymph Node(s), Inguinal, Left                                                              Flow Interpretation   A. Lymph Node(s), Inguinal, Left:     - Polytypic B cells  - No aberrant immunophenotype on T cells           IMAGING:  CT CAP 12/6/22:  IMPRESSION:  1.  Several enlarged lymph nodes identified at the right axilla,  retroperitoneum, left pelvis and borderline enlarged at the left  inguinal locations. While nonspecific, a neoplastic etiology is  possible including lymphoma.  2.  Mild splenomegaly.  3.  Lobulated indeterminate nodular masses at the left pericardial  fat.  4.  Indeterminate multiple bilateral pulmonary nodules. The dominant  solid nodule is at the right lower lobe measuring 1.1 cm. Neoplasm  remains in the differential and surveillance imaging and/or further  workup is recommended. See below for follow up imaging guidelines.    PET 12/9/22:  IMPRESSION:     Findings suspicious for active neoplasm such as lymphoma involving lymph nodes above/below the diaphragm. The right axillary for left inguinal lymph nodes are amenable to ultrasound-guided histologic sampling if desired.        ASSESSMENT/PLAN:  Billy Carey is a 33 year old male who is referred for thrombocytopenia. 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 He has not had any bleeding episodes.   -No new meds, recent infection, or recent surgeries.  -TSH normal at 3.85.   -HepB/C and HIV studies historically negative.   -No splenomegaly on physical examination.  -I doubt hemolysis as retic is normal as well as LFTs.  -We discussed the possibility of lab artifact, but there is no comment on platelet clumping  on smear. Also, he had lab work repeated two days in a row with PLT count returning in the 40s.   -Given his history of autoimmune disease, he may have underlying ITP, which is a diagnosis of exclusion. We had discussed possible steroid therapy if platelets are to return <30K.   -Repeat CBC is showing PLTs still in the 40's. Hb is 17.9, WBC normal.   -Also is consideration for a microangiopathic hemolytic anemia. MIRA has come back positive at 3+ (which is inconsistent with MAHA) and patient does not have elevated LFTs, abnormal coag studies, nor reticulocytosis. LDH is also normal at 240. He is afebrile. [facility] blood bank testing has returned +IgG and negative for complement, consistent with warm AIHA.   -CT CAP had returned with 2 cm LN of the retroperitoneum, inguinal, and axillary. There was also note of an abnormal pericardial fat mass. I sent for a PET, that did not show FDG avidity of the pericardial fat mass. The retroperitoneal LNs had SUV of 12-13 with the remainder LN 6-7. I sent patient for a core biopsy of the retroperitoneal LN with IR that returned in fragments with unremarkable FLOW. Bone marrow biopsy was normocellular with normal megakaryocyte morphology. I then pursued a right axillary LN biopsy, which again returned negative with unremarkable FLOW. I sent for excisional biopsy of an inguinal LN with general surgery with pathology once again returning as benign with folliculary and interfollicular hyperplasia. Congo red staining is negative. EBV, HHV8 negative. I reviewed with pathology for any morphologic evidence consistent with Castleman's disease and spoke with Dr. Kaminski who is unable to confirm this at this time. She did have an interdepartmental pathology review. I do note the sinuses contain abundant histiocytes in the microscopic description and in my differential diagnosis is also Rosai-Dorfamn or Langerhan's disease.  I spoke with Dr. Tyler who is in agreement with workup above.  I reviewed with Dr. 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 I reviewed I would then move onto rituximab next if patient is not to have improvement with steroid therapy. Dr. Tyler is in agreement with this plan.   -I reviewed the above with patient and wife, Jayla. I will need to continue to monitor the patient not only for treatment response for the hemolysis, but for evolution of LAD. My plan is to recheck with PET in 6 months time and will continue to monitor for treatment response discussed above.   -After our last visit, PLTs returned at 18K. Patient was hospitalized and treated with decadron 40 mg x4 and IVIG. His platelets have remained >100K since then. He has met with Dr. Tyler as well who is in agreement with next line rituximab. Patient would like to continue to think on this. We discussed the risk of bronchospasm and hypersensitivity.  -Patient has sought second opinion at [facility] (Dr. Ayon) who is in agreement with workup and plan for rituximab in the future.     Weekly rituximab x4 weeks, then possible maintenance:  -Rituximab 375 mg/m2 IV    -PET is showing 13 x 6 mm right level IIb LN with SUV 6.9, 6 mm left supraclavicular LN with SUV 4.4, decreased size and FDG avidity of a 12 mm right axillary LN. He has waxing/waning RP, bilateral iliac chain, and left inguinal LAD with both increased/decreased SUV. He has increase in splenomegaly to 17.9 x 9.6 x 5.6 cm with mild FDG avidity, SUV 5. There are no clear lesions of the spleen for biopsy and will hold on random biopsy due to risk of bleed. He has developed new rash. This was a previous symptom prior to thrombocytopenia. Currently, CBC has returned with PLT 128K. This, in conjunction with elevated Cr, elevated T. Bili. This will be a pattern to monitor in the future for relapse.   -Platelets have been stable  >100K since June 2023. If platelets continue to downtrend, will plan on initiation of rituximab 375 mg/m2 once weekly x4 (with possible maintenance). We reviewed the mechanism of action of rituximab. There is risk of bronchospasm and hypersensitivity with treatment. He has consented to treatment in the eventuality he will require initiation. We discussed possible splenectomy, but this would be further down the line for refractory disease. I spoke with Dr. Tyler today who is in agreement.   -Repeat EBV with high titer IgG, IgM negative. Mononucleosis screen negative.     2) Lymphadenopathy  -See above.  -Path reviewed at [facility] and returning as benign reactive folllicular hyperplasia. No evidence of malignancy.     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  -Patient without arthralgias, skin changes.  -APLA, ds-DNA labs returned negative.  -Rheumatology referral upcoming.     4) History of infectious mononucleosis  -EBV negative on LN.  -EBV PCR negative.    5) DM1  -Patient managed by endocrinology.    6) History of HTN, HLD    7) Rash  -Derm referral for biopsy. Discussed with patient and spouse still need to monitor for the possibility of cutaneous lymphomas.     8) -Keep labs and [facility] follow up on 9/1/23.  -Cancel labs on 9/15 and 9/29.  -Then schedule monthly labs and alternating follow up between DAPHNE and myself October-November.  -Repeat PET/CT in December 2023 and follow up with me shortly after.      Hayde Lopez, DO  Hematology/Oncology  [facility]

## 2023-08-18 NOTE — PROGRESS NOTES
"Oncology Rooming Note    August 18, 2023 9:05 AM   Billy Carey is a 33 year old male who presents for:    Chief Complaint   Patient presents with    Oncology Clinic Visit     Initial Vitals: /84   Pulse 54   Temp 98  F (36.7  C) (Oral)   Resp 16   Wt 118.4 kg (261 lb)   SpO2 100%   BMI 32.62 kg/m   Estimated body mass index is 32.62 kg/m  as calculated from the following:    Height as of 6/23/23: 1.905 m (6' 3\").    Weight as of this encounter: 118.4 kg (261 lb). Body surface area is 2.5 meters squared.  No Pain (0) Comment: Data Unavailable   No LMP for male patient.  Allergies reviewed: Yes  Medications reviewed: Yes    Medications: Medication refills not needed today.  Pharmacy name entered into [facility]:    [id number] - [place], [place] - [address] [place] [address] [address] [facility]  [id number] - [place], [place] [address]  [id number] - [place], [place] - [address] [facility] - [facility], [place] - [facility]    Clinical concerns: follow up        Kelsey Rebollar            "

## 2023-08-18 NOTE — LETTER
8/18/2023         RE: Billy Carey  [address] [facility] [address]        Dear Colleague,    Thank you for referring your patient, Billy Carey, to the [facility]. Please see a copy of my visit note below.    [facility]    Hematology/Oncology Established Patient Note      Today's Date: 8/18/23    Reason for Consultation: Thrombocytopenia  Referring Provider: ZAHRAA Kingsley CNP      HISTORY OF PRESENT ILLNESS: Billy Carey is a 33 year old male who is referred for thrombocytopenia. 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    Historically, he has had historical normal CBC. On 11/30/22, WBC 6.0, Hb 16.5, PLT 42. Repeat CBC on 12/1/22, WBC 7.2, Hb 17.5, PLT 43. Hepatitis and HIV studies negative.     He has had eustachian tube placement without bleeding event. He has not had any other surgeries.     For DM1- he is followed by endocrinology. He has continuous blood glucose monitoring and an insulin pump.     He drinks alcohol 1-2x/week. No excessive use. No history of smoking.     Family history of VTE in his sister. He is unaware of the nuances surrounding this event. No family history of bleeding disorder or malignancy.       INTERIM HISTORY:  Patient has sought second opinion at [facility].     Since our last visit, platelets have ranged 120K-145K. Rash has improved and patient has not yet met with dermatology. He will meet with rheumatology on 8/23/23. He feels fine and is looking forward to a trip to [facility] this weekend.       REVIEW OF SYSTEMS:   A 14 point ROS was reviewed with pertinent positives and negatives in the HPI.        HOME MEDICATIONS:  Current Outpatient Medications   Medication Sig Dispense Refill     atorvastatin (LIPITOR) 10 MG tablet Take 1 tablet (10 mg) by mouth daily 90 tablet 3     blood glucose (KELL CONTOUR NEXT) test strip Test 4-5 times daily 4 Box 3     Blood Glucose Monitoring Suppl  (KELL CONTOUR NEXT LINK) W/DEVICE KIT 1 kit 5 times daily. Use as directed 1 kit 1     Continuous Blood Gluc Sensor (DEXCOM G6 SENSOR) MISC USE 1 SENSOR EVERY 10 DAYS       glucagon (GLUCAGON EMERGENCY) 1 MG kit Inject 1 mg into the muscle once for 1 dose (Patient not taking: Reported on 3/7/2023) 1 mg 3     insulin lispro (HUMALOG VIAL) 100 UNIT/ML vial To be used in insulin pump       insulin pen needle (BD CHRIST U/F) 32G X 4 MM miscellaneous Use 3-6 pen needles daily or as directed. 50 each 0     INSULIN PUMP - OUTPATIENT Date Last Updated: 2/15/23  Tandem  Pump Basal Rates/Times:  8291-3096: 2 units/hour  3369-2681: 2.6 units/hour  9448-0039: 2.2 units/hour  0083-1497: 1.7 units/hour  CARB RATIO:   0559-2557: 1 unit per 10 grams carbohydrates  CF / Sensitivity Times:   5709-0927: 1 unit to decrease BG by 30 mg/dL  TARGET B mg/dL       triamcinolone (KENALOG) 0.5 % external ointment Apply a thin layer to affected areas twice daily as needed. 15 g 3     vitamin D2 (ERGOCALCIFEROL) 32253 units (1250 mcg) capsule Take 50,000 Units by mouth once a week           ALLERGIES:  Allergies   Allergen Reactions     Penicillins Rash         PAST MEDICAL HISTORY:  Past Medical History:   Diagnosis Date     Diabetes mellitus type 1, uncontrolled, insulin dependent 2013         PAST SURGICAL HISTORY:  Past Surgical History:   Procedure Laterality Date     EXCISE MASS GROIN Left 2023    Procedure: Lymph Node excisional biopsy;  Surgeon: New Burger MD;  Location: [facility] OR     MYRINGOTOMY, INSERT TUBE BILATERAL, COMBINED Bilateral     As a child         SOCIAL HISTORY:  Social History     Socioeconomic History     Marital status:      Spouse name: Jayla     Number of children: 3     Years of education: Not on file     Highest education level: Not on file   Occupational History     Occupation:      Comment: [employer]   Tobacco Use     Smoking status: Never     Smokeless tobacco:  Never   Substance and Sexual Activity     Alcohol use: No     Comment: rarely     Drug use: No     Sexual activity: Yes     Partners: Female   Other Topics Concern     Parent/sibling w/ CABG, MI or angioplasty before 65F 55M? Not Asked   Social History Narrative     Not on file     Social Determinants of Health     Financial Resource Strain: Low Risk  (1/12/2023)    Overall Financial Resource Strain (CARDIA)      Difficulty of Paying Living Expenses: Not hard at all   Food Insecurity: No Food Insecurity (1/12/2023)    Hunger Vital Sign      Worried About Running Out of Food in the Last Year: Never true      Ran Out of Food in the Last Year: Never true   Transportation Needs: No Transportation Needs (1/12/2023)    PRAPARE - Transportation      Lack of Transportation (Medical): No      Lack of Transportation (Non-Medical): No   Physical Activity: Sufficiently Active (1/12/2023)    Exercise Vital Sign      Days of Exercise per Week: 6 days      Minutes of Exercise per Session: 40 min   Stress: No Stress Concern Present (1/12/2023)    [employer] [employer] - Occupational Stress Questionnaire      Feeling of Stress : Only a little   Social Connections: Socially Integrated (1/12/2023)    Social Connection and Isolation Panel [NHANES]      Frequency of Communication with Friends and Family: More than three times a week      Frequency of Social Gatherings with Friends and Family: More than three times a week      Attends Congregation Services: 1 to 4 times per year      Active Member of Clubs or Organizations: Yes      Attends Club or Organization Meetings: Not on file      Marital Status:    Intimate Partner Violence: Not At Risk (3/31/2023)    Humiliation, Afraid, Rape, and Kick questionnaire      Fear of Current or Ex-Partner: No      Emotionally Abused: No      Physically Abused: No      Sexually Abused: No   Housing Stability: Low Risk  (1/12/2023)    Housing Stability Vital Sign      Unable to  Pay for Housing in the Last Year: No      Number of Places Lived in the Last Year: 1      Unstable Housing in the Last Year: No         FAMILY HISTORY:  Family History   Problem Relation Age of Onset     C.A.D. No family hx of      Diabetes No family hx of      Hypertension No family hx of      Cancer No family hx of         no skin cancer     Amblyopia No family hx of      Retinal detachment No family hx of      Thyroid Disease No family hx of      Glaucoma No family hx of      Macular Degeneration No family hx of          PHYSICAL EXAM:  Vital signs:  /84   Pulse 54   Temp 98  F (36.7  C) (Oral)   Resp 16   Wt 118.4 kg (261 lb)   SpO2 100%   BMI 32.62 kg/m     ECO  GENERAL/CONSTITUTIONAL: No acute distress.  LYMPH: No anterior cervical, posterior cervical, supraclavicular, axillary or inguinal adenopathy.   MUSCULOSKELETAL: Warm and well-perfused, no cyanosis, clubbing, or edema.  NEUROLOGIC: Cranial nerves II-XII are intact. Alert, oriented, answers questions appropriately.  INTEGUMENTARY: No rashes or jaundice.  GAIT: Steady, does not use assistive device.      LABS:   Latest Reference Range & Units 23 15:33 23 16:01 23 15:02 23 17:57 08/15/23 12:33   WBC 4.0 - 11.0 10e3/uL 7.6 7.0 7.3 7.6 5.6   Hemoglobin 13.3 - 17.7 g/dL 17.1 17.1 16.5 16.5 16.6   Hematocrit 40.0 - 53.0 % 47.1 47.2 46.7 46.5 46.8   Platelet Count 150 - 450 10e3/uL 128 (L) 144 (L) 120 (L) 145 (L) 124 (L)   RBC Count 4.40 - 5.90 10e6/uL 5.83 5.88 5.70 5.74 5.72   MCV 78 - 100 fL 81 80 82 81 82   MCH 26.5 - 33.0 pg 29.3 29.1 28.9 28.7 29.0   MCHC 31.5 - 36.5 g/dL 36.3 36.2 35.3 35.5 35.5   RDW 10.0 - 15.0 % 11.7 11.9 11.9 11.9 12.2   % Neutrophils % 72 58 68 65 53   % Lymphocytes % 18 28 21 24 31   % Monocytes % 8 11 9 9 13   % Eosinophils % 1 2 1 1 2   % Basophils % 1 1 1 1 1   Absolute Basophils 0.0 - 0.2 10e3/uL 0.0 0.1 0.1 0.1 0.1   Absolute Eosinophils 0.0 - 0.7 10e3/uL 0.0 0.1 0.1 0.1 0.1   Absolute  Immature Granulocytes <=0.4 10e3/uL 0.0 0.0 0.0 0.0 0.0   Absolute Lymphocytes 0.8 - 5.3 10e3/uL 1.4 2.0 1.5 1.8 1.7   Absolute Monocytes 0.0 - 1.3 10e3/uL 0.6 0.8 0.7 0.7 0.8   % Immature Granulocytes % 0 0 0 0 0   Absolute Neutrophils 1.6 - 8.3 10e3/uL 5.5 4.0 5.0 5.0 2.9   Absolute NRBCs 10e3/uL 0.0 0.0 0.0 0.0 0.0   NRBCs per 100 WBC <1 /100 0 0 0 0 0   (L): Data is abnormally low   Latest Reference Range & Units 12/06/22 09:40 12/14/22 09:20 01/10/23 15:17 01/26/23 14:47 02/15/23 16:02 03/07/23 09:00   Haptoglobin 32 - 197 mg/dL 37 31 (L) 22 (L) 31 (L) 118 50        Latest Reference Range & Units 01/10/23 15:17   SPECIMEN EXPIRATION DATE  [id number]   MIRA Anti-IgG,-C3d  Positive 3+   Hep B Surface Agn Nonreactive  Nonreactive   Hepatitis B Core Margarette Nonreactive  Nonreactive   Hepatitis B Surface Antibody Instrument Value <8.00 m[IU]/mL 35.73   Hepatitis B Surface Antibody  Reactive   Hepatitis C Antibody Nonreactive  Nonreactive   HIV Antigen Antibody Combo Nonreactive  Nonreactive      Latest Reference Range & Units 01/10/23 15:17   PAVAN interpretation Negative  Positive !      01/10/23 15:17   PAVAN titer 1 1:160     Serum M-protein (g/dL):  1/10/23: 0.0    Total IgG (mg/dL), IgA (mg/dL), IgM (mg/dL):  1/10/23: 956, 135, 44    Free kappa light chains (mg/dL), lambda (mg/dL), kappa/lambda ratio:  1/10/23: 1.33, 1.45, 0.92       Latest Reference Range & Units 12/14/22 09:20   MIRA Anti-IgG,-C3d  Weak Positive   MIRA Anti-C3  Negative   MIRA Anti-IgG, Tube  Positive 1+   Blood Bank Chart Comment  BB Chart Comment            Latest Reference Range & Units 12/06/22 09:40   Iron 61 - 157 ug/dL 59 (L)   Iron Binding Capacity 240 - 430 ug/dL 319   Iron Sat Index 15 - 46 % 18   Lactate Dehydrogenase 0 - 250 U/L 240   INR 0.85 - 1.15  0.91   PTT 22 - 38 Seconds 25   Fibrinogen 170 - 490 mg/dL 282   SPECIMEN EXPIRATION DATE  [id number]   MIRA Anti-IgG,-C3d  Positive 3+               PATHOLOGY:  Final Diagnosis 12/1/22:    Peripheral blood, morphology:  - Marked thrombocytopenia.  - Hemoglobin quantitatively within normal limits and erythrocytes without diagnostic morphologic abnormality.  - WBC subsets quantitatively within normal limits and without diagnostic morphologic abnormality.  - Negative for schistocytes.  - Negative for overt features of myelodysplasia or circulating blasts.     Final Diagnosis 12/6/22:   Peripheral blood:  --Slight polycythemia/erythrocytosis.  --Marked thrombocytopenia.   Electronically signed by Angel Marshall MD on 12/7/2022 at 10:33 AM   Comment    The cause of this patient's thrombocytopenia is unclear from the peripheral smear. Some common causes of thrombocytopenia to consider include infections, medications, alcohol, immune mediated mechanisms, and splenic sequestration. If splenic sequestration is of clinical concern, an accurate assessment of spleen size is recommended.         Clinical Information    Thrombocytopenia   Peripheral Smear    The red blood cells appear normochromic.  Rouleaux formation does not appear increased.  Polychromasia does not appear increased.  Poikilocytosis appears mild and nonspecific.  Platelets  are well granulated.  Lymphocytes are predominantly small with cytologically mature chromatin and appear overall polymorphous.  Neutrophils contain normal cytoplasmic granulation and unremarkable nuclear morphology.  There is no dysplasia and no circulating blasts are seen.     Final Diagnosis 12/14/22:   Bone marrow, left posterior iliac crest, decalcified trephine biopsy and aspiration:  -- Normocellular bone marrow for age (60 to 70%) with maturing trilineage hematopoiesis.  -- No evidence of marrow involvement by lymphoma or other hematopoietic neoplasm.     Peripheral blood showing:  -- Moderate thrombocytopenia.     Case Report   Flow Cytometry Report                             Case: [id number]                                   Authorizing Provider:  Hayde Lopez           Collected:           12/14/2022 10:06 AM           Ordering Location:     [facility]   Received:            12/14/2022 10:21 AM                                  [facility]                                                             Pathologist:           Marleny Holden MD                                                      Specimen:    Iliac Crest, Bone Marrow Aspirate, Left                                                    Flow Interpretation   A. Iliac Crest, Bone Marrow Aspirate, Left:    - Polytypic B cells  - No aberrant immunophenotype on T cells  - No increase in myeloid blasts and no abnormal myeloid blast population     Case Report   Surgical Pathology Report                         Case: [id number]                                   Authorizing Provider:  Hayde Lopez DO         Collected:           12/15/2022 11:27 AM           Ordering Location:     [facility]   Received:            12/15/2022 11:54 AM                                  Imaging                                                                       Pathologist:           Mirna Roger                                                                Specimen:    Retroperitoneal                                                                            Final Diagnosis   A.  Retroperitoneum, biopsy:  -Three tissue fragments, entirely submitted for flow cytometry evaluation (gross examination only).       Case Report   Flow Cytometry Report                             Case: [id number]                                   Authorizing Provider:  Hayde Lopez DO         Collected:           12/15/2022 01:16 PM           Ordering Location:     [facility]   Received:            12/15/2022 01:17 PM                                  Imaging                                                                       Pathologist:           Marleny Holden MD                                                       Specimen:    Retroperitoneal                                                                            Flow Interpretation   A. Retroperitoneal :    - Polytypic B cells  - No aberrant immunophenotype on T cells         Case Report   Surgical Pathology Report                         Case: [id number]                                   Authorizing Provider:  Hayde Lopez DO         Collected:           01/04/2023 02:25 PM           Ordering Location:     [facility]   Received:            01/04/2023 02:48 PM                                  [facility]                                                                 Pathologist:           Saadia Allen MD                                                                            Specimen:    Lymph Node(s), Axillary, Right                                                             Final Diagnosis   Lymph node, right axillary, core biopsies:  -Partially sampled lymph node showing no evidence of Hodgkin's or non-Hodgkin's lymphoma  -Completed immunophenotyping studies show polytypic B cells and no aberrant T-cell antigen expression     Case Report   Flow Cytometry Report                             Case: [id number]                                   Authorizing Provider:  Hayde Lopez DO         Collected:           01/04/2023 02:25 PM           Ordering Location:     [facility]   Received:            01/04/2023 02:53 PM                                  [facility]                                                                 Pathologist:           Xiomara Ramsey MD                                                         Specimen:    Lymph Node(s), Axillary, Right                                                             Flow Interpretation   A. Lymph Node(s), Axillary, Right, :  -Polytypic B cells  No aberrant  immunophenotype on T cells         Case Report   Surgical Pathology Report                         Case: [id number]                                   Authorizing Provider:  New Burger         Collected:           01/20/2023 02:16 PM                                  MD Ari                                                                  Ordering Location:      [facility] OR                 Received:            01/20/2023 02:29 PM           Pathologist:           Isabela Kaminski MD                                                     Specimen:    Lymph Node(s), Inguinal, Left, partial lymph node left groin                               Addendum   This addendum is issued to document that Dr. Isabela Kaminski discussed biopsy findings with Dr. Hayde Lopez on 1/26/23 at 12:30.    Addendum electronically signed by Isabela Kaminski MD on 1/26/2023 at 12:30 PM   Final Diagnosis   A. Left groin, lymph node, partial lymphadenectomy:  -Benign lymph node with follicular and interfollicular hyperplasia  -Rare small noncaseating granulomas  -No morphologic evidence of malignancy  -Negative for select microorganisms on stained tissue  -See comment   Electronically signed by Isabela Kaminski MD on 1/26/2023 at 12:16 PM   Comment  [facility]   There is no definitive morphologic or immunophenotypic evidence for malignancy in this biopsy. Instead, the findings (which include follicular and interfollicular hyperplasia and features of progressive transformation of germinal centers) are favored to be reactive.   Evaluation for select microorganisms was performed, with negative results for EBV (by in situ hybridization), HHV8 and toxoplasma (by immunohistochemistry), and acid fast and fungal organisms (by special stains).  Concurrent flow cytometry ( [id number]) demonstrated polytypic B cells and no aberrant immunophenotype on T cells.         Case Report   Flow Cytometry Report                             Case:  [id number]                                   Authorizing Provider:  New Burger         Collected:           01/20/2023 02:33 PM                                  MD Ari                                                                  Ordering Location:     [facility] OR                 Received:            01/20/2023 02:33 PM           Pathologist:           Marleny Holden MD                                                      Specimen:    Lymph Node(s), Inguinal, Left                                                              Flow Interpretation   A. Lymph Node(s), Inguinal, Left:     - Polytypic B cells  - No aberrant immunophenotype on T cells           IMAGING:  CT CAP 12/6/22:  IMPRESSION:  1.  Several enlarged lymph nodes identified at the right axilla,  retroperitoneum, left pelvis and borderline enlarged at the left  inguinal locations. While nonspecific, a neoplastic etiology is  possible including lymphoma.  2.  Mild splenomegaly.  3.  Lobulated indeterminate nodular masses at the left pericardial  fat.  4.  Indeterminate multiple bilateral pulmonary nodules. The dominant  solid nodule is at the right lower lobe measuring 1.1 cm. Neoplasm  remains in the differential and surveillance imaging and/or further  workup is recommended. See below for follow up imaging guidelines.    PET 12/9/22:  IMPRESSION:     Findings suspicious for active neoplasm such as lymphoma involving lymph nodes above/below the diaphragm. The right axillary for left inguinal lymph nodes are amenable to ultrasound-guided histologic sampling if desired.        ASSESSMENT/PLAN:  Billy Carey is a 33 year old male who is referred for thrombocytopenia. 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 He has not had any bleeding  episodes.   -No new meds, recent infection, or recent surgeries.  -TSH normal at 3.85.   -HepB/C and HIV studies historically negative.   -No splenomegaly on physical examination.  -I doubt hemolysis as retic is normal as well as LFTs.  -We discussed the possibility of lab artifact, but there is no comment on platelet clumping on smear. Also, he had lab work repeated two days in a row with PLT count returning in the 40s.   -Given his history of autoimmune disease, he may have underlying ITP, which is a diagnosis of exclusion. We had discussed possible steroid therapy if platelets are to return <30K.   -Repeat CBC is showing PLTs still in the 40's. Hb is 17.9, WBC normal.   -Also is consideration for a microangiopathic hemolytic anemia. MIRA has come back positive at 3+ (which is inconsistent with MAHA) and patient does not have elevated LFTs, abnormal coag studies, nor reticulocytosis. LDH is also normal at 240. He is afebrile. [facility] blood bank testing has returned +IgG and negative for complement, consistent with warm AIHA.   -CT CAP had returned with 2 cm LN of the retroperitoneum, inguinal, and axillary. There was also note of an abnormal pericardial fat mass. I sent for a PET, that did not show FDG avidity of the pericardial fat mass. The retroperitoneal LNs had SUV of 12-13 with the remainder LN 6-7. I sent patient for a core biopsy of the retroperitoneal LN with IR that returned in fragments with unremarkable FLOW. Bone marrow biopsy was normocellular with normal megakaryocyte morphology. I then pursued a right axillary LN biopsy, which again returned negative with unremarkable FLOW. I sent for excisional biopsy of an inguinal LN with general surgery with pathology once again returning as benign with folliculary and interfollicular hyperplasia. Congo red staining is negative. EBV, HHV8 negative. I reviewed with pathology for any morphologic evidence consistent with Castleman's disease and spoke with   Yamilex who is unable to confirm this at this time. She did have an interdepartmental pathology review. I do note the sinuses contain abundant histiocytes in the microscopic description and in my differential diagnosis is also Rosai-Dorfamn or Langerhan's disease.  I spoke with Dr. Tyler who is in agreement with workup above. I reviewed with Dr. 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 I reviewed I would then move onto rituximab next if patient is not to have improvement with steroid therapy. Dr. Tyler is in agreement with this plan.   -I reviewed the above with patient and wife, Jayla. I will need to continue to monitor the patient not only for treatment response for the hemolysis, but for evolution of LAD. My plan is to recheck with PET in 6 months time and will continue to monitor for treatment response discussed above.   -After our last visit, PLTs returned at 18K. Patient was hospitalized and treated with decadron 40 mg x4 and IVIG. His platelets have remained >100K since then. He has met with Dr. Tyler as well who is in agreement with next line rituximab. Patient would like to continue to think on this. We discussed the risk of bronchospasm and hypersensitivity.  -Patient has sought second opinion at [facility] (Dr. Ayon) who is in agreement with workup and plan for rituximab in the future.     Weekly rituximab x4 weeks, then possible maintenance:  -Rituximab 375 mg/m2 IV    -PET is showing 13 x 6 mm right level IIb LN with SUV 6.9, 6 mm left supraclavicular LN with SUV 4.4, decreased size and FDG avidity of a 12 mm right axillary LN. He has waxing/waning RP, bilateral iliac chain, and left inguinal LAD with both increased/decreased SUV. He has increase in splenomegaly to 17.9 x 9.6 x 5.6 cm with mild FDG avidity, SUV 5. There are no clear lesions of the spleen for  biopsy and will hold on random biopsy due to risk of bleed. He has developed new rash. This was a previous symptom prior to thrombocytopenia. Currently, CBC has returned with PLT 128K. This, in conjunction with elevated Cr, elevated T. Bili. This will be a pattern to monitor in the future for relapse.   -Platelets have been stable >100K since June 2023. If platelets continue to downtrend, will plan on initiation of rituximab 375 mg/m2 once weekly x4 (with possible maintenance). We reviewed the mechanism of action of rituximab. There is risk of bronchospasm and hypersensitivity with treatment. He has consented to treatment in the eventuality he will require initiation. We discussed possible splenectomy, but this would be further down the line for refractory disease. I spoke with Dr. Tyler today who is in agreement.   -Repeat EBV with high titer IgG, IgM negative. Mononucleosis screen negative.     2) Lymphadenopathy  -See above.  -Path reviewed at [facility] and returning as benign reactive folllicular hyperplasia. No evidence of malignancy.     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  -Patient without arthralgias, skin changes.  -APLA, ds-DNA labs returned negative.  -Rheumatology referral upcoming.     4) History of infectious mononucleosis  -EBV negative on LN.  -EBV PCR negative.    5) DM1  -Patient managed by endocrinology.    6) History of HTN, HLD    7) Rash  -Derm referral for biopsy. Discussed with patient and spouse still need to monitor for the possibility of cutaneous lymphomas.     8) -Keep labs and DAPHNE follow up on 9/1/23.  -Cancel labs on 9/15 and 9/29.  -Then schedule monthly labs and alternating follow up between DAPHNE and myself October-November.  -Repeat PET/CT in December 2023 and follow up with me shortly after.      Hayde Lopez,   Hematology/Oncology  [facility]        Oncology Rooming Note    August 18, 2023 9:05 AM   Billy Carey is a 33 year old male who presents  "for:    Chief Complaint   Patient presents with     Oncology Clinic Visit     Initial Vitals: /84   Pulse 54   Temp 98  F (36.7  C) (Oral)   Resp 16   Wt 118.4 kg (261 lb)   SpO2 100%   BMI 32.62 kg/m   Estimated body mass index is 32.62 kg/m  as calculated from the following:    Height as of 6/23/23: 1.905 m (6' 3\").    Weight as of this encounter: 118.4 kg (261 lb). Body surface area is 2.5 meters squared.  No Pain (0) Comment: Data Unavailable   No LMP for male patient.  Allergies reviewed: Yes  Medications reviewed: Yes    Medications: Medication refills not needed today.  Pharmacy name entered into [employer]:    [id number] [address] AT [facility] [address]  [id number] [place], [place] - [address] [id number] [place], [place] - [address] [address] [facility] - [facility], [address]    Clinical concerns: follow up        Kelsey Rebollar              Again, thank you for allowing me to participate in the care of your patient.        Sincerely,        Hayde Lopez, DO  "

## 2023-08-28 DIAGNOSIS — D69.3 AUTOIMMUNE THROMBOCYTOPENIA (H): Primary | ICD-10-CM

## 2023-09-01 ENCOUNTER — ONCOLOGY VISIT (OUTPATIENT)
Dept: ONCOLOGY | Facility: CLINIC | Age: 33
End: 2023-09-01
Attending: PHYSICIAN ASSISTANT
Payer: COMMERCIAL

## 2023-09-01 ENCOUNTER — LAB (OUTPATIENT)
Dept: ONCOLOGY | Facility: CLINIC | Age: 33
End: 2023-09-01
Attending: INTERNAL MEDICINE
Payer: COMMERCIAL

## 2023-09-01 VITALS
OXYGEN SATURATION: 96 % | WEIGHT: 258 LBS | TEMPERATURE: 98 F | RESPIRATION RATE: 16 BRPM | HEART RATE: 67 BPM | SYSTOLIC BLOOD PRESSURE: 120 MMHG | BODY MASS INDEX: 32.25 KG/M2 | DIASTOLIC BLOOD PRESSURE: 77 MMHG

## 2023-09-01 DIAGNOSIS — D69.6 THROMBOCYTOPENIA (H): ICD-10-CM

## 2023-09-01 DIAGNOSIS — D69.3 AUTOIMMUNE THROMBOCYTOPENIA (H): Primary | ICD-10-CM

## 2023-09-01 DIAGNOSIS — D69.3 AUTOIMMUNE THROMBOCYTOPENIA (H): ICD-10-CM

## 2023-09-01 LAB
BASOPHILS # BLD AUTO: 0.1 10E3/UL (ref 0–0.2)
BASOPHILS NFR BLD AUTO: 1 %
EOSINOPHIL # BLD AUTO: 0.2 10E3/UL (ref 0–0.7)
EOSINOPHIL NFR BLD AUTO: 2 %
ERYTHROCYTE [DISTWIDTH] IN BLOOD BY AUTOMATED COUNT: 12.4 % (ref 10–15)
HCT VFR BLD AUTO: 46.8 % (ref 40–53)
HGB BLD-MCNC: 16.6 G/DL (ref 13.3–17.7)
IMM GRANULOCYTES # BLD: 0 10E3/UL
IMM GRANULOCYTES NFR BLD: 0 %
LYMPHOCYTES # BLD AUTO: 1.3 10E3/UL (ref 0.8–5.3)
LYMPHOCYTES NFR BLD AUTO: 15 %
MCH RBC QN AUTO: 29.5 PG (ref 26.5–33)
MCHC RBC AUTO-ENTMCNC: 35.5 G/DL (ref 31.5–36.5)
MCV RBC AUTO: 83 FL (ref 78–100)
MONOCYTES # BLD AUTO: 0.7 10E3/UL (ref 0–1.3)
MONOCYTES NFR BLD AUTO: 8 %
NEUTROPHILS # BLD AUTO: 6.8 10E3/UL (ref 1.6–8.3)
NEUTROPHILS NFR BLD AUTO: 74 %
NRBC # BLD AUTO: 0 10E3/UL
NRBC BLD AUTO-RTO: 0 /100
PLATELET # BLD AUTO: 112 10E3/UL (ref 150–450)
RBC # BLD AUTO: 5.63 10E6/UL (ref 4.4–5.9)
WBC # BLD AUTO: 9 10E3/UL (ref 4–11)

## 2023-09-01 PROCEDURE — 99213 OFFICE O/P EST LOW 20 MIN: CPT | Performed by: PHYSICIAN ASSISTANT

## 2023-09-01 PROCEDURE — G0463 HOSPITAL OUTPT CLINIC VISIT: HCPCS | Performed by: PHYSICIAN ASSISTANT

## 2023-09-01 PROCEDURE — 85025 COMPLETE CBC W/AUTO DIFF WBC: CPT | Performed by: INTERNAL MEDICINE

## 2023-09-01 PROCEDURE — 36415 COLL VENOUS BLD VENIPUNCTURE: CPT

## 2023-09-01 ASSESSMENT — PAIN SCALES - GENERAL: PAINLEVEL: NO PAIN (0)

## 2023-09-01 NOTE — PROGRESS NOTES
Oncology/Hematology Visit Note    Sep 1, 2023    Reason for visit: Follow-up thrombocytopenia    Oncology HPI: Billy Carey is a 33 year old male with PMH of DM1 with thrombocytopenia.  He was followed closely by endocrinology for his DM1 and was in to see his PCP for routine follow-up, CBC on 11/30/2022 revealed thrombocytopenia with platelets of 42K.  Work-up was negative for schistocytes and bone marrow biopsy with normocellular bone marrow.  MIRA came back positive at 3+, but normal LFTs, normal coag studies and normal reticulocyte count.  CT CAP revealed 2 cm lymph node of the retroperitoneum, inguinal and axillary.  Core biopsy of the retroperitoneal lymph node unremarkable, axillary lymph node biopsy and inguinal lymph node biopsy all unremarkable.  Despite positive MIRA, hemoglobin and hemolysis markers remained preserved.  He was on surveillance.    He was admitted to [facility] on 2/15/2023 for platelets of 18K and was started on dexamethasone.  There was discussion of discharge with prednisone taper, however patient was hesitant to continue steroid taper and continued on surveillance.    He is here today for close hospital follow-up and repeat CBC and possible rituximab.    Interval History: Billy is unaccompanied and continues to do well.  No rash recently and no bleeding, fever or chills.  No other new complaints.    Review of Systems: See interval hx. Denies fevers, chills, abdominal pain, N/V, diarrhea, changes in urination, bleeding, bruising, rash.     PMHx and Social Hx reviewed per EPIC.      Medications:  Current Outpatient Medications   Medication Sig Dispense Refill    atorvastatin (LIPITOR) 10 MG tablet Take 1 tablet (10 mg) by mouth daily 90 tablet 3    blood glucose (KELL CONTOUR NEXT) test strip Test 4-5 times daily 4 Box 3    Blood Glucose Monitoring Suppl (KELL CONTOUR NEXT LINK) W/DEVICE KIT 1 kit 5 times daily. Use as directed 1 kit 1    Continuous Blood Gluc Sensor (DEXCOM G6  SENSOR) MISC USE 1 SENSOR EVERY 10 DAYS      insulin lispro (HUMALOG VIAL) 100 UNIT/ML vial To be used in insulin pump      insulin pen needle (BD CHRIST U/F) 32G X 4 MM miscellaneous Use 3-6 pen needles daily or as directed. 50 each 0    INSULIN PUMP - OUTPATIENT Date Last Updated: 2/15/23  Tandem  Pump Basal Rates/Times:  2023-1584: 2 units/hour  5372-7692: 2.6 units/hour  8176-6285: 2.2 units/hour  1575-8763: 1.7 units/hour  CARB RATIO:   8990-0456: 1 unit per 10 grams carbohydrates  CF / Sensitivity Times:   0108-6507: 1 unit to decrease BG by 30 mg/dL  TARGET B mg/dL      triamcinolone (KENALOG) 0.5 % external ointment Apply a thin layer to affected areas twice daily as needed. 15 g 3    vitamin D2 (ERGOCALCIFEROL) 41304 units (1250 mcg) capsule Take 50,000 Units by mouth once a week      glucagon (GLUCAGON EMERGENCY) 1 MG kit Inject 1 mg into the muscle once for 1 dose (Patient not taking: Reported on 3/7/2023) 1 mg 3       Allergies   Allergen Reactions    Penicillins Rash         EXAM:    /77   Pulse 67   Temp 98  F (36.7  C) (Tympanic)   Resp 16   Wt 117 kg (258 lb)   SpO2 96%   BMI 32.25 kg/m      GENERAL:  Male, in no acute distress.  Alert and oriented x3. Well groomed.   HEENT:  Normocephalic, atraumatic. No conjunctival injection or eye swelling.   LUNGS:  Nonlabored breathing  MSK: Full ROM UE.    SKIN: No rash on exposed skin.   NEURO: CN grossly intact, speech normal  PSYCH: Mentation appears normal, insight and judgement intact    Labs:    23 15:05   WBC 9.0   Hemoglobin 16.6   Hematocrit 46.8   Platelet Count 112 (L)   RBC Count 5.63   MCV 83   MCH 29.5   MCHC 35.5   RDW 12.4   % Neutrophils 74   % Lymphocytes 15   % Monocytes 8   % Eosinophils 2   % Basophils 1   Absolute Basophils 0.1   Absolute Eosinophils 0.2   Absolute Immature Granulocytes 0.0   Absolute Lymphocytes 1.3   Absolute Monocytes 0.7   % Immature Granulocytes 0   Absolute Neutrophils 6.8   Absolute NRBCs 0.0    NRBCs per 100 WBC 0   (L): Data is abnormally low    Imaging: n/a    Impression/Plan: Billy Carey is a 32 year old male with thrombocytopenia, currently on surveillance.    Thrombocytopenia/warm autoimmune hemolysis: Extremely thorough work-up complete including bone marrow biopsy and lymph node biopsies.  Initially thought may be ITP, but did have positive MIRA, concerning for hemolysis.  He was on surveillance for a while, then platelets dropped to 18 K, hospitalized and treated with Decadron and IVIG.  Platelets have remained greater than 100 K and rituximab has been discussed in the past.  Dr. Lopez would like to stay on surveillance for now.  However, if platelets drop below 100 K, then plan for rituximab.  Today, platelets 112 K and no bleeding. He met with Dr Joy at the [facility], who agreed with the recommendations. We will continue hold off on rituximab and continue labs monthly with provider visits monthly.  He will call sooner if needed.  -CBC, MD/DAPHNE monthly    Lymphadenopathy: Retroperitoneal, axillary and inguinal lymphadenopathy, biopsies benign.   -PET scheduled December 2023    Positive PAVAN: Asymptomatic.  Schedule with rheumatology August 2023.    Chart documentation with Dragon Voice recognition Software. Although reviewed after completion, some words and grammatical errors may remain.    20 minutes spent on the date of the encounter doing chart review, review of test results, interpretation of tests, patient visit and documentation     Loni White PA-C  Hematology/Oncology  [facility]

## 2023-09-01 NOTE — PROGRESS NOTES
If you are experiencing painful contractions, loss of fluids, vaginal bleeding, or decreased fetal movement, please call ask to speak to OBGYN doctor on call prior to proceeding to [address] [facility] 2nd floor [place] [address] or [facility] 3rd floor of [facility]  We have a resident doctor on call at both [facility] 24 hours a day  Pregnancy at [address] to [address]:   What changes are happening with my body? You may continue to have symptoms such as shortness of breath, heartburn, contractions, or swelling of your ankles and feet  You may be gaining about 1 pound a week now  How do I care for myself at this stage of my pregnancy? Eat a variety of healthy foods  Healthy foods include fruits, vegetables, whole-grain breads, low-fat dairy foods, beans, lean meats, and fish  Drink liquids as directed  Ask how much liquid to drink each day and which liquids are best for you  Limit caffeine to less than 200 milligrams each day  Limit your intake of fish to 2 servings each week  Choose fish low in mercury such as canned light tuna, shrimp, salmon, cod, or tilapia  Do not  eat fish high in mercury such as swordfish, tilefish, tommy mackerel, and shark  Manage heartburn  by eating 4 or 5 small meals each day instead of large meals  Avoid spicy food  Manage swelling  by lying down and putting your feet up  Take prenatal vitamins as directed  Your need for certain vitamins and minerals, such as folic acid, increases during pregnancy  Prenatal vitamins provide some of the extra vitamins and minerals you need  Prenatal vitamins may also help to decrease the risk of certain birth defects  Talk to your healthcare provider about exercise  Moderate exercise can help you stay fit  Your healthcare provider will help you plan an exercise program that is safe for you during pregnancy  Do not smoke    Smoking increases your risk of a Medical Assistant Note:  Billy Carey presents today for BLOOD DRAW.    Patient seen by provider today: Yes: KENNY.   present during visit today: Not Applicable.    Concerns: No Concerns.    Procedure:  Lab draw site: LEFT ANTECUB, Needle type: BUTTERFLY, Gauge: 23.    Post Assessment:  Labs drawn without difficulty: Yes.    Discharge Plan:  Departure Mode: Ambulatory.    Face to Face Time: 10.    Kelsey Rebollar         miscarriage and other health problems during your pregnancy  Smoking can cause your baby to be born too early or weigh less at birth  Ask your healthcare provider for information if you need help quitting  Do not drink alcohol  Alcohol passes from your body to your baby through the placenta  It can affect your baby's brain development and cause fetal alcohol syndrome (FAS)  FAS is a group of conditions that causes mental, behavior, and growth problems  Talk to your healthcare provider before you take any medicines  Many medicines may harm your baby if you take them when you are pregnant  Do not take any medicines, vitamins, herbs, or supplements without first talking to your healthcare provider  Never use illegal or street drugs (such as marijuana or cocaine) while you are pregnant  What are some safety tips during pregnancy? Avoid hot tubs and saunas  Do not use a hot tub or sauna while you are pregnant, especially during your first trimester  Hot tubs and saunas may raise your baby's temperature and increase the risk of birth defects  Avoid toxoplasmosis  This is an infection caused by eating raw meat or being around infected cat feces  It can cause birth defects, miscarriages, and other problems  Wash your hands after you touch raw meat  Make sure any meat is well-cooked before you eat it  Avoid raw eggs and unpasteurized milk  Use gloves or ask someone else to clean your cat's litter box while you are pregnant  What changes are happening with my baby? By 34 weeks, your baby may weigh more than 5 pounds  Your baby will be about 12 ½ inches long from the top of the head to the rump (baby's bottom)  Your baby is gaining about ½ pound a week  Your baby's eyes open and close now  Your baby's kicks and movements are more forceful at this time  What do I need to know about prenatal care? Your healthcare provider will check your blood pressure and weight   You may also need the following:  A urine test  may also be done to check for sugar and protein  These can be signs of gestational diabetes or infection  Protein in your urine may also be a sign of preeclampsia  Preeclampsia is a condition that can develop during week 20 or later of your pregnancy  It causes high blood pressure, and it can cause problems with your kidneys and other organs  A gestational diabetes screen  may be done  Your healthcare provider may order either a 1-step or 2-step oral glucose tolerance test (OGTT)  1-step OGTT:  Your blood sugar level will be tested after you have not eaten for 8 hours (fasting)  You will then be given a glucose drink  Your level will be tested again 1 hour and 2 hours after you finish the drink  2-step OGTT:  You do not have to fast for the first part of the test  You will have the glucose drink at any time of day  Your blood sugar level will be checked 1 hour later  If your blood sugar is higher than a certain level, another test will be ordered  You will fast and your blood sugar level will be tested  You will have the glucose drink  Your blood will be tested again 1 hour, 2 hours, and 3 hours after you finish the glucose drink  A Tdap vaccine  may be recommended by your healthcare provider  Fundal height  is a measurement of your uterus to check your baby's growth  This number is usually the same as the number of weeks that you have been pregnant  Your healthcare provider may also check your baby's position  Your baby's heart rate  will be checked  When should I seek immediate care? You develop a severe headache that does not go away  You have new or increased vision changes, such as blurred or spotted vision  You have new or increased swelling in your face or hands  You have vaginal spotting or bleeding  Your water broke or you feel warm water gushing or trickling from your vagina  When should I call my obstetrician?    You have more than 5 contractions in 1 hour     You notice any changes in your baby's movements  You have abdominal cramps, pressure, or tightening  You have a change in vaginal discharge  You have chills or a fever  You have vaginal itching, burning, or pain  You have yellow, green, white, or foul-smelling vaginal discharge  You have pain or burning when you urinate, less urine than usual, or pink or bloody urine  You have questions or concerns about your condition or care  CARE AGREEMENT:   You have the right to help plan your care  Learn about your health condition and how it may be treated  Discuss treatment options with your healthcare providers to decide what care you want to receive  You always have the right to refuse treatment  The above information is an  only  It is not intended as medical advice for individual conditions or treatments  Talk to your doctor, nurse or pharmacist before following any medical regimen to see if it is safe and effective for you  © Copyright [employer] 2022 Information is for End User's use only and may not be sold, redistributed or otherwise used for commercial purposes   All illustrations and images included in CareNotes® are the copyrighted property of A D A M , Inc  or [address] [place], [place] [address]pe

## 2023-09-01 NOTE — LETTER
9/1/2023         RE: Billy Carey  [address] [facility] [address]        Dear Colleague,    Thank you for referring your patient, Billy Carey, to the [facility]. Please see a copy of my visit note below.    Oncology/Hematology Visit Note    Sep 1, 2023    Reason for visit: Follow-up thrombocytopenia    Oncology HPI: Billy Carey is a 33 year old male with PMH of DM1 with thrombocytopenia.  He was followed closely by endocrinology for his DM1 and was in to see his PCP for routine follow-up, CBC on 11/30/2022 revealed thrombocytopenia with platelets of 42K.  Work-up was negative for schistocytes and bone marrow biopsy with normocellular bone marrow.  MIRA came back positive at 3+, but normal LFTs, normal coag studies and normal reticulocyte count.  CT CAP revealed 2 cm lymph node of the retroperitoneum, inguinal and axillary.  Core biopsy of the retroperitoneal lymph node unremarkable, axillary lymph node biopsy and inguinal lymph node biopsy all unremarkable.  Despite positive MIRA, hemoglobin and hemolysis markers remained preserved.  He was on surveillance.    He was admitted to [facility] on 2/15/2023 for platelets of 18K and was started on dexamethasone.  There was discussion of discharge with prednisone taper, however patient was hesitant to continue steroid taper and continued on surveillance.    He is here today for close hospital follow-up and repeat CBC and possible rituximab.    Interval History: Billy is unaccompanied and continues to do well.  No rash recently and no bleeding, fever or chills.  No other new complaints.    Review of Systems: See interval hx. Denies fevers, chills, abdominal pain, N/V, diarrhea, changes in urination, bleeding, bruising, rash.     PMHx and Social Hx reviewed per EPIC.      Medications:  Current Outpatient Medications   Medication Sig Dispense Refill     atorvastatin (LIPITOR) 10 MG tablet Take 1 tablet (10 mg) by mouth  daily 90 tablet 3     blood glucose (KELL CONTOUR NEXT) test strip Test 4-5 times daily 4 Box 3     Blood Glucose Monitoring Suppl (KELL CONTOUR NEXT LINK) W/DEVICE KIT 1 kit 5 times daily. Use as directed 1 kit 1     Continuous Blood Gluc Sensor (DEXCOM G6 SENSOR) MISC USE 1 SENSOR EVERY 10 DAYS       insulin lispro (HUMALOG VIAL) 100 UNIT/ML vial To be used in insulin pump       insulin pen needle (BD CHRIST U/F) 32G X 4 MM miscellaneous Use 3-6 pen needles daily or as directed. 50 each 0     INSULIN PUMP - OUTPATIENT Date Last Updated: 2/15/23  Tandem  Pump Basal Rates/Times:  9562-3976: 2 units/hour  3658-4421: 2.6 units/hour  2176-7450: 2.2 units/hour  8766-4923: 1.7 units/hour  CARB RATIO:   7184-4541: 1 unit per 10 grams carbohydrates  CF / Sensitivity Times:   6067-9444: 1 unit to decrease BG by 30 mg/dL  TARGET B mg/dL       triamcinolone (KENALOG) 0.5 % external ointment Apply a thin layer to affected areas twice daily as needed. 15 g 3     vitamin D2 (ERGOCALCIFEROL) 35307 units (1250 mcg) capsule Take 50,000 Units by mouth once a week       glucagon (GLUCAGON EMERGENCY) 1 MG kit Inject 1 mg into the muscle once for 1 dose (Patient not taking: Reported on 3/7/2023) 1 mg 3       Allergies   Allergen Reactions     Penicillins Rash         EXAM:    /77   Pulse 67   Temp 98  F (36.7  C) (Tympanic)   Resp 16   Wt 117 kg (258 lb)   SpO2 96%   BMI 32.25 kg/m      GENERAL:  Male, in no acute distress.  Alert and oriented x3. Well groomed.   HEENT:  Normocephalic, atraumatic. No conjunctival injection or eye swelling.   LUNGS:  Nonlabored breathing  MSK: Full ROM UE.    SKIN: No rash on exposed skin.   NEURO: CN grossly intact, speech normal  PSYCH: Mentation appears normal, insight and judgement intact    Labs:    23 15:05   WBC 9.0   Hemoglobin 16.6   Hematocrit 46.8   Platelet Count 112 (L)   RBC Count 5.63   MCV 83   MCH 29.5   MCHC 35.5   RDW 12.4   % Neutrophils 74   % Lymphocytes 15    % Monocytes 8   % Eosinophils 2   % Basophils 1   Absolute Basophils 0.1   Absolute Eosinophils 0.2   Absolute Immature Granulocytes 0.0   Absolute Lymphocytes 1.3   Absolute Monocytes 0.7   % Immature Granulocytes 0   Absolute Neutrophils 6.8   Absolute NRBCs 0.0   NRBCs per 100 WBC 0   (L): Data is abnormally low    Imaging: n/a    Impression/Plan: Billy Carey is a 32 year old male with thrombocytopenia, currently on surveillance.    Thrombocytopenia/warm autoimmune hemolysis: Extremely thorough work-up complete including bone marrow biopsy and lymph node biopsies.  Initially thought may be ITP, but did have positive MIRA, concerning for hemolysis.  He was on surveillance for a while, then platelets dropped to 18 K, hospitalized and treated with Decadron and IVIG.  Platelets have remained greater than 100 K and rituximab has been discussed in the past.  Dr. Lopez would like to stay on surveillance for now.  However, if platelets drop below 100 K, then plan for rituximab.  Today, platelets 112 K and no bleeding. He met with Dr Joy at the [facility], who agreed with the recommendations. We will continue hold off on rituximab and continue labs monthly with provider visits monthly.  He will call sooner if needed.  -CBC, MD/DAPHNE monthly    Lymphadenopathy: Retroperitoneal, axillary and inguinal lymphadenopathy, biopsies benign.   -PET scheduled December 2023    Positive PAVAN: Asymptomatic.  Schedule with rheumatology August 2023.    Chart documentation with Dragon Voice recognition Software. Although reviewed after completion, some words and grammatical errors may remain.    20 minutes spent on the date of the encounter doing chart review, review of test results, interpretation of tests, patient visit and documentation     Loni White PA-C  Hematology/Oncology  [facility]              Again, thank you for allowing me to participate in the care of your patient.         Sincerely,        Loni White PA-C

## 2023-09-01 NOTE — NURSING NOTE
"Oncology Rooming Note    September 1, 2023 3:15 PM   Billy Carey is a 33 year old male who presents for:    Chief Complaint   Patient presents with    Oncology Clinic Visit     Initial Vitals: /77   Pulse 67   Temp 98  F (36.7  C) (Tympanic)   Resp 16   Wt 117 kg (258 lb)   SpO2 96%   BMI 32.25 kg/m   Estimated body mass index is 32.25 kg/m  as calculated from the following:    Height as of 6/23/23: 1.905 m (6' 3\").    Weight as of this encounter: 117 kg (258 lb). Body surface area is 2.49 meters squared.  No Pain (0) Comment: Data Unavailable   No LMP for male patient.  Allergies reviewed: Yes  Medications reviewed: Yes    Medications: Medication refills not needed today.  Pharmacy name entered into [employer]:    [id number] - [address] AT [id number] [place], [place] - [address]  [id number] [place] [address]  [employer] [facility] - [facility], [place] - [address] [place] [address]    Clinical concerns: f/u       Shanna Quiroz CMA              "

## 2023-10-06 ENCOUNTER — ONCOLOGY VISIT (OUTPATIENT)
Dept: ONCOLOGY | Facility: CLINIC | Age: 33
End: 2023-10-06
Attending: INTERNAL MEDICINE
Payer: COMMERCIAL

## 2023-10-06 VITALS
DIASTOLIC BLOOD PRESSURE: 86 MMHG | RESPIRATION RATE: 16 BRPM | OXYGEN SATURATION: 100 % | BODY MASS INDEX: 32.12 KG/M2 | WEIGHT: 257 LBS | TEMPERATURE: 97.3 F | HEART RATE: 58 BPM | SYSTOLIC BLOOD PRESSURE: 147 MMHG

## 2023-10-06 DIAGNOSIS — D69.3 AUTOIMMUNE THROMBOCYTOPENIA (H): Primary | ICD-10-CM

## 2023-10-06 DIAGNOSIS — D69.3 AUTOIMMUNE THROMBOCYTOPENIA (H): ICD-10-CM

## 2023-10-06 LAB
BASO+EOS+MONOS # BLD AUTO: ABNORMAL 10*3/UL
BASO+EOS+MONOS NFR BLD AUTO: ABNORMAL %
BASOPHILS # BLD AUTO: 0.1 10E3/UL (ref 0–0.2)
BASOPHILS NFR BLD AUTO: 1 %
EOSINOPHIL # BLD AUTO: 0.3 10E3/UL (ref 0–0.7)
EOSINOPHIL NFR BLD AUTO: 3 %
ERYTHROCYTE [DISTWIDTH] IN BLOOD BY AUTOMATED COUNT: 12.3 % (ref 10–15)
HCT VFR BLD AUTO: 46.9 % (ref 40–53)
HGB BLD-MCNC: 16.3 G/DL (ref 13.3–17.7)
IMM GRANULOCYTES # BLD: 0 10E3/UL
IMM GRANULOCYTES NFR BLD: 0 %
LYMPHOCYTES # BLD AUTO: 2.5 10E3/UL (ref 0.8–5.3)
LYMPHOCYTES NFR BLD AUTO: 29 %
MCH RBC QN AUTO: 29.1 PG (ref 26.5–33)
MCHC RBC AUTO-ENTMCNC: 34.8 G/DL (ref 31.5–36.5)
MCV RBC AUTO: 84 FL (ref 78–100)
MONOCYTES # BLD AUTO: 1 10E3/UL (ref 0–1.3)
MONOCYTES NFR BLD AUTO: 11 %
NEUTROPHILS # BLD AUTO: 4.7 10E3/UL (ref 1.6–8.3)
NEUTROPHILS NFR BLD AUTO: 56 %
NRBC # BLD AUTO: 0 10E3/UL
NRBC BLD AUTO-RTO: 0 /100
PLATELET # BLD AUTO: 124 10E3/UL (ref 150–450)
RBC # BLD AUTO: 5.61 10E6/UL (ref 4.4–5.9)
WBC # BLD AUTO: 8.5 10E3/UL (ref 4–11)

## 2023-10-06 PROCEDURE — 99214 OFFICE O/P EST MOD 30 MIN: CPT | Performed by: INTERNAL MEDICINE

## 2023-10-06 PROCEDURE — 99213 OFFICE O/P EST LOW 20 MIN: CPT | Performed by: INTERNAL MEDICINE

## 2023-10-06 PROCEDURE — 85025 COMPLETE CBC W/AUTO DIFF WBC: CPT | Performed by: INTERNAL MEDICINE

## 2023-10-06 PROCEDURE — 36415 COLL VENOUS BLD VENIPUNCTURE: CPT

## 2023-10-06 ASSESSMENT — PAIN SCALES - GENERAL: PAINLEVEL: NO PAIN (0)

## 2023-10-06 NOTE — PROGRESS NOTES
Medical Assistant Note:  Billy Carey presents today for blood draw.    Patient seen by provider today: Yes: John.   present during visit today: Not Applicable.    Concerns: No Concerns.    Procedure:  Lab draw site: right antecub, Needle type: butterfly, Gauge: 23.    Post Assessment:  Labs drawn without difficulty: Yes.    Discharge Plan:  Departure Mode: Ambulatory.    Face to Face Time: 10.    Kelsey Rebollar

## 2023-10-06 NOTE — PROGRESS NOTES
[facility]    Hematology/Oncology Established Patient Note      Today's Date: 10/6/23    Reason for Consultation: Thrombocytopenia  Referring Provider: ZAHRAA Kingsley CNP      HISTORY OF PRESENT ILLNESS: Billy Carey is a 33 year old male who is referred for thrombocytopenia. 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    Historically, he has had historical normal CBC. On 11/30/22, WBC 6.0, Hb 16.5, PLT 42. Repeat CBC on 12/1/22, WBC 7.2, Hb 17.5, PLT 43. Hepatitis and HIV studies negative.     He has had eustachian tube placement without bleeding event. He has not had any other surgeries.     For DM1- he is followed by endocrinology. He has continuous blood glucose monitoring and an insulin pump.     He drinks alcohol 1-2x/week. No excessive use. No history of smoking.     Family history of VTE in his sister. He is unaware of the nuances surrounding this event. No family history of bleeding disorder or malignancy.       INTERIM HISTORY:  Patient has sought second opinion at [facility].     Since our last visit, platelets have ranged 120K-145K.He continues to have intermittent rash. No B-symptoms.       REVIEW OF SYSTEMS:   A 14 point ROS was reviewed with pertinent positives and negatives in the HPI.        HOME MEDICATIONS:  Current Outpatient Medications   Medication Sig Dispense Refill    atorvastatin (LIPITOR) 10 MG tablet Take 1 tablet (10 mg) by mouth daily 90 tablet 3    blood glucose (KELL CONTOUR NEXT) test strip Test 4-5 times daily 4 Box 3    Blood Glucose Monitoring Suppl (KELL CONTOUR NEXT LINK) W/DEVICE KIT 1 kit 5 times daily. Use as directed 1 kit 1    Continuous Blood Gluc Sensor (DEXCOM G6 SENSOR) MISC USE 1 SENSOR EVERY 10 DAYS      insulin lispro (HUMALOG VIAL) 100 UNIT/ML vial To be used in insulin pump      insulin pen needle (BD CHRIST U/F) 32G X 4 MM miscellaneous Use 3-6 pen needles daily or as directed. 50 each 0    INSULIN PUMP - OUTPATIENT  Date Last Updated: 2/15/23  Tandem  Pump Basal Rates/Times:  3070-0795: 2 units/hour  6519-5272: 2.6 units/hour  3203-2158: 2.2 units/hour  2908-8564: 1.7 units/hour  CARB RATIO:   6721-9793: 1 unit per 10 grams carbohydrates  CF / Sensitivity Times:   2293-8250: 1 unit to decrease BG by 30 mg/dL  TARGET B mg/dL      triamcinolone (KENALOG) 0.5 % external ointment Apply a thin layer to affected areas twice daily as needed. 15 g 3    vitamin D2 (ERGOCALCIFEROL) 92744 units (1250 mcg) capsule Take 50,000 Units by mouth once a week      glucagon (GLUCAGON EMERGENCY) 1 MG kit Inject 1 mg into the muscle once for 1 dose (Patient not taking: Reported on 3/7/2023) 1 mg 3         ALLERGIES:  Allergies   Allergen Reactions    Penicillins Rash         PAST MEDICAL HISTORY:  Past Medical History:   Diagnosis Date    Diabetes mellitus type 1, uncontrolled, insulin dependent 2013         PAST SURGICAL HISTORY:  Past Surgical History:   Procedure Laterality Date    EXCISE MASS GROIN Left 2023    Procedure: Lymph Node excisional biopsy;  Surgeon: New Burger MD;  Location: [facility] OR    MYRINGOTOMY, INSERT TUBE BILATERAL, COMBINED Bilateral     As a child         SOCIAL HISTORY:  Social History     Socioeconomic History    Marital status:      Spouse name: Jayla    Number of children: 3    Years of education: Not on file    Highest education level: Not on file   Occupational History    Occupation:      Comment: [employer]   Tobacco Use    Smoking status: Never    Smokeless tobacco: Never   Substance and Sexual Activity    Alcohol use: No     Comment: rarely    Drug use: No    Sexual activity: Yes     Partners: Female   Other Topics Concern    Parent/sibling w/ CABG, MI or angioplasty before 65F 55M? Not Asked   Social History Narrative    Not on file     Social Determinants of Health     Financial Resource Strain: Low Risk  (2023)    Overall Financial Resource Strain (CARDIA)      Difficulty of Paying Living Expenses: Not hard at all   Food Insecurity: No Food Insecurity (1/12/2023)    Hunger Vital Sign     Worried About Running Out of Food in the Last Year: Never true     Ran Out of Food in the Last Year: Never true   Transportation Needs: No Transportation Needs (1/12/2023)    PRAPARE - Transportation     Lack of Transportation (Medical): No     Lack of Transportation (Non-Medical): No   Physical Activity: Sufficiently Active (1/12/2023)    Exercise Vital Sign     Days of Exercise per Week: 6 days     Minutes of Exercise per Session: 40 min   Stress: No Stress Concern Present (1/12/2023)    [employer] - Occupational Stress Questionnaire     Feeling of Stress : Only a little   Social Connections: Socially Integrated (1/12/2023)    Social Connection and Isolation Panel [NHANES]     Frequency of Communication with Friends and Family: More than three times a week     Frequency of Social Gatherings with Friends and Family: More than three times a week     Attends Rastafarian Services: 1 to 4 times per year     Active Member of Clubs or Organizations: Yes     Attends Club or Organization Meetings: Not on file     Marital Status:    Interpersonal Safety: Not At Risk (3/31/2023)    Humiliation, Afraid, Rape, and Kick questionnaire     Fear of Current or Ex-Partner: No     Emotionally Abused: No     Physically Abused: No     Sexually Abused: No   Housing Stability: Low Risk  (1/12/2023)    Housing Stability Vital Sign     Unable to Pay for Housing in the Last Year: No     Number of Places Lived in the Last Year: 1     Unstable Housing in the Last Year: No         FAMILY HISTORY:  Family History   Problem Relation Age of Onset    C.A.D. No family hx of     Diabetes No family hx of     Hypertension No family hx of     Cancer No family hx of         no skin cancer    Amblyopia No family hx of     Retinal detachment No family hx of     Thyroid Disease No family hx of      Glaucoma No family hx of     Macular Degeneration No family hx of          PHYSICAL EXAM:  Vital signs:  BP (!) 147/86   Pulse 58   Temp 97.3  F (36.3  C) (Tympanic)   Resp 16   Wt 116.6 kg (257 lb)   SpO2 100%   BMI 32.12 kg/m     ECO  GENERAL/CONSTITUTIONAL: No acute distress.  MUSCULOSKELETAL: Warm and well-perfused, no cyanosis, clubbing, or edema.  NEUROLOGIC: Cranial nerves II-XII are intact. Alert, oriented, answers questions appropriately.  INTEGUMENTARY: No rashes or jaundice.  GAIT: Steady, does not use assistive device.      LABS: Reviewed with patient today.   Latest Reference Range & Units 22 07:56 22 11:59 22 09:40 22 09:20 01/10/23 15:17 23 14:47 02/15/23 16:02 02/15/23 18:51 23 06:57 23 06:33 23 07:00 23 14:22 23 14:59 23 09:00 03/10/23 15:12 23 15:15 23 15:07 23 15:14 23 15:38 23 14:59 23 15:33 23 16:01 23 15:02 23 17:57 08/15/23 12:33 23 15:05 10/06/23 08:33   WBC 4.0 - 11.0 10e3/uL 6.0 7.2 9.0 6.6 6.5 6.8 10.9 10.2 11.0 15.4 (H) 12.7 (H) 8.8 9.5 5.8 7.7 8.5 8.0 8.3 8.2 7.5 7.6 7.0 7.3 7.6 5.6 9.0 8.5   Hemoglobin 13.3 - 17.7 g/dL 16.5 17.5 17.9 (H) 17.3 16.4 17.0 17.8 (H) 17.2 17.6 16.4 15.3 17.1 16.6 16.3 15.9 17.2 16.1 16.4 16.6 16.3 17.1 17.1 16.5 16.5 16.6 16.6 16.3   Hematocrit 40.0 - 53.0 % 47.0 50.6 52.7 48.4 45.8 48.2 51.3 49.3 49.9 45.4 43.5 48.6 47.1 45.9 44.3 48.8 44.7 46.8 45.6 44.7 47.1 47.2 46.7 46.5 46.8 46.8 46.9   Platelet Count 150 - 450 10e3/uL 42 (LL) 43 (LL) 46 (LL) 59 (L) 45 (LL) 50 (L) 18 (LL) 25 (LL) 29 (LL) 66 (L) 100 (L) 196 162 115 (L) 111 (L) 116 (L) 108 (L) 114 (L) 114 (L) 202 128 (L) 144 (L) 120 (L) 145 (L) 124 (L) 112 (L) 124 (L)   RBC Count 4.40 - 5.90 10e6/uL 5.62 5.99 (H) 6.20 (H) 5.95 (H) 5.52 5.75 6.04 (H) 5.90 6.00 (H) 5.53 5.21 5.81 5.65 5.68 5.46 5.92 (H) 5.48 5.58 5.63 5.52 5.83 5.88 5.70 5.74 5.72 5.63 5.61   MCV 78 -  100 fL 84 85 85 81 83 84 85 84 83 82 84 84 83 81 81 82 82 84 81 81 81 80 82 81 82 83 84   MCH 26.5 - 33.0 pg 29.4 29.2 28.9 29.1 29.7 29.6 29.5 29.2 29.3 29.7 29.4 29.4 29.4 28.7 29.1 29.1 29.4 29.4 29.5 29.5 29.3 29.1 28.9 28.7 29.0 29.5 29.1   MCHC 31.5 - 36.5 g/dL 35.1 34.6 34.0 35.7 35.8 35.3 34.7 34.9 35.3 36.1 35.2 35.2 35.2 35.5 35.9 35.2 36.0 35.0 36.4 36.5 36.3 36.2 35.3 35.5 35.5 35.5 34.8   RDW 10.0 - 15.0 % 12.2 12.1 12.3 11.9 11.9 11.9 12.3 12.1 12.0 12.1 12.3 12.2 12.0 11.9 12.0 12.1 12.4 12.7 12.4 11.9 11.7 11.9 11.9 11.9 12.2 12.4 12.3   % Neutrophils % 56 55 55 64 65 66 66 63 89 93 91 70 69 58 70 62 64 63 67 74 72 58 68 65 53 74 56   % Lymphocytes % 23 28 28 21 23 21 18 22 9 3 5 19 18 28 20 25 25 25 23 16 18 28 21 24 31 15 29   % Monocytes % 14 13 11 10 10 10 11 11 1 4 3 9 10 11 8 10 9 9 8 8 8 11 9 9 13 8 11   % Eosinophils % 5 3 5 4 1 2 3 3 0 0 0 1 1 2 1 2 1 1 1 2 1 2 1 1 2 2 3   % Basophils % 1 1 1 1 1 1 1 1 0 0 0 0 1 1 1 1 1 1 1 0 1 1 1 1 1 1 1   Absolute Basophils 0.0 - 0.2 10e3/uL 0.1 0.1 0.1 0.1 0.1 0.1 0.1 0.1 0.0 0.0 0.0 0.0 0.1 0.1 0.0 0.1 0.1 0.1 0.1 0.0 0.0 0.1 0.1 0.1 0.1 0.1 0.1   Absolute Eosinophils 0.0 - 0.7 10e3/uL 0.3 0.2 0.4 0.2 0.1 0.1 0.4 0.3 0.0 0.0 0.0 0.1 0.1 0.1 0.1 0.1 0.1 0.1 0.1 0.2 0.0 0.1 0.1 0.1 0.1 0.2 0.3   Absolute Immature Granulocytes <=0.4 10e3/uL 0.0 0.0 0.0 0.0 0.0 0.0 0.1 0.0 0.1 0.1 0.1 0.1 0.1 0.0 0.0 0.0 0.0 0.0 0.0 0.0 0.0 0.0 0.0 0.0 0.0 0.0 0.0   Absolute Lymphocytes 0.8 - 5.3 10e3/uL 1.4 2.0 2.5 1.4 1.5 1.4 2.0 2.3 1.0 0.5 (L) 0.6 (L) 1.7 1.7 1.6 1.5 2.1 2.0 2.1 1.9 1.2 1.4 2.0 1.5 1.8 1.7 1.3 2.5   Absolute Monocytes 0.0 - 1.3 10e3/uL 0.8 0.9 1.0 0.7 0.7 0.7 1.2 1.1 0.1 0.6 0.4 0.8 0.9 0.6 0.6 0.8 0.8 0.8 0.7 0.6 0.6 0.8 0.7 0.7 0.8 0.7 1.0   % Immature Granulocytes % 1 0 0 0 0 0 1 0 1 0 1 1 1 0 0 0 0 1 0 0 0 0 0 0 0 0 0   Absolute Neutrophils 1.6 - 8.3 10e3/uL 3.4 3.9 4.9 4.3 4.2 4.5 7.3 6.4 9.8 (H) 14.2 (H) 11.6 (H) 6.1 6.7 3.3 5.4 5.3 5.0 5.3 5.6  5.5 5.5 4.0 5.0 5.0 2.9 6.8 4.7   Absolute NRBCs 10e3/uL 0.0 0.0 0.0 0.0 0.0 0.0 0.0 0.0 0.0 0.0 0.0 0.0 0.0 0.0 0.0 0.0 0.0 0.0 0.0 0.0 0.0 0.0 0.0 0.0 0.0 0.0 0.0   NRBCs per 100 WBC <1 /100 0 0 0 0 0 0 0 0 0 0 0 0 0 0 0 0 0 0 0 0 0 0 0 0 0 0 0      Latest Reference Range & Units 12/06/22 09:40 12/14/22 09:20 01/10/23 15:17 01/26/23 14:47 02/15/23 16:02 03/07/23 09:00   Haptoglobin 32 - 197 mg/dL 37 31 (L) 22 (L) 31 (L) 118 50        Latest Reference Range & Units 01/10/23 15:17   SPECIMEN EXPIRATION DATE  [id number]   MIRA Anti-IgG,-C3d  Positive 3+   Hep B Surface Agn Nonreactive  Nonreactive   Hepatitis B Core Margarette Nonreactive  Nonreactive   Hepatitis B Surface Antibody Instrument Value <8.00 m[IU]/mL 35.73   Hepatitis B Surface Antibody  Reactive   Hepatitis C Antibody Nonreactive  Nonreactive   HIV Antigen Antibody Combo Nonreactive  Nonreactive      Latest Reference Range & Units 01/10/23 15:17   PAVAN interpretation Negative  Positive !      01/10/23 15:17   PAVAN titer 1 1:160     Serum M-protein (g/dL):  1/10/23: 0.0    Total IgG (mg/dL), IgA (mg/dL), IgM (mg/dL):  1/10/23: 956, 135, 44    Free kappa light chains (mg/dL), lambda (mg/dL), kappa/lambda ratio:  1/10/23: 1.33, 1.45, 0.92       Latest Reference Range & Units 12/14/22 09:20   MIRA Anti-IgG,-C3d  Weak Positive   MIRA Anti-C3  Negative   MIRA Anti-IgG, Tube  Positive 1+   Blood Bank Chart Comment  BB Chart Comment            Latest Reference Range & Units 12/06/22 09:40   Iron 61 - 157 ug/dL 59 (L)   Iron Binding Capacity 240 - 430 ug/dL 319   Iron Sat Index 15 - 46 % 18   Lactate Dehydrogenase 0 - 250 U/L 240   INR 0.85 - 1.15  0.91   PTT 22 - 38 Seconds 25   Fibrinogen 170 - 490 mg/dL 282   SPECIMEN EXPIRATION DATE  20221209235900   MIRA Anti-IgG,-C3d  Positive 3+               PATHOLOGY:  Final Diagnosis 12/1/22:   Peripheral blood, morphology:  - Marked thrombocytopenia.  - Hemoglobin quantitatively within normal limits and erythrocytes without  diagnostic morphologic abnormality.  - WBC subsets quantitatively within normal limits and without diagnostic morphologic abnormality.  - Negative for schistocytes.  - Negative for overt features of myelodysplasia or circulating blasts.     Final Diagnosis 12/6/22:   Peripheral blood:  --Slight polycythemia/erythrocytosis.  --Marked thrombocytopenia.   Electronically signed by Agnel Marshall MD on 12/7/2022 at 10:33 AM   Comment    The cause of this patient's thrombocytopenia is unclear from the peripheral smear. Some common causes of thrombocytopenia to consider include infections, medications, alcohol, immune mediated mechanisms, and splenic sequestration. If splenic sequestration is of clinical concern, an accurate assessment of spleen size is recommended.         Clinical Information    Thrombocytopenia   Peripheral Smear    The red blood cells appear normochromic.  Rouleaux formation does not appear increased.  Polychromasia does not appear increased.  Poikilocytosis appears mild and nonspecific.  Platelets  are well granulated.  Lymphocytes are predominantly small with cytologically mature chromatin and appear overall polymorphous.  Neutrophils contain normal cytoplasmic granulation and unremarkable nuclear morphology.  There is no dysplasia and no circulating blasts are seen.     Final Diagnosis 12/14/22:   Bone marrow, left posterior iliac crest, decalcified trephine biopsy and aspiration:  -- Normocellular bone marrow for age (60 to 70%) with maturing trilineage hematopoiesis.  -- No evidence of marrow involvement by lymphoma or other hematopoietic neoplasm.     Peripheral blood showing:  -- Moderate thrombocytopenia.     Case Report   Flow Cytometry Report                             Case: [id number]                                   Authorizing Provider:  Hayde Lopez DO         Collected:           12/14/2022 10:06 AM           Ordering Location:     [facility]   Received:             12/14/2022 10:21 AM                                  [facility]                                                             Pathologist:           Marleny Holden MD                                                      Specimen:    Iliac Crest, Bone Marrow Aspirate, Left                                                    Flow Interpretation   A. Iliac Crest, Bone Marrow Aspirate, Left:    - Polytypic B cells  - No aberrant immunophenotype on T cells  - No increase in myeloid blasts and no abnormal myeloid blast population     Case Report   Surgical Pathology Report                         Case: [id number]                                   Authorizing Provider:  Hayde Lopez DO         Collected:           12/15/2022 11:27 AM           Ordering Location:     [facility]   Received:            12/15/2022 11:54 AM                                  Imaging                                                                       Pathologist:           Mirna Roger                                                                Specimen:    Retroperitoneal                                                                            Final Diagnosis   A.  Retroperitoneum, biopsy:  -Three tissue fragments, entirely submitted for flow cytometry evaluation (gross examination only).       Case Report   Flow Cytometry Report                             Case: [id number]                                   Authorizing Provider:  Hayde Lopez DO         Collected:           12/15/2022 01:16 PM           Ordering Location:     [facility]   Received:            12/15/2022 01:17 PM                                  Imaging                                                                       Pathologist:           Marleny Holden MD                                                      Specimen:    Retroperitoneal                                                                            Flow  Interpretation   A. Retroperitoneal :    - Polytypic B cells  - No aberrant immunophenotype on T cells         Case Report   Surgical Pathology Report                         Case: [id number]                                   Authorizing Provider:  Hayde Lopez DO         Collected:           01/04/2023 02:25 PM           Ordering Location:     [facility]   Received:            01/04/2023 02:48 PM                                  [facility]                                                                 Pathologist:           Saadia Allen MD                                                                            Specimen:    Lymph Node(s), Axillary, Right                                                             Final Diagnosis   Lymph node, right axillary, core biopsies:  -Partially sampled lymph node showing no evidence of Hodgkin's or non-Hodgkin's lymphoma  -Completed immunophenotyping studies show polytypic B cells and no aberrant T-cell antigen expression     Case Report   Flow Cytometry Report                             Case: [id number]                                   Authorizing Provider:  Hayde Lopez DO         Collected:           01/04/2023 02:25 PM           Ordering Location:     [facility]   Received:            01/04/2023 02:53 PM                                  [facility]                                                                 Pathologist:           Xiomara Ramsey MD                                                         Specimen:    Lymph Node(s), Axillary, Right                                                             Flow Interpretation   A. Lymph Node(s), Axillary, Right, :  -Polytypic B cells  No aberrant immunophenotype on T cells         Case Report   Surgical Pathology Report                         Case: [id number]                                    Authorizing Provider:  New Burger         Collected:           01/20/2023 02:16 PM                                  MD Ari                                                                  Ordering Location:     [facility]                 Received:            01/20/2023 02:29 PM           Pathologist:           Isabela Kaminski MD                                                     Specimen:    Lymph Node(s), Inguinal, Left, partial lymph node left groin                               Addendum   This addendum is issued to document that Dr. Isabela Kaminski discussed biopsy findings with Dr. Hayde Lopez on 1/26/23 at 12:30.    Addendum electronically signed by Isabela Kaminski MD on 1/26/2023 at 12:30 PM   Final Diagnosis   A. Left groin, lymph node, partial lymphadenectomy:  -Benign lymph node with follicular and interfollicular hyperplasia  -Rare small noncaseating granulomas  -No morphologic evidence of malignancy  -Negative for select microorganisms on stained tissue  -See comment   Electronically signed by Isabela Kaminski MD on 1/26/2023 at 12:16 PM   Comment  [facility]   There is no definitive morphologic or immunophenotypic evidence for malignancy in this biopsy. Instead, the findings (which include follicular and interfollicular hyperplasia and features of progressive transformation of germinal centers) are favored to be reactive.   Evaluation for select microorganisms was performed, with negative results for EBV (by in situ hybridization), HHV8 and toxoplasma (by immunohistochemistry), and acid fast and fungal organisms (by special stains).  Concurrent flow cytometry ([id number]) demonstrated polytypic B cells and no aberrant immunophenotype on T cells.         Case Report   Flow Cytometry Report                             Case: [id number]                                   Authorizing Provider:  New Burger         Collected:           01/20/2023 02:33 PM                                   MD Ari                                                                  Ordering Location:      MAIN OR                 Received:            01/20/2023 02:33 PM           Pathologist:           Marleny Holden MD                                                      Specimen:    Lymph Node(s), Inguinal, Left                                                              Flow Interpretation   A. Lymph Node(s), Inguinal, Left:     - Polytypic B cells  - No aberrant immunophenotype on T cells           IMAGING:  CT CAP 12/6/22:  IMPRESSION:  1.  Several enlarged lymph nodes identified at the right axilla,  retroperitoneum, left pelvis and borderline enlarged at the left  inguinal locations. While nonspecific, a neoplastic etiology is  possible including lymphoma.  2.  Mild splenomegaly.  3.  Lobulated indeterminate nodular masses at the left pericardial  fat.  4.  Indeterminate multiple bilateral pulmonary nodules. The dominant  solid nodule is at the right lower lobe measuring 1.1 cm. Neoplasm  remains in the differential and surveillance imaging and/or further  workup is recommended. See below for follow up imaging guidelines.    PET 12/9/22:  IMPRESSION:     Findings suspicious for active neoplasm such as lymphoma involving lymph nodes above/below the diaphragm. The right axillary for left inguinal lymph nodes are amenable to ultrasound-guided histologic sampling if desired.        ASSESSMENT/PLAN:  Billy Carey is a 33 year old male who is referred for thrombocytopenia. 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 He has not had any bleeding episodes.   -No new meds, recent infection, or recent surgeries.  -TSH normal at 3.85.   -HepB/C and HIV studies historically negative.   -No  splenomegaly on physical examination.  -I doubt hemolysis as retic is normal as well as LFTs.  -We discussed the possibility of lab artifact, but there is no comment on platelet clumping on smear. Also, he had lab work repeated two days in a row with PLT count returning in the 40s.   -Given his history of autoimmune disease, he may have underlying ITP, which is a diagnosis of exclusion. We had discussed possible steroid therapy if platelets are to return <30K.   -Repeat CBC is showing PLTs still in the 40's. Hb is 17.9, WBC normal.   -Also is consideration for a microangiopathic hemolytic anemia. MIRA has come back positive at 3+ (which is inconsistent with MAHA) and patient does not have elevated LFTs, abnormal coag studies, nor reticulocytosis. LDH is also normal at 240. He is afebrile. [facility] blood bank testing has returned +IgG and negative for complement, consistent with warm AIHA.   -CT CAP had returned with 2 cm LN of the retroperitoneum, inguinal, and axillary. There was also note of an abnormal pericardial fat mass. I sent for a PET, that did not show FDG avidity of the pericardial fat mass. The retroperitoneal LNs had SUV of 12-13 with the remainder LN 6-7. I sent patient for a core biopsy of the retroperitoneal LN with IR that returned in fragments with unremarkable FLOW. Bone marrow biopsy was normocellular with normal megakaryocyte morphology. I then pursued a right axillary LN biopsy, which again returned negative with unremarkable FLOW. I sent for excisional biopsy of an inguinal LN with general surgery with pathology once again returning as benign with folliculary and interfollicular hyperplasia. Congo red staining is negative. EBV, HHV8 negative. I reviewed with pathology for any morphologic evidence consistent with Castleman's disease and spoke with Dr. Kaminski who is unable to confirm this at this time. She did have an interdepartmental pathology review. I do note the sinuses contain abundant  histiocytes in the microscopic description and in my differential diagnosis is also Rosai-Dorfamn or Langerhan's disease.  I spoke with Dr. Tyler who is in agreement with workup above. I reviewed with Dr. 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 I reviewed I would then move onto rituximab next if patient is not to have improvement with steroid therapy. Dr. Tyler is in agreement with this plan.   -I reviewed the above with patient and wife, Jayla. I will need to continue to monitor the patient not only for treatment response for the hemolysis, but for evolution of LAD. My plan is to recheck with PET in 6 months time and will continue to monitor for treatment response discussed above.   -PLTs returned at 18K. Patient was hospitalized and treated with decadron 40 mg x4 and IVIG. His platelets have remained >100K since then. He has met with Dr. Tyler as well who is in agreement with next line rituximab. Patient would like to continue to think on this. We discussed the risk of bronchospasm and hypersensitivity.  -Patient has sought second opinion at [facility] (Dr. Ayon) who is in agreement with workup and plan for rituximab in the future.     Weekly rituximab x4 weeks, then possible maintenance:  -Rituximab 375 mg/m2 IV    -PET is showing 13 x 6 mm right level IIb LN with SUV 6.9, 6 mm left supraclavicular LN with SUV 4.4, decreased size and FDG avidity of a 12 mm right axillary LN. He has waxing/waning RP, bilateral iliac chain, and left inguinal LAD with both increased/decreased SUV. He has increase in splenomegaly to 17.9 x 9.6 x 5.6 cm with mild FDG avidity, SUV 5. There are no clear lesions of the spleen for biopsy and will hold on random biopsy due to risk of bleed. He has developed new rash. This was a previous symptom prior to thrombocytopenia. Currently, CBC has  returned with PLT 128K. This, in conjunction with elevated Cr, elevated T. Bili. This will be a pattern to monitor in the future for relapse.   -Platelets have been stable >100K since June 2023. If platelets continue to downtrend, will plan on initiation of rituximab 375 mg/m2 once weekly x4 (with possible maintenance). We reviewed the mechanism of action of rituximab. There is risk of bronchospasm and hypersensitivity with treatment. He has consented to treatment in the eventuality he will require initiation. We discussed possible splenectomy, but this would be further down the line for refractory disease. I spoke with Dr. Tyler today who is in agreement.   -Repeat EBV with high titer IgG, IgM negative. Mononucleosis screen negative.     2) Lymphadenopathy  -See above.  -Path reviewed at [facility] and returning as benign reactive folllicular hyperplasia. No evidence of malignancy.     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  -Patient without arthralgias, skin changes.  -APLA, ds-DNA labs returned negative.  -Rheumatology referral upcoming.     4) History of infectious mononucleosis  -EBV negative on LN.  -EBV PCR negative.    5) DM1  -Patient managed by endocrinology.    6) History of HTN, HLD    7) Rash  -Derm referral for biopsy. Discussed with patient and spouse still need to monitor for the possibility of cutaneous lymphomas.     8) -Cancel labs and follow up on 11/3/23 with DAPHNE as CBC is stable.  -Repeat PET/CT in December 2023 with CBC and follow up with me shortly after.      Hayde Lopez,   Hematology/Oncology  [facility]

## 2023-10-06 NOTE — LETTER
10/6/2023         RE: Billy Carey  [address] [facility] [address]        Dear Colleague,    Thank you for referring your patient, Billy Carey, to the [facility]. Please see a copy of my visit note below.    [facility]    Hematology/Oncology Established Patient Note      Today's Date: 10/6/23    Reason for Consultation: Thrombocytopenia  Referring Provider: ZAHRAA Kingsley CNP      HISTORY OF PRESENT ILLNESS: Billy Carey is a 33 year old male who is referred for thrombocytopenia. 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    Historically, he has had historical normal CBC. On 11/30/22, WBC 6.0, Hb 16.5, PLT 42. Repeat CBC on 12/1/22, WBC 7.2, Hb 17.5, PLT 43. Hepatitis and HIV studies negative.     He has had eustachian tube placement without bleeding event. He has not had any other surgeries.     For DM1- he is followed by endocrinology. He has continuous blood glucose monitoring and an insulin pump.     He drinks alcohol 1-2x/week. No excessive use. No history of smoking.     Family history of VTE in his sister. He is unaware of the nuances surrounding this event. No family history of bleeding disorder or malignancy.       INTERIM HISTORY:  Patient has sought second opinion at [facility].     Since our last visit, platelets have ranged 120K-145K.He continues to have intermittent rash. No B-symptoms.       REVIEW OF SYSTEMS:   A 14 point ROS was reviewed with pertinent positives and negatives in the HPI.        HOME MEDICATIONS:  Current Outpatient Medications   Medication Sig Dispense Refill     atorvastatin (LIPITOR) 10 MG tablet Take 1 tablet (10 mg) by mouth daily 90 tablet 3     blood glucose (KELL CONTOUR NEXT) test strip Test 4-5 times daily 4 Box 3     Blood Glucose Monitoring Suppl (KELL CONTOUR NEXT LINK) W/DEVICE KIT 1 kit 5 times daily. Use as directed 1 kit 1     Continuous Blood Gluc Sensor (DEXCOM G6  SENSOR) MISC USE 1 SENSOR EVERY 10 DAYS       insulin lispro (HUMALOG VIAL) 100 UNIT/ML vial To be used in insulin pump       insulin pen needle (BD CHRIST U/F) 32G X 4 MM miscellaneous Use 3-6 pen needles daily or as directed. 50 each 0     INSULIN PUMP - OUTPATIENT Date Last Updated: 2/15/23  Tandem  Pump Basal Rates/Times:  5147-8571: 2 units/hour  4651-6768: 2.6 units/hour  9113-4131: 2.2 units/hour  8517-2758: 1.7 units/hour  CARB RATIO:   5117-4452: 1 unit per 10 grams carbohydrates  CF / Sensitivity Times:   3364-7882: 1 unit to decrease BG by 30 mg/dL  TARGET B mg/dL       triamcinolone (KENALOG) 0.5 % external ointment Apply a thin layer to affected areas twice daily as needed. 15 g 3     vitamin D2 (ERGOCALCIFEROL) 74648 units (1250 mcg) capsule Take 50,000 Units by mouth once a week       glucagon (GLUCAGON EMERGENCY) 1 MG kit Inject 1 mg into the muscle once for 1 dose (Patient not taking: Reported on 3/7/2023) 1 mg 3         ALLERGIES:  Allergies   Allergen Reactions     Penicillins Rash         PAST MEDICAL HISTORY:  Past Medical History:   Diagnosis Date     Diabetes mellitus type 1, uncontrolled, insulin dependent 2013         PAST SURGICAL HISTORY:  Past Surgical History:   Procedure Laterality Date     EXCISE MASS GROIN Left 2023    Procedure: Lymph Node excisional biopsy;  Surgeon: New Burger MD;  Location: [facility] OR     MYRINGOTOMY, INSERT TUBE BILATERAL, COMBINED Bilateral     As a child         SOCIAL HISTORY:  Social History     Socioeconomic History     Marital status:      Spouse name: Jayla     Number of children: 3     Years of education: Not on file     Highest education level: Not on file   Occupational History     Occupation:      Comment: [employer]   Tobacco Use     Smoking status: Never     Smokeless tobacco: Never   Substance and Sexual Activity     Alcohol use: No     Comment: rarely     Drug use: No     Sexual activity: Yes      Partners: Female   Other Topics Concern     Parent/sibling w/ CABG, MI or angioplasty before 65F 55M? Not Asked   Social History Narrative     Not on file     Social Determinants of Health     Financial Resource Strain: Low Risk  (1/12/2023)    Overall Financial Resource Strain (CARDIA)      Difficulty of Paying Living Expenses: Not hard at all   Food Insecurity: No Food Insecurity (1/12/2023)    Hunger Vital Sign      Worried About Running Out of Food in the Last Year: Never true      Ran Out of Food in the Last Year: Never true   Transportation Needs: No Transportation Needs (1/12/2023)    PRAPARE - Transportation      Lack of Transportation (Medical): No      Lack of Transportation (Non-Medical): No   Physical Activity: Sufficiently Active (1/12/2023)    Exercise Vital Sign      Days of Exercise per Week: 6 days      Minutes of Exercise per Session: 40 min   Stress: No Stress Concern Present (1/12/2023)    [employer] [place] - Occupational Stress Questionnaire      Feeling of Stress : Only a little   Social Connections: Socially Integrated (1/12/2023)    Social Connection and Isolation Panel [NHANES]      Frequency of Communication with Friends and Family: More than three times a week      Frequency of Social Gatherings with Friends and Family: More than three times a week      Attends Jewish Services: 1 to 4 times per year      Active Member of Clubs or Organizations: Yes      Attends Club or Organization Meetings: Not on file      Marital Status:    Interpersonal Safety: Not At Risk (3/31/2023)    Humiliation, Afraid, Rape, and Kick questionnaire      Fear of Current or Ex-Partner: No      Emotionally Abused: No      Physically Abused: No      Sexually Abused: No   Housing Stability: Low Risk  (1/12/2023)    Housing Stability Vital Sign      Unable to Pay for Housing in the Last Year: No      Number of Places Lived in the Last Year: 1      Unstable Housing in the Last Year: No          FAMILY HISTORY:  Family History   Problem Relation Age of Onset     C.A.D. No family hx of      Diabetes No family hx of      Hypertension No family hx of      Cancer No family hx of         no skin cancer     Amblyopia No family hx of      Retinal detachment No family hx of      Thyroid Disease No family hx of      Glaucoma No family hx of      Macular Degeneration No family hx of          PHYSICAL EXAM:  Vital signs:  BP (!) 147/86   Pulse 58   Temp 97.3  F (36.3  C) (Tympanic)   Resp 16   Wt 116.6 kg (257 lb)   SpO2 100%   BMI 32.12 kg/m     ECO  GENERAL/CONSTITUTIONAL: No acute distress.  MUSCULOSKELETAL: Warm and well-perfused, no cyanosis, clubbing, or edema.  NEUROLOGIC: Cranial nerves II-XII are intact. Alert, oriented, answers questions appropriately.  INTEGUMENTARY: No rashes or jaundice.  GAIT: Steady, does not use assistive device.      LABS: Reviewed with patient today.   Latest Reference Range & Units 22 07:56 22 11:59 22 09:40 22 09:20 01/10/23 15:17 23 14:47 02/15/23 16:02 02/15/23 18:51 23 06:57 23 06:33 23 07:00 23 14:22 23 14:59 23 09:00 03/10/23 15:12 23 15:15 23 15:07 23 15:14 23 15:38 23 14:59 23 15:33 23 16:01 23 15:02 23 17:57 08/15/23 12:33 23 15:05 10/06/23 08:33   WBC 4.0 - 11.0 10e3/uL 6.0 7.2 9.0 6.6 6.5 6.8 10.9 10.2 11.0 15.4 (H) 12.7 (H) 8.8 9.5 5.8 7.7 8.5 8.0 8.3 8.2 7.5 7.6 7.0 7.3 7.6 5.6 9.0 8.5   Hemoglobin 13.3 - 17.7 g/dL 16.5 17.5 17.9 (H) 17.3 16.4 17.0 17.8 (H) 17.2 17.6 16.4 15.3 17.1 16.6 16.3 15.9 17.2 16.1 16.4 16.6 16.3 17.1 17.1 16.5 16.5 16.6 16.6 16.3   Hematocrit 40.0 - 53.0 % 47.0 50.6 52.7 48.4 45.8 48.2 51.3 49.3 49.9 45.4 43.5 48.6 47.1 45.9 44.3 48.8 44.7 46.8 45.6 44.7 47.1 47.2 46.7 46.5 46.8 46.8 46.9   Platelet Count 150 - 450 10e3/uL 42 (LL) 43 (LL) 46 (LL) 59 (L) 45 (LL) 50 (L) 18 (LL) 25 (LL) 29 (LL) 66 (L) 100  (L) 196 162 115 (L) 111 (L) 116 (L) 108 (L) 114 (L) 114 (L) 202 128 (L) 144 (L) 120 (L) 145 (L) 124 (L) 112 (L) 124 (L)   RBC Count 4.40 - 5.90 10e6/uL 5.62 5.99 (H) 6.20 (H) 5.95 (H) 5.52 5.75 6.04 (H) 5.90 6.00 (H) 5.53 5.21 5.81 5.65 5.68 5.46 5.92 (H) 5.48 5.58 5.63 5.52 5.83 5.88 5.70 5.74 5.72 5.63 5.61   MCV 78 - 100 fL 84 85 85 81 83 84 85 84 83 82 84 84 83 81 81 82 82 84 81 81 81 80 82 81 82 83 84   MCH 26.5 - 33.0 pg 29.4 29.2 28.9 29.1 29.7 29.6 29.5 29.2 29.3 29.7 29.4 29.4 29.4 28.7 29.1 29.1 29.4 29.4 29.5 29.5 29.3 29.1 28.9 28.7 29.0 29.5 29.1   MCHC 31.5 - 36.5 g/dL 35.1 34.6 34.0 35.7 35.8 35.3 34.7 34.9 35.3 36.1 35.2 35.2 35.2 35.5 35.9 35.2 36.0 35.0 36.4 36.5 36.3 36.2 35.3 35.5 35.5 35.5 34.8   RDW 10.0 - 15.0 % 12.2 12.1 12.3 11.9 11.9 11.9 12.3 12.1 12.0 12.1 12.3 12.2 12.0 11.9 12.0 12.1 12.4 12.7 12.4 11.9 11.7 11.9 11.9 11.9 12.2 12.4 12.3   % Neutrophils % 56 55 55 64 65 66 66 63 89 93 91 70 69 58 70 62 64 63 67 74 72 58 68 65 53 74 56   % Lymphocytes % 23 28 28 21 23 21 18 22 9 3 5 19 18 28 20 25 25 25 23 16 18 28 21 24 31 15 29   % Monocytes % 14 13 11 10 10 10 11 11 1 4 3 9 10 11 8 10 9 9 8 8 8 11 9 9 13 8 11   % Eosinophils % 5 3 5 4 1 2 3 3 0 0 0 1 1 2 1 2 1 1 1 2 1 2 1 1 2 2 3   % Basophils % 1 1 1 1 1 1 1 1 0 0 0 0 1 1 1 1 1 1 1 0 1 1 1 1 1 1 1   Absolute Basophils 0.0 - 0.2 10e3/uL 0.1 0.1 0.1 0.1 0.1 0.1 0.1 0.1 0.0 0.0 0.0 0.0 0.1 0.1 0.0 0.1 0.1 0.1 0.1 0.0 0.0 0.1 0.1 0.1 0.1 0.1 0.1   Absolute Eosinophils 0.0 - 0.7 10e3/uL 0.3 0.2 0.4 0.2 0.1 0.1 0.4 0.3 0.0 0.0 0.0 0.1 0.1 0.1 0.1 0.1 0.1 0.1 0.1 0.2 0.0 0.1 0.1 0.1 0.1 0.2 0.3   Absolute Immature Granulocytes <=0.4 10e3/uL 0.0 0.0 0.0 0.0 0.0 0.0 0.1 0.0 0.1 0.1 0.1 0.1 0.1 0.0 0.0 0.0 0.0 0.0 0.0 0.0 0.0 0.0 0.0 0.0 0.0 0.0 0.0   Absolute Lymphocytes 0.8 - 5.3 10e3/uL 1.4 2.0 2.5 1.4 1.5 1.4 2.0 2.3 1.0 0.5 (L) 0.6 (L) 1.7 1.7 1.6 1.5 2.1 2.0 2.1 1.9 1.2 1.4 2.0 1.5 1.8 1.7 1.3 2.5   Absolute Monocytes 0.0 - 1.3 10e3/uL  0.8 0.9 1.0 0.7 0.7 0.7 1.2 1.1 0.1 0.6 0.4 0.8 0.9 0.6 0.6 0.8 0.8 0.8 0.7 0.6 0.6 0.8 0.7 0.7 0.8 0.7 1.0   % Immature Granulocytes % 1 0 0 0 0 0 1 0 1 0 1 1 1 0 0 0 0 1 0 0 0 0 0 0 0 0 0   Absolute Neutrophils 1.6 - 8.3 10e3/uL 3.4 3.9 4.9 4.3 4.2 4.5 7.3 6.4 9.8 (H) 14.2 (H) 11.6 (H) 6.1 6.7 3.3 5.4 5.3 5.0 5.3 5.6 5.5 5.5 4.0 5.0 5.0 2.9 6.8 4.7   Absolute NRBCs 10e3/uL 0.0 0.0 0.0 0.0 0.0 0.0 0.0 0.0 0.0 0.0 0.0 0.0 0.0 0.0 0.0 0.0 0.0 0.0 0.0 0.0 0.0 0.0 0.0 0.0 0.0 0.0 0.0   NRBCs per 100 WBC <1 /100 0 0 0 0 0 0 0 0 0 0 0 0 0 0 0 0 0 0 0 0 0 0 0 0 0 0 0      Latest Reference Range & Units 12/06/22 09:40 12/14/22 09:20 01/10/23 15:17 01/26/23 14:47 02/15/23 16:02 03/07/23 09:00   Haptoglobin 32 - 197 mg/dL 37 31 (L) 22 (L) 31 (L) 118 50        Latest Reference Range & Units 01/10/23 15:17   SPECIMEN EXPIRATION DATE  [id number]   MIRA Anti-IgG,-C3d  Positive 3+   Hep B Surface Agn Nonreactive  Nonreactive   Hepatitis B Core Margarette Nonreactive  Nonreactive   Hepatitis B Surface Antibody Instrument Value <8.00 m[IU]/mL 35.73   Hepatitis B Surface Antibody  Reactive   Hepatitis C Antibody Nonreactive  Nonreactive   HIV Antigen Antibody Combo Nonreactive  Nonreactive      Latest Reference Range & Units 01/10/23 15:17   PAVAN interpretation Negative  Positive !      01/10/23 15:17   PAVAN titer 1 1:160     Serum M-protein (g/dL):  1/10/23: 0.0    Total IgG (mg/dL), IgA (mg/dL), IgM (mg/dL):  1/10/23: 956, 135, 44    Free kappa light chains (mg/dL), lambda (mg/dL), kappa/lambda ratio:  1/10/23: 1.33, 1.45, 0.92       Latest Reference Range & Units 12/14/22 09:20   MIRA Anti-IgG,-C3d  Weak Positive   MIRA Anti-C3  Negative   MIRA Anti-IgG, Tube  Positive 1+   Blood Bank Chart Comment  BB Chart Comment            Latest Reference Range & Units 12/06/22 09:40   Iron 61 - 157 ug/dL 59 (L)   Iron Binding Capacity 240 - 430 ug/dL 319   Iron Sat Index 15 - 46 % 18   Lactate Dehydrogenase 0 - 250 U/L 240   INR 0.85 - 1.15  0.91    PTT 22 - 38 Seconds 25   Fibrinogen 170 - 490 mg/dL 282   SPECIMEN EXPIRATION DATE  [id number]   MIRA Anti-IgG,-C3d  Positive 3+               PATHOLOGY:  Final Diagnosis 12/1/22:   Peripheral blood, morphology:  - Marked thrombocytopenia.  - Hemoglobin quantitatively within normal limits and erythrocytes without diagnostic morphologic abnormality.  - WBC subsets quantitatively within normal limits and without diagnostic morphologic abnormality.  - Negative for schistocytes.  - Negative for overt features of myelodysplasia or circulating blasts.     Final Diagnosis 12/6/22:   Peripheral blood:  --Slight polycythemia/erythrocytosis.  --Marked thrombocytopenia.   Electronically signed by Angel Marshall MD on 12/7/2022 at 10:33 AM   Comment    The cause of this patient's thrombocytopenia is unclear from the peripheral smear. Some common causes of thrombocytopenia to consider include infections, medications, alcohol, immune mediated mechanisms, and splenic sequestration. If splenic sequestration is of clinical concern, an accurate assessment of spleen size is recommended.         Clinical Information    Thrombocytopenia   Peripheral Smear    The red blood cells appear normochromic.  Rouleaux formation does not appear increased.  Polychromasia does not appear increased.  Poikilocytosis appears mild and nonspecific.  Platelets  are well granulated.  Lymphocytes are predominantly small with cytologically mature chromatin and appear overall polymorphous.  Neutrophils contain normal cytoplasmic granulation and unremarkable nuclear morphology.  There is no dysplasia and no circulating blasts are seen.     Final Diagnosis 12/14/22:   Bone marrow, left posterior iliac crest, decalcified trephine biopsy and aspiration:  -- Normocellular bone marrow for age (60 to 70%) with maturing trilineage hematopoiesis.  -- No evidence of marrow involvement by lymphoma or other hematopoietic neoplasm.     Peripheral blood showing:  --  Moderate thrombocytopenia.     Case Report   Flow Cytometry Report                             Case: [id number]                                   Authorizing Provider:  Hayde Lopez DO         Collected:           12/14/2022 10:06 AM           Ordering Location:     [facility]   Received:            12/14/2022 10:21 AM                                  [facility]                                                             Pathologist:           Marleny oHlden MD                                                      Specimen:    Iliac Crest, Bone Marrow Aspirate, Left                                                    Flow Interpretation   A. Iliac Crest, Bone Marrow Aspirate, Left:    - Polytypic B cells  - No aberrant immunophenotype on T cells  - No increase in myeloid blasts and no abnormal myeloid blast population     Case Report   Surgical Pathology Report                         Case: [id number]                                   Authorizing Provider:  Hayde Lopez DO         Collected:           12/15/2022 11:27 AM           Ordering Location:     [facility]   Received:            12/15/2022 11:54 AM                                  Imaging                                                                       Pathologist:           Mirna Roger                                                                Specimen:    Retroperitoneal                                                                            Final Diagnosis   A.  Retroperitoneum, biopsy:  -Three tissue fragments, entirely submitted for flow cytometry evaluation (gross examination only).       Case Report   Flow Cytometry Report                             Case: [id number]                                   Authorizing Provider:  Hayde Lopez DO         Collected:           12/15/2022 01:16 PM           Ordering Location:     [facility]   Received:            12/15/2022 01:17 PM                                   Imaging                                                                       Pathologist:           Marleny Holden MD                                                      Specimen:    Retroperitoneal                                                                            Flow Interpretation   A. Retroperitoneal :    - Polytypic B cells  - No aberrant immunophenotype on T cells         Case Report   Surgical Pathology Report                         Case: [id number]                                   Authorizing Provider:  Hayde Lopez DO         Collected:           01/04/2023 02:25 PM           Ordering Location:     [facility]   Received:            01/04/2023 02:48 PM                                  [facility]                                                                 Pathologist:           Saadia Allen MD                                                                            Specimen:    Lymph Node(s), Axillary, Right                                                             Final Diagnosis   Lymph node, right axillary, core biopsies:  -Partially sampled lymph node showing no evidence of Hodgkin's or non-Hodgkin's lymphoma  -Completed immunophenotyping studies show polytypic B cells and no aberrant T-cell antigen expression     Case Report   Flow Cytometry Report                             Case: [id number]                                   Authorizing Provider:  Hayde Lopez DO         Collected:           01/04/2023 02:25 PM           Ordering Location:     [facility]   Received:            01/04/2023 02:53 PM                                  [facility]                                                                 Pathologist:           Xiomara Ramsey MD                                                         Specimen:    Lymph  Node(s), Axillary, Right                                                             Flow Interpretation   A. Lymph Node(s), Axillary, Right, :  -Polytypic B cells  No aberrant immunophenotype on T cells         Case Report   Surgical Pathology Report                         Case: [id number]                                   Authorizing Provider:  New Burger         Collected:           01/20/2023 02:16 PM                                  MD Ari                                                                  Ordering Location:      [facility]                 Received:            01/20/2023 02:29 PM           Pathologist:           Isabela Kaminski MD                                                     Specimen:    Lymph Node(s), Inguinal, Left, partial lymph node left groin                               Addendum   This addendum is issued to document that Dr. Isabela Kaminski discussed biopsy findings with Dr. Hayde Lopez on 1/26/23 at 12:30.    Addendum electronically signed by Isabela Kaminski MD on 1/26/2023 at 12:30 PM   Final Diagnosis   A. Left groin, lymph node, partial lymphadenectomy:  -Benign lymph node with follicular and interfollicular hyperplasia  -Rare small noncaseating granulomas  -No morphologic evidence of malignancy  -Negative for select microorganisms on stained tissue  -See comment   Electronically signed by Isabela Kaminski MD on 1/26/2023 at 12:16 PM   Comment  [id number]   There is no definitive morphologic or immunophenotypic evidence for malignancy in this biopsy. Instead, the findings (which include follicular and interfollicular hyperplasia and features of progressive transformation of germinal centers) are favored to be reactive.   Evaluation for select microorganisms was performed, with negative results for EBV (by in situ hybridization), HHV8 and toxoplasma (by immunohistochemistry), and acid fast and fungal organisms (by special stains).  Concurrent flow  cytometry ([id number]) demonstrated polytypic B cells and no aberrant immunophenotype on T cells.         Case Report   Flow Cytometry Report                             Case: [id number]                                   Authorizing Provider:  New Burger         Collected:           01/20/2023 02:33 PM                                  MD Ari                                                                  Ordering Location:      [facility]                 Received:            01/20/2023 02:33 PM           Pathologist:           Marleny Holden MD                                                      Specimen:    Lymph Node(s), Inguinal, Left                                                              Flow Interpretation   A. Lymph Node(s), Inguinal, Left:     - Polytypic B cells  - No aberrant immunophenotype on T cells           IMAGING:  CT CAP 12/6/22:  IMPRESSION:  1.  Several enlarged lymph nodes identified at the right axilla,  retroperitoneum, left pelvis and borderline enlarged at the left  inguinal locations. While nonspecific, a neoplastic etiology is  possible including lymphoma.  2.  Mild splenomegaly.  3.  Lobulated indeterminate nodular masses at the left pericardial  fat.  4.  Indeterminate multiple bilateral pulmonary nodules. The dominant  solid nodule is at the right lower lobe measuring 1.1 cm. Neoplasm  remains in the differential and surveillance imaging and/or further  workup is recommended. See below for follow up imaging guidelines.    PET 12/9/22:  IMPRESSION:     Findings suspicious for active neoplasm such as lymphoma involving lymph nodes above/below the diaphragm. The right axillary for left inguinal lymph nodes are amenable to ultrasound-guided histologic sampling if desired.        ASSESSMENT/PLAN:  Billy Carey is a 33 year old male who is referred for thrombocytopenia. 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 He has not had any bleeding episodes.   -No new meds, recent infection, or recent surgeries.  -TSH normal at 3.85.   -HepB/C and HIV studies historically negative.   -No splenomegaly on physical examination.  -I doubt hemolysis as retic is normal as well as LFTs.  -We discussed the possibility of lab artifact, but there is no comment on platelet clumping on smear. Also, he had lab work repeated two days in a row with PLT count returning in the 40s.   -Given his history of autoimmune disease, he may have underlying ITP, which is a diagnosis of exclusion. We had discussed possible steroid therapy if platelets are to return <30K.   -Repeat CBC is showing PLTs still in the 40's. Hb is 17.9, WBC normal.   -Also is consideration for a microangiopathic hemolytic anemia. MIRA has come back positive at 3+ (which is inconsistent with MAHA) and patient does not have elevated LFTs, abnormal coag studies, nor reticulocytosis. LDH is also normal at 240. He is afebrile. [facility] blood bank testing has returned +IgG and negative for complement, consistent with warm AIHA.   -CT CAP had returned with 2 cm LN of the retroperitoneum, inguinal, and axillary. There was also note of an abnormal pericardial fat mass. I sent for a PET, that did not show FDG avidity of the pericardial fat mass. The retroperitoneal LNs had SUV of 12-13 with the remainder LN 6-7. I sent patient for a core biopsy of the retroperitoneal LN with IR that returned in fragments with unremarkable FLOW. Bone marrow biopsy was normocellular with normal megakaryocyte morphology. I then pursued a right axillary LN biopsy, which again returned negative with unremarkable FLOW. I sent for excisional biopsy of an inguinal LN with general surgery with pathology once again returning as benign with folliculary and interfollicular  hyperplasia. Congo red staining is negative. EBV, HHV8 negative. I reviewed with pathology for any morphologic evidence consistent with Castleman's disease and spoke with Dr. Kaminski who is unable to confirm this at this time. She did have an interdepartmental pathology review. I do note the sinuses contain abundant histiocytes in the microscopic description and in my differential diagnosis is also Rosai-Dorfamn or Langerhan's disease.  I spoke with Dr. Tyler who is in agreement with workup above. I reviewed with Dr. 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 I reviewed I would then move onto rituximab next if patient is not to have improvement with steroid therapy. Dr. Tyler is in agreement with this plan.   -I reviewed the above with patient and wife, Jayla. I will need to continue to monitor the patient not only for treatment response for the hemolysis, but for evolution of LAD. My plan is to recheck with PET in 6 months time and will continue to monitor for treatment response discussed above.   -PLTs returned at 18K. Patient was hospitalized and treated with decadron 40 mg x4 and IVIG. His platelets have remained >100K since then. He has met with Dr. Tyler as well who is in agreement with next line rituximab. Patient would like to continue to think on this. We discussed the risk of bronchospasm and hypersensitivity.  -Patient has sought second opinion at [facility] (Dr. Ayon) who is in agreement with workup and plan for rituximab in the future.     Weekly rituximab x4 weeks, then possible maintenance:  -Rituximab 375 mg/m2 IV    -PET is showing 13 x 6 mm right level IIb LN with SUV 6.9, 6 mm left supraclavicular LN with SUV 4.4, decreased size and FDG avidity of a 12 mm right axillary LN. He has waxing/waning RP, bilateral iliac chain, and left inguinal LAD with both  increased/decreased SUV. He has increase in splenomegaly to 17.9 x 9.6 x 5.6 cm with mild FDG avidity, SUV 5. There are no clear lesions of the spleen for biopsy and will hold on random biopsy due to risk of bleed. He has developed new rash. This was a previous symptom prior to thrombocytopenia. Currently, CBC has returned with PLT 128K. This, in conjunction with elevated Cr, elevated T. Bili. This will be a pattern to monitor in the future for relapse.   -Platelets have been stable >100K since June 2023. If platelets continue to downtrend, will plan on initiation of rituximab 375 mg/m2 once weekly x4 (with possible maintenance). We reviewed the mechanism of action of rituximab. There is risk of bronchospasm and hypersensitivity with treatment. He has consented to treatment in the eventuality he will require initiation. We discussed possible splenectomy, but this would be further down the line for refractory disease. I spoke with Dr. Tyler today who is in agreement.   -Repeat EBV with high titer IgG, IgM negative. Mononucleosis screen negative.     2) Lymphadenopathy  -See above.  -Path reviewed at [facility] and returning as benign reactive folllicular hyperplasia. No evidence of malignancy.     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  -Patient without arthralgias, skin changes.  -APLA, ds-DNA labs returned negative.  -Rheumatology referral upcoming.     4) History of infectious mononucleosis  -EBV negative on LN.  -EBV PCR negative.    5) DM1  -Patient managed by endocrinology.    6) History of HTN, HLD    7) Rash  -Derm referral for biopsy. Discussed with patient and spouse still need to monitor for the possibility of cutaneous lymphomas.     8) -Cancel labs and follow up on 11/3/23 with [facility] as CBC is stable.  -Repeat PET/CT in December 2023 with CBC and follow up with me shortly after.      Hayde Lopez, DO  Hematology/Oncology  [facility]      Again, thank you for allowing me to participate  in the care of your patient.        Sincerely,        Hayde Lopez, DO

## 2024-01-09 ENCOUNTER — HOSPITAL ENCOUNTER (OUTPATIENT)
Dept: PET IMAGING | Facility: CLINIC | Age: 34
Discharge: HOME | End: 2024-01-09
Attending: INTERNAL MEDICINE | Admitting: INTERNAL MEDICINE
Payer: COMMERCIAL

## 2024-01-09 DIAGNOSIS — R59.1 DIFFUSE LYMPHADENOPATHY: ICD-10-CM

## 2024-01-09 DIAGNOSIS — D69.3 AUTOIMMUNE THROMBOCYTOPENIA (H): ICD-10-CM

## 2024-01-09 PROCEDURE — 343N000001 HC RX 343: Performed by: INTERNAL MEDICINE

## 2024-01-09 PROCEDURE — 78816 PET IMAGE W/CT FULL BODY: CPT | Mod: PS

## 2024-01-09 PROCEDURE — A9552 F18 FDG: HCPCS | Performed by: INTERNAL MEDICINE

## 2024-01-09 RX ADMIN — FLUDEOXYGLUCOSE F-18 13.6 MILLICURIE: 500 INJECTION, SOLUTION INTRAVENOUS at 13:48

## 2024-01-11 NOTE — PROGRESS NOTES
Virtual Visit Details    Type of service:  Video Visit     Originating Location (pt. Location): Home    Distant Location (provider location):  On-site  Platform used for Video Visit: ChandrikaWell    START: 1534  END: 1543    [facility]    Hematology/Oncology Established Patient Note      Today's Date: 1/11/24    Reason for Consultation: Thrombocytopenia  Referring Provider: ZAHRAA Kingsley CNP      HISTORY OF PRESENT ILLNESS: Billy Carey is a 33 year old male who is referred for thrombocytopenia. 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    Historically, he has had historical normal CBC. On 11/30/22, WBC 6.0, Hb 16.5, PLT 42. Repeat CBC on 12/1/22, WBC 7.2, Hb 17.5, PLT 43. Hepatitis and HIV studies negative.     He has had eustachian tube placement without bleeding event. He has not had any other surgeries.     For DM1- he is followed by endocrinology. He has continuous blood glucose monitoring and an insulin pump.     He drinks alcohol 1-2x/week. No excessive use. No history of smoking.     Family history of VTE in his sister. He is unaware of the nuances surrounding this event. No family history of bleeding disorder or malignancy.       INTERIM HISTORY:  Since our last visit, platelets have ranged 120K-145K.He continues to have intermittent rash. No B-symptoms.       REVIEW OF SYSTEMS:   A 14 point ROS was reviewed with pertinent positives and negatives in the HPI.        HOME MEDICATIONS:  Current Outpatient Medications   Medication Sig Dispense Refill    atorvastatin (LIPITOR) 10 MG tablet Take 1 tablet (10 mg) by mouth daily 90 tablet 3    blood glucose (KELL CONTOUR NEXT) test strip Test 4-5 times daily 4 Box 3    Blood Glucose Monitoring Suppl (KELL CONTOUR NEXT LINK) W/DEVICE KIT 1 kit 5 times daily. Use as directed 1 kit 1    Continuous Blood Gluc Sensor (DEXCOM G6 SENSOR) MISC USE 1 SENSOR EVERY 10 DAYS      glucagon (GLUCAGON EMERGENCY) 1 MG kit Inject 1 mg  into the muscle once for 1 dose (Patient not taking: Reported on 3/7/2023) 1 mg 3    insulin lispro (HUMALOG VIAL) 100 UNIT/ML vial To be used in insulin pump      insulin pen needle (BD CHRIST U/F) 32G X 4 MM miscellaneous Use 3-6 pen needles daily or as directed. 50 each 0    INSULIN PUMP - OUTPATIENT Date Last Updated: 2/15/23  Tandem  Pump Basal Rates/Times:  7044-9963: 2 units/hour  4235-9280: 2.6 units/hour  5394-0942: 2.2 units/hour  6656-9583: 1.7 units/hour  CARB RATIO:   0901-0875: 1 unit per 10 grams carbohydrates  CF / Sensitivity Times:   8739-5762: 1 unit to decrease BG by 30 mg/dL  TARGET B mg/dL      triamcinolone (KENALOG) 0.5 % external ointment Apply a thin layer to affected areas twice daily as needed. 15 g 3    vitamin D2 (ERGOCALCIFEROL) 57219 units (1250 mcg) capsule Take 50,000 Units by mouth once a week           ALLERGIES:  Allergies   Allergen Reactions    Penicillins Rash         PAST MEDICAL HISTORY:  Past Medical History:   Diagnosis Date    Diabetes mellitus type 1, uncontrolled, insulin dependent 2013         PAST SURGICAL HISTORY:  Past Surgical History:   Procedure Laterality Date    EXCISE MASS GROIN Left 2023    Procedure: Lymph Node excisional biopsy;  Surgeon: New Burger MD;  Location: [facility] OR    MYRINGOTOMY, INSERT TUBE BILATERAL, COMBINED Bilateral     As a child         SOCIAL HISTORY:  Social History     Socioeconomic History    Marital status:      Spouse name: Jayla    Number of children: 3    Years of education: Not on file    Highest education level: Not on file   Occupational History    Occupation:      Comment: [employer]   Tobacco Use    Smoking status: Never    Smokeless tobacco: Never   Substance and Sexual Activity    Alcohol use: No     Comment: rarely    Drug use: No    Sexual activity: Yes     Partners: Female   Other Topics Concern    Parent/sibling w/ CABG, MI or angioplasty before 65F 55M? Not Asked   Social  History Narrative    Not on file     Social Determinants of Health     Financial Resource Strain: Low Risk  (1/12/2023)    Overall Financial Resource Strain (CARDIA)     Difficulty of Paying Living Expenses: Not hard at all   Food Insecurity: No Food Insecurity (1/12/2023)    Hunger Vital Sign     Worried About Running Out of Food in the Last Year: Never true     Ran Out of Food in the Last Year: Never true   Transportation Needs: No Transportation Needs (1/12/2023)    PRAPARE - Transportation     Lack of Transportation (Medical): No     Lack of Transportation (Non-Medical): No   Physical Activity: Sufficiently Active (1/12/2023)    Exercise Vital Sign     Days of Exercise per Week: 6 days     Minutes of Exercise per Session: 40 min   Stress: No Stress Concern Present (1/12/2023)    [employer] [place] [employer] - Occupational Stress Questionnaire     Feeling of Stress : Only a little   Social Connections: Socially Integrated (1/12/2023)    Social Connection and Isolation Panel [NHANES]     Frequency of Communication with Friends and Family: More than three times a week     Frequency of Social Gatherings with Friends and Family: More than three times a week     Attends Religion Services: 1 to 4 times per year     Active Member of Clubs or Organizations: Yes     Attends Club or Organization Meetings: Not on file     Marital Status:    Interpersonal Safety: Not At Risk (3/31/2023)    Humiliation, Afraid, Rape, and Kick questionnaire     Fear of Current or Ex-Partner: No     Emotionally Abused: No     Physically Abused: No     Sexually Abused: No   Housing Stability: Low Risk  (1/12/2023)    Housing Stability Vital Sign     Unable to Pay for Housing in the Last Year: No     Number of Places Lived in the Last Year: 1     Unstable Housing in the Last Year: No         FAMILY HISTORY:  Family History   Problem Relation Age of Onset    C.A.D. No family hx of     Diabetes No family hx of     Hypertension  No family hx of     Cancer No family hx of         no skin cancer    Amblyopia No family hx of     Retinal detachment No family hx of     Thyroid Disease No family hx of     Glaucoma No family hx of     Macular Degeneration No family hx of          PHYSICAL EXAM:  ECO  GENERAL/CONSTITUTIONAL: No acute distress. Healthy, alert.  EYES: No scleral icterus.  No redness or discharge.    RESPIRATORY: No audible wheeze, cough, or visible cyanosis.  No visible retractions or increased work of breathing.  Able to speak fully in complete sentences.  MUSCULOSKELETAL: Normal range of motion.  NEUROLOGIC: Alert, oriented, answers questions appropriately. No tremor. Mentation intact and speech normal  INTEGUMENTARY: No jaundice.  No obvious rash or skin lesions.  PSYCHIATRIC:  Mentation appears normal, affect normal/bright, judgement and insight intact, normal speech and appearance well-groomed.    The rest of a comprehensive physical exam is deferred due to public [facility] emergency video visit restrictions.        LABS: Reviewed with patient today.   Latest Reference Range & Units 22 07:56 22 11:59 22 09:40 22 09:20 01/10/23 15:17 23 14:47 02/15/23 16:02 02/15/23 18:51 23 06:57 23 06:33 23 07:00 23 14:22 23 14:59 23 09:00 03/10/23 15:12 23 15:15 23 15:07 23 15:14 23 15:38 23 14:59 23 15:33 23 16:01 23 15:02 23 17:57 08/15/23 12:33 23 15:05 10/06/23 08:33   WBC 4.0 - 11.0 10e3/uL 6.0 7.2 9.0 6.6 6.5 6.8 10.9 10.2 11.0 15.4 (H) 12.7 (H) 8.8 9.5 5.8 7.7 8.5 8.0 8.3 8.2 7.5 7.6 7.0 7.3 7.6 5.6 9.0 8.5   Hemoglobin 13.3 - 17.7 g/dL 16.5 17.5 17.9 (H) 17.3 16.4 17.0 17.8 (H) 17.2 17.6 16.4 15.3 17.1 16.6 16.3 15.9 17.2 16.1 16.4 16.6 16.3 17.1 17.1 16.5 16.5 16.6 16.6 16.3   Hematocrit 40.0 - 53.0 % 47.0 50.6 52.7 48.4 45.8 48.2 51.3 49.3 49.9 45.4 43.5 48.6 47.1 45.9 44.3 48.8 44.7 46.8 45.6 44.7 47.1 47.2  46.7 46.5 46.8 46.8 46.9   Platelet Count 150 - 450 10e3/uL 42 (LL) 43 (LL) 46 (LL) 59 (L) 45 (LL) 50 (L) 18 (LL) 25 (LL) 29 (LL) 66 (L) 100 (L) 196 162 115 (L) 111 (L) 116 (L) 108 (L) 114 (L) 114 (L) 202 128 (L) 144 (L) 120 (L) 145 (L) 124 (L) 112 (L) 124 (L)   RBC Count 4.40 - 5.90 10e6/uL 5.62 5.99 (H) 6.20 (H) 5.95 (H) 5.52 5.75 6.04 (H) 5.90 6.00 (H) 5.53 5.21 5.81 5.65 5.68 5.46 5.92 (H) 5.48 5.58 5.63 5.52 5.83 5.88 5.70 5.74 5.72 5.63 5.61   MCV 78 - 100 fL 84 85 85 81 83 84 85 84 83 82 84 84 83 81 81 82 82 84 81 81 81 80 82 81 82 83 84   MCH 26.5 - 33.0 pg 29.4 29.2 28.9 29.1 29.7 29.6 29.5 29.2 29.3 29.7 29.4 29.4 29.4 28.7 29.1 29.1 29.4 29.4 29.5 29.5 29.3 29.1 28.9 28.7 29.0 29.5 29.1   MCHC 31.5 - 36.5 g/dL 35.1 34.6 34.0 35.7 35.8 35.3 34.7 34.9 35.3 36.1 35.2 35.2 35.2 35.5 35.9 35.2 36.0 35.0 36.4 36.5 36.3 36.2 35.3 35.5 35.5 35.5 34.8   RDW 10.0 - 15.0 % 12.2 12.1 12.3 11.9 11.9 11.9 12.3 12.1 12.0 12.1 12.3 12.2 12.0 11.9 12.0 12.1 12.4 12.7 12.4 11.9 11.7 11.9 11.9 11.9 12.2 12.4 12.3   % Neutrophils % 56 55 55 64 65 66 66 63 89 93 91 70 69 58 70 62 64 63 67 74 72 58 68 65 53 74 56   % Lymphocytes % 23 28 28 21 23 21 18 22 9 3 5 19 18 28 20 25 25 25 23 16 18 28 21 24 31 15 29   % Monocytes % 14 13 11 10 10 10 11 11 1 4 3 9 10 11 8 10 9 9 8 8 8 11 9 9 13 8 11   % Eosinophils % 5 3 5 4 1 2 3 3 0 0 0 1 1 2 1 2 1 1 1 2 1 2 1 1 2 2 3   % Basophils % 1 1 1 1 1 1 1 1 0 0 0 0 1 1 1 1 1 1 1 0 1 1 1 1 1 1 1   Absolute Basophils 0.0 - 0.2 10e3/uL 0.1 0.1 0.1 0.1 0.1 0.1 0.1 0.1 0.0 0.0 0.0 0.0 0.1 0.1 0.0 0.1 0.1 0.1 0.1 0.0 0.0 0.1 0.1 0.1 0.1 0.1 0.1   Absolute Eosinophils 0.0 - 0.7 10e3/uL 0.3 0.2 0.4 0.2 0.1 0.1 0.4 0.3 0.0 0.0 0.0 0.1 0.1 0.1 0.1 0.1 0.1 0.1 0.1 0.2 0.0 0.1 0.1 0.1 0.1 0.2 0.3   Absolute Immature Granulocytes <=0.4 10e3/uL 0.0 0.0 0.0 0.0 0.0 0.0 0.1 0.0 0.1 0.1 0.1 0.1 0.1 0.0 0.0 0.0 0.0 0.0 0.0 0.0 0.0 0.0 0.0 0.0 0.0 0.0 0.0   Absolute Lymphocytes 0.8 - 5.3 10e3/uL 1.4 2.0 2.5 1.4  1.5 1.4 2.0 2.3 1.0 0.5 (L) 0.6 (L) 1.7 1.7 1.6 1.5 2.1 2.0 2.1 1.9 1.2 1.4 2.0 1.5 1.8 1.7 1.3 2.5   Absolute Monocytes 0.0 - 1.3 10e3/uL 0.8 0.9 1.0 0.7 0.7 0.7 1.2 1.1 0.1 0.6 0.4 0.8 0.9 0.6 0.6 0.8 0.8 0.8 0.7 0.6 0.6 0.8 0.7 0.7 0.8 0.7 1.0   % Immature Granulocytes % 1 0 0 0 0 0 1 0 1 0 1 1 1 0 0 0 0 1 0 0 0 0 0 0 0 0 0   Absolute Neutrophils 1.6 - 8.3 10e3/uL 3.4 3.9 4.9 4.3 4.2 4.5 7.3 6.4 9.8 (H) 14.2 (H) 11.6 (H) 6.1 6.7 3.3 5.4 5.3 5.0 5.3 5.6 5.5 5.5 4.0 5.0 5.0 2.9 6.8 4.7   Absolute NRBCs 10e3/uL 0.0 0.0 0.0 0.0 0.0 0.0 0.0 0.0 0.0 0.0 0.0 0.0 0.0 0.0 0.0 0.0 0.0 0.0 0.0 0.0 0.0 0.0 0.0 0.0 0.0 0.0 0.0   NRBCs per 100 WBC <1 /100 0 0 0 0 0 0 0 0 0 0 0 0 0 0 0 0 0 0 0 0 0 0 0 0 0 0 0      [facility] Reference Range & Units 12/06/22 09:40 12/14/22 09:20 01/10/23 15:17 01/26/23 14:47 02/15/23 16:02 03/07/23 09:00   Haptoglobin 32 - 197 mg/dL 37 31 (L) 22 (L) 31 (L) 118 50        Latest Reference Range & Units 01/10/23 15:17   SPECIMEN EXPIRATION DATE  [id number]   MIRA Anti-IgG,-C3d  Positive 3+   Hep B Surface Agn Nonreactive  Nonreactive   Hepatitis B Core Margarette Nonreactive  Nonreactive   Hepatitis B Surface Antibody Instrument Value <8.00 m[IU]/mL 35.73   Hepatitis B Surface Antibody  Reactive   Hepatitis C Antibody Nonreactive  Nonreactive   HIV Antigen Antibody Combo Nonreactive  Nonreactive      Latest Reference Range & Units 01/10/23 15:17   PAVAN interpretation Negative  Positive !      01/10/23 15:17   PAVAN titer 1 1:160     Serum M-protein (g/dL):  1/10/23: 0.0    Total IgG (mg/dL), IgA (mg/dL), IgM (mg/dL):  1/10/23: 956, 135, 44    Free kappa light chains (mg/dL), lambda (mg/dL), kappa/lambda ratio:  1/10/23: 1.33, 1.45, 0.92       Latest Reference Range & Units 12/14/22 09:20   MIRA Anti-IgG,-C3d  Weak Positive   MIRA Anti-C3  Negative   MIRA Anti-IgG, Tube  Positive 1+   Blood Bank Chart Comment  BB Chart Comment            Latest Reference Range & Units 12/06/22 09:40   Iron 61 - 157 ug/dL 59 (L)    Iron Binding Capacity 240 - 430 ug/dL 319   Iron Sat Index 15 - 46 % 18   Lactate Dehydrogenase 0 - 250 U/L 240   INR 0.85 - 1.15  0.91   PTT 22 - 38 Seconds 25   Fibrinogen 170 - 490 mg/dL 282   SPECIMEN EXPIRATION DATE  [id number]   MIRA Anti-IgG,-C3d  Positive 3+               PATHOLOGY:  Final Diagnosis 12/1/22:   Peripheral blood, morphology:  - Marked thrombocytopenia.  - Hemoglobin quantitatively within normal limits and erythrocytes without diagnostic morphologic abnormality.  - WBC subsets quantitatively within normal limits and without diagnostic morphologic abnormality.  - Negative for schistocytes.  - Negative for overt features of myelodysplasia or circulating blasts.     Final Diagnosis 12/6/22:   Peripheral blood:  --Slight polycythemia/erythrocytosis.  --Marked thrombocytopenia.   Electronically signed by Angel Marshall MD on 12/7/2022 at 10:33 AM   Comment    The cause of this patient's thrombocytopenia is unclear from the peripheral smear. Some common causes of thrombocytopenia to consider include infections, medications, alcohol, immune mediated mechanisms, and splenic sequestration. If splenic sequestration is of clinical concern, an accurate assessment of spleen size is recommended.         Clinical Information    Thrombocytopenia   Peripheral Smear    The red blood cells appear normochromic.  Rouleaux formation does not appear increased.  Polychromasia does not appear increased.  Poikilocytosis appears mild and nonspecific.  Platelets  are well granulated.  Lymphocytes are predominantly small with cytologically mature chromatin and appear overall polymorphous.  Neutrophils contain normal cytoplasmic granulation and unremarkable nuclear morphology.  There is no dysplasia and no circulating blasts are seen.     Final Diagnosis 12/14/22:   Bone marrow, left posterior iliac crest, decalcified trephine biopsy and aspiration:  -- Normocellular bone marrow for age (60 to 70%) with maturing  trilineage hematopoiesis.  -- No evidence of marrow involvement by lymphoma or other hematopoietic neoplasm.     Peripheral blood showing:  -- Moderate thrombocytopenia.     Case Report   Flow Cytometry Report                             Case: [id number]                                   Authorizing Provider:  Hayde Lopez DO         Collected:           12/14/2022 10:06 AM           Ordering Location:     [facility]   Received:            12/14/2022 10:21 AM                                  [facility]                                                             Pathologist:           Marleny Holden MD                                                      Specimen:    Iliac Crest, Bone Marrow Aspirate, Left                                                    Flow Interpretation   A. Iliac Crest, Bone Marrow Aspirate, Left:    - Polytypic B cells  - No aberrant immunophenotype on T cells  - No increase in myeloid blasts and no abnormal myeloid blast population     Case Report   Surgical Pathology Report                         Case: [id number]                                   Authorizing Provider:  Hayde Lopez DO         Collected:           12/15/2022 11:27 AM           Ordering Location:     [facility]   Received:            12/15/2022 11:54 AM                                  Imaging                                                                       Pathologist:           Mirna Roger                                                                Specimen:    Retroperitoneal                                                                            Final Diagnosis   A.  Retroperitoneum, biopsy:  -Three tissue fragments, entirely submitted for flow cytometry evaluation (gross examination only).       Case Report   Flow Cytometry Report                             Case: [id number]                                   Authorizing Provider:  Hayde Lopez DO          Collected:           12/15/2022 01:16 PM           Ordering Location:     [facility]   Received:            12/15/2022 01:17 PM                                  Imaging                                                                       Pathologist:           Marleny Holden MD                                                      Specimen:    Retroperitoneal                                                                            Flow Interpretation   A. Retroperitoneal :    - Polytypic B cells  - No aberrant immunophenotype on T cells         Case Report   Surgical Pathology Report                         Case: [id number]                                   Authorizing Provider:  Hayde Lopez DO         Collected:           01/04/2023 02:25 PM           Ordering Location:     [facility]   Received:            01/04/2023 02:48 PM                                  [facility]                                                                 Pathologist:           Sadaia Allen MD                                                                            Specimen:    Lymph Node(s), Axillary, Right                                                             Final Diagnosis   Lymph node, right axillary, core biopsies:  -Partially sampled lymph node showing no evidence of Hodgkin's or non-Hodgkin's lymphoma  -Completed immunophenotyping studies show polytypic B cells and no aberrant T-cell antigen expression     Case Report   Flow Cytometry Report                             Case: [id number]                                   Authorizing Provider:  Hayde Lopez DO         Collected:           01/04/2023 02:25 PM           Ordering Location:     [facility]   Received:            01/04/2023 02:53 PM                                  [facility]                                                                  Pathologist:           Xiomara Ramsey MD                                                         Specimen:    Lymph Node(s), Axillary, Right                                                             Flow Interpretation   A. Lymph Node(s), Axillary, Right, :  -Polytypic B cells  No aberrant immunophenotype on T cells         Case Report   Surgical Pathology Report                         Case: [id number]                                   Authorizing Provider:  New Burger         Collected:           01/20/2023 02:16 PM                                  MD Ari                                                                  Ordering Location:      [facility]                 Received:            01/20/2023 02:29 PM           Pathologist:           Isabela Kaminski MD                                                     Specimen:    Lymph Node(s), Inguinal, Left, partial lymph node left groin                               Addendum   This addendum is issued to document that Dr. Isabela Kaminski discussed biopsy findings with Dr. Hayde Lopez on 1/26/23 at 12:30.    Addendum electronically signed by Isabela Kaminski MD on 1/26/2023 at 12:30 PM   Final Diagnosis   A. Left groin, lymph node, partial lymphadenectomy:  -Benign lymph node with follicular and interfollicular hyperplasia  -Rare small noncaseating granulomas  -No morphologic evidence of malignancy  -Negative for select microorganisms on stained tissue  -See comment   Electronically signed by Isabela Kaminski MD on 1/26/2023 at 12:16 PM   Comment  [facility]   There is no definitive morphologic or immunophenotypic evidence for malignancy in this biopsy. Instead, the findings (which include follicular and interfollicular hyperplasia and features of progressive transformation of germinal centers) are favored to be reactive.   Evaluation for select microorganisms was performed, with negative results for EBV (by in situ  hybridization), HHV8 and toxoplasma (by immunohistochemistry), and acid fast and fungal organisms (by special stains).  Concurrent flow cytometry ([id number]) demonstrated polytypic B cells and no aberrant immunophenotype on T cells.         Case Report   Flow Cytometry Report                             Case: [id number]                                   Authorizing Provider:  New Burger         Collected:           01/20/2023 02:33 PM                                  MD Ari                                                                  Ordering Location:     [facility]                 Received:            01/20/2023 02:33 PM           Pathologist:           Marleny Holden MD                                                      Specimen:    Lymph Node(s), Inguinal, Left                                                              Flow Interpretation   A. Lymph Node(s), Inguinal, Left:     - Polytypic B cells  - No aberrant immunophenotype on T cells           IMAGING:  CT CAP 12/6/22:  IMPRESSION:  1.  Several enlarged lymph nodes identified at the right axilla,  retroperitoneum, left pelvis and borderline enlarged at the left  inguinal locations. While nonspecific, a neoplastic etiology is  possible including lymphoma.  2.  Mild splenomegaly.  3.  Lobulated indeterminate nodular masses at the left pericardial  fat.  4.  Indeterminate multiple bilateral pulmonary nodules. The dominant  solid nodule is at the right lower lobe measuring 1.1 cm. Neoplasm  remains in the differential and surveillance imaging and/or further  workup is recommended. See below for follow up imaging guidelines.    PET 12/9/22:  IMPRESSION:     Findings suspicious for active neoplasm such as lymphoma involving lymph nodes above/below the diaphragm. The right axillary for left inguinal lymph nodes are amenable to ultrasound-guided histologic sampling if desired.    PET 1/9/24:  IMPRESSION:  1.  Increasing hypermetabolic  lymphadenopathy within the chest, abdomen, and pelvis.  2.  There is new FDG avid cutaneous thickening in the left posterior thigh. Recommend correlation with direct visualization.  3.  Normal appearance of the spleen.      ASSESSMENT/PLAN:  Billy Carey is a 33 year old male who is referred for thrombocytopenia. 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 He has not had any bleeding episodes.   -No new meds, recent infection, or recent surgeries.  -TSH normal at 3.85.   -HepB/C and HIV studies historically negative.   -No splenomegaly on physical examination.  -I doubt hemolysis as retic is normal as well as LFTs.  -We discussed the possibility of lab artifact, but there is no comment on platelet clumping on smear. Also, he had lab work repeated two days in a row with PLT count returning in the 40s.   -Given his history of autoimmune disease, he may have underlying ITP, which is a diagnosis of exclusion. We had discussed possible steroid therapy if platelets are to return <30K.   -Repeat CBC is showing PLTs still in the 40's. Hb is 17.9, WBC normal.   -Also is consideration for a microangiopathic hemolytic anemia. MIRA has come back positive at 3+ (which is inconsistent with MAHA) and patient does not have elevated LFTs, abnormal coag studies, nor reticulocytosis. LDH is also normal at 240. He is afebrile. [facility] blood bank testing has returned +IgG and negative for complement, consistent with warm AIHA.   -CT CAP had returned with 2 cm LN of the retroperitoneum, inguinal, and axillary. There was also note of an abnormal pericardial fat mass. I sent for a PET, that did not show FDG avidity of the pericardial fat mass. The retroperitoneal LNs had SUV of 12-13 with the remainder LN 6-7. I sent patient for a core  biopsy of the retroperitoneal LN with IR that returned in fragments with unremarkable FLOW. Bone marrow biopsy was normocellular with normal megakaryocyte morphology. I then pursued a right axillary LN biopsy, which again returned negative with unremarkable FLOW. I sent for excisional biopsy of an inguinal LN with general surgery with pathology once again returning as benign with folliculary and interfollicular hyperplasia. Congo red staining is negative. EBV, HHV8 negative. I reviewed with pathology for any morphologic evidence consistent with Castleman's disease and spoke with Dr. Kaminski who is unable to confirm this at this time. She did have an interdepartmental pathology review. I do note the sinuses contain abundant histiocytes in the microscopic description and in my differential diagnosis is also Rosai-Dorfamn or Langerhan's disease.  I spoke with Dr. Tyler who is in agreement with workup above. I reviewed with Dr. 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 I reviewed I would then move onto rituximab next if patient is not to have improvement with steroid therapy. Dr. Tyler is in agreement with this plan.   -I reviewed the above with patient and wife, Jayla. I will need to continue to monitor the patient not only for treatment response for the hemolysis, but for evolution of LAD. My plan is to recheck with PET in 6 months time and will continue to monitor for treatment response discussed above.   -PLTs returned at 18K. Patient was hospitalized and treated with decadron 40 mg x4 and IVIG. His platelets have remained >100K since then. He has met with Dr. Tyler as well who is in agreement with next line rituximab. Patient would like to continue to think on this. We discussed the risk of bronchospasm and hypersensitivity.  -Patient has sought second opinion at [facility]  (Dr. Ayon) who is in agreement with workup and plan for rituximab in the future.     Weekly rituximab x4 weeks, then possible maintenance:  -Rituximab 375 mg/m2 IV    -PET is showing 13 x 6 mm right level IIb LN with SUV 6.9, 6 mm left supraclavicular LN with SUV 4.4, decreased size and FDG avidity of a 12 mm right axillary LN. He has waxing/waning RP, bilateral iliac chain, and left inguinal LAD with both increased/decreased SUV. He has increase in splenomegaly to 17.9 x 9.6 x 5.6 cm with mild FDG avidity, SUV 5. There are no clear lesions of the spleen for biopsy and will hold on random biopsy due to risk of bleed. He has developed new rash. This was a previous symptom prior to thrombocytopenia. Currently, CBC has returned with PLT 128K. This, in conjunction with elevated Cr, elevated T. Bili. This will be a pattern to monitor in the future for relapse.   -Platelets have been stable >100K since June 2023. If platelets continue to downtrend, will plan on initiation of rituximab 375 mg/m2 once weekly x4 (with possible maintenance). We reviewed the mechanism of action of rituximab. There is risk of bronchospasm and hypersensitivity with treatment. He has consented to treatment in the eventuality he will require initiation. We discussed possible splenectomy, but this would be further down the line for refractory disease. I spoke with Dr. Tyler today who is in agreement.   -Repeat EBV with high titer IgG, IgM negative. Mononucleosis screen negative.   -PET 1/9/24: I personally reviewed imaging with patient showing increase in hypermetabolic LAD within the chest/abdomen/pelvis. He has supraclavicular and axillary adenopathy with SUV 5-7, right upper paratracheal LAD with SUV 7.1, retroperitoneal LAD with SUV 11. Most notably, there is an 11 mm left internal iliac LN with SUV 17.1 (previously 4.2 mm). Enlarging left external iliac lymph node measuring 26 x 13 mm (previously 5.2 mm). He is in need of updated labs  and will send to general surgery for left external iliac lymph node biopsy.    2) Lymphadenopathy  -See above.  -Path reviewed at [facility] and returning as benign reactive folllicular hyperplasia. No evidence of malignancy.     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  -Patient without arthralgias, skin changes.  -APLA, ds-DNA labs returned negative.  -Rheumatology referral upcoming.     4) History of infectious mononucleosis  -EBV negative on LN.  -EBV PCR negative.    5) DM1  -Patient managed by endocrinology.    6) History of HTN, HLD    7) Rash  -Derm referral for biopsy. Discussed with patient and spouse still need to monitor for the possibility of cutaneous lymphomas.     8) -Schedule lab draw as ordered.  -General surgery referral for excisional lymph node biopsy.  -Follow up with me last week of January 2024 to review pathology.  -I discussed with patient my planned departure from [facility] and he will establish with a new hematologist with [facility] after February 2024.          Hayde Lopez, DO  Hematology/Oncology  [facility]

## 2024-01-12 ENCOUNTER — VIRTUAL VISIT (OUTPATIENT)
Dept: ONCOLOGY | Facility: CLINIC | Age: 34
End: 2024-01-12
Attending: INTERNAL MEDICINE
Payer: COMMERCIAL

## 2024-01-12 VITALS
SYSTOLIC BLOOD PRESSURE: 130 MMHG | DIASTOLIC BLOOD PRESSURE: 70 MMHG | BODY MASS INDEX: 30.96 KG/M2 | HEART RATE: 68 BPM | WEIGHT: 249 LBS | HEIGHT: 75 IN

## 2024-01-12 DIAGNOSIS — R59.1 DIFFUSE LYMPHADENOPATHY: Primary | ICD-10-CM

## 2024-01-12 PROCEDURE — 99215 OFFICE O/P EST HI 40 MIN: CPT | Mod: 95 | Performed by: INTERNAL MEDICINE

## 2024-01-12 ASSESSMENT — PAIN SCALES - GENERAL: PAINLEVEL: NO PAIN (0)

## 2024-01-12 NOTE — LETTER
1/12/2024         RE: Billy Carey  [id number]  [id number]        Dear Colleague,    Thank you for referring your patient, Billy Carey, to the [facility]. Please see a copy of my visit note below.    Virtual Visit Details    Type of service:  Video Visit     Originating Location (pt. Location): Home    Distant Location (provider location):  On-site  Platform used for Video Visit: [employer]    START: 1534  END: 1543    [facility]    Hematology/Oncology Established Patient Note      Today's Date: 1/11/24    Reason for Consultation: Thrombocytopenia  Referring Provider: ZAHRAA Kingsley CNP      HISTORY OF PRESENT ILLNESS: Billy Carey is a 33 year old male who is referred for thrombocytopenia. 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    Historically, he has had historical normal CBC. On 11/30/22, WBC 6.0, Hb 16.5, PLT 42. Repeat CBC on 12/1/22, WBC 7.2, Hb 17.5, PLT 43. Hepatitis and HIV studies negative.     He has had eustachian tube placement without bleeding event. He has not had any other surgeries.     For DM1- he is followed by endocrinology. He has continuous blood glucose monitoring and an insulin pump.     He drinks alcohol 1-2x/week. No excessive use. No history of smoking.     Family history of VTE in his sister. He is unaware of the nuances surrounding this event. No family history of bleeding disorder or malignancy.       INTERIM HISTORY:  Since our last visit, platelets have ranged 120K-145K.He continues to have intermittent rash. No B-symptoms.       REVIEW OF SYSTEMS:   A 14 point ROS was reviewed with pertinent positives and negatives in the HPI.        HOME MEDICATIONS:  Current Outpatient Medications   Medication Sig Dispense Refill     atorvastatin (LIPITOR) 10 MG tablet Take 1 tablet (10 mg) by mouth daily 90 tablet 3     blood glucose (KELL CONTOUR NEXT) test strip Test 4-5 times daily 4 Box 3      Blood Glucose Monitoring Suppl (KELL CONTOUR NEXT LINK) W/DEVICE KIT 1 kit 5 times daily. Use as directed 1 kit 1     Continuous Blood Gluc Sensor (DEXCOM G6 SENSOR) MISC USE 1 SENSOR EVERY 10 DAYS       glucagon (GLUCAGON EMERGENCY) 1 MG kit Inject 1 mg into the muscle once for 1 dose (Patient not taking: Reported on 3/7/2023) 1 mg 3     insulin lispro (HUMALOG VIAL) 100 UNIT/ML vial To be used in insulin pump       insulin pen needle (BD CHRIST U/F) 32G X 4 MM miscellaneous Use 3-6 pen needles daily or as directed. 50 each 0     INSULIN PUMP - OUTPATIENT Date Last Updated: 2/15/23  Tandem  Pump Basal Rates/Times:  6362-4327: 2 units/hour  5340-2741: 2.6 units/hour  1040-5458: 2.2 units/hour  0772-1512: 1.7 units/hour  CARB RATIO:   8290-3892: 1 unit per 10 grams carbohydrates  CF / Sensitivity Times:   1120-5424: 1 unit to decrease BG by 30 mg/dL  TARGET B mg/dL       triamcinolone (KENALOG) 0.5 % external ointment Apply a thin layer to affected areas twice daily as needed. 15 g 3     vitamin D2 (ERGOCALCIFEROL) 85429 units (1250 mcg) capsule Take 50,000 Units by mouth once a week           ALLERGIES:  Allergies   Allergen Reactions     Penicillins Rash         PAST MEDICAL HISTORY:  Past Medical History:   Diagnosis Date     Diabetes mellitus type 1, uncontrolled, insulin dependent 2013         PAST SURGICAL HISTORY:  Past Surgical History:   Procedure Laterality Date     EXCISE MASS GROIN Left 2023    Procedure: Lymph Node excisional biopsy;  Surgeon: New Burger MD;  Location: [facility] OR     MYRINGOTOMY, INSERT TUBE BILATERAL, COMBINED Bilateral     As a child         SOCIAL HISTORY:  Social History     Socioeconomic History     Marital status:      Spouse name: Jayla     Number of children: 3     Years of education: Not on file     Highest education level: Not on file   Occupational History     Occupation:      Comment: [employer]   Tobacco Use     Smoking  status: Never     Smokeless tobacco: Never   Substance and Sexual Activity     Alcohol use: No     Comment: rarely     Drug use: No     Sexual activity: Yes     Partners: Female   Other Topics Concern     Parent/sibling w/ CABG, MI or angioplasty before 65F 55M? Not Asked   Social History Narrative     Not on file     Social Determinants of Health     Financial Resource Strain: Low Risk  (1/12/2023)    Overall Financial Resource Strain (CARDIA)      Difficulty of Paying Living Expenses: Not hard at all   Food Insecurity: No Food Insecurity (1/12/2023)    Hunger Vital Sign      Worried About Running Out of Food in the Last Year: Never true      Ran Out of Food in the Last Year: Never true   Transportation Needs: No Transportation Needs (1/12/2023)    PRAPARE - Transportation      Lack of Transportation (Medical): No      Lack of Transportation (Non-Medical): No   Physical Activity: Sufficiently Active (1/12/2023)    Exercise Vital Sign      Days of Exercise per Week: 6 days      Minutes of Exercise per Session: 40 min   Stress: No Stress Concern Present (1/12/2023)    [employer] - Occupational Stress Questionnaire      Feeling of Stress : Only a little   Social Connections: Socially Integrated (1/12/2023)    Social Connection and Isolation Panel [NHANES]      Frequency of Communication with Friends and Family: More than three times a week      Frequency of Social Gatherings with Friends and Family: More than three times a week      Attends Congregation Services: 1 to 4 times per year      Active Member of Clubs or Organizations: Yes      Attends Club or Organization Meetings: Not on file      Marital Status:    Interpersonal Safety: Not At Risk (3/31/2023)    Humiliation, Afraid, Rape, and Kick questionnaire      Fear of Current or Ex-Partner: No      Emotionally Abused: No      Physically Abused: No      Sexually Abused: No   Housing Stability: Low Risk  (1/12/2023)    Housing  Stability Vital Sign      Unable to Pay for Housing in the Last Year: No      Number of Places Lived in the Last Year: 1      Unstable Housing in the Last Year: No         FAMILY HISTORY:  Family History   Problem Relation Age of Onset     C.A.D. No family hx of      Diabetes No family hx of      Hypertension No family hx of      Cancer No family hx of         no skin cancer     Amblyopia No family hx of      Retinal detachment No family hx of      Thyroid Disease No family hx of      Glaucoma No family hx of      Macular Degeneration No family hx of          PHYSICAL EXAM:  ECO  GENERAL/CONSTITUTIONAL: No acute distress. Healthy, alert.  EYES: No scleral icterus.  No redness or discharge.    RESPIRATORY: No audible wheeze, cough, or visible cyanosis.  No visible retractions or increased work of breathing.  Able to speak fully in complete sentences.  MUSCULOSKELETAL: Normal range of motion.  NEUROLOGIC: Alert, oriented, answers questions appropriately. No tremor. Mentation intact and speech normal  INTEGUMENTARY: No jaundice.  No obvious rash or skin lesions.  PSYCHIATRIC:  Mentation appears normal, affect normal/bright, judgement and insight intact, normal speech and appearance well-groomed.    The rest of a comprehensive physical exam is deferred due to public [facility] emergency video visit restrictions.        LABS: Reviewed with patient today.   Latest Reference Range & Units 22 07:56 22 11:59 22 09:40 22 09:20 01/10/23 15:17 23 14:47 02/15/23 16:02 02/15/23 18:51 23 06:57 23 06:33 23 07:00 23 14:22 23 14:59 23 09:00 03/10/23 15:12 23 15:15 23 15:07 23 15:14 23 15:38 23 14:59 23 15:33 23 16:01 23 15:02 23 17:57 08/15/23 12:33 23 15:05 10/06/23 08:33   WBC 4.0 - 11.0 10e3/uL 6.0 7.2 9.0 6.6 6.5 6.8 10.9 10.2 11.0 15.4 (H) 12.7 (H) 8.8 9.5 5.8 7.7 8.5 8.0 8.3 8.2 7.5 7.6 7.0 7.3 7.6 5.6  9.0 8.5   Hemoglobin 13.3 - 17.7 g/dL 16.5 17.5 17.9 (H) 17.3 16.4 17.0 17.8 (H) 17.2 17.6 16.4 15.3 17.1 16.6 16.3 15.9 17.2 16.1 16.4 16.6 16.3 17.1 17.1 16.5 16.5 16.6 16.6 16.3   Hematocrit 40.0 - 53.0 % 47.0 50.6 52.7 48.4 45.8 48.2 51.3 49.3 49.9 45.4 43.5 48.6 47.1 45.9 44.3 48.8 44.7 46.8 45.6 44.7 47.1 47.2 46.7 46.5 46.8 46.8 46.9   Platelet Count 150 - 450 10e3/uL 42 (LL) 43 (LL) 46 (LL) 59 (L) 45 (LL) 50 (L) 18 (LL) 25 (LL) 29 (LL) 66 (L) 100 (L) 196 162 115 (L) 111 (L) 116 (L) 108 (L) 114 (L) 114 (L) 202 128 (L) 144 (L) 120 (L) 145 (L) 124 (L) 112 (L) 124 (L)   RBC Count 4.40 - 5.90 10e6/uL 5.62 5.99 (H) 6.20 (H) 5.95 (H) 5.52 5.75 6.04 (H) 5.90 6.00 (H) 5.53 5.21 5.81 5.65 5.68 5.46 5.92 (H) 5.48 5.58 5.63 5.52 5.83 5.88 5.70 5.74 5.72 5.63 5.61   MCV 78 - 100 fL 84 85 85 81 83 84 85 84 83 82 84 84 83 81 81 82 82 84 81 81 81 80 82 81 82 83 84   MCH 26.5 - 33.0 pg 29.4 29.2 28.9 29.1 29.7 29.6 29.5 29.2 29.3 29.7 29.4 29.4 29.4 28.7 29.1 29.1 29.4 29.4 29.5 29.5 29.3 29.1 28.9 28.7 29.0 29.5 29.1   MCHC 31.5 - 36.5 g/dL 35.1 34.6 34.0 35.7 35.8 35.3 34.7 34.9 35.3 36.1 35.2 35.2 35.2 35.5 35.9 35.2 36.0 35.0 36.4 36.5 36.3 36.2 35.3 35.5 35.5 35.5 34.8   RDW 10.0 - 15.0 % 12.2 12.1 12.3 11.9 11.9 11.9 12.3 12.1 12.0 12.1 12.3 12.2 12.0 11.9 12.0 12.1 12.4 12.7 12.4 11.9 11.7 11.9 11.9 11.9 12.2 12.4 12.3   % Neutrophils % 56 55 55 64 65 66 66 63 89 93 91 70 69 58 70 62 64 63 67 74 72 58 68 65 53 74 56   % Lymphocytes % 23 28 28 21 23 21 18 22 9 3 5 19 18 28 20 25 25 25 23 16 18 28 21 24 31 15 29   % Monocytes % 14 13 11 10 10 10 11 11 1 4 3 9 10 11 8 10 9 9 8 8 8 11 9 9 13 8 11   % Eosinophils % 5 3 5 4 1 2 3 3 0 0 0 1 1 2 1 2 1 1 1 2 1 2 1 1 2 2 3   % Basophils % 1 1 1 1 1 1 1 1 0 0 0 0 1 1 1 1 1 1 1 0 1 1 1 1 1 1 1   Absolute Basophils 0.0 - 0.2 10e3/uL 0.1 0.1 0.1 0.1 0.1 0.1 0.1 0.1 0.0 0.0 0.0 0.0 0.1 0.1 0.0 0.1 0.1 0.1 0.1 0.0 0.0 0.1 0.1 0.1 0.1 0.1 0.1   Absolute Eosinophils 0.0 - 0.7 10e3/uL  0.3 0.2 0.4 0.2 0.1 0.1 0.4 0.3 0.0 0.0 0.0 0.1 0.1 0.1 0.1 0.1 0.1 0.1 0.1 0.2 0.0 0.1 0.1 0.1 0.1 0.2 0.3   Absolute Immature Granulocytes <=0.4 10e3/uL 0.0 0.0 0.0 0.0 0.0 0.0 0.1 0.0 0.1 0.1 0.1 0.1 0.1 0.0 0.0 0.0 0.0 0.0 0.0 0.0 0.0 0.0 0.0 0.0 0.0 0.0 0.0   Absolute Lymphocytes 0.8 - 5.3 10e3/uL 1.4 2.0 2.5 1.4 1.5 1.4 2.0 2.3 1.0 0.5 (L) 0.6 (L) 1.7 1.7 1.6 1.5 2.1 2.0 2.1 1.9 1.2 1.4 2.0 1.5 1.8 1.7 1.3 2.5   Absolute Monocytes 0.0 - 1.3 10e3/uL 0.8 0.9 1.0 0.7 0.7 0.7 1.2 1.1 0.1 0.6 0.4 0.8 0.9 0.6 0.6 0.8 0.8 0.8 0.7 0.6 0.6 0.8 0.7 0.7 0.8 0.7 1.0   % Immature Granulocytes % 1 0 0 0 0 0 1 0 1 0 1 1 1 0 0 0 0 1 0 0 0 0 0 0 0 0 0   Absolute Neutrophils 1.6 - 8.3 10e3/uL 3.4 3.9 4.9 4.3 4.2 4.5 7.3 6.4 9.8 (H) 14.2 (H) 11.6 (H) 6.1 6.7 3.3 5.4 5.3 5.0 5.3 5.6 5.5 5.5 4.0 5.0 5.0 2.9 6.8 4.7   Absolute NRBCs 10e3/uL 0.0 0.0 0.0 0.0 0.0 0.0 0.0 0.0 0.0 0.0 0.0 0.0 0.0 0.0 0.0 0.0 0.0 0.0 0.0 0.0 0.0 0.0 0.0 0.0 0.0 0.0 0.0   NRBCs per 100 WBC <1 /100 0 0 0 0 0 0 0 0 0 0 0 0 0 0 0 0 0 0 0 0 0 0 0 0 0 0 0      Latest Reference Range & Units 12/06/22 09:40 12/14/22 09:20 01/10/23 15:17 01/26/23 14:47 02/15/23 16:02 03/07/23 09:00   Haptoglobin 32 - 197 mg/dL 37 31 (L) 22 (L) 31 (L) 118 50        Latest Reference Range & Units 01/10/23 15:17   SPECIMEN EXPIRATION DATE  [id number]   MIRA Anti-IgG,-C3d  Positive 3+   Hep B Surface Agn Nonreactive  Nonreactive   Hepatitis B Core Margarette Nonreactive  Nonreactive   Hepatitis B Surface Antibody Instrument Value <8.00 m[IU]/mL 35.73   Hepatitis B Surface Antibody  Reactive   Hepatitis C Antibody Nonreactive  Nonreactive   HIV Antigen Antibody Combo Nonreactive  Nonreactive      Latest Reference Range & Units 01/10/23 15:17   PAVAN interpretation Negative  Positive !      01/10/23 15:17   PAVAN titer 1 1:160     Serum M-protein (g/dL):  1/10/23: 0.0    Total IgG (mg/dL), IgA (mg/dL), IgM (mg/dL):  1/10/23: 956, 135, 44    Free kappa light chains (mg/dL), lambda (mg/dL),  kappa/lambda ratio:  1/10/23: 1.33, 1.45, 0.92       Latest Reference Range & Units 12/14/22 09:20   MIRA Anti-IgG,-C3d  Weak Positive   MIRA Anti-C3  Negative   MIRA Anti-IgG, Tube  Positive 1+   Blood Bank Chart Comment  BB Chart Comment            Latest Reference Range & Units 12/06/22 09:40   Iron 61 - 157 ug/dL 59 (L)   Iron Binding Capacity 240 - 430 ug/dL 319   Iron Sat Index 15 - 46 % 18   Lactate Dehydrogenase 0 - 250 U/L 240   INR 0.85 - 1.15  0.91   PTT 22 - 38 Seconds 25   Fibrinogen 170 - 490 mg/dL 282   SPECIMEN EXPIRATION DATE  [id number]   MIRA Anti-IgG,-C3d  Positive 3+               PATHOLOGY:  Final Diagnosis 12/1/22:   Peripheral blood, morphology:  - Marked thrombocytopenia.  - Hemoglobin quantitatively within normal limits and erythrocytes without diagnostic morphologic abnormality.  - WBC subsets quantitatively within normal limits and without diagnostic morphologic abnormality.  - Negative for schistocytes.  - Negative for overt features of myelodysplasia or circulating blasts.     Final Diagnosis 12/6/22:   Peripheral blood:  --Slight polycythemia/erythrocytosis.  --Marked thrombocytopenia.   Electronically signed by Angel Marshall MD on 12/7/2022 at 10:33 AM   Comment    The cause of this patient's thrombocytopenia is unclear from the peripheral smear. Some common causes of thrombocytopenia to consider include infections, medications, alcohol, immune mediated mechanisms, and splenic sequestration. If splenic sequestration is of clinical concern, an accurate assessment of spleen size is recommended.         Clinical Information    Thrombocytopenia   Peripheral Smear    The red blood cells appear normochromic.  Rouleaux formation does not appear increased.  Polychromasia does not appear increased.  Poikilocytosis appears mild and nonspecific.  Platelets  are well granulated.  Lymphocytes are predominantly small with cytologically mature chromatin and appear overall polymorphous.  Neutrophils  contain normal cytoplasmic granulation and unremarkable nuclear morphology.  There is no dysplasia and no circulating blasts are seen.     Final Diagnosis 12/14/22:   Bone marrow, left posterior iliac crest, decalcified trephine biopsy and aspiration:  -- Normocellular bone marrow for age (60 to 70%) with maturing trilineage hematopoiesis.  -- No evidence of marrow involvement by lymphoma or other hematopoietic neoplasm.     Peripheral blood showing:  -- Moderate thrombocytopenia.     Case Report   Flow Cytometry Report                             Case: [id number]                                   Authorizing Provider:  Hayde Lopez DO         Collected:           12/14/2022 10:06 AM           Ordering Location:     [facility]   Received:            12/14/2022 10:21 AM                                  [facility]                                                             Pathologist:           Marleny Holden MD                                                      Specimen:    Iliac Crest, Bone Marrow Aspirate, Left                                                    Flow Interpretation   A. Iliac Crest, Bone Marrow Aspirate, Left:    - Polytypic B cells  - No aberrant immunophenotype on T cells  - No increase in myeloid blasts and no abnormal myeloid blast population     Case Report   Surgical Pathology Report                         Case: [id number]                                   Authorizing Provider:  Hayde Lopez DO         Collected:           12/15/2022 11:27 AM           Ordering Location:     [facility]   Received:            12/15/2022 11:54 AM                                  Imaging                                                                       Pathologist:           Mirna Roger                                                                Specimen:    Retroperitoneal                                                                             Final Diagnosis   A.  Retroperitoneum, biopsy:  -Three tissue fragments, entirely submitted for flow cytometry evaluation (gross examination only).       Case Report   Flow Cytometry Report                             Case: [id number]                                   Authorizing Provider:  Hayde Lopez DO         Collected:           12/15/2022 01:16 PM           Ordering Location:     [facility]   Received:            12/15/2022 01:17 PM                                  Imaging                                                                       Pathologist:           Marleny Holden MD                                                      Specimen:    Retroperitoneal                                                                            Flow Interpretation   A. Retroperitoneal :    - Polytypic B cells  - No aberrant immunophenotype on T cells         Case Report   Surgical Pathology Report                         Case: [id number]                                   Authorizing Provider:  Hayde Lopez DO         Collected:           01/04/2023 02:25 PM           Ordering Location:     [facility]   Received:            01/04/2023 02:48 PM                                  [facility]                                                                 Pathologist:           Saadia Allen MD                                                                            Specimen:    Lymph Node(s), Axillary, Right                                                             Final Diagnosis   Lymph node, right axillary, core biopsies:  -Partially sampled lymph node showing no evidence of Hodgkin's or non-Hodgkin's lymphoma  -Completed immunophenotyping studies show polytypic B cells and no aberrant T-cell antigen expression     Case Report   Flow Cytometry Report                             Case:  [id number]                                   Authorizing Provider:  Hayde Lopez DO         Collected:           01/04/2023 02:25 PM           Ordering Location:     [facility]   Received:            01/04/2023 02:53 PM                                  [facility]                                                                 Pathologist:           Xiomara Ramsey MD                                                         Specimen:    Lymph Node(s), Axillary, Right                                                             Flow Interpretation   A. Lymph Node(s), Axillary, Right, :  -Polytypic B cells  No aberrant immunophenotype on T cells         Case Report   Surgical Pathology Report                         Case: [id number]                                   Authorizing Provider:  New Burger         Collected:           01/20/2023 02:16 PM                                  MD Ari                                                                  Ordering Location:     [facility]                 Received:            01/20/2023 02:29 PM           Pathologist:           Isabela Kaminski MD                                                     Specimen:    Lymph Node(s), Inguinal, Left, partial lymph node left groin                               Addendum   This addendum is issued to document that Dr. Isabela Kaminski discussed biopsy findings with Dr. Hayde Lopez on 1/26/23 at 12:30.    Addendum electronically signed by Isabela Kaminski MD on 1/26/2023 at 12:30 PM   Final Diagnosis   A. Left groin, lymph node, partial lymphadenectomy:  -Benign lymph node with follicular and interfollicular hyperplasia  -Rare small noncaseating granulomas  -No morphologic evidence of malignancy  -Negative for select microorganisms on stained tissue  -See comment   Electronically signed by Isabela Kaminski MD on 1/26/2023 at 12:16 PM   Comment  [facility]   There is no definitive morphologic or  immunophenotypic evidence for malignancy in this biopsy. Instead, the findings (which include follicular and interfollicular hyperplasia and features of progressive transformation of germinal centers) are favored to be reactive.   Evaluation for select microorganisms was performed, with negative results for EBV (by in situ hybridization), HHV8 and toxoplasma (by immunohistochemistry), and acid fast and fungal organisms (by special stains).  Concurrent flow cytometry ([id number]) demonstrated polytypic B cells and no aberrant immunophenotype on T cells.         Case Report   Flow Cytometry Report                             Case: [id number]                                   Authorizing Provider:  New Burger         Collected:           01/20/2023 02:33 PM                                  MD Ari                                                                  Ordering Location:      [facility] OR                 Received:            01/20/2023 02:33 PM           Pathologist:           Marleny Holden MD                                                      Specimen:    Lymph Node(s), Inguinal, Left                                                              Flow Interpretation   A. Lymph Node(s), Inguinal, Left:     - Polytypic B cells  - No aberrant immunophenotype on T cells           IMAGING:  CT CAP 12/6/22:  IMPRESSION:  1.  Several enlarged lymph nodes identified at the right axilla,  retroperitoneum, left pelvis and borderline enlarged at the left  inguinal locations. While nonspecific, a neoplastic etiology is  possible including lymphoma.  2.  Mild splenomegaly.  3.  Lobulated indeterminate nodular masses at the left pericardial  fat.  4.  Indeterminate multiple bilateral pulmonary nodules. The dominant  solid nodule is at the right lower lobe measuring 1.1 cm. Neoplasm  remains in the differential and surveillance imaging and/or further  workup is recommended. See below for follow up imaging  guidelines.    PET 12/9/22:  IMPRESSION:     Findings suspicious for active neoplasm such as lymphoma involving lymph nodes above/below the diaphragm. The right axillary for left inguinal lymph nodes are amenable to ultrasound-guided histologic sampling if desired.    PET 1/9/24:  IMPRESSION:  1.  Increasing hypermetabolic lymphadenopathy within the chest, abdomen, and pelvis.  2.  There is new FDG avid cutaneous thickening in the left posterior thigh. Recommend correlation with direct visualization.  3.  Normal appearance of the spleen.      ASSESSMENT/PLAN:  Billy Carey is a 33 year old male who is referred for thrombocytopenia. 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 He has not had any bleeding episodes.   -No new meds, recent infection, or recent surgeries.  -TSH normal at 3.85.   -HepB/C and HIV studies historically negative.   -No splenomegaly on physical examination.  -I doubt hemolysis as retic is normal as well as LFTs.  -We discussed the possibility of lab artifact, but there is no comment on platelet clumping on smear. Also, he had lab work repeated two days in a row with PLT count returning in the 40s.   -Given his history of autoimmune disease, he may have underlying ITP, which is a diagnosis of exclusion. We had discussed possible steroid therapy if platelets are to return <30K.   -Repeat CBC is showing PLTs still in the 40's. Hb is 17.9, WBC normal.   -Also is consideration for a microangiopathic hemolytic anemia. MIRA has come back positive at 3+ (which is inconsistent with MAHA) and patient does not have elevated LFTs, abnormal coag studies, nor reticulocytosis. LDH is also normal at 240. He is afebrile. [facility] blood bank testing has returned +IgG and negative for complement, consistent with  warm AIHA.   -CT CAP had returned with 2 cm LN of the retroperitoneum, inguinal, and axillary. There was also note of an abnormal pericardial fat mass. I sent for a PET, that did not show FDG avidity of the pericardial fat mass. The retroperitoneal LNs had SUV of 12-13 with the remainder LN 6-7. I sent patient for a core biopsy of the retroperitoneal LN with IR that returned in fragments with unremarkable FLOW. Bone marrow biopsy was normocellular with normal megakaryocyte morphology. I then pursued a right axillary LN biopsy, which again returned negative with unremarkable FLOW. I sent for excisional biopsy of an inguinal LN with general surgery with pathology once again returning as benign with folliculary and interfollicular hyperplasia. Congo red staining is negative. EBV, HHV8 negative. I reviewed with pathology for any morphologic evidence consistent with Castleman's disease and spoke with Dr. Kaminski who is unable to confirm this at this time. She did have an interdepartmental pathology review. I do note the sinuses contain abundant histiocytes in the microscopic description and in my differential diagnosis is also Rosai-Dorfamn or Langerhan's disease.  I spoke with Dr. Tyler who is in agreement with workup above. I reviewed with Dr. 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 I reviewed I would then move onto rituximab next if patient is not to have improvement with steroid therapy. Dr. Tyler is in agreement with this plan.   -I reviewed the above with patient and wife, Jayla. I will need to continue to monitor the patient not only for treatment response for the hemolysis, but for evolution of LAD. My plan is to recheck with PET in 6 months time and will continue to monitor for treatment response discussed above.   -PLTs returned at 18K. Patient was hospitalized and treated  with decadron 40 mg x4 and IVIG. His platelets have remained >100K since then. He has met with Dr. Tyler as well who is in agreement with next line rituximab. Patient would like to continue to think on this. We discussed the risk of bronchospasm and hypersensitivity.  -Patient has sought second opinion at [facility] (Dr. Ayon) who is in agreement with workup and plan for rituximab in the future.     Weekly rituximab x4 weeks, then possible maintenance:  -Rituximab 375 mg/m2 IV    -PET is showing 13 x 6 mm right level IIb LN with SUV 6.9, 6 mm left supraclavicular LN with SUV 4.4, decreased size and FDG avidity of a 12 mm right axillary LN. He has waxing/waning RP, bilateral iliac chain, and left inguinal LAD with both increased/decreased SUV. He has increase in splenomegaly to 17.9 x 9.6 x 5.6 cm with mild FDG avidity, SUV 5. There are no clear lesions of the spleen for biopsy and will hold on random biopsy due to risk of bleed. He has developed new rash. This was a previous symptom prior to thrombocytopenia. Currently, CBC has returned with PLT 128K. This, in conjunction with elevated Cr, elevated T. Bili. This will be a pattern to monitor in the future for relapse.   -Platelets have been stable >100K since June 2023. If platelets continue to downtrend, will plan on initiation of rituximab 375 mg/m2 once weekly x4 (with possible maintenance). We reviewed the mechanism of action of rituximab. There is risk of bronchospasm and hypersensitivity with treatment. He has consented to treatment in the eventuality he will require initiation. We discussed possible splenectomy, but this would be further down the line for refractory disease. I spoke with Dr. Tyler today who is in agreement.   -Repeat EBV with high titer IgG, IgM negative. Mononucleosis screen negative.   -PET 1/9/24: I personally reviewed imaging with patient showing increase in hypermetabolic LAD within the chest/abdomen/pelvis. He has  supraclavicular and axillary adenopathy with SUV 5-7, right upper paratracheal LAD with SUV 7.1, retroperitoneal LAD with SUV 11. Most notably, there is an 11 mm left internal iliac LN with SUV 17.1 (previously 4.2 mm). Enlarging left external iliac lymph node measuring 26 x 13 mm (previously 5.2 mm). He is in need of updated labs and will send to general surgery for left external iliac lymph node biopsy.    2) Lymphadenopathy  -See above.  -Path reviewed at [facility] and returning as benign reactive folllicular hyperplasia. No evidence of malignancy.     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  -Patient without arthralgias, skin changes.  -APLA, ds-DNA labs returned negative.  -Rheumatology referral upcoming.     4) History of infectious mononucleosis  -EBV negative on LN.  -EBV PCR negative.    5) DM1  -Patient managed by endocrinology.    6) History of HTN, HLD    7) Rash  -Derm referral for biopsy. Discussed with patient and spouse still need to monitor for the possibility of cutaneous lymphomas.     8) -Schedule lab draw as ordered.  -General surgery referral for excisional lymph node biopsy.  -Follow up with me last week of January 2024 to review pathology.  -I discussed with patient my planned departure from [facility] and he will establish with a new hematologist with [facility] after February 2024.          Hayde Lopez DO  Hematology/Oncology  [facility]      Again, thank you for allowing me to participate in the care of your patient.        Sincerely,        Hayde Lopez DO

## 2024-01-12 NOTE — NURSING NOTE
Is the patient currently in the state of [place]? YES    Visit mode:VIDEO    If the visit is dropped, the patient can be reconnected by: VIDEO VISIT: Text to cell phone:   Telephone Information:   Mobile [phone]       Will anyone else be joining the visit? Yes, Pt's wife is with the pt and will be joining the video visit per pt  (If patient encounters technical issues they should call [phone]    How would you like to obtain your AVS? MyChart    Are changes needed to the allergy or medication list? Pt stated no med changes    Reason for visit: MILANA CHAMBERLAINF

## 2024-01-12 NOTE — LETTER
1/12/2024         RE: Billy Carey  [address] [address]        Dear Colleague,    Thank you for referring your patient, Billy Carey, to the [facility]. Please see a copy of my visit note below.    Virtual Visit Details    Type of service:  Video Visit     Originating Location (pt. Location): Home    Distant Location (provider location):  On-site  Platform used for Video Visit: Direct Hit    START: 1534  END: 1543    [facility]    Hematology/Oncology Established Patient Note      Today's Date: 1/11/24    Reason for Consultation: Thrombocytopenia  Referring Provider: ZAHRAA Kingsley CNP      HISTORY OF PRESENT ILLNESS: Billy Carey is a 33 year old male who is referred for thrombocytopenia. 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    Historically, he has had historical normal CBC. On 11/30/22, WBC 6.0, Hb 16.5, PLT 42. Repeat CBC on 12/1/22, WBC 7.2, Hb 17.5, PLT 43. Hepatitis and HIV studies negative.     He has had eustachian tube placement without bleeding event. He has not had any other surgeries.     For DM1- he is followed by endocrinology. He has continuous blood glucose monitoring and an insulin pump.     He drinks alcohol 1-2x/week. No excessive use. No history of smoking.     Family history of VTE in his sister. He is unaware of the nuances surrounding this event. No family history of bleeding disorder or malignancy.       INTERIM HISTORY:  Since our last visit, platelets have ranged 120K-145K.He continues to have intermittent rash. No B-symptoms.       REVIEW OF SYSTEMS:   A 14 point ROS was reviewed with pertinent positives and negatives in the HPI.        HOME MEDICATIONS:  Current Outpatient Medications   Medication Sig Dispense Refill     atorvastatin (LIPITOR) 10 MG tablet Take 1 tablet (10 mg) by mouth daily 90 tablet 3     blood glucose (KELL CONTOUR NEXT) test strip Test 4-5 times daily 4 Box 3      Blood Glucose Monitoring Suppl (KELL CONTOUR NEXT LINK) W/DEVICE KIT 1 kit 5 times daily. Use as directed 1 kit 1     Continuous Blood Gluc Sensor (DEXCOM G6 SENSOR) MISC USE 1 SENSOR EVERY 10 DAYS       glucagon (GLUCAGON EMERGENCY) 1 MG kit Inject 1 mg into the muscle once for 1 dose (Patient not taking: Reported on 3/7/2023) 1 mg 3     insulin lispro (HUMALOG VIAL) 100 UNIT/ML vial To be used in insulin pump       insulin pen needle (BD CHRIST U/F) 32G X 4 MM miscellaneous Use 3-6 pen needles daily or as directed. 50 each 0     INSULIN PUMP - OUTPATIENT Date Last Updated: 2/15/23  Tandem  Pump Basal Rates/Times:  7425-4673: 2 units/hour  5122-7743: 2.6 units/hour  3429-8587: 2.2 units/hour  6928-0660: 1.7 units/hour  CARB RATIO:   4628-1949: 1 unit per 10 grams carbohydrates  CF / Sensitivity Times:   6441-3880: 1 unit to decrease BG by 30 mg/dL  TARGET B mg/dL       triamcinolone (KENALOG) 0.5 % external ointment Apply a thin layer to affected areas twice daily as needed. 15 g 3     vitamin D2 (ERGOCALCIFEROL) 51553 units (1250 mcg) capsule Take 50,000 Units by mouth once a week           ALLERGIES:  Allergies   Allergen Reactions     Penicillins Rash         PAST MEDICAL HISTORY:  Past Medical History:   Diagnosis Date     Diabetes mellitus type 1, uncontrolled, insulin dependent 2013         PAST SURGICAL HISTORY:  Past Surgical History:   Procedure Laterality Date     EXCISE MASS GROIN Left 2023    Procedure: Lymph Node excisional biopsy;  Surgeon: New Burger MD;  Location: [facility] OR     MYRINGOTOMY, INSERT TUBE BILATERAL, COMBINED Bilateral     As a child         SOCIAL HISTORY:  Social History     Socioeconomic History     Marital status:      Spouse name: Jayla     Number of children: 3     Years of education: Not on file     Highest education level: Not on file   Occupational History     Occupation:      Comment: [employer]   Tobacco Use     Smoking  status: Never     Smokeless tobacco: Never   Substance and Sexual Activity     Alcohol use: No     Comment: rarely     Drug use: No     Sexual activity: Yes     Partners: Female   Other Topics Concern     Parent/sibling w/ CABG, MI or angioplasty before 65F 55M? Not Asked   Social History Narrative     Not on file     Social Determinants of Health     Financial Resource Strain: Low Risk  (1/12/2023)    Overall Financial Resource Strain (CARDIA)      Difficulty of Paying Living Expenses: Not hard at all   Food Insecurity: No Food Insecurity (1/12/2023)    Hunger Vital Sign      Worried About Running Out of Food in the Last Year: Never true      Ran Out of Food in the Last Year: Never true   Transportation Needs: No Transportation Needs (1/12/2023)    PRAPARE - Transportation      Lack of Transportation (Medical): No      Lack of Transportation (Non-Medical): No   Physical Activity: Sufficiently Active (1/12/2023)    Exercise Vital Sign      Days of Exercise per Week: 6 days      Minutes of Exercise per Session: 40 min   Stress: No Stress Concern Present (1/12/2023)    [employer] - Occupational Stress Questionnaire      Feeling of Stress : Only a little   Social Connections: Socially Integrated (1/12/2023)    Social Connection and Isolation Panel [NHANES]      Frequency of Communication with Friends and Family: More than three times a week      Frequency of Social Gatherings with Friends and Family: More than three times a week      Attends Faith Services: 1 to 4 times per year      Active Member of Clubs or Organizations: Yes      Attends Club or Organization Meetings: Not on file      Marital Status:    Interpersonal Safety: Not At Risk (3/31/2023)    Humiliation, Afraid, Rape, and Kick questionnaire      Fear of Current or Ex-Partner: No      Emotionally Abused: No      Physically Abused: No      Sexually Abused: No   Housing Stability: Low Risk  (1/12/2023)    Housing  Stability Vital Sign      Unable to Pay for Housing in the Last Year: No      Number of Places Lived in the Last Year: 1      Unstable Housing in the Last Year: No         FAMILY HISTORY:  Family History   Problem Relation Age of Onset     C.A.D. No family hx of      Diabetes No family hx of      Hypertension No family hx of      Cancer No family hx of         no skin cancer     Amblyopia No family hx of      Retinal detachment No family hx of      Thyroid Disease No family hx of      Glaucoma No family hx of      Macular Degeneration No family hx of          PHYSICAL EXAM:  ECO  GENERAL/CONSTITUTIONAL: No acute distress. Healthy, alert.  EYES: No scleral icterus.  No redness or discharge.    RESPIRATORY: No audible wheeze, cough, or visible cyanosis.  No visible retractions or increased work of breathing.  Able to speak fully in complete sentences.  MUSCULOSKELETAL: Normal range of motion.  NEUROLOGIC: Alert, oriented, answers questions appropriately. No tremor. Mentation intact and speech normal  INTEGUMENTARY: No jaundice.  No obvious rash or skin lesions.  PSYCHIATRIC:  Mentation appears normal, affect normal/bright, judgement and insight intact, normal speech and appearance well-groomed.    The rest of a comprehensive physical exam is deferred due to public [facility] emergency video visit restrictions.        LABS: Reviewed with patient today.   Latest Reference Range & Units 22 07:56 22 11:59 22 09:40 22 09:20 01/10/23 15:17 23 14:47 02/15/23 16:02 02/15/23 18:51 23 06:57 23 06:33 23 07:00 23 14:22 23 14:59 23 09:00 03/10/23 15:12 23 15:15 23 15:07 23 15:14 23 15:38 23 14:59 23 15:33 23 16:01 23 15:02 23 17:57 08/15/23 12:33 23 15:05 10/06/23 08:33   WBC 4.0 - 11.0 10e3/uL 6.0 7.2 9.0 6.6 6.5 6.8 10.9 10.2 11.0 15.4 (H) 12.7 (H) 8.8 9.5 5.8 7.7 8.5 8.0 8.3 8.2 7.5 7.6 7.0 7.3 7.6 5.6  9.0 8.5   Hemoglobin 13.3 - 17.7 g/dL 16.5 17.5 17.9 (H) 17.3 16.4 17.0 17.8 (H) 17.2 17.6 16.4 15.3 17.1 16.6 16.3 15.9 17.2 16.1 16.4 16.6 16.3 17.1 17.1 16.5 16.5 16.6 16.6 16.3   Hematocrit 40.0 - 53.0 % 47.0 50.6 52.7 48.4 45.8 48.2 51.3 49.3 49.9 45.4 43.5 48.6 47.1 45.9 44.3 48.8 44.7 46.8 45.6 44.7 47.1 47.2 46.7 46.5 46.8 46.8 46.9   Platelet Count 150 - 450 10e3/uL 42 (LL) 43 (LL) 46 (LL) 59 (L) 45 (LL) 50 (L) 18 (LL) 25 (LL) 29 (LL) 66 (L) 100 (L) 196 162 115 (L) 111 (L) 116 (L) 108 (L) 114 (L) 114 (L) 202 128 (L) 144 (L) 120 (L) 145 (L) 124 (L) 112 (L) 124 (L)   RBC Count 4.40 - 5.90 10e6/uL 5.62 5.99 (H) 6.20 (H) 5.95 (H) 5.52 5.75 6.04 (H) 5.90 6.00 (H) 5.53 5.21 5.81 5.65 5.68 5.46 5.92 (H) 5.48 5.58 5.63 5.52 5.83 5.88 5.70 5.74 5.72 5.63 5.61   MCV 78 - 100 fL 84 85 85 81 83 84 85 84 83 82 84 84 83 81 81 82 82 84 81 81 81 80 82 81 82 83 84   MCH 26.5 - 33.0 pg 29.4 29.2 28.9 29.1 29.7 29.6 29.5 29.2 29.3 29.7 29.4 29.4 29.4 28.7 29.1 29.1 29.4 29.4 29.5 29.5 29.3 29.1 28.9 28.7 29.0 29.5 29.1   MCHC 31.5 - 36.5 g/dL 35.1 34.6 34.0 35.7 35.8 35.3 34.7 34.9 35.3 36.1 35.2 35.2 35.2 35.5 35.9 35.2 36.0 35.0 36.4 36.5 36.3 36.2 35.3 35.5 35.5 35.5 34.8   RDW 10.0 - 15.0 % 12.2 12.1 12.3 11.9 11.9 11.9 12.3 12.1 12.0 12.1 12.3 12.2 12.0 11.9 12.0 12.1 12.4 12.7 12.4 11.9 11.7 11.9 11.9 11.9 12.2 12.4 12.3   % Neutrophils % 56 55 55 64 65 66 66 63 89 93 91 70 69 58 70 62 64 63 67 74 72 58 68 65 53 74 56   % Lymphocytes % 23 28 28 21 23 21 18 22 9 3 5 19 18 28 20 25 25 25 23 16 18 28 21 24 31 15 29   % Monocytes % 14 13 11 10 10 10 11 11 1 4 3 9 10 11 8 10 9 9 8 8 8 11 9 9 13 8 11   % Eosinophils % 5 3 5 4 1 2 3 3 0 0 0 1 1 2 1 2 1 1 1 2 1 2 1 1 2 2 3   % Basophils % 1 1 1 1 1 1 1 1 0 0 0 0 1 1 1 1 1 1 1 0 1 1 1 1 1 1 1   Absolute Basophils 0.0 - 0.2 10e3/uL 0.1 0.1 0.1 0.1 0.1 0.1 0.1 0.1 0.0 0.0 0.0 0.0 0.1 0.1 0.0 0.1 0.1 0.1 0.1 0.0 0.0 0.1 0.1 0.1 0.1 0.1 0.1   Absolute Eosinophils 0.0 - 0.7 10e3/uL  0.3 0.2 0.4 0.2 0.1 0.1 0.4 0.3 0.0 0.0 0.0 0.1 0.1 0.1 0.1 0.1 0.1 0.1 0.1 0.2 0.0 0.1 0.1 0.1 0.1 0.2 0.3   Absolute Immature Granulocytes <=0.4 10e3/uL 0.0 0.0 0.0 0.0 0.0 0.0 0.1 0.0 0.1 0.1 0.1 0.1 0.1 0.0 0.0 0.0 0.0 0.0 0.0 0.0 0.0 0.0 0.0 0.0 0.0 0.0 0.0   Absolute Lymphocytes 0.8 - 5.3 10e3/uL 1.4 2.0 2.5 1.4 1.5 1.4 2.0 2.3 1.0 0.5 (L) 0.6 (L) 1.7 1.7 1.6 1.5 2.1 2.0 2.1 1.9 1.2 1.4 2.0 1.5 1.8 1.7 1.3 2.5   Absolute Monocytes 0.0 - 1.3 10e3/uL 0.8 0.9 1.0 0.7 0.7 0.7 1.2 1.1 0.1 0.6 0.4 0.8 0.9 0.6 0.6 0.8 0.8 0.8 0.7 0.6 0.6 0.8 0.7 0.7 0.8 0.7 1.0   % Immature Granulocytes % 1 0 0 0 0 0 1 0 1 0 1 1 1 0 0 0 0 1 0 0 0 0 0 0 0 0 0   Absolute Neutrophils 1.6 - 8.3 10e3/uL 3.4 3.9 4.9 4.3 4.2 4.5 7.3 6.4 9.8 (H) 14.2 (H) 11.6 (H) 6.1 6.7 3.3 5.4 5.3 5.0 5.3 5.6 5.5 5.5 4.0 5.0 5.0 2.9 6.8 4.7   Absolute NRBCs 10e3/uL 0.0 0.0 0.0 0.0 0.0 0.0 0.0 0.0 0.0 0.0 0.0 0.0 0.0 0.0 0.0 0.0 0.0 0.0 0.0 0.0 0.0 0.0 0.0 0.0 0.0 0.0 0.0   NRBCs per 100 WBC <1 /100 0 0 0 0 0 0 0 0 0 0 0 0 0 0 0 0 0 0 0 0 0 0 0 0 0 0 0      Latest Reference Range & Units 12/06/22 09:40 12/14/22 09:20 01/10/23 15:17 01/26/23 14:47 02/15/23 16:02 03/07/23 09:00   Haptoglobin 32 - 197 mg/dL 37 31 (L) 22 (L) 31 (L) 118 50        Latest Reference Range & Units 01/10/23 15:17   SPECIMEN EXPIRATION DATE  [id number]   MIRA Anti-IgG,-C3d  Positive 3+   Hep B Surface Agn Nonreactive  Nonreactive   Hepatitis B Core Margarette Nonreactive  Nonreactive   Hepatitis B Surface Antibody Instrument Value <8.00 m[IU]/mL 35.73   Hepatitis B Surface Antibody  Reactive   Hepatitis C Antibody Nonreactive  Nonreactive   HIV Antigen Antibody Combo Nonreactive  Nonreactive      Latest Reference Range & Units 01/10/23 15:17   PAVAN interpretation Negative  Positive !      01/10/23 15:17   PAVAN titer 1 1:160     Serum M-protein (g/dL):  1/10/23: 0.0    Total IgG (mg/dL), IgA (mg/dL), IgM (mg/dL):  1/10/23: 956, 135, 44    Free kappa light chains (mg/dL), lambda (mg/dL),  kappa/lambda ratio:  1/10/23: 1.33, 1.45, 0.92       Latest Reference Range & Units 12/14/22 09:20   MIRA Anti-IgG,-C3d  Weak Positive   MIRA Anti-C3  Negative   MIRA Anti-IgG, Tube  Positive 1+   Blood Bank Chart Comment  BB Chart Comment            Latest Reference Range & Units 12/06/22 09:40   Iron 61 - 157 ug/dL 59 (L)   Iron Binding Capacity 240 - 430 ug/dL 319   Iron Sat Index 15 - 46 % 18   Lactate Dehydrogenase 0 - 250 U/L 240   INR 0.85 - 1.15  0.91   PTT 22 - 38 Seconds 25   Fibrinogen 170 - 490 mg/dL 282   SPECIMEN EXPIRATION DATE  [id number]   MIRA Anti-IgG,-C3d  Positive 3+               PATHOLOGY:  Final Diagnosis 12/1/22:   Peripheral blood, morphology:  - Marked thrombocytopenia.  - Hemoglobin quantitatively within normal limits and erythrocytes without diagnostic morphologic abnormality.  - WBC subsets quantitatively within normal limits and without diagnostic morphologic abnormality.  - Negative for schistocytes.  - Negative for overt features of myelodysplasia or circulating blasts.     Final Diagnosis 12/6/22:   Peripheral blood:  --Slight polycythemia/erythrocytosis.  --Marked thrombocytopenia.   Electronically signed by Angel Marshall MD on 12/7/2022 at 10:33 AM   Comment    The cause of this patient's thrombocytopenia is unclear from the peripheral smear. Some common causes of thrombocytopenia to consider include infections, medications, alcohol, immune mediated mechanisms, and splenic sequestration. If splenic sequestration is of clinical concern, an accurate assessment of spleen size is recommended.         Clinical Information    Thrombocytopenia   Peripheral Smear    The red blood cells appear normochromic.  Rouleaux formation does not appear increased.  Polychromasia does not appear increased.  Poikilocytosis appears mild and nonspecific.  Platelets  are well granulated.  Lymphocytes are predominantly small with cytologically mature chromatin and appear overall polymorphous.  Neutrophils  contain normal cytoplasmic granulation and unremarkable nuclear morphology.  There is no dysplasia and no circulating blasts are seen.     Final Diagnosis 12/14/22:   Bone marrow, left posterior iliac crest, decalcified trephine biopsy and aspiration:  -- Normocellular bone marrow for age (60 to 70%) with maturing trilineage hematopoiesis.  -- No evidence of marrow involvement by lymphoma or other hematopoietic neoplasm.     Peripheral blood showing:  -- Moderate thrombocytopenia.     Case Report   Flow Cytometry Report                             Case: [id number]                                   Authorizing Provider:  Hayde Lopez DO         Collected:           12/14/2022 10:06 AM           Ordering Location:     [facility]   Received:            12/14/2022 10:21 AM                                  [facility]                                                             Pathologist:           Marleny Holden MD                                                      Specimen:    Iliac Crest, Bone Marrow Aspirate, Left                                                    Flow Interpretation   A. Iliac Crest, Bone Marrow Aspirate, Left:    - Polytypic B cells  - No aberrant immunophenotype on T cells  - No increase in myeloid blasts and no abnormal myeloid blast population     Case Report   Surgical Pathology Report                         Case: [id number]                                   Authorizing Provider:  Hayde Lopez DO         Collected:           12/15/2022 11:27 AM           Ordering Location:     [facility]   Received:            12/15/2022 11:54 AM                                  Imaging                                                                       Pathologist:           Mirna Roger                                                                Specimen:    Retroperitoneal                                                                             Final Diagnosis   A.  Retroperitoneum, biopsy:  -Three tissue fragments, entirely submitted for flow cytometry evaluation (gross examination only).       Case Report   Flow Cytometry Report                             Case: [id number]                                   Authorizing Provider:  Hayde Lopez DO         Collected:           12/15/2022 01:16 PM           Ordering Location:     [facility]   Received:            12/15/2022 01:17 PM                                  Imaging                                                                       Pathologist:           Marleny Holden MD                                                      Specimen:    Retroperitoneal                                                                            Flow Interpretation   A. Retroperitoneal :    - Polytypic B cells  - No aberrant immunophenotype on T cells         Case Report   Surgical Pathology Report                         Case: [id number]                                   Authorizing Provider:  Hayde Lopez DO         Collected:           01/04/2023 02:25 PM           Ordering Location:     [facility]   Received:            01/04/2023 02:48 PM                                  [facility]                                                                 Pathologist:           Saadia Allen MD                                                                            Specimen:    Lymph Node(s), Axillary, Right                                                             Final Diagnosis   Lymph node, right axillary, core biopsies:  -Partially sampled lymph node showing no evidence of Hodgkin's or non-Hodgkin's lymphoma  -Completed immunophenotyping studies show polytypic B cells and no aberrant T-cell antigen expression     Case Report   Flow Cytometry Report                             Case:  [id number]                                   Authorizing Provider:  Hayde Lopez DO         Collected:           01/04/2023 02:25 PM           Ordering Location:     [facility]   Received:            01/04/2023 02:53 PM                                  [facility]                                                                 Pathologist:           Xiomara Ramsey MD                                                         Specimen:    Lymph Node(s), Axillary, Right                                                             Flow Interpretation   A. Lymph Node(s), Axillary, Right, :  -Polytypic B cells  No aberrant immunophenotype on T cells         Case Report   Surgical Pathology Report                         Case: [id number]                                   Authorizing Provider:  New Burger         Collected:           01/20/2023 02:16 PM                                  MD Ari                                                                  Ordering Location:     [facility] OR                 Received:            01/20/2023 02:29 PM           Pathologist:           Isabela Kaminski MD                                                     Specimen:    Lymph Node(s), Inguinal, Left, partial lymph node left groin                               Addendum   This addendum is issued to document that Dr. Isabela Kaminski discussed biopsy findings with Dr. Hayde Lopez on 1/26/23 at 12:30.    Addendum electronically signed by Isabela Kaminski MD on 1/26/2023 at 12:30 PM   Final Diagnosis   A. Left groin, lymph node, partial lymphadenectomy:  -Benign lymph node with follicular and interfollicular hyperplasia  -Rare small noncaseating granulomas  -No morphologic evidence of malignancy  -Negative for select microorganisms on stained tissue  -See comment   Electronically signed by Isabela Kaminski MD on 1/26/2023 at 12:16 PM   Comment  [facility]   There is no definitive morphologic or  immunophenotypic evidence for malignancy in this biopsy. Instead, the findings (which include follicular and interfollicular hyperplasia and features of progressive transformation of germinal centers) are favored to be reactive.   Evaluation for select microorganisms was performed, with negative results for EBV (by in situ hybridization), HHV8 and toxoplasma (by immunohistochemistry), and acid fast and fungal organisms (by special stains).  Concurrent flow cytometry ([id number]) demonstrated polytypic B cells and no aberrant immunophenotype on T cells.         Case Report   Flow Cytometry Report                             Case: [id number]                                   Authorizing Provider:  New Burger         Collected:           01/20/2023 02:33 PM                                  MD Ari                                                                  Ordering Location:      [facility] OR                 Received:            01/20/2023 02:33 PM           Pathologist:           Marleny Holden MD                                                      Specimen:    Lymph Node(s), Inguinal, Left                                                              Flow Interpretation   A. Lymph Node(s), Inguinal, Left:     - Polytypic B cells  - No aberrant immunophenotype on T cells           IMAGING:  CT CAP 12/6/22:  IMPRESSION:  1.  Several enlarged lymph nodes identified at the right axilla,  retroperitoneum, left pelvis and borderline enlarged at the left  inguinal locations. While nonspecific, a neoplastic etiology is  possible including lymphoma.  2.  Mild splenomegaly.  3.  Lobulated indeterminate nodular masses at the left pericardial  fat.  4.  Indeterminate multiple bilateral pulmonary nodules. The dominant  solid nodule is at the right lower lobe measuring 1.1 cm. Neoplasm  remains in the differential and surveillance imaging and/or further  workup is recommended. See below for follow up imaging  guidelines.    PET 12/9/22:  IMPRESSION:     Findings suspicious for active neoplasm such as lymphoma involving lymph nodes above/below the diaphragm. The right axillary for left inguinal lymph nodes are amenable to ultrasound-guided histologic sampling if desired.    PET 1/9/24:  IMPRESSION:  1.  Increasing hypermetabolic lymphadenopathy within the chest, abdomen, and pelvis.  2.  There is new FDG avid cutaneous thickening in the left posterior thigh. Recommend correlation with direct visualization.  3.  Normal appearance of the spleen.      ASSESSMENT/PLAN:  Billy Carey is a 33 year old male who is referred for thrombocytopenia. 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 He has not had any bleeding episodes.   -No new meds, recent infection, or recent surgeries.  -TSH normal at 3.85.   -HepB/C and HIV studies historically negative.   -No splenomegaly on physical examination.  -I doubt hemolysis as retic is normal as well as LFTs.  -We discussed the possibility of lab artifact, but there is no comment on platelet clumping on smear. Also, he had lab work repeated two days in a row with PLT count returning in the 40s.   -Given his history of autoimmune disease, he may have underlying ITP, which is a diagnosis of exclusion. We had discussed possible steroid therapy if platelets are to return <30K.   -Repeat CBC is showing PLTs still in the 40's. Hb is 17.9, WBC normal.   -Also is consideration for a microangiopathic hemolytic anemia. MIRA has come back positive at 3+ (which is inconsistent with MAHA) and patient does not have elevated LFTs, abnormal coag studies, nor reticulocytosis. LDH is also normal at 240. He is afebrile. [facility] blood bank testing has returned +IgG and negative for complement, consistent with  warm AIHA.   -CT CAP had returned with 2 cm LN of the retroperitoneum, inguinal, and axillary. There was also note of an abnormal pericardial fat mass. I sent for a PET, that did not show FDG avidity of the pericardial fat mass. The retroperitoneal LNs had SUV of 12-13 with the remainder LN 6-7. I sent patient for a core biopsy of the retroperitoneal LN with IR that returned in fragments with unremarkable FLOW. Bone marrow biopsy was normocellular with normal megakaryocyte morphology. I then pursued a right axillary LN biopsy, which again returned negative with unremarkable FLOW. I sent for excisional biopsy of an inguinal LN with general surgery with pathology once again returning as benign with folliculary and interfollicular hyperplasia. Congo red staining is negative. EBV, HHV8 negative. I reviewed with pathology for any morphologic evidence consistent with Castleman's disease and spoke with Dr. Kaminski who is unable to confirm this at this time. She did have an interdepartmental pathology review. I do note the sinuses contain abundant histiocytes in the microscopic description and in my differential diagnosis is also Rosai-Dorfamn or Langerhan's disease.  I spoke with Dr. Tyler who is in agreement with workup above. I reviewed with Dr. 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 I reviewed I would then move onto rituximab next if patient is not to have improvement with steroid therapy. Dr. Tyler is in agreement with this plan.   -I reviewed the above with patient and wife, Jayla. I will need to continue to monitor the patient not only for treatment response for the hemolysis, but for evolution of LAD. My plan is to recheck with PET in 6 months time and will continue to monitor for treatment response discussed above.   -PLTs returned at 18K. Patient was hospitalized and treated  with decadron 40 mg x4 and IVIG. His platelets have remained >100K since then. He has met with Dr. Tyler as well who is in agreement with next line rituximab. Patient would like to continue to think on this. We discussed the risk of bronchospasm and hypersensitivity.  -Patient has sought second opinion at [facility] (Dr. Ayon) who is in agreement with workup and plan for rituximab in the future.     Weekly rituximab x4 weeks, then possible maintenance:  -Rituximab 375 mg/m2 IV    -PET is showing 13 x 6 mm right level IIb LN with SUV 6.9, 6 mm left supraclavicular LN with SUV 4.4, decreased size and FDG avidity of a 12 mm right axillary LN. He has waxing/waning RP, bilateral iliac chain, and left inguinal LAD with both increased/decreased SUV. He has increase in splenomegaly to 17.9 x 9.6 x 5.6 cm with mild FDG avidity, SUV 5. There are no clear lesions of the spleen for biopsy and will hold on random biopsy due to risk of bleed. He has developed new rash. This was a previous symptom prior to thrombocytopenia. Currently, CBC has returned with PLT 128K. This, in conjunction with elevated Cr, elevated T. Bili. This will be a pattern to monitor in the future for relapse.   -Platelets have been stable >100K since June 2023. If platelets continue to downtrend, will plan on initiation of rituximab 375 mg/m2 once weekly x4 (with possible maintenance). We reviewed the mechanism of action of rituximab. There is risk of bronchospasm and hypersensitivity with treatment. He has consented to treatment in the eventuality he will require initiation. We discussed possible splenectomy, but this would be further down the line for refractory disease. I spoke with Dr. Tyler today who is in agreement.   -Repeat EBV with high titer IgG, IgM negative. Mononucleosis screen negative.   -PET 1/9/24: I personally reviewed imaging with patient showing increase in hypermetabolic LAD within the chest/abdomen/pelvis. He has  supraclavicular and axillary adenopathy with SUV 5-7, right upper paratracheal LAD with SUV 7.1, retroperitoneal LAD with SUV 11. Most notably, there is an 11 mm left internal iliac LN with SUV 17.1 (previously 4.2 mm). Enlarging left external iliac lymph node measuring 26 x 13 mm (previously 5.2 mm). He is in need of updated labs and will send to general surgery for left external iliac lymph node biopsy.    2) Lymphadenopathy  -See above.  -Path reviewed at [facility] and returning as benign reactive folllicular hyperplasia. No evidence of malignancy.     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  -Patient without arthralgias, skin changes.  -APLA, ds-DNA labs returned negative.  -Rheumatology referral upcoming.     4) History of infectious mononucleosis  -EBV negative on LN.  -EBV PCR negative.    5) DM1  -Patient managed by endocrinology.    6) History of HTN, HLD    7) Rash  -Derm referral for biopsy. Discussed with patient and spouse still need to monitor for the possibility of cutaneous lymphomas.     8) -Schedule lab draw as ordered.  -General surgery referral for excisional lymph node biopsy.  -Follow up with me last week of January 2024 to review pathology.  -I discussed with patient my planned departure from [facility] and he will establish with a new hematologist with [facility] after February 2024.          Hayde Lopez DO  Hematology/Oncology  [facility]      Again, thank you for allowing me to participate in the care of your patient.        Sincerely,        Hayde Lopez DO

## 2024-01-14 LAB
DAT POLY: NORMAL
SPECIMEN EXPIRATION DATE: NORMAL

## 2024-01-15 ENCOUNTER — LAB (OUTPATIENT)
Dept: LAB | Facility: CLINIC | Age: 34
End: 2024-01-15
Payer: COMMERCIAL

## 2024-01-15 DIAGNOSIS — R59.1 DIFFUSE LYMPHADENOPATHY: ICD-10-CM

## 2024-01-15 LAB
ALBUMIN SERPL BCG-MCNC: 4.7 G/DL (ref 3.5–5.2)
ALP SERPL-CCNC: 62 U/L (ref 40–150)
ALT SERPL W P-5'-P-CCNC: 29 U/L (ref 0–70)
ANION GAP SERPL CALCULATED.3IONS-SCNC: 9 MMOL/L (ref 7–15)
APTT PPP: 26 SECONDS (ref 22–38)
AST SERPL W P-5'-P-CCNC: 42 U/L (ref 0–45)
BASOPHILS # BLD AUTO: 0.1 10E3/UL (ref 0–0.2)
BASOPHILS NFR BLD AUTO: 1 %
BILIRUB SERPL-MCNC: 0.9 MG/DL
BUN SERPL-MCNC: 16.5 MG/DL (ref 6–20)
CALCIUM SERPL-MCNC: 9.4 MG/DL (ref 8.6–10)
CHLORIDE SERPL-SCNC: 100 MMOL/L (ref 98–107)
CREAT SERPL-MCNC: 1.26 MG/DL (ref 0.67–1.17)
DAT, ANTI-IGG: NORMAL
DEPRECATED HCO3 PLAS-SCNC: 30 MMOL/L (ref 22–29)
EGFRCR SERPLBLD CKD-EPI 2021: 76 ML/MIN/1.73M2
EOSINOPHIL # BLD AUTO: 0.4 10E3/UL (ref 0–0.7)
EOSINOPHIL NFR BLD AUTO: 7 %
ERYTHROCYTE [DISTWIDTH] IN BLOOD BY AUTOMATED COUNT: 12.2 % (ref 10–15)
FIBRINOGEN PPP-MCNC: 231 MG/DL (ref 170–490)
GLUCOSE SERPL-MCNC: 133 MG/DL (ref 70–99)
HAPTOGLOB SERPL-MCNC: NORMAL MG/DL
HCT VFR BLD AUTO: 45.4 % (ref 40–53)
HGB BLD-MCNC: 16.2 G/DL (ref 13.3–17.7)
IMM GRANULOCYTES # BLD: 0 10E3/UL
IMM GRANULOCYTES NFR BLD: 0 %
INR PPP: 1.03 (ref 0.85–1.15)
IRON BINDING CAPACITY (ROCHE): 239 UG/DL (ref 240–430)
IRON SATN MFR SERPL: 46 % (ref 15–46)
IRON SERPL-MCNC: 109 UG/DL (ref 61–157)
LDH SERPL L TO P-CCNC: 204 U/L (ref 0–250)
LYMPHOCYTES # BLD AUTO: 1.7 10E3/UL (ref 0.8–5.3)
LYMPHOCYTES NFR BLD AUTO: 28 %
MCH RBC QN AUTO: 29.7 PG (ref 26.5–33)
MCHC RBC AUTO-ENTMCNC: 35.7 G/DL (ref 31.5–36.5)
MCV RBC AUTO: 83 FL (ref 78–100)
MONOCYTES # BLD AUTO: 0.6 10E3/UL (ref 0–1.3)
MONOCYTES NFR BLD AUTO: 10 %
NEUTROPHILS # BLD AUTO: 3.2 10E3/UL (ref 1.6–8.3)
NEUTROPHILS NFR BLD AUTO: 54 %
NRBC # BLD AUTO: 0 10E3/UL
NRBC BLD AUTO-RTO: 0 /100
PLATELET # BLD AUTO: 101 10E3/UL (ref 150–450)
POTASSIUM SERPL-SCNC: 4.6 MMOL/L (ref 3.4–5.3)
PROT SERPL-MCNC: 6.9 G/DL (ref 6.4–8.3)
RBC # BLD AUTO: 5.46 10E6/UL (ref 4.4–5.9)
RETICS # AUTO: 0.05 10E6/UL (ref 0.03–0.1)
RETICS/RBC NFR AUTO: 0.8 % (ref 0.5–2)
SODIUM SERPL-SCNC: 139 MMOL/L (ref 135–145)
SPECIMEN EXPIRATION DATE: NORMAL
WBC # BLD AUTO: 6 10E3/UL (ref 4–11)

## 2024-01-15 PROCEDURE — 83615 LACTATE (LD) (LDH) ENZYME: CPT

## 2024-01-15 PROCEDURE — 83010 ASSAY OF HAPTOGLOBIN QUANT: CPT

## 2024-01-15 PROCEDURE — 85730 THROMBOPLASTIN TIME PARTIAL: CPT

## 2024-01-15 PROCEDURE — 86880 COOMBS TEST DIRECT: CPT

## 2024-01-15 PROCEDURE — 83550 IRON BINDING TEST: CPT

## 2024-01-15 PROCEDURE — 36415 COLL VENOUS BLD VENIPUNCTURE: CPT

## 2024-01-15 PROCEDURE — 83540 ASSAY OF IRON: CPT

## 2024-01-15 PROCEDURE — 85610 PROTHROMBIN TIME: CPT

## 2024-01-15 PROCEDURE — 85025 COMPLETE CBC W/AUTO DIFF WBC: CPT

## 2024-01-15 PROCEDURE — 80053 COMPREHEN METABOLIC PANEL: CPT

## 2024-01-15 PROCEDURE — 85384 FIBRINOGEN ACTIVITY: CPT

## 2024-01-15 PROCEDURE — 85045 AUTOMATED RETICULOCYTE COUNT: CPT

## 2024-01-16 ENCOUNTER — PATIENT OUTREACH (OUTPATIENT)
Dept: ONCOLOGY | Facility: CLINIC | Age: 34
End: 2024-01-16

## 2024-01-16 ENCOUNTER — TELEPHONE (OUTPATIENT)
Dept: OTHER | Facility: CLINIC | Age: 34
End: 2024-01-16

## 2024-01-16 ENCOUNTER — OFFICE VISIT (OUTPATIENT)
Dept: SURGERY | Facility: CLINIC | Age: 34
End: 2024-01-16
Payer: COMMERCIAL

## 2024-01-16 VITALS
SYSTOLIC BLOOD PRESSURE: 144 MMHG | WEIGHT: 251 LBS | OXYGEN SATURATION: 100 % | DIASTOLIC BLOOD PRESSURE: 92 MMHG | HEART RATE: 66 BPM | HEIGHT: 75 IN | RESPIRATION RATE: 16 BRPM | BODY MASS INDEX: 31.21 KG/M2

## 2024-01-16 DIAGNOSIS — R59.1 DIFFUSE LYMPHADENOPATHY: Primary | ICD-10-CM

## 2024-01-16 LAB
PATH REPORT.COMMENTS IMP SPEC: NORMAL
PATH REPORT.COMMENTS IMP SPEC: NORMAL
PATH REPORT.FINAL DX SPEC: NORMAL
PATH REPORT.MICROSCOPIC SPEC OTHER STN: NORMAL
PATH REPORT.MICROSCOPIC SPEC OTHER STN: NORMAL
PATH REPORT.RELEVANT HX SPEC: NORMAL

## 2024-01-16 PROCEDURE — 99207 BLOOD MORPHOLOGY PATHOLOGIST REVIEW: CPT | Performed by: PATHOLOGY

## 2024-01-16 PROCEDURE — 99204 OFFICE O/P NEW MOD 45 MIN: CPT | Performed by: SURGERY

## 2024-01-16 NOTE — PROGRESS NOTES
[facility]   [facility] Patient Office Visit         Assessment and Plan:   Assessment and plan:   Patient is a 33 year old male with a PMH of autoimmune thrombocytopenia, diffuse lymphadenopathy suspicious for lymphoma, despite previous negative lymph node biopsies, presents to clinic as a referral to discuss excisional lymph node biopsy.  I discussed with his oncologist Dr. Lopez and the lymph node site for requested excision was left external iliac chain, this is one of the largest lymph nodes is FDG avid.  On PET/CT this is posterior to the left external iliac vein which is a difficult area to access, I do not routinely perform these types of excisional biopsies. Other notable areas of hypermetabolic LAD within the chest/abdomen/pelvis are supraclavicular and axillary adenopathy with SUV 5-7 but these are very small, less than 1 cm each and would be quite difficult to find.    There are no other very large FDG avid lymph nodes that can be palpated on exam easily accessible.   I have spoken with the surgery dept at [facility] where he had his prior lymph node excision to review the imaging and determine if this is something they would perform. If not will refer to vascular surgery and/or he will again discuss with Dr Lopez the necessity/utility of attempting biopsy since it may be quite involved/higher risk.        Billy Carey is a 33 year old male seen in consultation for lymphadenopathy, at the request of Elfego Herrera PA-C.       Chief Complaint:   Lymphadenopathy           History of Present Illness:   Billy Carey is a 33 year old male who presents to discuss lymph node biopsy.  He has been following with Dr. 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  He previously has had core biopsy of retroperitoneal lymph node, right axilla lymph node biopsy and left  inguinal excisional lymph node biopsy 3/20/2023.  These all returned as benign. he had bone marrow biopsy that was normal.  He was treated with steroids and IVIG when his platelets dropped to 18,000 and his platelets improved and have remained greater than 100,000 since then.  He has been on Rituximab.  He had a repeat PET/CT 1/9/2024 showed increase in hypermetabolic lymph nodes in the chest abdomen pelvis as reviewed below.          Past Medical History:     Past Medical History:   Diagnosis Date    Diabetes mellitus type 1, uncontrolled, insulin dependent 9/16/2013             Past Surgical History:     Past Surgical History:   Procedure Laterality Date    EXCISE MASS GROIN Left 1/20/2023    Procedure: Lymph Node excisional biopsy;  Surgeon: New Burger MD;  Location: [facility] OR    MYRINGOTOMY, INSERT TUBE BILATERAL, COMBINED Bilateral     As a child             Social History:     Social History     Tobacco Use    Smoking status: Never     Passive exposure: Never    Smokeless tobacco: Never   Substance Use Topics    Alcohol use: No     Comment: rarely           Family History:     Family History   Problem Relation Age of Onset    C.A.D. No family hx of     Diabetes No family hx of     Hypertension No family hx of     Cancer No family hx of         no skin cancer    Amblyopia No family hx of     Retinal detachment No family hx of     Thyroid Disease No family hx of     Glaucoma No family hx of     Macular Degeneration No family hx of             Medications:     Current Outpatient Medications   Medication    atorvastatin (LIPITOR) 10 MG tablet    blood glucose (KELL CONTOUR NEXT) test strip    Blood Glucose Monitoring Suppl (KELL CONTOUR NEXT LINK) W/DEVICE KIT    Continuous Blood Gluc Sensor (DEXCOM G6 SENSOR) MISC    insulin lispro (HUMALOG VIAL) 100 UNIT/ML vial    insulin pen needle (BD CHRIST U/F) 32G X 4 MM miscellaneous    INSULIN PUMP - OUTPATIENT    triamcinolone (KENALOG) 0.5 % external  "ointment    vitamin D2 (ERGOCALCIFEROL) 95178 units (1250 mcg) capsule    glucagon (GLUCAGON EMERGENCY) 1 MG kit     No current facility-administered medications for this visit.            Review of Systems:   The Review of Systems is negative other than noted in the HPI or below            Physical Exam:   Ht 1.905 m (6' 3\")   Wt 116.6 kg (257 lb)   BMI 32.12 kg/m    General - Well developed, well nourished male in no apparent distress  HEENT:  Head normocephalic and atraumatic, pupils equal and round, conjunctivae clear, no scleral icterus  Neck: Supple without thyromegaly or masses  Lymphatic: No cervical, or supraclavicular lymphadenopathy  Lungs: Breathing comfortably on room air  Heart: regular rate   Extremities: Warm without edema  Neurologic: alert, speech is clear, moves all extremities with good strength  Psychiatric: Mood and affect appropriate  Skin: Without lesions, rashes, or jaundice         Data:   All labs imaging studies reviewed by me.  Imaging shows:  -PET 1/9/24: Hypermetabolic LAD within the chest/abdomen/pelvis, including supraclavicular and axillary adenopathy with SUV 5-7 (but these are very small, less than 1 cm each), right upper paratracheal LAD with SUV 7.1. 11 mm left internal iliac LN with SUV 17.1 (previously 4.2 mm). Enlarging left external iliac lymph node measuring 26 x 13 mm (previously 5.2 mm) with SUV max 13.6.       Hayde Trevizo MD    Please route or send letter to:  Dr. Hayde Lopez        "

## 2024-01-16 NOTE — PROGRESS NOTES
Billy met with Dr. Trevizo in general surgery for a biopsy consultation today.     Per Dr. Trevizo's note:     On PET/CT this is posterior to the left external iliac vein which is a difficult area to access, I do not routinely perform these types of excisional biopsies. Other notable areas of hypermetabolic LAD within the chest/abdomen/pelvis are supraclavicular and axillary adenopathy with SUV 5-7 but these are very small, less than 1 cm each and would be quite difficult to find.    There are no other very large FDG avid lymph nodes that can be palpated on exam easily accessible.   I have spoken with the surgery dept at [facility] where he had his prior lymph node excision to review the imaging and determine if this is something they would perform. If not will refer to vascular surgery and/or he will again discuss with Dr Lopez the necessity/utility of attempting biopsy if it is more risky.    Writer spoke with RN Rayna Dominguez who works with Dr. Burger who completed prior lymph node biopsy.      Writer spoke to Dr. Lopez about these recommendations. Referral for vascular surgery placed.    Armida Gibbons RN on 1/16/2024 at 12:42 PM

## 2024-01-16 NOTE — LETTER
January 16, 2024          [place]  [address]  [place] [address]      RE:   Billy Carey 1990      Dear Colleague,    Thank you for referring your patient, Billy Carey, to [facility] at [place] location. Please see a copy of my visit note below.    [facility]   [place]  New Patient Office Visit         Assessment and Plan:   Assessment and plan:   Patient is a 33 year old male with a PMH of autoimmune thrombocytopenia, diffuse lymphadenopathy suspicious for lymphoma, despite previous negative lymph node biopsies, presents to clinic as a referral to discuss excisional lymph node biopsy.  I discussed with his oncologist Dr. Lopez and the lymph node site for requested excision was left external iliac chain, this is one of the largest lymph nodes is FDG avid.  On PET/CT this is posterior to the left external iliac vein which is a difficult area to access, I do not routinely perform these types of excisional biopsies. Other notable areas of hypermetabolic LAD within the chest/abdomen/pelvis are supraclavicular and axillary adenopathy with SUV 5-7 but these are very small, less than 1 cm each and would be quite difficult to find.    There are no other very large FDG avid lymph nodes that can be palpated on exam easily accessible.   I have spoken with the surgery dept at [facility] where he had his prior lymph node excision to review the imaging and determine if this is something they would perform. If not will refer to vascular surgery and/or he will again discuss with Dr Lopez the necessity/utility of attempting biopsy since it may be quite involved/higher risk.        Billy Carey is a 33 year old male seen in consultation for lymphadenopathy, at the request of Elfego Herrera PA-C.       Chief Complaint:   Lymphadenopathy         History of Present Illness:   Billy Carey is a 33 year old male who presents to discuss lymph node biopsy.  He has been  "following with Dr. 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  He previously has had core biopsy of retroperitoneal lymph node, right axilla lymph node biopsy and left inguinal excisional lymph node biopsy 3/20/2023.  These all returned as benign. he had bone marrow biopsy that was normal.  He was treated with steroids and IVIG when his platelets dropped to 18,000 and his platelets improved and have remained greater than 100,000 since then.  He has been on Rituximab.  He had a repeat PET/CT 1/9/2024 showed increase in hypermetabolic lymph nodes in the chest abdomen pelvis as reviewed below.          Review of Systems:   The Review of Systems is negative other than noted in the HPI or below          Physical Exam:   Ht 1.905 m (6' 3\")   Wt 116.6 kg (257 lb)   BMI 32.12 kg/m    General - Well developed, well nourished male in no apparent distress  HEENT:  Head normocephalic and atraumatic, pupils equal and round, conjunctivae clear, no scleral icterus  Neck: Supple without thyromegaly or masses  Lymphatic: No cervical, or supraclavicular lymphadenopathy  Lungs: Breathing comfortably on room air  Heart: regular rate   Extremities: Warm without edema  Neurologic: alert, speech is clear, moves all extremities with good strength  Psychiatric: Mood and affect appropriate  Skin: Without lesions, rashes, or jaundice         Data:   All labs imaging studies reviewed by me.  Imaging shows:  -PET 1/9/24: Hypermetabolic LAD within the chest/abdomen/pelvis, including supraclavicular and axillary adenopathy with SUV 5-7 (but these are very small, less than 1 cm each), right upper paratracheal LAD with SUV 7.1. 11 mm left internal iliac LN with SUV 17.1 (previously 4.2 mm). Enlarging left external iliac lymph node measuring 26 x 13 mm (previously 5.2 mm) with SUV max 13.6.     Billy to follow up " with Primary Care provider regarding elevated blood pressure.     Again, thank you for allowing me to participate in the care of your patient.      Sincerely,      MD BRADLY Bonds/ozzy  D: 1/16/2024  T: 1:31 pm

## 2024-01-16 NOTE — TELEPHONE ENCOUNTER
Pt referred to [facility] by Hayde Lopez DO for left iliac lymph node excisional biopsy.     Reviewed and discussed with Dr. Norton.     Pt needs to be scheduled for new pt in clinic consult with either Dr. Norton or with Dr. Lester.  Will route to scheduling to coordinate an appointment within 3-5 days (per referring provider request).     Appt note: new pt ref by  Hayde Lopez DO for left iliac lymph node excisional biopsy.   Medical records in EPIC.     SABA CantuN, RN  [facility], located within [facility]

## 2024-01-17 NOTE — PROGRESS NOTES
Hayde Lopez, DO  You30 minutes ago (1:54 PM)     JL  Yes, let's push back follow up. I also would like to see the consultation note/review with vascular surgery. I want to go with the safest access.    Thank you     You  You; Hayde Lopez, DO3 hours ago (10:41 AM)     KB  Hi Dr. Lopez    Billy is scheduled for a consultation with Vascular Surgery on 01/25 and a follow up with us on 01/31. He will not get biopsy on 01/25. Should I push back his apt with us until after biopsy with completed path?    Thanks,  Armida     Verafinvickey message sent to Billy to discuss pushing back appointment with Dr. John Gibbons, RN on 1/17/2024 at 2:25 PM

## 2024-01-17 NOTE — PROGRESS NOTES
Billy is scheduled with Dr. Lester in Vascular Surgery on 01/25/24.    Armida Gibbons RN on 1/17/2024 at 10:40 AM

## 2024-01-24 NOTE — PROGRESS NOTES
[place] [facility]    Billy Carey comes for evaluation of lymphadenopathy.  This very well-controlled 33-year-old type I diabetic developed thrombocytopenia perhaps due to ITP diagnosed in March 2023 after nosebleed with a platelet count= 18,000.  Given a course of prednisone and IVIG with improvement since platelets have been stable.    During that evaluation he had a PET scan revealing diffuse lymphadenopathy of which biopsies were performed both open and core biopsies with no specific diagnosis.    He has remained completely asymptomatic but repeat PET scan on 1/9/2024 revealed increased distal para-aortic left iliac adenopathy.  Slight lesion in the left hip skin or masses noted that has resolved.  Significance of this is unknown and they requested a biopsy    Remains completely asymptomatic.      PMH: Medications: Insulin pump, lispro, Lipitor   Medical: Type 1 diabetes-uses Dexcom sensor    Hyperlipidemia on statin    History of autoimmune thrombocytopenia-platelet= 101    CKD-1/15/2024 SCr= 1.26    Non-smoker.    3/20/2023 core biopsies of retroperitoneal lymph node, right axillary lymph node, left inguinal excisional lymph node biopsy is negative.  Normal bone marrow.    Seen by Dr. Trevizo general surgery on 1/16/2024.  Due to location of the lymphadenopathy particular behind the left external iliac vein did not feel this is amenable to a laparoscopic approach.    Exam: Alert and appropriate.  Comfortable.  Here with his wife.   Large well-developed man.  Ad=708 kg.  BMI= 31.4 kg meter square.    Height= 6.3 inches   Blood pressure 139/83 right and 127/87 left.  Pulse 60 regular   Chest= clear   Cardiovascular= regular rate      I revealed the previous and present PET scans with the patient.  There is an increase in the lymphadenopathy particular along the right side of the aorta near the inferior vena cava and down the left iliac system.  No obvious inguinal or axillary adenopathy.   Splenomegaly has improved.      IMPRESSION:  Increased diffuse lymphadenopathy primarily in the distal aorta and left iliac system retroperitoneally of uncertain etiology and completely asymptomatic patient.  Oncology was discussing possibility of biopsy.  Unfortunately, due to this location laparoscopic biopsy was not felt to be feasible by time of surgery.  We could certainly do excisional biopsies of lymph nodes but this would require a retroperitoneal approach via paramedian abdominal incision and this was discussed with patient and his wife.  This is a somewhat bigger surgery due to the nature of the exposure and also the patient's bilaterally habitus and to be in the hospital for several days.  This is a very similar exposure we do for our spine surgeons and people recover very well.    I think the major question is whether the biopsy needs to be performed. Or whether this should be monitored with furthers scanning considering negative biopsies a year ago and presently asymptomatic.    Over 45 minutes with patient and his wife today.  Will discuss this further with oncology/hematology ( Dr Hayde Lopez [facility]) and let me know their decision.    Mendoza Lester MD   This note was created using Dragon voice recognition software which may result in transcription errors.    CC: Dr. Hayde Lopez

## 2024-01-25 ENCOUNTER — OFFICE VISIT (OUTPATIENT)
Dept: OTHER | Facility: CLINIC | Age: 34
End: 2024-01-25
Attending: SURGERY
Payer: COMMERCIAL

## 2024-01-25 ENCOUNTER — PATIENT OUTREACH (OUTPATIENT)
Dept: ONCOLOGY | Facility: CLINIC | Age: 34
End: 2024-01-25

## 2024-01-25 VITALS — SYSTOLIC BLOOD PRESSURE: 139 MMHG | HEART RATE: 54 BPM | DIASTOLIC BLOOD PRESSURE: 83 MMHG

## 2024-01-25 DIAGNOSIS — R59.1 DIFFUSE LYMPHADENOPATHY: ICD-10-CM

## 2024-01-25 PROCEDURE — 99213 OFFICE O/P EST LOW 20 MIN: CPT | Performed by: SURGERY

## 2024-01-25 PROCEDURE — 99204 OFFICE O/P NEW MOD 45 MIN: CPT | Performed by: SURGERY

## 2024-01-25 NOTE — PROGRESS NOTES
Situation: Patient chart reviewed by care coordinator.    Background: Pt seen by Dr. Alli Joy in consult regarding Immune thrombocytopenic purpura; Positive MIRA, IgG and C3d; Type 1 diabetes; Hx of COVID; Status post lymph node biopsies and bone marrow biopsies; Positive PAVAN 1:160.  Pt primarily see's Dr. Hayde Lopez, in [place] at this time.      Assessment: Pt sent MyChart to Dr. Alli Joy.    Plan/Recommendations: Dr. Joy stated he spoke with Dr. Hayde oLpez about situation.  Currently, no need for pt to schedule return visit with Dr. Alli Joy.     Carin Kohli, RN, OCN, RN Care Coordinator, [facility]

## 2024-01-25 NOTE — PROGRESS NOTES
[facility]        Patient is here for a consult.    Pt is currently taking Statin.    /83 (BP Location: Right arm, Patient Position: Chair, Cuff Size: Adult Large)   Pulse 54     The provider has been notified that the patient has no concerns.     Questions patient would like addressed today are: N/A.    Refills are needed: N/A    Has homecare services and agency name:  Leah Diana MA

## 2024-01-25 NOTE — NURSING NOTE
Written surgery order obtained and placed in Dr. Lester's TBD folder.  Patient will call if he decides to proceed with surgery.  Case request will need to be entered and patient education completed at that time.

## 2024-02-04 ENCOUNTER — HEALTH MAINTENANCE LETTER (OUTPATIENT)
Age: 34
End: 2024-02-04

## 2024-02-16 ENCOUNTER — ONCOLOGY VISIT (OUTPATIENT)
Dept: ONCOLOGY | Facility: CLINIC | Age: 34
End: 2024-02-16
Attending: INTERNAL MEDICINE
Payer: COMMERCIAL

## 2024-02-16 VITALS
BODY MASS INDEX: 31.27 KG/M2 | HEIGHT: 75 IN | TEMPERATURE: 97.9 F | WEIGHT: 251.5 LBS | OXYGEN SATURATION: 100 % | RESPIRATION RATE: 18 BRPM | HEART RATE: 66 BPM | DIASTOLIC BLOOD PRESSURE: 83 MMHG | SYSTOLIC BLOOD PRESSURE: 128 MMHG

## 2024-02-16 DIAGNOSIS — R21 RASH: ICD-10-CM

## 2024-02-16 DIAGNOSIS — D69.3 AUTOIMMUNE THROMBOCYTOPENIA (H): Primary | ICD-10-CM

## 2024-02-16 PROCEDURE — 99215 OFFICE O/P EST HI 40 MIN: CPT | Performed by: INTERNAL MEDICINE

## 2024-02-16 PROCEDURE — 99214 OFFICE O/P EST MOD 30 MIN: CPT | Performed by: INTERNAL MEDICINE

## 2024-02-16 RX ORDER — TRIAMCINOLONE ACETONIDE 5 MG/G
OINTMENT TOPICAL
Qty: 15 G | Refills: 3 | Status: SHIPPED | OUTPATIENT
Start: 2024-02-16

## 2024-02-16 ASSESSMENT — PAIN SCALES - GENERAL: PAINLEVEL: NO PAIN (0)

## 2024-02-16 NOTE — NURSING NOTE
"Oncology Rooming Note    February 16, 2024 10:39 AM   Billy Carey is a 33 year old male who presents for:    Chief Complaint   Patient presents with    Oncology Clinic Visit     Autoimmune thrombocytopenia      Initial Vitals: /83   Pulse 66   Temp 97.9  F (36.6  C) (Oral)   Resp 18   Ht 1.905 m (6' 3\")   Wt 114.1 kg (251 lb 8 oz)   SpO2 100%   BMI 31.44 kg/m   Estimated body mass index is 31.44 kg/m  as calculated from the following:    Height as of this encounter: 1.905 m (6' 3\").    Weight as of this encounter: 114.1 kg (251 lb 8 oz). Body surface area is 2.46 meters squared.  No Pain (0) Comment: Data Unavailable   No LMP for male patient.  Allergies reviewed: Yes  Medications reviewed: Yes    Medications: MEDICATION REFILLS NEEDED TODAY. Provider was notified.  Pharmacy name entered into Intapp:    [id number] - [place], [place] - [address] [place] [address] [address] [address]  [id number] [place], [place] - [address] [id number] [place], [place] - [address] [facility] - [facility], [place] - [facility]    Frailty Screening:   Is the patient here for a new oncology consult visit in cancer care? 2. No      Clinical concerns: follow up       Madelin Benoit MA              "

## 2024-02-16 NOTE — LETTER
2/16/2024         RE: Billy Carey  [address] [facility] [address]        Dear Colleague,    Thank you for referring your patient, Billy Carey, to the [facility]. Please see a copy of my visit note below.    [facility]    Hematology/Oncology Established Patient Note      Today's Date: 1/11/24    Reason for Consultation: Thrombocytopenia  Referring Provider: ZAHRAA Kingsley CNP      HISTORY OF PRESENT ILLNESS: Billy Carey is a 33 year old male who is referred for thrombocytopenia. 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    Historically, he has had historical normal CBC. On 11/30/22, WBC 6.0, Hb 16.5, PLT 42. Repeat CBC on 12/1/22, WBC 7.2, Hb 17.5, PLT 43. Hepatitis and HIV studies negative.     He has had eustachian tube placement without bleeding event. He has not had any other surgeries.     For DM1- he is followed by endocrinology. He has continuous blood glucose monitoring and an insulin pump.     He drinks alcohol 1-2x/week. No excessive use. No history of smoking.     Family history of VTE in his sister. He is unaware of the nuances surrounding this event. No family history of bleeding disorder or malignancy.       INTERIM HISTORY:  No B-symptoms. He met with general and vascular surgery.       REVIEW OF SYSTEMS:   A 14 point ROS was reviewed with pertinent positives and negatives in the HPI.        HOME MEDICATIONS:  Current Outpatient Medications   Medication Sig Dispense Refill     atorvastatin (LIPITOR) 10 MG tablet Take 1 tablet (10 mg) by mouth daily 90 tablet 3     blood glucose (KELL CONTOUR NEXT) test strip Test 4-5 times daily 4 Box 3     Blood Glucose Monitoring Suppl (KELL CONTOUR NEXT LINK) W/DEVICE KIT 1 kit 5 times daily. Use as directed 1 kit 1     Continuous Blood Gluc Sensor (DEXCOM G6 SENSOR) MISC USE 1 SENSOR EVERY 10 DAYS       insulin lispro (HUMALOG VIAL) 100 UNIT/ML vial To be used in  insulin pump       insulin pen needle (BD CHRIST U/F) 32G X 4 MM miscellaneous Use 3-6 pen needles daily or as directed. 50 each 0     INSULIN PUMP - OUTPATIENT Date Last Updated: 2/15/23  Tandem  Pump Basal Rates/Times:  2623-4963: 2 units/hour  0569-1017: 2.6 units/hour  4164-4411: 2.2 units/hour  4410-7998: 1.7 units/hour  CARB RATIO:   7720-8097: 1 unit per 10 grams carbohydrates  CF / Sensitivity Times:   6533-0315: 1 unit to decrease BG by 30 mg/dL  TARGET B mg/dL       triamcinolone (KENALOG) 0.5 % external ointment Apply a thin layer to affected areas twice daily as needed. 15 g 3     vitamin D2 (ERGOCALCIFEROL) 09817 units (1250 mcg) capsule Take 50,000 Units by mouth once a week           ALLERGIES:  Allergies   Allergen Reactions     Penicillins Rash         PAST MEDICAL HISTORY:  Past Medical History:   Diagnosis Date     Diabetes mellitus type 1, uncontrolled, insulin dependent 2013         PAST SURGICAL HISTORY:  Past Surgical History:   Procedure Laterality Date     EXCISE MASS GROIN Left 2023    Procedure: Lymph Node excisional biopsy;  Surgeon: New Burger MD;  Location: [facility] OR     MYRINGOTOMY, INSERT TUBE BILATERAL, COMBINED Bilateral     As a child         SOCIAL HISTORY:  Social History     Socioeconomic History     Marital status:      Spouse name: Jayla     Number of children: 3     Years of education: Not on file     Highest education level: Not on file   Occupational History     Occupation:      Comment: [employer]   Tobacco Use     Smoking status: Never     Passive exposure: Never     Smokeless tobacco: Never   Substance and Sexual Activity     Alcohol use: No     Comment: rarely     Drug use: No     Sexual activity: Yes     Partners: Female   Other Topics Concern     Parent/sibling w/ CABG, MI or angioplasty before 65F 55M? Not Asked   Social History Narrative     Not on file     Social Determinants of Health     Financial Resource Strain: Low  Risk  (1/12/2023)    Overall Financial Resource Strain (CARDIA)      Difficulty of Paying Living Expenses: Not hard at all   Food Insecurity: No Food Insecurity (1/12/2023)    Hunger Vital Sign      Worried About Running Out of Food in the Last Year: Never true      Ran Out of Food in the Last Year: Never true   Transportation Needs: No Transportation Needs (1/12/2023)    PRAPARE - Transportation      Lack of Transportation (Medical): No      Lack of Transportation (Non-Medical): No   Physical Activity: Sufficiently Active (1/12/2023)    Exercise Vital Sign      Days of Exercise per Week: 6 days      Minutes of Exercise per Session: 40 min   Stress: No Stress Concern Present (1/12/2023)    [employer] [place] [employer] - Occupational Stress Questionnaire      Feeling of Stress : Only a little   Social Connections: Socially Integrated (1/12/2023)    Social Connection and Isolation Panel [NHANES]      Frequency of Communication with Friends and Family: More than three times a week      Frequency of Social Gatherings with Friends and Family: More than three times a week      Attends Muslim Services: 1 to 4 times per year      Active Member of Clubs or Organizations: Yes      Attends Club or Organization Meetings: Not on file      Marital Status:    Interpersonal Safety: Not At Risk (3/31/2023)    Humiliation, Afraid, Rape, and Kick questionnaire      Fear of Current or Ex-Partner: No      Emotionally Abused: No      Physically Abused: No      Sexually Abused: No   Housing Stability: Low Risk  (1/12/2023)    Housing Stability Vital Sign      Unable to Pay for Housing in the Last Year: No      Number of Places Lived in the Last Year: 1      Unstable Housing in the Last Year: No         FAMILY HISTORY:  Family History   Problem Relation Age of Onset     C.A.D. No family hx of      Diabetes No family hx of      Hypertension No family hx of      Cancer No family hx of         no skin cancer      "Amblyopia No family hx of      Retinal detachment No family hx of      Thyroid Disease No family hx of      Glaucoma No family hx of      Macular Degeneration No family hx of          PHYSICAL EXAM:  /83   Pulse 66   Temp 97.9  F (36.6  C) (Oral)   Resp 18   Ht 1.905 m (6' 3\")   Wt 114.1 kg (251 lb 8 oz)   SpO2 100%   BMI 31.44 kg/m    PHYSICAL EXAMINATION:   ECO  GENERAL/CONSTITUTIONAL: No acute distress.  EYES: Pupils are equal, round, and react to light and accommodation. Extraocular movements intact.  No scleral icterus.  ENT/MOUTH: Neck supple. Oropharynx clear, no mucositis.  MUSCULOSKELETAL: Warm and well-perfused, no cyanosis, clubbing, or edema.  NEUROLOGIC: Cranial nerves II-XII are intact. Alert, oriented, answers questions appropriately.  INTEGUMENTARY: No rashes or jaundice.  GAIT: Steady, does not use assistive device          LABS: Reviewed with patient today.   Latest Reference Range & Units 01/15/24 11:44   Sodium 135 - 145 mmol/L 139   Potassium 3.4 - 5.3 mmol/L 4.6   Chloride 98 - 107 mmol/L 100   Carbon Dioxide (CO2) 22 - 29 mmol/L 30 (H)   Urea Nitrogen 6.0 - 20.0 mg/dL 16.5   Creatinine 0.67 - 1.17 mg/dL 1.26 (H)   GFR Estimate >60 mL/min/1.73m2 76   Calcium 8.6 - 10.0 mg/dL 9.4   Anion Gap 7 - 15 mmol/L 9   Albumin 3.5 - 5.2 g/dL 4.7   Protein Total 6.4 - 8.3 g/dL 6.9   Alkaline Phosphatase 40 - 150 U/L 62   ALT 0 - 70 U/L 29   AST 0 - 45 U/L 42   Bilirubin Total <=1.2 mg/dL 0.9   Glucose 70 - 99 mg/dL 133 (H)   Iron 61 - 157 ug/dL 109   Iron Binding Capacity 240 - 430 ug/dL 239 (L)   Iron Sat Index 15 - 46 % 46   Lactate Dehydrogenase 0 - 250 U/L 204   WBC 4.0 - 11.0 10e3/uL 6.0   Hemoglobin 13.3 - 17.7 g/dL 16.2   Hematocrit 40.0 - 53.0 % 45.4   Platelet Count 150 - 450 10e3/uL 101 (L)   RBC Count 4.40 - 5.90 10e6/uL 5.46   MCV 78 - 100 fL 83   MCH 26.5 - 33.0 pg 29.7   MCHC 31.5 - 36.5 g/dL 35.7   RDW 10.0 - 15.0 % 12.2   % Neutrophils % 54   % Lymphocytes % 28   % " Monocytes % 10   % Eosinophils % 7   % Basophils % 1   Absolute Basophils 0.0 - 0.2 10e3/uL 0.1   Absolute Eosinophils 0.0 - 0.7 10e3/uL 0.4   Absolute Immature Granulocytes <=0.4 10e3/uL 0.0   Absolute Lymphocytes 0.8 - 5.3 10e3/uL 1.7   Absolute Monocytes 0.0 - 1.3 10e3/uL 0.6   % Immature Granulocytes % 0   Absolute Neutrophils 1.6 - 8.3 10e3/uL 3.2   Absolute NRBCs 10e3/uL 0.0   NRBCs per 100 WBC <1 /100 0   % Retic 0.5 - 2.0 % 0.8   Absolute Retic 0.025 - 0.095 10e6/uL 0.046   INR 0.85 - 1.15  1.03   PTT 22 - 38 Seconds 26   Fibrinogen 170 - 490 mg/dL 231   SPECIMEN EXPIRATION DATE  [id number] [id number]   MIRA Anti-IgG,-C3d  Positive 1+   MIRA Anti-IgG  Positive 1+          Latest Reference Range & Units 01/10/23 15:17   SPECIMEN EXPIRATION DATE  [id number]   MIRA Anti-IgG,-C3d  Positive 3+   Hep B Surface Agn Nonreactive  Nonreactive   Hepatitis B Core Margarette Nonreactive  Nonreactive   Hepatitis B Surface Antibody Instrument Value <8.00 m[IU]/mL 35.73   Hepatitis B Surface Antibody  Reactive   Hepatitis C Antibody Nonreactive  Nonreactive   HIV Antigen Antibody Combo Nonreactive  Nonreactive      Latest Reference Range & Units 01/10/23 15:17   PAVAN interpretation Negative  Positive !      01/10/23 15:17   PAVAN titer 1 1:160     Serum M-protein (g/dL):  1/10/23: 0.0    Total IgG (mg/dL), IgA (mg/dL), IgM (mg/dL):  1/10/23: 956, 135, 44    Free kappa light chains (mg/dL), lambda (mg/dL), kappa/lambda ratio:  1/10/23: 1.33, 1.45, 0.92       Latest Reference Range & Units 12/14/22 09:20   MIRA Anti-IgG,-C3d  Weak Positive   MIRA Anti-C3  Negative   MIRA Anti-IgG, Tube  Positive 1+   Blood Bank Chart Comment  BB Chart Comment            Latest Reference Range & Units 12/06/22 09:40   Iron 61 - 157 ug/dL 59 (L)   Iron Binding Capacity 240 - 430 ug/dL 319   Iron Sat Index 15 - 46 % 18   Lactate Dehydrogenase 0 - 250 U/L 240   INR 0.85 - 1.15  0.91   PTT 22 - 38 Seconds 25   Fibrinogen 170 - 490 mg/dL 282    SPECIMEN EXPIRATION DATE  [id number]   MIRA Anti-IgG,-C3d  Positive 3+               PATHOLOGY:  Final Diagnosis 12/1/22:   Peripheral blood, morphology:  - Marked thrombocytopenia.  - Hemoglobin quantitatively within normal limits and erythrocytes without diagnostic morphologic abnormality.  - WBC subsets quantitatively within normal limits and without diagnostic morphologic abnormality.  - Negative for schistocytes.  - Negative for overt features of myelodysplasia or circulating blasts.     Final Diagnosis 12/6/22:   Peripheral blood:  --Slight polycythemia/erythrocytosis.  --Marked thrombocytopenia.   Electronically signed by Angel Marshall MD on 12/7/2022 at 10:33 AM   Comment    The cause of this patient's thrombocytopenia is unclear from the peripheral smear. Some common causes of thrombocytopenia to consider include infections, medications, alcohol, immune mediated mechanisms, and splenic sequestration. If splenic sequestration is of clinical concern, an accurate assessment of spleen size is recommended.         Clinical Information    Thrombocytopenia   Peripheral Smear    The red blood cells appear normochromic.  Rouleaux formation does not appear increased.  Polychromasia does not appear increased.  Poikilocytosis appears mild and nonspecific.  Platelets  are well granulated.  Lymphocytes are predominantly small with cytologically mature chromatin and appear overall polymorphous.  Neutrophils contain normal cytoplasmic granulation and unremarkable nuclear morphology.  There is no dysplasia and no circulating blasts are seen.     Final Diagnosis 12/14/22:   Bone marrow, left posterior iliac crest, decalcified trephine biopsy and aspiration:  -- Normocellular bone marrow for age (60 to 70%) with maturing trilineage hematopoiesis.  -- No evidence of marrow involvement by lymphoma or other hematopoietic neoplasm.     Peripheral blood showing:  -- Moderate thrombocytopenia.     Case Report   Flow Cytometry  Report                             Case: [id number]                                   Authorizing Provider:  Hayde Lopez DO         Collected:           12/14/2022 10:06 AM           Ordering Location:     [facility]   Received:            12/14/2022 10:21 AM                                  [facility]                                                             Pathologist:           Marleny Holden MD                                                      Specimen:    Iliac Crest, Bone Marrow Aspirate, Left                                                    Flow Interpretation   A. Iliac Crest, Bone Marrow Aspirate, Left:    - Polytypic B cells  - No aberrant immunophenotype on T cells  - No increase in myeloid blasts and no abnormal myeloid blast population     Case Report   Surgical Pathology Report                         Case: [id number]                                   Authorizing Provider:  Hayde Lopez DO         Collected:           12/15/2022 11:27 AM           Ordering Location:     [facility]   Received:            12/15/2022 11:54 AM                                  [facility]                                                                       Pathologist:           Mirna Roger                                                                Specimen:    Retroperitoneal                                                                            Final Diagnosis   A.  Retroperitoneum, biopsy:  -Three tissue fragments, entirely submitted for flow cytometry evaluation (gross examination only).       Case Report   Flow Cytometry Report                             Case: [id number]                                   Authorizing Provider:  Hayde Lopez DO         Collected:           12/15/2022 01:16 PM           Ordering Location:     [facility]   Received:            12/15/2022 01:17 PM                                  [facility]                                                                        Pathologist:           Marleny Holden MD                                                      Specimen:    Retroperitoneal                                                                            Flow Interpretation   A. Retroperitoneal :    - Polytypic B cells  - No aberrant immunophenotype on T cells         Case Report   Surgical Pathology Report                         Case: [id number]                                   Authorizing Provider:  Hayde Lopez DO         Collected:           01/04/2023 02:25 PM           Ordering Location:     [facility]   Received:            01/04/2023 02:48 PM                                  [facility]                                                                 Pathologist:           Saadia Allen MD                                                                            Specimen:    Lymph Node(s), Axillary, Right                                                             Final Diagnosis   Lymph node, right axillary, core biopsies:  -Partially sampled lymph node showing no evidence of Hodgkin's or non-Hodgkin's lymphoma  -Completed immunophenotyping studies show polytypic B cells and no aberrant T-cell antigen expression     Case Report   Flow Cytometry Report                             Case: [id number]                                   Authorizing Provider:  Hayde Lopez DO         Collected:           01/04/2023 02:25 PM           Ordering Location:     [facility]   Received:            01/04/2023 02:53 PM                                  [facility]                                                                 Pathologist:           Xiomara Ramsey MD                                                         Specimen:    Lymph Node(s), Axillary, Right                                                              Flow Interpretation   A. Lymph Node(s), Axillary, Right, :  -Polytypic B cells  No aberrant immunophenotype on T cells         Case Report   Surgical Pathology Report                         Case: [id number]                                   Authorizing Provider:  New Burger         Collected:           01/20/2023 02:16 PM                                  MD Ari                                                                  Ordering Location:      [facility]                 Received:            01/20/2023 02:29 PM           Pathologist:           Isabela Kaminski MD                                                     Specimen:    Lymph Node(s), Inguinal, Left, partial lymph node left groin                               Addendum   This addendum is issued to document that Dr. Isabela Kaminski discussed biopsy findings with Dr. Hayde Lopez on 1/26/23 at 12:30.    Addendum electronically signed by Isabela Kaminski MD on 1/26/2023 at 12:30 PM   Final Diagnosis   A. Left groin, lymph node, partial lymphadenectomy:  -Benign lymph node with follicular and interfollicular hyperplasia  -Rare small noncaseating granulomas  -No morphologic evidence of malignancy  -Negative for select microorganisms on stained tissue  -See comment   Electronically signed by Isabela Kaminski MD on 1/26/2023 at 12:16 PM   Comment  [id number]   There is no definitive morphologic or immunophenotypic evidence for malignancy in this biopsy. Instead, the findings (which include follicular and interfollicular hyperplasia and features of progressive transformation of germinal centers) are favored to be reactive.   Evaluation for select microorganisms was performed, with negative results for EBV (by in situ hybridization), HHV8 and toxoplasma (by immunohistochemistry), and acid fast and fungal organisms (by special stains).  Concurrent flow cytometry ( [id number]) demonstrated polytypic B cells and no  aberrant immunophenotype on T cells.         Case Report   Flow Cytometry Report                             Case: [id number]                                   Authorizing Provider:  Rejimeredith Christchichi         Collected:           01/20/2023 02:33 PM                                  MD Ari                                                                  Ordering Location:      [facility] OR                 Received:            01/20/2023 02:33 PM           Pathologist:           Marleny Holden MD                                                      Specimen:    Lymph Node(s), Inguinal, Left                                                              Flow Interpretation   A. Lymph Node(s), Inguinal, Left:     - Polytypic B cells  - No aberrant immunophenotype on T cells           IMAGING:  CT CAP 12/6/22:  IMPRESSION:  1.  Several enlarged lymph nodes identified at the right axilla,  retroperitoneum, left pelvis and borderline enlarged at the left  inguinal locations. While nonspecific, a neoplastic etiology is  possible including lymphoma.  2.  Mild splenomegaly.  3.  Lobulated indeterminate nodular masses at the left pericardial  fat.  4.  Indeterminate multiple bilateral pulmonary nodules. The dominant  solid nodule is at the right lower lobe measuring 1.1 cm. Neoplasm  remains in the differential and surveillance imaging and/or further  workup is recommended. See below for follow up imaging guidelines.    PET 12/9/22:  IMPRESSION:     Findings suspicious for active neoplasm such as lymphoma involving lymph nodes above/below the diaphragm. The right axillary for left inguinal lymph nodes are amenable to ultrasound-guided histologic sampling if desired.    PET 1/9/24:  IMPRESSION:  1.  Increasing hypermetabolic lymphadenopathy within the chest, abdomen, and pelvis.  2.  There is new FDG avid cutaneous thickening in the left posterior thigh. Recommend correlation with direct visualization.  3.  Normal  appearance of the spleen.      ASSESSMENT/PLAN:  Billy Carey is a 33 year old male who is referred for thrombocytopenia. 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 He has not had any bleeding episodes.   -No new meds, recent infection, or recent surgeries.  -TSH normal at 3.85.   -HepB/C and HIV studies historically negative.   -No splenomegaly on physical examination.  -I doubt hemolysis as retic is normal as well as LFTs.  -We discussed the possibility of lab artifact, but there is no comment on platelet clumping on smear. Also, he had lab work repeated two days in a row with PLT count returning in the 40s.   -Given his history of autoimmune disease, he may have underlying ITP, which is a diagnosis of exclusion. We had discussed possible steroid therapy if platelets are to return <30K.   -Repeat CBC is showing PLTs still in the 40's. Hb is 17.9, WBC normal.   -Also is consideration for a microangiopathic hemolytic anemia. MIRA has come back positive at 3+ (which is inconsistent with MAHA) and patient does not have elevated LFTs, abnormal coag studies, nor reticulocytosis. LDH is also normal at 240. He is afebrile. [facility] blood bank testing has returned +IgG and negative for complement, consistent with warm AIHA.   -CT CAP had returned with 2 cm LN of the retroperitoneum, inguinal, and axillary. There was also note of an abnormal pericardial fat mass. I sent for a PET, that did not show FDG avidity of the pericardial fat mass. The retroperitoneal LNs had SUV of 12-13 with the remainder LN 6-7. I sent patient for a core biopsy of the retroperitoneal LN with IR that returned in fragments with unremarkable FLOW. Bone marrow biopsy was normocellular with normal megakaryocyte morphology. I then pursued a right axillary  LN biopsy, which again returned negative with unremarkable FLOW. I sent for excisional biopsy of an inguinal LN with general surgery with pathology once again returning as benign with folliculary and interfollicular hyperplasia. Congo red staining is negative. EBV, HHV8 negative. I reviewed with pathology for any morphologic evidence consistent with Castleman's disease and spoke with Dr. Kaminski who is unable to confirm this at this time. She did have an interdepartmental pathology review. I do note the sinuses contain abundant histiocytes in the microscopic description and in my differential diagnosis is also Rosai-Dorfamn or Langerhan's disease.  I spoke with Dr. Tyler who is in agreement with workup above. I reviewed with Dr. 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 I reviewed I would then move onto rituximab next if patient is not to have improvement with steroid therapy. Dr. Tyler is in agreement with this plan.   -I reviewed the above with patient and wife, Jalya. I will need to continue to monitor the patient not only for treatment response for the hemolysis, but for evolution of LAD. My plan is to recheck with PET in 6 months time and will continue to monitor for treatment response discussed above.   -PLTs returned at 18K. Patient was hospitalized and treated with decadron 40 mg x4 and IVIG. His platelets have remained >100K since then. He has met with Dr. Tyler as well who is in agreement with next line rituximab. Patient would like to continue to think on this. We discussed the risk of bronchospasm and hypersensitivity.  -Patient has sought second opinion at [facility] (Dr. Ayon) who is in agreement with workup and plan for rituximab in the future.     Weekly rituximab x4 weeks, then possible maintenance:  -Rituximab 375 mg/m2 IV    -PET is showing 13 x 6 mm  right level IIb LN with SUV 6.9, 6 mm left supraclavicular LN with SUV 4.4, decreased size and FDG avidity of a 12 mm right axillary LN. He has waxing/waning RP, bilateral iliac chain, and left inguinal LAD with both increased/decreased SUV. He has increase in splenomegaly to 17.9 x 9.6 x 5.6 cm with mild FDG avidity, SUV 5. There are no clear lesions of the spleen for biopsy and will hold on random biopsy due to risk of bleed. He has developed new rash. This was a previous symptom prior to thrombocytopenia. Currently, CBC has returned with PLT 128K. This, in conjunction with elevated Cr, elevated T. Bili. This will be a pattern to monitor in the future for relapse.   -Platelets have been stable >100K since June 2023. If platelets continue to downtrend, will plan on initiation of rituximab 375 mg/m2 once weekly x4 (with possible maintenance). We reviewed the mechanism of action of rituximab. There is risk of bronchospasm and hypersensitivity with treatment. He has consented to treatment in the eventuality he will require initiation. We discussed possible splenectomy, but this would be further down the line for refractory disease. I spoke with Dr. Tyler today who is in agreement.   -Repeat EBV with high titer IgG, IgM negative. Mononucleosis screen negative.   -PET 1/9/24: I personally reviewed imaging with patient showing increase in hypermetabolic LAD within the chest/abdomen/pelvis. He has supraclavicular and axillary adenopathy with SUV 5-7, right upper paratracheal LAD with SUV 7.1, retroperitoneal LAD with SUV 11. Most notably, there is an 11 mm left internal iliac LN with SUV 17.1 (previously 4.2 mm). Enlarging left external iliac lymph node measuring 26 x 13 mm (previously 5.2 mm). He is in need of updated labs and will send to general surgery for left external iliac lymph node biopsy.  -I referred to general surgery who then referred to vascular surgery. Lymph node excision will require extensive  surgery and will hold at this time given stability. I also discussed with Dr. Tyler. Patient is agreeable to this.     2) Lymphadenopathy  -See above.  -Path reviewed at [facility] and returning as benign reactive folllicular hyperplasia. No evidence of malignancy.     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  -Patient without arthralgias, skin changes.  -APLA, ds-DNA labs returned negative.  -Rheumatology referral upcoming.     4) History of infectious mononucleosis  -EBV negative on LN.  -EBV PCR negative.    5) DM1  -Patient managed by endocrinology.    6) History of HTN, HLD    7) Rash  -Derm referral for biopsy. Discussed with patient and spouse still need to monitor for the possibility of cutaneous lymphomas.     8) -Recheck CBC, CMP in 3 months. Will hold on repeat PET at this time and can recheck in the future based on clinical symptoms.   -I discussed with patient my planned departure from [facility] and he will establish with a new hematologist with [facility] after February 2024.  He will transfer care to Dr. Tyler of [facility] whom he has seen in the past.         Hayde Lopez DO  Hematology/Oncology  [facility]      Again, thank you for allowing me to participate in the care of your patient.        Sincerely,        Hayde Lopez DO

## 2024-02-16 NOTE — PROGRESS NOTES
[facility]    Hematology/Oncology Established Patient Note      Today's Date: 1/11/24    Reason for Consultation: Thrombocytopenia  Referring Provider: ZAHRAA Kingsley CNP      HISTORY OF PRESENT ILLNESS: Billy Carey is a 33 year old male who is referred for thrombocytopenia. 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    Historically, he has had historical normal CBC. On 11/30/22, WBC 6.0, Hb 16.5, PLT 42. Repeat CBC on 12/1/22, WBC 7.2, Hb 17.5, PLT 43. Hepatitis and HIV studies negative.     He has had eustachian tube placement without bleeding event. He has not had any other surgeries.     For DM1- he is followed by endocrinology. He has continuous blood glucose monitoring and an insulin pump.     He drinks alcohol 1-2x/week. No excessive use. No history of smoking.     Family history of VTE in his sister. He is unaware of the nuances surrounding this event. No family history of bleeding disorder or malignancy.       INTERIM HISTORY:  No B-symptoms. He met with general and vascular surgery.       REVIEW OF SYSTEMS:   A 14 point ROS was reviewed with pertinent positives and negatives in the HPI.        HOME MEDICATIONS:  Current Outpatient Medications   Medication Sig Dispense Refill    atorvastatin (LIPITOR) 10 MG tablet Take 1 tablet (10 mg) by mouth daily 90 tablet 3    blood glucose (KELL CONTOUR NEXT) test strip Test 4-5 times daily 4 Box 3    Blood Glucose Monitoring Suppl (KELL CONTOUR NEXT LINK) W/DEVICE KIT 1 kit 5 times daily. Use as directed 1 kit 1    Continuous Blood Gluc Sensor (DEXCOM G6 SENSOR) MISC USE 1 SENSOR EVERY 10 DAYS      insulin lispro (HUMALOG VIAL) 100 UNIT/ML vial To be used in insulin pump      insulin pen needle (BD CHRIST U/F) 32G X 4 MM miscellaneous Use 3-6 pen needles daily or as directed. 50 each 0    INSULIN PUMP - OUTPATIENT Date Last Updated: 2/15/23  Tandem  Pump Basal Rates/Times:  2456-7474: 2 units/hour  4167-4954: 2.6  units/hour  4514-9105: 2.2 units/hour  8203-6246: 1.7 units/hour  CARB RATIO:   6627-6475: 1 unit per 10 grams carbohydrates  CF / Sensitivity Times:   5255-8556: 1 unit to decrease BG by 30 mg/dL  TARGET B mg/dL      triamcinolone (KENALOG) 0.5 % external ointment Apply a thin layer to affected areas twice daily as needed. 15 g 3    vitamin D2 (ERGOCALCIFEROL) 69657 units (1250 mcg) capsule Take 50,000 Units by mouth once a week           ALLERGIES:  Allergies   Allergen Reactions    Penicillins Rash         PAST MEDICAL HISTORY:  Past Medical History:   Diagnosis Date    Diabetes mellitus type 1, uncontrolled, insulin dependent 2013         PAST SURGICAL HISTORY:  Past Surgical History:   Procedure Laterality Date    EXCISE MASS GROIN Left 2023    Procedure: Lymph Node excisional biopsy;  Surgeon: New Burger MD;  Location: [facility] OR    MYRINGOTOMY, INSERT TUBE BILATERAL, COMBINED Bilateral     As a child         SOCIAL HISTORY:  Social History     Socioeconomic History    Marital status:      Spouse name: Jayla    Number of children: 3    Years of education: Not on file    Highest education level: Not on file   Occupational History    Occupation:      Comment: [employer]   Tobacco Use    Smoking status: Never     Passive exposure: Never    Smokeless tobacco: Never   Substance and Sexual Activity    Alcohol use: No     Comment: rarely    Drug use: No    Sexual activity: Yes     Partners: Female   Other Topics Concern    Parent/sibling w/ CABG, MI or angioplasty before 65F 55M? Not Asked   Social History Narrative    Not on file     Social Determinants of Health     Financial Resource Strain: Low Risk  (2023)    Overall Financial Resource Strain (CARDIA)     Difficulty of Paying Living Expenses: Not hard at all   Food Insecurity: No Food Insecurity (2023)    Hunger Vital Sign     Worried About Running Out of Food in the Last Year: Never true     Ran Out of  "Food in the Last Year: Never true   Transportation Needs: No Transportation Needs (1/12/2023)    PRAPARE - Transportation     Lack of Transportation (Medical): No     Lack of Transportation (Non-Medical): No   Physical Activity: Sufficiently Active (1/12/2023)    Exercise Vital Sign     Days of Exercise per Week: 6 days     Minutes of Exercise per Session: 40 min   Stress: No Stress Concern Present (1/12/2023)    [employer] [place] - Occupational Stress Questionnaire     Feeling of Stress : Only a little   Social Connections: Socially Integrated (1/12/2023)    Social Connection and Isolation Panel [NHANES]     Frequency of Communication with Friends and Family: More than three times a week     Frequency of Social Gatherings with Friends and Family: More than three times a week     Attends Hinduism Services: 1 to 4 times per year     Active Member of Clubs or Organizations: Yes     Attends Club or Organization Meetings: Not on file     Marital Status:    Interpersonal Safety: Not At Risk (3/31/2023)    Humiliation, Afraid, Rape, and Kick questionnaire     Fear of Current or Ex-Partner: No     Emotionally Abused: No     Physically Abused: No     Sexually Abused: No   Housing Stability: Low Risk  (1/12/2023)    Housing Stability Vital Sign     Unable to Pay for Housing in the Last Year: No     Number of Places Lived in the Last Year: 1     Unstable Housing in the Last Year: No         FAMILY HISTORY:  Family History   Problem Relation Age of Onset    C.A.D. No family hx of     Diabetes No family hx of     Hypertension No family hx of     Cancer No family hx of         no skin cancer    Amblyopia No family hx of     Retinal detachment No family hx of     Thyroid Disease No family hx of     Glaucoma No family hx of     Macular Degeneration No family hx of          PHYSICAL EXAM:  /83   Pulse 66   Temp 97.9  F (36.6  C) (Oral)   Resp 18   Ht 1.905 m (6' 3\")   Wt 114.1 kg (251 lb 8 " oz)   SpO2 100%   BMI 31.44 kg/m    PHYSICAL EXAMINATION:   ECO  GENERAL/CONSTITUTIONAL: No acute distress.  EYES: Pupils are equal, round, and react to light and accommodation. Extraocular movements intact.  No scleral icterus.  ENT/MOUTH: Neck supple. Oropharynx clear, no mucositis.  MUSCULOSKELETAL: Warm and well-perfused, no cyanosis, clubbing, or edema.  NEUROLOGIC: Cranial nerves II-XII are intact. Alert, oriented, answers questions appropriately.  INTEGUMENTARY: No rashes or jaundice.  GAIT: Steady, does not use assistive device          LABS: Reviewed with patient today.   Latest Reference Range & Units 01/15/24 11:44   Sodium 135 - 145 mmol/L 139   Potassium 3.4 - 5.3 mmol/L 4.6   Chloride 98 - 107 mmol/L 100   Carbon Dioxide (CO2) 22 - 29 mmol/L 30 (H)   Urea Nitrogen 6.0 - 20.0 mg/dL 16.5   Creatinine 0.67 - 1.17 mg/dL 1.26 (H)   GFR Estimate >60 mL/min/1.73m2 76   Calcium 8.6 - 10.0 mg/dL 9.4   Anion Gap 7 - 15 mmol/L 9   Albumin 3.5 - 5.2 g/dL 4.7   Protein Total 6.4 - 8.3 g/dL 6.9   Alkaline Phosphatase 40 - 150 U/L 62   ALT 0 - 70 U/L 29   AST 0 - 45 U/L 42   Bilirubin Total <=1.2 mg/dL 0.9   Glucose 70 - 99 mg/dL 133 (H)   Iron 61 - 157 ug/dL 109   Iron Binding Capacity 240 - 430 ug/dL 239 (L)   Iron Sat Index 15 - 46 % 46   Lactate Dehydrogenase 0 - 250 U/L 204   WBC 4.0 - 11.0 10e3/uL 6.0   Hemoglobin 13.3 - 17.7 g/dL 16.2   Hematocrit 40.0 - 53.0 % 45.4   Platelet Count 150 - 450 10e3/uL 101 (L)   RBC Count 4.40 - 5.90 10e6/uL 5.46   MCV 78 - 100 fL 83   MCH 26.5 - 33.0 pg 29.7   MCHC 31.5 - 36.5 g/dL 35.7   RDW 10.0 - 15.0 % 12.2   % Neutrophils % 54   % Lymphocytes % 28   % Monocytes % 10   % Eosinophils % 7   % Basophils % 1   Absolute Basophils 0.0 - 0.2 10e3/uL 0.1   Absolute Eosinophils 0.0 - 0.7 10e3/uL 0.4   Absolute Immature Granulocytes <=0.4 10e3/uL 0.0   Absolute Lymphocytes 0.8 - 5.3 10e3/uL 1.7   Absolute Monocytes 0.0 - 1.3 10e3/uL 0.6   % Immature Granulocytes % 0   Absolute  Neutrophils 1.6 - 8.3 10e3/uL 3.2   Absolute NRBCs 10e3/uL 0.0   NRBCs per 100 WBC <1 /100 0   % Retic 0.5 - 2.0 % 0.8   Absolute Retic 0.025 - 0.095 10e6/uL 0.046   INR 0.85 - 1.15  1.03   PTT 22 - 38 Seconds 26   Fibrinogen 170 - 490 mg/dL 231   SPECIMEN EXPIRATION DATE  20240118235900 [id number]   MIRA Anti-IgG,-C3d  Positive 1+   MIRA Anti-IgG  Positive 1+          Latest Reference Range & Units 01/10/23 15:17   SPECIMEN EXPIRATION DATE  [id number]   MIRA Anti-IgG,-C3d  Positive 3+   Hep B Surface Agn Nonreactive  Nonreactive   Hepatitis B Core Margarette Nonreactive  Nonreactive   Hepatitis B Surface Antibody Instrument Value <8.00 m[IU]/mL 35.73   Hepatitis B Surface Antibody  Reactive   Hepatitis C Antibody Nonreactive  Nonreactive   HIV Antigen Antibody Combo Nonreactive  Nonreactive      Latest Reference Range & Units 01/10/23 15:17   PAVAN interpretation Negative  Positive !      01/10/23 15:17   PAVAN titer 1 1:160     Serum M-protein (g/dL):  1/10/23: 0.0    Total IgG (mg/dL), IgA (mg/dL), IgM (mg/dL):  1/10/23: 956, 135, 44    Free kappa light chains (mg/dL), lambda (mg/dL), kappa/lambda ratio:  1/10/23: 1.33, 1.45, 0.92       Latest Reference Range & Units 12/14/22 09:20   MIRA Anti-IgG,-C3d  Weak Positive   MIRA Anti-C3  Negative   MIRA Anti-IgG, Tube  Positive 1+   Blood Bank Chart Comment  BB Chart Comment            Latest Reference Range & Units 12/06/22 09:40   Iron 61 - 157 ug/dL 59 (L)   Iron Binding Capacity 240 - 430 ug/dL 319   Iron Sat Index 15 - 46 % 18   Lactate Dehydrogenase 0 - 250 U/L 240   INR 0.85 - 1.15  0.91   PTT 22 - 38 Seconds 25   Fibrinogen 170 - 490 mg/dL 282   SPECIMEN EXPIRATION DATE  [id number]   MIRA Anti-IgG,-C3d  Positive 3+               PATHOLOGY:  Final Diagnosis 12/1/22:   Peripheral blood, morphology:  - Marked thrombocytopenia.  - Hemoglobin quantitatively within normal limits and erythrocytes without diagnostic morphologic abnormality.  - WBC subsets quantitatively  within normal limits and without diagnostic morphologic abnormality.  - Negative for schistocytes.  - Negative for overt features of myelodysplasia or circulating blasts.     Final Diagnosis 12/6/22:   Peripheral blood:  --Slight polycythemia/erythrocytosis.  --Marked thrombocytopenia.   Electronically signed by Angel Marshall MD on 12/7/2022 at 10:33 AM   Comment    The cause of this patient's thrombocytopenia is unclear from the peripheral smear. Some common causes of thrombocytopenia to consider include infections, medications, alcohol, immune mediated mechanisms, and splenic sequestration. If splenic sequestration is of clinical concern, an accurate assessment of spleen size is recommended.         Clinical Information    Thrombocytopenia   Peripheral Smear    The red blood cells appear normochromic.  Rouleaux formation does not appear increased.  Polychromasia does not appear increased.  Poikilocytosis appears mild and nonspecific.  Platelets  are well granulated.  Lymphocytes are predominantly small with cytologically mature chromatin and appear overall polymorphous.  Neutrophils contain normal cytoplasmic granulation and unremarkable nuclear morphology.  There is no dysplasia and no circulating blasts are seen.     Final Diagnosis 12/14/22:   Bone marrow, left posterior iliac crest, decalcified trephine biopsy and aspiration:  -- Normocellular bone marrow for age (60 to 70%) with maturing trilineage hematopoiesis.  -- No evidence of marrow involvement by lymphoma or other hematopoietic neoplasm.     Peripheral blood showing:  -- Moderate thrombocytopenia.     Case Report   Flow Cytometry Report                             Case: [id number]                                   Authorizing Provider:  Hayde Lopez DO         Collected:           12/14/2022 10:06 AM           Ordering Location:     [facility]   Received:            12/14/2022 10:21 AM                                  Center  [place]                                                             Pathologist:           Marleny Holden MD                                                      Specimen:    Iliac Crest, Bone Marrow Aspirate, Left                                                    Flow Interpretation   A. Iliac Crest, Bone Marrow Aspirate, Left:    - Polytypic B cells  - No aberrant immunophenotype on T cells  - No increase in myeloid blasts and no abnormal myeloid blast population     Case Report   Surgical Pathology Report                         Case: [id number]                                   Authorizing Provider:  Hayde Loepz DO         Collected:           12/15/2022 11:27 AM           Ordering Location:     [facility]   Received:            12/15/2022 11:54 AM                                  Imaging                                                                       Pathologist:           Mirna Roger                                                                Specimen:    Retroperitoneal                                                                            Final Diagnosis   A.  Retroperitoneum, biopsy:  -Three tissue fragments, entirely submitted for flow cytometry evaluation (gross examination only).       Case Report   Flow Cytometry Report                             Case: [id number]                                   Authorizing Provider:  Hayde Lopez DO         Collected:           12/15/2022 01:16 PM           Ordering Location:     [facility]   Received:            12/15/2022 01:17 PM                                  Imaging                                                                       Pathologist:           Marleny Holden MD                                                      Specimen:    Retroperitoneal                                                                            Flow Interpretation   A. Retroperitoneal :    - Polytypic B  cells  - No aberrant immunophenotype on T cells         Case Report   Surgical Pathology Report                         Case: [id number]                                   Authorizing Provider:  Hayde Lopez DO         Collected:           01/04/2023 02:25 PM           Ordering Location:     [facility]   Received:            01/04/2023 02:48 PM                                  [facility]                                                                 Pathologist:           Saadia Allen MD                                                                            Specimen:    Lymph Node(s), Axillary, Right                                                             Final Diagnosis   Lymph node, right axillary, core biopsies:  -Partially sampled lymph node showing no evidence of Hodgkin's or non-Hodgkin's lymphoma  -Completed immunophenotyping studies show polytypic B cells and no aberrant T-cell antigen expression     Case Report   Flow Cytometry Report                             Case: [id number]                                   Authorizing Provider:  Hayde Lopez DO         Collected:           01/04/2023 02:25 PM           Ordering Location:     [facility]   Received:            01/04/2023 02:53 PM                                  [facility]                                                                 Pathologist:           Xiomara Ramsey MD                                                         Specimen:    Lymph Node(s), Axillary, Right                                                             Flow Interpretation   A. Lymph Node(s), Axillary, Right, :  -Polytypic B cells  No aberrant immunophenotype on T cells         Case Report   Surgical Pathology Report                         Case: [id number]                                   Authorizing Provider:  New Burger          Collected:           01/20/2023 02:16 PM                                  MD Ari                                                                  Ordering Location:      [facility] OR                 Received:            01/20/2023 02:29 PM           Pathologist:           Isabela Kaminski MD                                                     Specimen:    Lymph Node(s), Inguinal, Left, partial lymph node left groin                               Addendum   This addendum is issued to document that Dr. Isabela Kaminski discussed biopsy findings with Dr. Hayde Lopez on 1/26/23 at 12:30.    Addendum electronically signed by Isabela Kaminski MD on 1/26/2023 at 12:30 PM   Final Diagnosis   A. Left groin, lymph node, partial lymphadenectomy:  -Benign lymph node with follicular and interfollicular hyperplasia  -Rare small noncaseating granulomas  -No morphologic evidence of malignancy  -Negative for select microorganisms on stained tissue  -See comment   Electronically signed by Isabela Kaminski MD on 1/26/2023 at 12:16 PM   Comment  [facility]   There is no definitive morphologic or immunophenotypic evidence for malignancy in this biopsy. Instead, the findings (which include follicular and interfollicular hyperplasia and features of progressive transformation of germinal centers) are favored to be reactive.   Evaluation for select microorganisms was performed, with negative results for EBV (by in situ hybridization), HHV8 and toxoplasma (by immunohistochemistry), and acid fast and fungal organisms (by special stains).  Concurrent flow cytometry ([id number]) demonstrated polytypic B cells and no aberrant immunophenotype on T cells.         Case Report   Flow Cytometry Report                             Case: [id number]                                   Authorizing Provider:  New Burger         Collected:           01/20/2023 02:33 PM                                  MD Ari                                                                   Ordering Location:     [facility]                 Received:            01/20/2023 02:33 PM           Pathologist:           Marleny Holden MD                                                      Specimen:    Lymph Node(s), Inguinal, Left                                                              Flow Interpretation   A. Lymph Node(s), Inguinal, Left:     - Polytypic B cells  - No aberrant immunophenotype on T cells           IMAGING:  CT CAP 12/6/22:  IMPRESSION:  1.  Several enlarged lymph nodes identified at the right axilla,  retroperitoneum, left pelvis and borderline enlarged at the left  inguinal locations. While nonspecific, a neoplastic etiology is  possible including lymphoma.  2.  Mild splenomegaly.  3.  Lobulated indeterminate nodular masses at the left pericardial  fat.  4.  Indeterminate multiple bilateral pulmonary nodules. The dominant  solid nodule is at the right lower lobe measuring 1.1 cm. Neoplasm  remains in the differential and surveillance imaging and/or further  workup is recommended. See below for follow up imaging guidelines.    PET 12/9/22:  IMPRESSION:     Findings suspicious for active neoplasm such as lymphoma involving lymph nodes above/below the diaphragm. The right axillary for left inguinal lymph nodes are amenable to ultrasound-guided histologic sampling if desired.    PET 1/9/24:  IMPRESSION:  1.  Increasing hypermetabolic lymphadenopathy within the chest, abdomen, and pelvis.  2.  There is new FDG avid cutaneous thickening in the left posterior thigh. Recommend correlation with direct visualization.  3.  Normal appearance of the spleen.      ASSESSMENT/PLAN:  Billy Carey is a 33 year old male who is referred for thrombocytopenia. 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 He has not had any bleeding episodes.   -No new meds, recent infection, or recent surgeries.  -TSH normal at 3.85.   -HepB/C and HIV studies historically negative.   -No splenomegaly on physical examination.  -I doubt hemolysis as retic is normal as well as LFTs.  -We discussed the possibility of lab artifact, but there is no comment on platelet clumping on smear. Also, he had lab work repeated two days in a row with PLT count returning in the 40s.   -Given his history of autoimmune disease, he may have underlying ITP, which is a diagnosis of exclusion. We had discussed possible steroid therapy if platelets are to return <30K.   -Repeat CBC is showing PLTs still in the 40's. Hb is 17.9, WBC normal.   -Also is consideration for a microangiopathic hemolytic anemia. MIRA has come back positive at 3+ (which is inconsistent with MAHA) and patient does not have elevated LFTs, abnormal coag studies, nor reticulocytosis. LDH is also normal at 240. He is afebrile. [facility] blood bank testing has returned +IgG and negative for complement, consistent with warm AIHA.   -CT CAP had returned with 2 cm LN of the retroperitoneum, inguinal, and axillary. There was also note of an abnormal pericardial fat mass. I sent for a PET, that did not show FDG avidity of the pericardial fat mass. The retroperitoneal LNs had SUV of 12-13 with the remainder LN 6-7. I sent patient for a core biopsy of the retroperitoneal LN with IR that returned in fragments with unremarkable FLOW. Bone marrow biopsy was normocellular with normal megakaryocyte morphology. I then pursued a right axillary LN biopsy, which again returned negative with unremarkable FLOW. I sent for excisional biopsy of an inguinal LN with general surgery with pathology once again returning as benign with folliculary and interfollicular hyperplasia. Congo red staining is negative. EBV, HHV8 negative. I reviewed with pathology for any  morphologic evidence consistent with Castleman's disease and spoke with Dr. Kaminski who is unable to confirm this at this time. She did have an interdepartmental pathology review. I do note the sinuses contain abundant histiocytes in the microscopic description and in my differential diagnosis is also Rosai-Dorfamn or Langerhan's disease.  I spoke with Dr. Tyler who is in agreement with workup above. I reviewed with Dr. 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 I reviewed I would then move onto rituximab next if patient is not to have improvement with steroid therapy. Dr. Tyler is in agreement with this plan.   -I reviewed the above with patient and wife, Jayla. I will need to continue to monitor the patient not only for treatment response for the hemolysis, but for evolution of LAD. My plan is to recheck with PET in 6 months time and will continue to monitor for treatment response discussed above.   -PLTs returned at 18K. Patient was hospitalized and treated with decadron 40 mg x4 and IVIG. His platelets have remained >100K since then. He has met with Dr. Tyler as well who is in agreement with next line rituximab. Patient would like to continue to think on this. We discussed the risk of bronchospasm and hypersensitivity.  -Patient has sought second opinion at [facility] (Dr. Ayon) who is in agreement with workup and plan for rituximab in the future.     Weekly rituximab x4 weeks, then possible maintenance:  -Rituximab 375 mg/m2 IV    -PET is showing 13 x 6 mm right level IIb LN with SUV 6.9, 6 mm left supraclavicular LN with SUV 4.4, decreased size and FDG avidity of a 12 mm right axillary LN. He has waxing/waning RP, bilateral iliac chain, and left inguinal LAD with both increased/decreased SUV. He has increase in splenomegaly to 17.9 x 9.6 x 5.6 cm with mild FDG avidity,  SUV 5. There are no clear lesions of the spleen for biopsy and will hold on random biopsy due to risk of bleed. He has developed new rash. This was a previous symptom prior to thrombocytopenia. Currently, CBC has returned with PLT 128K. This, in conjunction with elevated Cr, elevated T. Bili. This will be a pattern to monitor in the future for relapse.   -Platelets have been stable >100K since June 2023. If platelets continue to downtrend, will plan on initiation of rituximab 375 mg/m2 once weekly x4 (with possible maintenance). We reviewed the mechanism of action of rituximab. There is risk of bronchospasm and hypersensitivity with treatment. He has consented to treatment in the eventuality he will require initiation. We discussed possible splenectomy, but this would be further down the line for refractory disease. I spoke with Dr. Tyler today who is in agreement.   -Repeat EBV with high titer IgG, IgM negative. Mononucleosis screen negative.   -PET 1/9/24: I personally reviewed imaging with patient showing increase in hypermetabolic LAD within the chest/abdomen/pelvis. He has supraclavicular and axillary adenopathy with SUV 5-7, right upper paratracheal LAD with SUV 7.1, retroperitoneal LAD with SUV 11. Most notably, there is an 11 mm left internal iliac LN with SUV 17.1 (previously 4.2 mm). Enlarging left external iliac lymph node measuring 26 x 13 mm (previously 5.2 mm). He is in need of updated labs and will send to general surgery for left external iliac lymph node biopsy.  -I referred to general surgery who then referred to vascular surgery. Lymph node excision will require extensive surgery and will hold at this time given stability. I also discussed with Dr. Tyler. Patient is agreeable to this.     2) Lymphadenopathy  -See above.  -Path reviewed at [facility] and returning as benign reactive folllicular hyperplasia. No evidence of malignancy.     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  -Patient without  arthralgias, skin changes.  -APLA, ds-DNA labs returned negative.  -Rheumatology referral upcoming.     4) History of infectious mononucleosis  -EBV negative on LN.  -EBV PCR negative.    5) DM1  -Patient managed by endocrinology.    6) History of HTN, HLD    7) Rash  -Derm referral for biopsy. Discussed with patient and spouse still need to monitor for the possibility of cutaneous lymphomas.     8) -Recheck CBC, CMP in 3 months. Will hold on repeat PET at this time and can recheck in the future based on clinical symptoms.   -I discussed with patient my planned departure from [facility] and he will establish with a new hematologist with [facility] after February 2024.  He will transfer care to Dr. Tyler of [facility] whom he has seen in the past.         Hayde Lopez,   Hematology/Oncology  [facility]

## 2024-02-27 ENCOUNTER — PATIENT OUTREACH (OUTPATIENT)
Dept: ONCOLOGY | Facility: CLINIC | Age: 34
End: 2024-02-27
Payer: COMMERCIAL

## 2024-02-27 NOTE — PROGRESS NOTES
Situation: Patient chart reviewed by care coordinator.    Background: Pt previously seen by Dr. Alli Joy.      Assessment: Dr. Hayde Lopez wants pt to follow up with Dr. Alli Joy.    Plan/Recommendations: Per communication, Dr. Joy agreed to have pt follow up with him.  Appointment has been scheduled in April and pt aware per clinic coordinatorMartell.    Carin Kohli, RN, OCN  RN Care Coordinator  [facility]  Clinic line [phone]

## 2024-02-29 NOTE — TELEPHONE ENCOUNTER
James's signature notarized. Patient informed (one page) form at  for . Copy forwarded to abstracting.   Tavo Hernandez RN   NEUROPSYCHOLOGY CONSULT    Referral Information  Name: Jacques Lechuga Jr.  MRN: [id number]  : 1946  Age: 77 y.o.  Race: White  Gender: Male  Dominant Hand: Left  Referring Provider: Nicole Lechuga MD  [address] [place] [address]  Billing: See below for details as coding/billing has changed   Referral Reason/Medical Necessity: Neuropsychological evaluation to assess current cognitive functioning, aid in differential diagnosis, and provide treatment recommendations in the context of reported memory concerns.  Consent/Emergency Plan: The patient expressed an understanding of the purpose of the evaluation and consented to all procedures, including providing consent for Salena Goldstein Psy.D., a clinical neuropsychology fellow under the supervision of Sierra Toussaint, Ph.D., [facility], to be involved in his care. We informed the patient of limits to confidentiality and discussed an emergency plan.       SUMMARY/TREATMENT PLAN   Results from the interview indicate the following diagnoses and treatment plan recommendations. The patient will be able to follow a treatment plan without help from family.    Diagnoses/Plan:  Problem List Items Addressed This Visit          Neuro    Cognitive complaints     Summary/Conclusions:  Mr. Jacques Lechuga Jr. is a 77 y.o., male with 12 years of education and pertinent medical history including dizziness, aortic atherosclerosis, tremor, pre-syncope, low vitamin D, right foot pain, essential hypertension, mixed hyperlipidemia, glaucoma, movements in sleep, and hypogonadism, who was referred by Neurology for a neuropsychological evaluation in the setting of memory loss and speech disturbance, unspecified type. Initial report of memory difficulties was reported in  and then again in . He reported short-term memory difficulty, and he is independent in all activities of daily living. He reported irritability.    Neuropsychological assessment results were  interpreted in the context of estimated average premorbid intellectual abilities. Within that context, Mr. Lechuga demonstrated overall intact cognitive functioning on tasks of attention, working memory, language abilities, visuospatial skills, auditory learning and memory of stories, rote verbal memory, visual memory, processing speed, executive functioning, and bilateral motor speed. His performance showed a relative weakness in rote verbal learning, and he made some errors on drawing tasks, however it is not unusual in the normative population to have 1-2 scores below expectation, and patterns of performance were not suggestive of a cognitive disorder diagnosis.      In sum, Mr. Lechuga exhibited intact cognitive abilities with a relative weakness in rote verbal learning though memory was intact. His cognitive profile and independence in all activities of daily living do not indicate a neurocognitive disorder. Report of tremors, nighttime sleep activity, and dizziness suggest monitoring over time is warranted.     Recommendations:   Neuropsychology Follow-up: Follow-up on 3/6/2024 to review diagnoses and recommendations. Follow-up evaluation is not recommended at a specified time; follow-up will be scheduled if Mr. Lechuga and family report ongoing cognitive and functional changes.  Medical Follow-Up: Continue usual follow up for current active medical issues, including monitoring of tremor and dizziness.  Sleep Medicine: Follow-up with Sleep Medicine is recommended.  Recommendations for Mr. Lechuga based on relative weakness and reported symptoms:  Attention: Remember that inattention and lack of focus are major culprits to forgetting information so be sure and practice paying attention for adequate learning of information. If you rely on passive attention to remembering something (e.g., yeah, uh-huh approach), you'll find you cannot recall it later. I recommend the following to improve attention, which may aid in  later recall:   Reduce distractions in the area as much as possible.  Look at the person as they are speaking to you.   Paraphrase as they are speaking  Write down important pieces of information   Ask them to repeat if you zone out.   Have them simplify and reduce information that you need to attend to during conversation.   Have visual cues to remind you if you need to do something later.  Storing Information: Use the below strategies to help you further enhance how information is stored.  Rehearse - Immediately after seeing/hearing something, try to recall it.  Wait a few minutes, then check again.  Gradually lengthen the intervals between rehearsals.  Repetition of learned material is critical to ensure storage of information to be learned. Self-test at home to ensure learning.  Write down important information to improve your attention and focus and to have something to look back on when you need to recall it.  Make sure the person doesn't rattle off, but presents in a clear, logical, and unhurried manner.   Practice good cognitive/brain health hygiene:  Engage in regular exercise, which increases alertness and arousal and can improve attention and focus.    Get a good night's sleep, as this can enhance alertness and cognition.  Eat healthy foods and balanced meals. It is notable that research indicates certain nutrients may aid in brain function, such as B vitamins (especially B6, B12, and folic acid), antioxidants (such as vitamins C and E, and beta carotene), and Omega-3 fatty acids. Talk with your physician or nutritionist about what's right for you.   Keep your brain active. Find activities to stay mentally active, such as reading, games (cards, checkers), puzzles (crosswords, Sudoku, jig saw), crafts (EQ works, woodworking), gardening, or participating in activities in the community.  Stay socially engaged. Continue staying active with your family and friends.    Managing Vascular Risk Factors: A personal  history of disorders that affect the cardiovascular system (e.g., hypertension, hyperlipidemia, diabetes) can have a negative impact on brain functioning especially over many years. Therefore, it is very important for Mr. Lechuga to maintain good control over risk factors. The following is recommended:  Take all medications as prescribed and follow-up with recommendations above.  Get regular physical exercise to the extent that it is possible.   Follow a Mediterranean diet, which emphasizes plant-based foods, whole grains, fish, and healthy fats, such as olive oil, and has been shown to be brain protective.   Check blood pressure, cholesterol levels, blood sugar, and others as appropriate.    Recommendations: Consider stress management techniques to reduce anxiety and respond to anxiety as it arises. Here is a simple three-minute exercise you can use no matter where you are:  1. Sit or stand up nice and tall. Let your eyes relax. Relax your jaw. Let your shoulders relax down your back and start to focus your attention on your breath.  2. Breathe all the way in through your nose, filling your belly with your breath. Then, breathe all the way out through your nose. It's that simple.  3. Start to breathe into a count of three. Inhale for one, two, three. Then, exhale into a count of six: six, five, four, three, two and one.  4. Repeat. Inhale: one, two, three. Exhale: six, five, four, three, two and one.  5. Continue just like this for three minutes, or as long as you would like. You can set a timer on your phone at the beginning of your practice.    Should your mind wander, simply notice, without judgment. Observe your thought. And let it go. Return your breath to your attention as many times as it takes, training your mind in the same way we train our  bodies.    https://blog.[facility].org/articles/3-minute-mindful-breathing-exercise-for-anxiety-relief    https://blog.[facility].org/articles/shake-it-off-in-the-new-year-simple-stress-relief-techniques     Thank you for allowing us to participate in Mr. Lechuga' care.  If you have any questions, please contact Dr. Toussaint at [phone].    Salena Goldstein Psy.D.  Postdoctoral Fellow in Clinical Neuropsychology  [facility] - Department of Neurology    Sierra Toussaint, Ph.D., ABPP  Board Certified in Clinical Neuropsychology  [facility] - Department of Neurology    HISTORY OF PRESENT ILLNESS AND CURRENT SYMPTOMS     Mr. Lechuga has active problems noted below. He visited neurology on 2/3/2022 for dizziness/syncopal episode and no-no head tremor but not memory concerns. Physical exam included head tremor and difficulty with tandem gait. He followed up with Family Medicine on 4/7/2022 reporting short-term memory issues, decreased appetite with unexpected weight change, dizziness, fatigue, reduced strength, and he appeared anxious. Physical exam was notable for a no-no head tremor that has been present for years. On 8/8/2023 he visited Neurology per Primary Care for evaluation of memory loss, speech trouble, dizziness, and head pain. Performance on a brief mental status screener was below expectation (Mini-Mental Status Exam, Second Edition [MMSE-2] = 25/30). He reported fatigue, forgetfulness, hesitation in speaking before he gathers his thoughts, brain fog, and confusion. He reported some falls because of dizziness and syncope or near syncope. He reported chronic neuropathy resulting in imbalance, excessive nighttime movement, and his wife reported he is more short-tempered. Physical exam was notable for additional bilateral upper-extremity action tremor that was not noted at any prior appointment. The day prior to his Neurology appointment he was seen in the ER for nausea, vomiting, and diarrhea lasting  months, and dizziness for the past year and a half. He had difficulty drawing a clock during a primary care visit on 9/7/2023 (numbers outside of the clock face), which was not present in 2018 when he completed the same task. At his most recent cardiology appointment (9/19/2023), he reported he had not had any dizziness in the prior two weeks, since adjusting blood pressure medication. He was independent in all activities of daily living.    Cognitive Functioning   Previous evaluation(s): None  Onset & course of difficulty: Mr. Lechuga reported gradual onset of memory difficulties beginning about 4-5 months ago (vs. April 2022 in records) that have not progressively worsened since their onset. He denied any other memory difficulties in the past.  Examples:   Attention/Working Memory: No reported changes  Executive Functioning/Planning: No reported changes   Processing Speed: No reported changes  Language: He denied any changes. When asked about prior speech difficulties that were reported in August of 2023, he appeared confused and denied ever having speech trouble.  Visuospatial: No reported changes  Learning & Memory: He reported short-term memory difficulty (e.g., forgetting to complete a planned task). He reported his wife has noticed trouble with short-term memory as well.   Exacerbating factors: None reported   Ameliorating factors: None reported    Daily Functioning    ADLs  Self-Care Eating Safety Other   Independent and without difficulty  Independent and without difficulty  Independent and without difficulty       Instrumental IADLs:   Driving Medication Mgmt/Health Household Mgmt Finances   Independent and without difficulty  Independent and without difficulty  Independent and without difficulty  Independent and without difficulty      Physical Symptoms:    Tremor: Yes, head and bilateral upper extremities.  Difficulty walking: Yes, but he does not require an assistive device. He began attending physical  "therapy (PT) in November and reported improvements.  Imbalance: yes, he reported gradual onset several months ago. Since its onset it has not progressed.   Falls: No falls reported  Weakness/Numbness: Yes, he reported numbness secondary to neuropathy; weakness after a surgery because of loss of muscle tone and from chronic diarrhea.  Trouble with fine motor movements: No   Lightheadedness/Dizziness/Syncope: Yes, he reported dizziness and syncope.  Urinary or Bowel Urgency/Incontinence: He reported current diarrhea which he has been attempting to treat with medication. Outside of this he denied urgency or incontinence.  Sensory Sxs: Yes, he received hearing aids 3 months ago and wears them on occasion when he is going out. No other sensory changes reported.  Pain: He reported cramps from neuropathy  Physical Exercise Routine: PT (2x week) and a stretching class every week.    Psychiatric/Neuropsychiatric Symptoms   Mood: Good, I'm very positive  Depression: None reported; he reported he started taking Lexapro about 6 months ago(medical records indicate he has been prescribed Lexapro since 2022)  Current Ideation, Intention, or Plan: No  Homicidal Ideation, Intention, or Plan: No    Yenni/Hypomania: No   Anxiety: Denied anxiety  Stress: Some, he is frustrated about weight loss and GI difficulties  Coping Mechanisms: No   Social Withdrawal: No   Neurovegetative Sxs:  Appetite: No reported changes  Sleep: He reports he sleep(s) pretty good, 7 hours a night. He reported waking up about every three nights due to leg cramps. He is using a lotion that helps with cramps.   Insomnia: No  Snoring: No  Apnea: Mr. Lechuga denied having sleep apnea. Per medical records,   Acting out dreams: He reported he has a lot of vivid dreams and often acts them out. Medical records indicate twitching and moving during his sleep per his wife.  Energy: He reported energy is "a little bit lower." He reported he believes this is due to " not getting enough nutrients because of his current GI trouble.  Hallucinations: No   Delusional/Paranoid Thinking: No   Obsessive/Compulsive Behaviors: No   Impulsivity/Compulsivity: No    Disinhibition: No   Irritability/Agitation: Yes, secondary to physical symptoms  Aggression: No   Apathy/Indifference: No   Other changes in personality: No     RELEVANT HISTORY  Psychiatric History   Prior Diagnoses: Anxiety (diagnoses in 2022 and started on Lexapro)  Medication(s): Lexapro  Psychotherapy/Counseling: No     Substance Use History   Mr. Lechuga reported that he has never smoked. He has never used smokeless tobacco. He reported current alcohol use of 2-3 glasses of wine a night with dinner. He reported that he does not use drugs.   History of abuse/overuse: No     Neurological History    Headaches/Migraines: No    TBI: No   Seizures: No   Stroke: No   Tumor: No   Previous Episodes of Delirium: No   Movement Disorder: Head and bilateral upper extremity tremor  Multiple Sclerosis: No  CNS Infection: No    Family Neurological & Psychiatric History     Family history includes Cancer in his father; heart disease in his father and mother.  Neurologic: Negative for heritable risk factors.   Psychiatric: Negative for heritable risk factors.    Development  Education   Born & raised: [place], [place]  Prenatal and  development: [facility]  Developmental milestones: [facility]  Language Acquisition: English is his first language  Level Attained: 12 + 1 full year of college and 2 years of night school. He did not report degree completion.  Learning/Attention/Behavior Difficulties: None reported  Repeated Grade(s): No  Typical Grades: B-Cs        Occupation  Social    Service: [employer], E4, 2 years  Occupational Status: Retired for personal reasons  Primary Occupation: Sales and marketing for 45 years  Family Status: , 2 grown sons, 2 grandchildren   Current Living Situation: He and his wife  Support System:  Wife  Hobbies/Activities: Wife, community activities, travel       Medical Status   Patient Active Problem List   Diagnosis    Essential hypertension    Mixed hyperlipidemia    Glaucoma    Hypogonadism male    GERD (gastroesophageal reflux disease)    Erectile dysfunction    Limited joint range of motion (ROM)    Foot pain, right    Decreased range of motion of neck    Muscle tightness    Tremor    Pre-syncope    Low vitamin D level    History of colon polyps    Aortic atherosclerosis    Dizziness     Past Medical History:   Diagnosis Date    Allergy     seasonal allergic rhinitis    Anticoagulant long-term use     Colon polyp     Decreased functional mobility 10/7/2020    Diverticulosis     ED (erectile dysfunction)     Fatty liver 2016    GERD (gastroesophageal reflux disease)     Glaucoma     Heme positive stool 5/13/2015    HTN (hypertension) 3/20/2012    Hyperlipidemia 3/20/2012    Hypertension     Hypogonadism male 3/20/2012    Syncope and collapse 2/3/2022     Past Surgical History:   Procedure Laterality Date    ANGIOGRAM, CORONARY, WITH LEFT HEART CATHETERIZATION Left 09/16/2021    Procedure: Angiogram, Coronary, with Left Heart Cath;  Surgeon: Rodger Lainez MD;  Location: [facility] CATH;  Service: Cardiology;  Laterality: Left;    ARTERIOGRAPHY OF AORTIC ROOT N/A 09/16/2021    Procedure: ARTERIOGRAM, AORTIC ROOT;  Surgeon: Rodger Lainez MD;  Location: [facility] CATH;  Service: Cardiology;  Laterality: N/A;    CHOLECYSTECTOMY  01/2022    COLONOSCOPY  05/13/2015    DR. GARSIA, REPEAT IN 6-7 YEARS FOR SURVEILLANCE    COLONOSCOPY N/A 02/16/2022    Procedure: COLONOSCOPY;  Surgeon: Edgard Garsia Jr., MD;  Location: [facility] ENDO;  Service: Endoscopy;  Laterality: N/A; 1 colon polyp removed, diverticulosis, hemorrhoids; Repeat colonoscopy is not recommended due to current age; biopsy: Tubular adenoma    CORONARY ANGIOGRAPHY N/A 10/12/2018    Procedure: ANGIOGRAM, CORONARY ARTERY;  Surgeon: Isidoro Sanders MD;   Location: [facility] CATH;  Service: Cardiology;  Laterality: N/A;    Dental implants      ESOPHAGOGASTRODUODENOSCOPY N/A 12/06/2018    Procedure: EGD (ESOPHAGOGASTRODUODENOSCOPY);  Surgeon: Edgard Funk Jr., MD;  Location: [facility];  Service: Endoscopy;  Laterality: N/A; Mild Schatzki ring. Dilated. small hiatal hernia, gastric mucosal atrophy, duodenal diverticulum; biopsy: stomach-mild chronic inflammation and reactive changes. negative h pylori    ESOPHAGOGASTRODUODENOSCOPY N/A 02/16/2022    Dr. Funk: Benign-appearing esophageal stenosis. Biopsied & dilated; Slight antral mucosal atrophy, duodenal diverticulum; biopsy: stomach- mild chronic gastritis, negative for h pylori; focal intestinal metaplasia (incomplete type). repeat in 1 -2 years for surveillance    ESOPHAGOGASTRODUODENOSCOPY N/A 1/10/2024    Procedure: EGD (ESOPHAGOGASTRODUODENOSCOPY);  Surgeon: Edgard Funk Jr., MD;  Location: [facility];  Service: Endoscopy;  Laterality: N/A;    LAPAROSCOPIC CHOLECYSTECTOMY N/A 01/15/2022    Procedure: CHOLECYSTECTOMY, LAPAROSCOPIC;  Surgeon: Ann Mueller MD;  Location: [facility] OR;  Service: General;  Laterality: N/A;    LEFT HEART CATHETERIZATION Left 10/12/2018    Procedure: Left heart cath;  Surgeon: Isidoro Sanders MD;  Location: [facility] CATH;  Service: Cardiology;  Laterality: Left;     Neurodiagnostics     Results for orders placed or performed during the hospital encounter of 02/04/22     MRI Brain Without Contrast     Narrative    REASON: syncope x 2;  Syncope and collapse    COMPARISON: CT head 12/10/2021    FINDINGS: Study is mildly degraded by motion artifact.  1. No acute intracranial abnormality.  2. Mild generalized cerebral volume loss with scattered supratentorial white matter T2/FLAIR hyperintensity, nonspecific but likely representing chronic microvascular ischemic change.     Results for orders placed or performed during the hospital encounter of 12/10/21     CT Head Without Contrast  "   Narrative    REASON: Weight loss, unintended; Hereditary and idiopathic neuropathy, unspecified    COMPARISON: MRA brain 01/05/2005    FINDINGS:  1. No evidence of an acute intracranial abnormality.  2. Mild generalized cerebral volume loss with patchy areas of supratentorial white matter hypoattenuation, which are nonspecific and may represent a mild degree of chronic microvascular ischemic change       Pertinent Lab Work     Lab Results   Component Value Date    FBGPHEOM93 326 11/06/2023     Lab Results   Component Value Date    RPR Non-reactive 08/08/2023     Lab Results   Component Value Date    FOLATE 6.5 11/06/2023     Lab Results   Component Value Date    TSH 1.830 08/08/2023     No results found for: "LABA1C", "HGBA1C"  No results found for: "HIV1X2", "[id number]"    11/6/2023: Recent bloodwork was significant for levels of CO2 being just below reference range, and glucose and potassium being just above reference ranges. Thiamine has been assessed over the years and is always within range.    Medications     Current Outpatient Medications:     albuterol (PROVENTIL HFA) 90 mcg/actuation inhaler, Inhale 2 puffs into the lungs every 6 (six) hours as needed for Shortness of Breath. Rescue (Patient not taking: Reported on 1/5/2024), Disp: 54 g, Rfl: 3    amLODIPine (NORVASC) 5 MG tablet, TAKE 1 TABLET ONE TIME DAILY, Disp: 90 tablet, Rfl: 3    atorvastatin (LIPITOR) 40 MG tablet, TAKE 1 TABLET(40 MG) BY MOUTH EVERY DAY, Disp: 90 tablet, Rfl: 3    betamethasone dipropionate 0.05 % cream, Apply 1 application topically 2 (two) times daily. Apply to affected area, Disp: 45 g, Rfl: 2    clobetasoL (TEMOVATE) 0.05 % external solution, Mix into jar of CeraVe cream and aaa bid prn, Disp: 60 mL, Rfl: 3    EScitalopram oxalate (LEXAPRO) 10 MG tablet, TAKE 1 TABLET ONE TIME DAILY, Disp: 90 tablet, Rfl: 3    ferrous gluconate 324 mg (37.5 mg iron) Tab tablet, Take 1 tablet (324 mg total) by mouth 3 (three) times a week. " (Patient not taking: Reported on 10/24/2023), Disp: 36 tablet, Rfl: 1    gabapentin (NEURONTIN) 300 MG capsule, TAKE 1 CAPSULE (300 MG TOTAL) BY MOUTH EVERY EVENING., Disp: 90 capsule, Rfl: 3    irbesartan (AVAPRO) 300 MG tablet, TAKE 1 TABLET ONE TIME DAILY, Disp: 90 tablet, Rfl: 3    latanoprost 0.005 % ophthalmic solution, Place 1 drop into both eyes every evening., Disp: , Rfl:     lipase-protease-amylase 24,000-76,000-120,000 units (PANLIPASE) 24,000-76,000 -120,000 unit capsule, Take 3 capsules by mouth 3 (three) times daily with meals. & 1 capsule with snacks, Disp: 300 capsule, Rfl: 11    montelukast (SINGULAIR) 10 mg tablet, TAKE 1 TABLET EVERY EVENING, Disp: 90 tablet, Rfl: 3    multivit-min/ferrous fumarate (MULTI VITAMIN ORAL), Take 1 tablet by mouth once daily., Disp: , Rfl:     pantoprazole (PROTONIX) 40 MG tablet, TAKE 1 TABLET TWICE DAILY (Patient not taking: Reported on 1/5/2024), Disp: 180 tablet, Rfl: 3    testosterone (ANDROGEL) 20.25 mg/1.25 gram (1.62 %) GlPm, PLACE 2 PUMPS ONTO THE SKIN ONCE DAILY, Disp: 75 g, Rfl: 3    Current Facility-Administered Medications:     triamcinolone acetonide injection 10 mg, 10 mg, Intradermal, 1 time in Clinic/HOD, Ester Lam MD     OBJECTIVE:     MENTAL STATUS AND OBSERVATIONS:  APPEARANCE: Casually dressed and adequate grooming/hygiene.   ALERTNESS/ORIENTATION: Attentive and alert. Fully oriented.  GAIT: Independent  MOTOR MOVEMENTS/MANNERISMS: Bilateral tremor, L > R  SPEECH/LANGUAGE: Normal in rate, rhythm, tone, and volume. No significant word finding difficulty noted. Expressive and receptive language was normal.  STATED MOOD/AFFECT: The patient's mood was euthymic. Affect was congruent with mood.   INTERPERSONAL BEHAVIOR: Rapport was quickly and easily established   SUICIDALITY/HOMICIDALITY: Denied  HALLUCINATIONS/DELUSIONS: None evidenced or endorsed  THOUGHT PROCESSES/INSIGHT: Thoughts seemed logical and goal-directed.     TEST TAKING  BEHAVIOR and VALIDITY: Mr. Lechuga was friendly and cooperative with assessment procedures. Hearing and vision were adequate for testing with the use of hearing aids and prescription glasses. He completed tasks at an average speed, self-corrected, and was relaxed. Instructions were rarely repeated, and he did not need clarification. Scores on stand-alone and embedded performance validity measures were within normal limits. The current results, therefore, are likely an accurate reflection of his current functioning.     PROCEDURES/TESTS ADMINISTERED:  In addition to performing a review of pertinent medical records, reviewing limits to confidentiality, conducting a clinical interview, and explaining procedures, the following measures were administered: MSVT; Test of Premorbid Functioning; CITH; DCT; Neuropsychological Assessment Battery (NAB), selected subtests; Verbal fluency tests (FAS & animal naming; Gerry et al., 2004 norms); Trail Making Test, parts A and B (Gerry et al., 2004 norms); Grooved Pegboard Test (Gerry et al., 2004 norms); Rafy Complex Figure Test (RCFT, copy); Clock Drawing and Copy; Frontal Assessment Battery (OMID); Geriatric Depression Scale (GDS-30); Generalized Anxiety Disorder Questionnaire (VIDAL-7). Manual norms were used unless otherwise indicated.      TEST RESULTS    PREMORBID FUNCTIONING Raw Score Type of Standardized Score Standardized Score Percentile/CP Descriptor   TOPF simple + pred. eFSIQ -  55 Average   COGNITIVE SCREENING Raw Score Type of Standardized Score Standardized Score Percentile/CP Descriptor   Orientation - Place 5/5 - - - -   Orientation - Date 5/5 - - - -   RBANS         Immediate Memory - SS 83 13 Low Average   VS/Construction -  63 Average   Language -  53 Average   Attention - SS 85 16 Low Average   Delayed Memory - SS 78 7 Below Average   Total Scale - SS 86 18 Low Average   EI 0 - - - -   LANGUAGE FUNCTIONING Raw Score Type of Standardized Score  Standardized Score Percentile/CP Descriptor   RBANS Naming 10 ss - 51-75 Average   RBANS Semantic Fluency 20 ss 10 50 Average   TOPF Word Reading 35 SS 97 42 Average   NAB Auditory Comprehension 89 Tscore 58 79 High Average   NAB Auditory Comprehension Colors 13 - - 100 WNL   NAB Auditory Comprehension Shapes 22 - - 100 WNL   NAB Auditory Comprehension Colors/Shapes/Numbers 21 - - 100 WNL   NAB Auditory Comprehension Pointing 6 - - 100 WNL   NAB Auditory Comprehension Yes/No 10 - - 100 WNL   NAB Auditory Comprehension Paper Folding 17 - - 100 WNL   NAB Naming 31 Tscore 60 84 High Average   FAS 53 Tscore 64 92 Above Average   Animal Naming 19 Tscore 57 76 High Average   VISUOSPATIAL FUNCTIONING Raw Score Type of Standardized Score Standardized Score Percentile/CP Descriptor   RBANS Line Orientation  17 ss - 51-75 Average   RBANS Figure Copy 19 ss 12 75 High Average   RCFT Copy 29.5 - - >16 WNL   RCFT Time to Copy 208 - - >16 WNL   Clock Request 4 - - 48.9 Average   Clock Copy 5 - - 100 WNL   Clock Total 9 - - 53.5 Average   LEARNING & MEMORY Raw Score Type of Standardized Score Standardized Score Percentile/CP Descriptor   RBANS         List Learning 18 ss 5 5 Below Average   List Recall 3 ss - 17-25 Low Average   List Recognition 17 ss - 10-16 Low Average   Story Memory 16 ss 9 37 Average   Story Recall 8 ss 9 37 Average   Story Recognition  10 - - 27-46 Average   Figure Recall 9 ss 8 25 Average   Figure Recognition 1 - - - WNL   CITH 10 - - - -   MSVT         IR 95 - - - -    - - - -   Cons 95 - - - -   PA 80 - - - -   FR 45 - - - -   ATTENTION/WORKING MEMORY Raw Score Type of Standardized Score Standardized Score Percentile/CP Descriptor   RBANS Digit Span  8 ss 7 16 Low Average   MENTAL PROCESSING SPEED Raw Score Type of Standardized Score Standardized Score Percentile/CP Descriptor   RBANS Coding 34 ss 8 25 Average   TMT A  36 Tscore 52 58 Average   TMT A errors 0 - - - -   DCT 9 - - - -   EXECUTIVE  FUNCTIONING Raw Score Type of Standardized Score Standardized Score Percentile/CP Descriptor   TMT B 132 Tscore 45 31 Average   TMT B errors 1 - - - -   OMID 18 zscore 1 - WNL   FRONTOMOTOR  Raw Score Type of Standardized Score Standardized Score Percentile/CP Descriptor   GPT DH 92 Tscore 50 50 Average   GPD  Tscore 48 42 Average   MOOD & PERSONALITY Raw Score Type of Standardized Score Standardized Score Percentile/CP Descriptor   GDS-30 2 - - - WNL   VIDAL-7 1 - - - WNL   ss = scaled score (mean = 10, SD = 3); SS = standard score (mean = 100, SD = 15); Tscore mean = 50, SD = 10; zscore (mean = 0.00, SD = 1)       BILLING  Service Description CPT Code Minutes Units   Test Evaluation Services --  --   Neuropsychological testing evaluation services by physician [id number] [phone]   Each additional hour by physician [id number]  2   Test Administration and Scoring --  --   Psychological or neuropsychological test administration and scoring by physician [id number]  0   Each additional 30 minutes by physician [id number]  0   Psychological or neuropsychological test administration and scoring by technician [id number] [phone]   Each additional 30 minutes by technician [id number]  3

## 2024-03-14 ENCOUNTER — TELEPHONE (OUTPATIENT)
Dept: ONCOLOGY | Facility: CLINIC | Age: 34
End: 2024-03-14

## 2024-03-14 ENCOUNTER — LAB (OUTPATIENT)
Dept: LAB | Facility: CLINIC | Age: 34
End: 2024-03-14
Payer: COMMERCIAL

## 2024-03-14 ENCOUNTER — NURSE TRIAGE (OUTPATIENT)
Dept: ONCOLOGY | Facility: CLINIC | Age: 34
End: 2024-03-14

## 2024-03-14 ENCOUNTER — PATIENT OUTREACH (OUTPATIENT)
Dept: ONCOLOGY | Facility: CLINIC | Age: 34
End: 2024-03-14

## 2024-03-14 DIAGNOSIS — D69.3 AUTOIMMUNE THROMBOCYTOPENIA (H): ICD-10-CM

## 2024-03-14 DIAGNOSIS — D69.6 THROMBOCYTOPENIA (H): Primary | ICD-10-CM

## 2024-03-14 DIAGNOSIS — D69.3 AUTOIMMUNE THROMBOCYTOPENIA (H): Primary | ICD-10-CM

## 2024-03-14 DIAGNOSIS — R21 RASH: ICD-10-CM

## 2024-03-14 LAB
ACANTHOCYTES BLD QL SMEAR: ABNORMAL
ALBUMIN SERPL BCG-MCNC: 5 G/DL (ref 3.5–5.2)
ALP SERPL-CCNC: 65 U/L (ref 40–150)
ALT SERPL W P-5'-P-CCNC: 28 U/L (ref 0–70)
ANION GAP SERPL CALCULATED.3IONS-SCNC: 10 MMOL/L (ref 7–15)
AST SERPL W P-5'-P-CCNC: 28 U/L (ref 0–45)
AUER BODIES BLD QL SMEAR: ABNORMAL
BASO STIPL BLD QL SMEAR: ABNORMAL
BASOPHILS # BLD AUTO: 0.1 10E3/UL (ref 0–0.2)
BASOPHILS NFR BLD AUTO: 1 %
BILIRUB SERPL-MCNC: 1.5 MG/DL
BITE CELLS BLD QL SMEAR: ABNORMAL
BLISTER CELLS BLD QL SMEAR: ABNORMAL
BUN SERPL-MCNC: 15.2 MG/DL (ref 6–20)
BURR CELLS BLD QL SMEAR: ABNORMAL
CALCIUM SERPL-MCNC: 9.3 MG/DL (ref 8.6–10)
CHLORIDE SERPL-SCNC: 100 MMOL/L (ref 98–107)
CREAT SERPL-MCNC: 1.3 MG/DL (ref 0.67–1.17)
CRP SERPL-MCNC: <3 MG/L
DACRYOCYTES BLD QL SMEAR: ABNORMAL
DAT POLY: NORMAL
DEPRECATED HCO3 PLAS-SCNC: 29 MMOL/L (ref 22–29)
EGFRCR SERPLBLD CKD-EPI 2021: 73 ML/MIN/1.73M2
ELLIPTOCYTES BLD QL SMEAR: ABNORMAL
EOSINOPHIL # BLD AUTO: 0.3 10E3/UL (ref 0–0.7)
EOSINOPHIL NFR BLD AUTO: 5 %
ERYTHROCYTE [DISTWIDTH] IN BLOOD BY AUTOMATED COUNT: 11.9 % (ref 10–15)
FRAGMENTS BLD QL SMEAR: ABNORMAL
GLUCOSE SERPL-MCNC: 149 MG/DL (ref 70–99)
HCT VFR BLD AUTO: 47.8 % (ref 40–53)
HGB BLD-MCNC: 16.9 G/DL (ref 13.3–17.7)
HGB C CRYSTALS: ABNORMAL
HOWELL-JOLLY BOD BLD QL SMEAR: ABNORMAL
IMM GRANULOCYTES # BLD: 0 10E3/UL
IMM GRANULOCYTES NFR BLD: 0 %
LDH SERPL L TO P-CCNC: 216 U/L (ref 0–250)
LYMPHOCYTES # BLD AUTO: 2.2 10E3/UL (ref 0.8–5.3)
LYMPHOCYTES NFR BLD AUTO: 32 %
MCH RBC QN AUTO: 28.9 PG (ref 26.5–33)
MCHC RBC AUTO-ENTMCNC: 35.4 G/DL (ref 31.5–36.5)
MCV RBC AUTO: 82 FL (ref 78–100)
MONOCYTES # BLD AUTO: 0.7 10E3/UL (ref 0–1.3)
MONOCYTES NFR BLD AUTO: 10 %
NEUTROPHILS # BLD AUTO: 3.5 10E3/UL (ref 1.6–8.3)
NEUTROPHILS NFR BLD AUTO: 52 %
NEUTS HYPERSEG BLD QL SMEAR: ABNORMAL
NRBC # BLD AUTO: 0 10E3/UL
NRBC BLD AUTO-RTO: 0 /100
PLAT MORPH BLD: ABNORMAL
PLATELET # BLD AUTO: 19 10E3/UL (ref 150–450)
POLYCHROMASIA BLD QL SMEAR: ABNORMAL
POTASSIUM SERPL-SCNC: 3.9 MMOL/L (ref 3.4–5.3)
PROT SERPL-MCNC: 7.4 G/DL (ref 6.4–8.3)
RBC # BLD AUTO: 5.84 10E6/UL (ref 4.4–5.9)
RBC AGGLUT BLD QL: ABNORMAL
RBC MORPH BLD: ABNORMAL
RETICS # AUTO: 0.05 10E6/UL (ref 0.03–0.1)
RETICS/RBC NFR AUTO: 0.8 % (ref 0.5–2)
ROULEAUX BLD QL SMEAR: ABNORMAL
SICKLE CELLS BLD QL SMEAR: ABNORMAL
SMUDGE CELLS BLD QL SMEAR: ABNORMAL
SODIUM SERPL-SCNC: 139 MMOL/L (ref 135–145)
SPECIMEN EXPIRATION DATE: NORMAL
SPHEROCYTES BLD QL SMEAR: ABNORMAL
STOMATOCYTES BLD QL SMEAR: ABNORMAL
TARGETS BLD QL SMEAR: ABNORMAL
TOXIC GRANULES BLD QL SMEAR: ABNORMAL
VARIANT LYMPHS BLD QL SMEAR: PRESENT
WBC # BLD AUTO: 6.7 10E3/UL (ref 4–11)

## 2024-03-14 PROCEDURE — 86140 C-REACTIVE PROTEIN: CPT

## 2024-03-14 PROCEDURE — 85025 COMPLETE CBC W/AUTO DIFF WBC: CPT

## 2024-03-14 PROCEDURE — 85045 AUTOMATED RETICULOCYTE COUNT: CPT

## 2024-03-14 PROCEDURE — 80053 COMPREHEN METABOLIC PANEL: CPT

## 2024-03-14 PROCEDURE — 36415 COLL VENOUS BLD VENIPUNCTURE: CPT

## 2024-03-14 PROCEDURE — 83615 LACTATE (LD) (LDH) ENZYME: CPT

## 2024-03-14 PROCEDURE — 83010 ASSAY OF HAPTOGLOBIN QUANT: CPT

## 2024-03-14 RX ORDER — EPINEPHRINE 1 MG/ML
0.3 INJECTION, SOLUTION INTRAMUSCULAR; SUBCUTANEOUS EVERY 5 MIN PRN
Status: CANCELLED | OUTPATIENT
Start: 2024-04-08

## 2024-03-14 RX ORDER — LORAZEPAM 2 MG/ML
0.5 INJECTION INTRAMUSCULAR EVERY 4 HOURS PRN
Status: CANCELLED | OUTPATIENT
Start: 2024-03-25

## 2024-03-14 RX ORDER — LORAZEPAM 2 MG/ML
0.5 INJECTION INTRAMUSCULAR EVERY 4 HOURS PRN
Status: CANCELLED | OUTPATIENT
Start: 2024-03-18

## 2024-03-14 RX ORDER — ALBUTEROL SULFATE 90 UG/1
1-2 AEROSOL, METERED RESPIRATORY (INHALATION)
Status: CANCELLED
Start: 2024-04-01

## 2024-03-14 RX ORDER — METHYLPREDNISOLONE SODIUM SUCCINATE 125 MG/2ML
125 INJECTION, POWDER, LYOPHILIZED, FOR SOLUTION INTRAMUSCULAR; INTRAVENOUS
Status: CANCELLED | OUTPATIENT
Start: 2024-03-25

## 2024-03-14 RX ORDER — MEPERIDINE HYDROCHLORIDE 25 MG/ML
25 INJECTION INTRAMUSCULAR; INTRAVENOUS; SUBCUTANEOUS EVERY 30 MIN PRN
Status: CANCELLED | OUTPATIENT
Start: 2024-04-08

## 2024-03-14 RX ORDER — HEPARIN SODIUM,PORCINE 10 UNIT/ML
5-20 VIAL (ML) INTRAVENOUS DAILY PRN
Status: CANCELLED | OUTPATIENT
Start: 2024-03-25

## 2024-03-14 RX ORDER — DIPHENHYDRAMINE HCL 25 MG
50 CAPSULE ORAL ONCE
Status: CANCELLED | OUTPATIENT
Start: 2024-03-25

## 2024-03-14 RX ORDER — HEPARIN SODIUM (PORCINE) LOCK FLUSH IV SOLN 100 UNIT/ML 100 UNIT/ML
5 SOLUTION INTRAVENOUS
Status: CANCELLED | OUTPATIENT
Start: 2024-04-01

## 2024-03-14 RX ORDER — METHYLPREDNISOLONE SODIUM SUCCINATE 125 MG/2ML
125 INJECTION, POWDER, LYOPHILIZED, FOR SOLUTION INTRAMUSCULAR; INTRAVENOUS
Status: CANCELLED
Start: 2024-03-18

## 2024-03-14 RX ORDER — ACETAMINOPHEN 325 MG/1
650 TABLET ORAL ONCE
Status: CANCELLED | OUTPATIENT
Start: 2024-03-25

## 2024-03-14 RX ORDER — DIPHENHYDRAMINE HYDROCHLORIDE 50 MG/ML
50 INJECTION INTRAMUSCULAR; INTRAVENOUS
Status: CANCELLED
Start: 2024-04-08

## 2024-03-14 RX ORDER — DIPHENHYDRAMINE HCL 25 MG
50 CAPSULE ORAL ONCE
Status: CANCELLED | OUTPATIENT
Start: 2024-04-08

## 2024-03-14 RX ORDER — DIPHENHYDRAMINE HCL 25 MG
50 CAPSULE ORAL ONCE
Status: CANCELLED | OUTPATIENT
Start: 2024-04-01

## 2024-03-14 RX ORDER — ACETAMINOPHEN 325 MG/1
650 TABLET ORAL ONCE
Status: CANCELLED | OUTPATIENT
Start: 2024-04-01

## 2024-03-14 RX ORDER — MEPERIDINE HYDROCHLORIDE 25 MG/ML
25 INJECTION INTRAMUSCULAR; INTRAVENOUS; SUBCUTANEOUS EVERY 30 MIN PRN
Status: CANCELLED | OUTPATIENT
Start: 2024-03-25

## 2024-03-14 RX ORDER — MEPERIDINE HYDROCHLORIDE 25 MG/ML
25 INJECTION INTRAMUSCULAR; INTRAVENOUS; SUBCUTANEOUS EVERY 30 MIN PRN
Status: CANCELLED | OUTPATIENT
Start: 2024-03-18

## 2024-03-14 RX ORDER — ALBUTEROL SULFATE 0.83 MG/ML
2.5 SOLUTION RESPIRATORY (INHALATION)
Status: CANCELLED | OUTPATIENT
Start: 2024-04-08

## 2024-03-14 RX ORDER — DIPHENHYDRAMINE HYDROCHLORIDE 50 MG/ML
50 INJECTION INTRAMUSCULAR; INTRAVENOUS
Status: CANCELLED
Start: 2024-04-01

## 2024-03-14 RX ORDER — ALBUTEROL SULFATE 90 UG/1
1-2 AEROSOL, METERED RESPIRATORY (INHALATION)
Status: CANCELLED
Start: 2024-04-08

## 2024-03-14 RX ORDER — HEPARIN SODIUM (PORCINE) LOCK FLUSH IV SOLN 100 UNIT/ML 100 UNIT/ML
5 SOLUTION INTRAVENOUS
Status: CANCELLED | OUTPATIENT
Start: 2024-04-08

## 2024-03-14 RX ORDER — HEPARIN SODIUM,PORCINE 10 UNIT/ML
5-20 VIAL (ML) INTRAVENOUS DAILY PRN
Status: CANCELLED | OUTPATIENT
Start: 2024-04-01

## 2024-03-14 RX ORDER — EPINEPHRINE 1 MG/ML
0.3 INJECTION, SOLUTION INTRAMUSCULAR; SUBCUTANEOUS EVERY 5 MIN PRN
Status: CANCELLED | OUTPATIENT
Start: 2024-04-01

## 2024-03-14 RX ORDER — LORAZEPAM 2 MG/ML
0.5 INJECTION INTRAMUSCULAR EVERY 4 HOURS PRN
Status: CANCELLED | OUTPATIENT
Start: 2024-04-01

## 2024-03-14 RX ORDER — METHYLPREDNISOLONE SODIUM SUCCINATE 125 MG/2ML
125 INJECTION, POWDER, LYOPHILIZED, FOR SOLUTION INTRAMUSCULAR; INTRAVENOUS
Status: CANCELLED
Start: 2024-04-08

## 2024-03-14 RX ORDER — DIPHENHYDRAMINE HYDROCHLORIDE 50 MG/ML
50 INJECTION INTRAMUSCULAR; INTRAVENOUS
Status: CANCELLED
Start: 2024-03-18

## 2024-03-14 RX ORDER — MEPERIDINE HYDROCHLORIDE 25 MG/ML
25 INJECTION INTRAMUSCULAR; INTRAVENOUS; SUBCUTANEOUS
Status: CANCELLED | OUTPATIENT
Start: 2024-04-08

## 2024-03-14 RX ORDER — MEPERIDINE HYDROCHLORIDE 25 MG/ML
25 INJECTION INTRAMUSCULAR; INTRAVENOUS; SUBCUTANEOUS
Status: CANCELLED | OUTPATIENT
Start: 2024-04-01

## 2024-03-14 RX ORDER — DIPHENHYDRAMINE HYDROCHLORIDE 50 MG/ML
50 INJECTION INTRAMUSCULAR; INTRAVENOUS
Status: CANCELLED
Start: 2024-03-25

## 2024-03-14 RX ORDER — ACETAMINOPHEN 325 MG/1
650 TABLET ORAL ONCE
Status: CANCELLED | OUTPATIENT
Start: 2024-04-08

## 2024-03-14 RX ORDER — EPINEPHRINE 1 MG/ML
0.3 INJECTION, SOLUTION INTRAMUSCULAR; SUBCUTANEOUS EVERY 5 MIN PRN
Status: CANCELLED | OUTPATIENT
Start: 2024-03-25

## 2024-03-14 RX ORDER — METHYLPREDNISOLONE SODIUM SUCCINATE 125 MG/2ML
125 INJECTION, POWDER, LYOPHILIZED, FOR SOLUTION INTRAMUSCULAR; INTRAVENOUS
Status: CANCELLED | OUTPATIENT
Start: 2024-04-08

## 2024-03-14 RX ORDER — METHYLPREDNISOLONE SODIUM SUCCINATE 125 MG/2ML
125 INJECTION, POWDER, LYOPHILIZED, FOR SOLUTION INTRAMUSCULAR; INTRAVENOUS
Status: CANCELLED
Start: 2024-04-01

## 2024-03-14 RX ORDER — ALBUTEROL SULFATE 90 UG/1
1-2 AEROSOL, METERED RESPIRATORY (INHALATION)
Status: CANCELLED
Start: 2024-03-25

## 2024-03-14 RX ORDER — METHYLPREDNISOLONE SODIUM SUCCINATE 125 MG/2ML
125 INJECTION, POWDER, LYOPHILIZED, FOR SOLUTION INTRAMUSCULAR; INTRAVENOUS
Status: CANCELLED | OUTPATIENT
Start: 2024-04-01

## 2024-03-14 RX ORDER — METHYLPREDNISOLONE SODIUM SUCCINATE 125 MG/2ML
125 INJECTION, POWDER, LYOPHILIZED, FOR SOLUTION INTRAMUSCULAR; INTRAVENOUS
Status: CANCELLED
Start: 2024-03-25

## 2024-03-14 RX ORDER — HEPARIN SODIUM (PORCINE) LOCK FLUSH IV SOLN 100 UNIT/ML 100 UNIT/ML
5 SOLUTION INTRAVENOUS
Status: CANCELLED | OUTPATIENT
Start: 2024-03-25

## 2024-03-14 RX ORDER — HEPARIN SODIUM,PORCINE 10 UNIT/ML
5-20 VIAL (ML) INTRAVENOUS DAILY PRN
Status: CANCELLED | OUTPATIENT
Start: 2024-04-08

## 2024-03-14 RX ORDER — HEPARIN SODIUM,PORCINE 10 UNIT/ML
5-20 VIAL (ML) INTRAVENOUS DAILY PRN
Status: CANCELLED | OUTPATIENT
Start: 2024-03-18

## 2024-03-14 RX ORDER — ALBUTEROL SULFATE 0.83 MG/ML
2.5 SOLUTION RESPIRATORY (INHALATION)
Status: CANCELLED | OUTPATIENT
Start: 2024-03-18

## 2024-03-14 RX ORDER — MEPERIDINE HYDROCHLORIDE 25 MG/ML
25 INJECTION INTRAMUSCULAR; INTRAVENOUS; SUBCUTANEOUS EVERY 30 MIN PRN
Status: CANCELLED | OUTPATIENT
Start: 2024-04-01

## 2024-03-14 RX ORDER — MEPERIDINE HYDROCHLORIDE 25 MG/ML
25 INJECTION INTRAMUSCULAR; INTRAVENOUS; SUBCUTANEOUS
Status: CANCELLED | OUTPATIENT
Start: 2024-03-25

## 2024-03-14 RX ORDER — METHYLPREDNISOLONE SODIUM SUCCINATE 125 MG/2ML
125 INJECTION, POWDER, LYOPHILIZED, FOR SOLUTION INTRAMUSCULAR; INTRAVENOUS
Status: CANCELLED | OUTPATIENT
Start: 2024-03-18

## 2024-03-14 RX ORDER — ACETAMINOPHEN 325 MG/1
650 TABLET ORAL ONCE
Status: CANCELLED | OUTPATIENT
Start: 2024-03-18

## 2024-03-14 RX ORDER — ALBUTEROL SULFATE 0.83 MG/ML
2.5 SOLUTION RESPIRATORY (INHALATION)
Status: CANCELLED | OUTPATIENT
Start: 2024-03-25

## 2024-03-14 RX ORDER — DIPHENHYDRAMINE HCL 25 MG
50 CAPSULE ORAL ONCE
Status: CANCELLED | OUTPATIENT
Start: 2024-03-18

## 2024-03-14 RX ORDER — ALBUTEROL SULFATE 0.83 MG/ML
2.5 SOLUTION RESPIRATORY (INHALATION)
Status: CANCELLED | OUTPATIENT
Start: 2024-04-01

## 2024-03-14 RX ORDER — HEPARIN SODIUM (PORCINE) LOCK FLUSH IV SOLN 100 UNIT/ML 100 UNIT/ML
5 SOLUTION INTRAVENOUS
Status: CANCELLED | OUTPATIENT
Start: 2024-03-18

## 2024-03-14 RX ORDER — ALBUTEROL SULFATE 90 UG/1
1-2 AEROSOL, METERED RESPIRATORY (INHALATION)
Status: CANCELLED
Start: 2024-03-18

## 2024-03-14 RX ORDER — MEPERIDINE HYDROCHLORIDE 25 MG/ML
25 INJECTION INTRAMUSCULAR; INTRAVENOUS; SUBCUTANEOUS
Status: CANCELLED | OUTPATIENT
Start: 2024-03-18

## 2024-03-14 RX ORDER — LORAZEPAM 2 MG/ML
0.5 INJECTION INTRAMUSCULAR EVERY 4 HOURS PRN
Status: CANCELLED | OUTPATIENT
Start: 2024-04-08

## 2024-03-14 RX ORDER — EPINEPHRINE 1 MG/ML
0.3 INJECTION, SOLUTION INTRAMUSCULAR; SUBCUTANEOUS EVERY 5 MIN PRN
Status: CANCELLED | OUTPATIENT
Start: 2024-03-18

## 2024-03-14 NOTE — PROGRESS NOTES
DATE/TIME OF CALL RECEIVED FROM LAB:  03/14/24 at 4:23 PM   LAB TEST: CBC  LAB VALUE:  Plt 19  PROVIDER NOTIFIED?: No (Please explain why the provider was not notified): Routed to RN[facility] of Dr. Tyler   PROVIDER NAME: Dr. Tyler   DATE/TIME LAB VALUE REPORTED TO PROVIDER: Secure Chat/Encounter  Ovidio Murphy RN, BSN.  RN Care Coordinator     [facility]   [phone]

## 2024-03-14 NOTE — CONFIDENTIAL NOTE
Patient called in regarding his platelet count of 19 today.  Discussed with covering MD Dr. Elaina Estrada.  She discussed with Dr. Joy who will review and release rituximab orders.  Urgent scheduling request sent to [employer] .  RN[facility] working on securing finance, will follow-up on appt. And advise patient.    Next Steps:      Secure finance approval  Secure scheduling appt.  Patient had labs today and will need labs Monday.  Page Dr. Estrada with lab results today.  Patient will need to go to ED if platelet counts drop to 10 or below.    Lastly, called patient back and advised him of plan.

## 2024-03-14 NOTE — TELEPHONE ENCOUNTER
Hematology On-Call    Received a call that Billy Carey's platelets are 19 today    This is a gentleman with history of thrombocytopenia (see trend below) of unclear etiology, felt to be possibly autoimmune in nature, as well as a history of warm autoimmune hemolysis as well as type 1 diabetes.    He has been treated in the past with steroids and IVIg.  His most recent platelet trends are below:        He last saw Dr. ASTER Lopez on 2/16/2024 and the plan at this time was to initiate rituximab if his next platelet count was <30,000.     Today's platelet count, as above, is 19,000.  He denies any bleeding.    We called the patient and as some prior notes had indicated possible steroid intervention and as it is not possible to get rituximab set up today (or even tomorrow, most likely) we offered dexamethasone 40mg X4 days as an interim bridge.  However, he declines this given his significant diabetes history.    I discussed with Dr. Joy.  Prior orders for rituximab had been placed and need to be removed from hold.  He is willing to do this.  After this, patient's nursing care team will arrange for rituximab to start when a spot is available in infusion.  However, this will not likely be until next week.  As such, I have requested that he come in for repeat CBC tomorrow (3/15/2024) as well as next Monday (3/18/2024) and that he be hospitalized if the platelet count drops to 10,000 or less or if he develops bleeding at any platelet count.

## 2024-03-15 ENCOUNTER — PATIENT OUTREACH (OUTPATIENT)
Dept: ONCOLOGY | Facility: CLINIC | Age: 34
End: 2024-03-15

## 2024-03-15 ENCOUNTER — LAB (OUTPATIENT)
Dept: LAB | Facility: CLINIC | Age: 34
End: 2024-03-15
Payer: COMMERCIAL

## 2024-03-15 ENCOUNTER — CARE COORDINATION (OUTPATIENT)
Dept: ONCOLOGY | Facility: CLINIC | Age: 34
End: 2024-03-15

## 2024-03-15 DIAGNOSIS — D59.10 AUTOIMMUNE HEMOLYTIC ANEMIA (H): ICD-10-CM

## 2024-03-15 DIAGNOSIS — D69.3 AUTOIMMUNE THROMBOCYTOPENIA (H): ICD-10-CM

## 2024-03-15 LAB
ACANTHOCYTES BLD QL SMEAR: NORMAL
ALBUMIN SERPL BCG-MCNC: 4.7 G/DL (ref 3.5–5.2)
ALP SERPL-CCNC: 61 U/L (ref 40–150)
ALT SERPL W P-5'-P-CCNC: 23 U/L (ref 0–70)
ANION GAP SERPL CALCULATED.3IONS-SCNC: 11 MMOL/L (ref 7–15)
AST SERPL W P-5'-P-CCNC: 31 U/L (ref 0–45)
AUER BODIES BLD QL SMEAR: NORMAL
BASO STIPL BLD QL SMEAR: NORMAL
BASOPHILS # BLD AUTO: 0.1 10E3/UL (ref 0–0.2)
BASOPHILS NFR BLD AUTO: 2 %
BILIRUB SERPL-MCNC: 1.8 MG/DL
BITE CELLS BLD QL SMEAR: NORMAL
BLISTER CELLS BLD QL SMEAR: NORMAL
BUN SERPL-MCNC: 13.3 MG/DL (ref 6–20)
BURR CELLS BLD QL SMEAR: NORMAL
CALCIUM SERPL-MCNC: 9.3 MG/DL (ref 8.6–10)
CHLORIDE SERPL-SCNC: 100 MMOL/L (ref 98–107)
CREAT SERPL-MCNC: 1.21 MG/DL (ref 0.67–1.17)
DACRYOCYTES BLD QL SMEAR: NORMAL
DEPRECATED HCO3 PLAS-SCNC: 27 MMOL/L (ref 22–29)
EGFRCR SERPLBLD CKD-EPI 2021: 80 ML/MIN/1.73M2
ELLIPTOCYTES BLD QL SMEAR: NORMAL
EOSINOPHIL # BLD AUTO: 0.4 10E3/UL (ref 0–0.7)
EOSINOPHIL NFR BLD AUTO: 7 %
ERYTHROCYTE [DISTWIDTH] IN BLOOD BY AUTOMATED COUNT: 11.9 % (ref 10–15)
ERYTHROCYTE [SEDIMENTATION RATE] IN BLOOD BY WESTERGREN METHOD: 8 MM/HR (ref 0–15)
FRAGMENTS BLD QL SMEAR: NORMAL
GLUCOSE SERPL-MCNC: 161 MG/DL (ref 70–99)
HAPTOGLOB SERPL-MCNC: <10 MG/DL (ref 30–200)
HCT VFR BLD AUTO: 47.6 % (ref 40–53)
HGB BLD-MCNC: 17.3 G/DL (ref 13.3–17.7)
HGB C CRYSTALS: NORMAL
HOWELL-JOLLY BOD BLD QL SMEAR: NORMAL
IMM GRANULOCYTES # BLD: 0 10E3/UL
IMM GRANULOCYTES NFR BLD: 0 %
LYMPHOCYTES # BLD AUTO: 2.1 10E3/UL (ref 0.8–5.3)
LYMPHOCYTES NFR BLD AUTO: 34 %
MCH RBC QN AUTO: 29.3 PG (ref 26.5–33)
MCHC RBC AUTO-ENTMCNC: 36.3 G/DL (ref 31.5–36.5)
MCV RBC AUTO: 81 FL (ref 78–100)
MONOCYTES # BLD AUTO: 0.6 10E3/UL (ref 0–1.3)
MONOCYTES NFR BLD AUTO: 10 %
NEUTROPHILS # BLD AUTO: 2.9 10E3/UL (ref 1.6–8.3)
NEUTROPHILS NFR BLD AUTO: 47 %
NEUTS HYPERSEG BLD QL SMEAR: NORMAL
NRBC # BLD AUTO: 0 10E3/UL
NRBC BLD AUTO-RTO: 0 /100
PATH REPORT.COMMENTS IMP SPEC: NORMAL
PATH REPORT.FINAL DX SPEC: NORMAL
PATH REPORT.MICROSCOPIC SPEC OTHER STN: NORMAL
PATH REPORT.MICROSCOPIC SPEC OTHER STN: NORMAL
PATH REPORT.RELEVANT HX SPEC: NORMAL
PLAT MORPH BLD: NORMAL
PLATELET # BLD AUTO: 26 10E3/UL (ref 150–450)
POLYCHROMASIA BLD QL SMEAR: NORMAL
POTASSIUM SERPL-SCNC: 3.9 MMOL/L (ref 3.4–5.3)
PROT SERPL-MCNC: 7.1 G/DL (ref 6.4–8.3)
RBC # BLD AUTO: 5.9 10E6/UL (ref 4.4–5.9)
RBC AGGLUT BLD QL: NORMAL
RBC MORPH BLD: NORMAL
RETICS # AUTO: 0.06 10E6/UL (ref 0.03–0.1)
RETICS/RBC NFR AUTO: 1 % (ref 0.5–2)
ROULEAUX BLD QL SMEAR: NORMAL
SICKLE CELLS BLD QL SMEAR: NORMAL
SMUDGE CELLS BLD QL SMEAR: NORMAL
SODIUM SERPL-SCNC: 138 MMOL/L (ref 135–145)
SPHEROCYTES BLD QL SMEAR: NORMAL
STOMATOCYTES BLD QL SMEAR: NORMAL
TARGETS BLD QL SMEAR: NORMAL
TOXIC GRANULES BLD QL SMEAR: NORMAL
VARIANT LYMPHS BLD QL SMEAR: NORMAL
WBC # BLD AUTO: 6.2 10E3/UL (ref 4–11)

## 2024-03-15 PROCEDURE — 99207 BLOOD MORPHOLOGY PATHOLOGIST REVIEW: CPT | Performed by: PATHOLOGY

## 2024-03-15 PROCEDURE — 85025 COMPLETE CBC W/AUTO DIFF WBC: CPT

## 2024-03-15 PROCEDURE — 82040 ASSAY OF SERUM ALBUMIN: CPT

## 2024-03-15 PROCEDURE — 83010 ASSAY OF HAPTOGLOBIN QUANT: CPT

## 2024-03-15 PROCEDURE — 36415 COLL VENOUS BLD VENIPUNCTURE: CPT

## 2024-03-15 PROCEDURE — 85652 RBC SED RATE AUTOMATED: CPT

## 2024-03-15 PROCEDURE — 85045 AUTOMATED RETICULOCYTE COUNT: CPT

## 2024-03-15 PROCEDURE — 84155 ASSAY OF PROTEIN SERUM: CPT

## 2024-03-15 PROCEDURE — 86880 COOMBS TEST DIRECT: CPT

## 2024-03-15 NOTE — PROGRESS NOTES
Hematology Note: Thrombocytopenia Management    Please see my Telephone Encounter 3/14/2024 for further details    As per that note, our plan for this gentleman with thrombocytopenia (possibly immune thrombocytopenia) was to recheck a CBC today to monitor platelet counts, and get him in for Rituximab as soon as an infusion spot is available.    Repeat platelet count today is 26 (compared to 19 last night)    Other labs show:  --Normal WBC (6.2) and hemoglobin (17.3 so even slightly elevated)   --Total bilirubin of 1.8 with positive MIRA (positive MIRA also observed in past)  --Reticulocyte count 0.059 (appropriate given lack of anemia)  --Normal LDH of 216 (added to yesterday's bloodwork) with haptoglobin pending  --Creatinine slightly elevated at 1.21 (stable over at least past 2 months with prior baseline fluctuating between what appears to be 0.9 and 1.2)  --Normal ESR of 8 and normal CRP of <3 (the CRP was added to yesterday's bloodwork)    Therefore despite positive MIRA, I don't think there's strong evidence of hemolysis based on the normal/slightly elevated hemoglobin, normal LDH, normal retic    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    Peyton Estrada MD  3/15/2024

## 2024-03-15 NOTE — PROGRESS NOTES
[facility]: Cancer Care                                                                                          Situation: Patient chart reviewed by care coordinator.    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    Assessment: Spoke with Montserrat, lab staff member at [facility].  She stated she is most likely person to call critical value.  Gave her author's contact number to call.  Currently no results available yet on CBC/d/p drawn this morning.    Per conversation with Montserrat lab, plts at 26 today, 3/15, with Hgb 17.3 and WBC 6.2.  CBC not yet reported out in system.    Plan/Recommendations:   Per communication with Rebecca Greer RN, Dr. Estrada aware of plts = 26.  Rebecca called pt to review plan, including checking labs again on Monday and going to ED if has any bleeding over the weekend.  Pt to have labs again on Monday, 3/18/24.  Infusion finance team has submitted PA for rituximab.  Clinic coordinator at [facility], have pt on wait list for rituximab.  ([facility] no availability at this time, either.)    Signature:  Carin Kohli RN, OCN

## 2024-03-16 NOTE — PROGRESS NOTES
Hematology/Oncology Consult Note    Billy Carey MRN# [id number]   Age: 32 year old YOB: 1990          Reason for Consult:   thrombocytopenia         Assessment and Plan:   Billy Carey is a 32 year old   ASSESSMENT:    1.  Immune thrombocytopenic purpura,.Hackett Syndrome  2.  Positive MIRA, IgG and C3d,Hackett Syndrome  3.  Type 1 diabetes.  4.  History of COVID.  5.  Status post lymph node biopsies and bone marrow biopsies  6.  Positive PAVAN 1:160 .   7. Skin rash    Billy likely triggered the drop in platelet count due to a viral URI. The decision to go now to rituxan was based on the difficulty with steroids with his Type 1 DM, the Pos PANCHO and lymphadenopathy. He has no lymph nodes I can feel today. He has a rash which could be eczema but will get derm appt. Gladly  platelets are up to 53k K today and MIRA is positive. This is a very unusual presentation of immune thrombocytopenia and a positive MIRA suggesting Hackett syndrome.Of interest he is not briskly hemolyzing . He does have a pos PANCHO.  Does he have some kind of an autoimmune rheumatologic disorder?  I have been impressed that autoimmune disorders following COVID are more common than we ever dreamed of.  A nice review in The Lancet in January 09, 2023 ( DOI:https://doi.org/10.1016/j.eclinm.2022.710135) shows an increased incidence of RA, lupus and other autoimmune disorders.  How COVID can elicit an autoimmune response and lead to an anergic response to an autoimmune clone is unclear to me.  I worry in Billy about long-term steroid use and I would like to propose that although high-dose dexamethasone could be used for his ITP, I think it might be most reasonable, especially with the positive PAVAN, to continue rituximab 375 mg/m2 weekly x4.  That may be able to steroid-spare him.. I would continue to watch for autoimmune diseases evolving.  I am impressed with how hot the PET scan was, suggesting that inflammatory cells were taking up glucose  quite readily.  We do have a good node biopsy from the axilla and so I am confident at this time that we cannot document a B-cell lymphoma.    PLAN   Rituxan 375 mg/m2 weekly for 3 more doses  Weekly CBC plat diff retic and CMP  Follow blood glucose   Call if fever or other infectious issues arise  RTC in 4 weeks April 20    I spent a total of  40 minutes face to face with Billy Carey during today's office visit. Over 50% of this time was spent counseling the patient and coordinating the care regarding immunethrombocytopenia  and 20 minutes preparing to see the patient and  care coordination   Moose Joy MD  [phone]       History of Present Illness:   History obtained from chart review and confirmed with patient.    I had the pleasure of seeing Billy Carey in consultation for Dr. Hayde Lopez for evaluation of autoimmune thrombocytopenia and a history of a positive MIRA.  Billy has been a healthy young man.  Participated in track while at the [facility] including shot put who developed COVID in 05/2022 with fatigue, joint aches and fever.  He did not receive any other medications.  He had been vaccinated.  He was really fine until around Thanksgiving.  He developed a cough with fever and fatigue.  It was productive.  He did not receive an antibiotic.  He had no night sweats or fevers.  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  A repeat was 43,000.  He was seen by Dr. Hayde Lopez on 12/06.  At that time, his platelet count was 46,000, hemoglobin 17.6.  MIRA was positive +2, IgG, C3d.  Creatinine was slightly elevated.  A CT scan showed a 3 cm mass in the pericardial fat but he also had periaortic nodes, iliac nodes, axillary nodes.  His spleen was 15.5 cm.  A PET scan was done.  Showed increased uptake with SUV of 6.5 in the right axilla, 12.5 in the retroperitoneal nodes,  and the left iliac showed an SUV of 11.  An attempt at an interventional radiology needle biopsy of the retroperitoneal was done but no tissue was noted.  He subsequently underwent a biopsy of a right axillary 3 x 2 cm mass.  The biopsy showed polytypic lymphocytes and no evidence of lymphoma, extensive immunocytochemistry could not identify any clonal abnormalities.  Flow cytometry and cytogenetics were also done in December.  He had a bone marrow biopsy which showed hypercellular marrow with adequate megakaryocytes and no evidence of lymphoma.  Interestingly, despite having a continuous MIRA positivity, his hemoglobin remained 15-16gm% with minimal evidence of hemolysis. Haptoglobin was low at  22  While retic count and LDH not elevated He was given prednisone to see if his platelet count would respond, 60 mg a day, at the end of January for 2 weeks.  Again, he had no significant bleeding, no petechiae, no weight loss, no fevers, no chills.  On 02/15, his platelet count had fallen to 18,000, had nose bleed  and he was admitted and received IVIgG for 2 days and dexamethasone 40 mg a day for 4 days.  His platelet count gisell to 196,000 and he is referred here for further evaluation.  He has had an extensive evaluation for autoimmune disease.  His IgG level is 956, IgA 135 and IgM 44.  His haptoglobin was 118.  No further evidence of hemolysis.  No abnormality in kappa or lambda free light chains.  No monoclonal protein seen.  Cytogenetics have been normal.  Flow cytometry showed no immunophenotypic evidence of non-Hodgkin lymphoma on the lymph node biopsies.  He also had an extensive viral evaluation.  He was negative for COVID.  EBV DNA was negative.  Hep B surface antigen, hep B core antibody, hep B surface antibody were positive for vaccination.  Hepatitis C was negative.  HIV was negative.  Influenza was negative.  RSV was negative.  Lupus anticoagulant was absent.  PAVAN was positive at 1:160 in a nucleolar  pattern.  Complement levels were drawn.  Today, he feels good with no bleeding.  He said the dexamethasone did raise his blood glucose levels, so he had to alter his insulin requirements.  INTERVAL HISTORY  About 3 weeks ago noted discomfort in left groin, a subtle soreness.This catalyzed a blood test on Thursday March 14 . No bleeding no bruising no fever.Did have cold sx 3 weeks ago with sinus congestion. He was in [place] in February but no illnesses No diarrhea.   Found to have Platelet 19,000. On Friday Platelets 25k.Subsequently arranged to receive rituxan on Monday March 18 which well tolerated.  Has had pruritic maculopapular rash lower ext  treats with [facility] comes and goes but always present         Review of Systems:   A comprehensive ROS was performed with the patient and was found to be negative with the exception of that noted in the HPI above.  EYES  Some DM retinopathy  RESP Has cough since end of November with cold sx Continue to have a cough No asthma  COR  No chest pain no palpitations  GI  No GI bleeding No Reflux  ENDO Not thryoid issues Has insulin pump for DM   No UTIs  NEURO no seizure or HA  ID Had COVID in May 2022 with fatiuge joint aches, fever  PSYCH Mood ok         Past Medical History:     Past Medical History:   Diagnosis Date    Diabetes mellitus type 1, uncontrolled, insulin dependent 9/16/2013            Past Surgical History:     Past Surgical History:   Procedure Laterality Date    EXCISE MASS GROIN Left 1/20/2023    Procedure: Lymph Node excisional biopsy;  Surgeon: New Burger MD;  Location: [facility] OR    MYRINGOTOMY, INSERT TUBE BILATERAL, COMBINED Bilateral     As a child            Social History:     Social History     Socioeconomic History    Marital status:      Spouse name: Jayla    Number of children: 3    Years of education: Not on file    Highest education level: Not on file   Occupational History    Occupation:      Comment: [employer]    Tobacco Use    Smoking status: Never     Passive exposure: Never    Smokeless tobacco: Never   Substance and Sexual Activity    Alcohol use: No     Comment: rarely    Drug use: No    Sexual activity: Yes     Partners: Female   Other Topics Concern    Parent/sibling w/ CABG, MI or angioplasty before 65F 55M? Not Asked   Social History Narrative    Not on file     Social Determinants of Health     Financial Resource Strain: Low Risk  (1/12/2023)    Overall Financial Resource Strain (CARDIA)     Difficulty of Paying Living Expenses: Not hard at all   Food Insecurity: No Food Insecurity (1/12/2023)    Hunger Vital Sign     Worried About Running Out of Food in the Last Year: Never true     Ran Out of Food in the Last Year: Never true   Transportation Needs: No Transportation Needs (1/12/2023)    PRAPARE - Transportation     Lack of Transportation (Medical): No     Lack of Transportation (Non-Medical): No   Physical Activity: Sufficiently Active (1/12/2023)    Exercise Vital Sign     Days of Exercise per Week: 6 days     Minutes of Exercise per Session: 40 min   Stress: No Stress Concern Present (1/12/2023)    [employer] [place] [employer] - Occupational Stress Questionnaire     Feeling of Stress : Only a little   Social Connections: Socially Integrated (1/12/2023)    Social Connection and Isolation Panel [NHANES]     Frequency of Communication with Friends and Family: More than three times a week     Frequency of Social Gatherings with Friends and Family: More than three times a week     Attends Orthodox Services: 1 to 4 times per year     Active Member of Clubs or Organizations: Yes     Attends Club or Organization Meetings: Not on file     Marital Status:    Interpersonal Safety: Not At Risk (3/31/2023)    Humiliation, Afraid, Rape, and Kick questionnaire     Fear of Current or Ex-Partner: No     Emotionally Abused: No     Physically Abused: No     Sexually Abused: No   Housing Stability: Low Risk   (1/12/2023)    Housing Stability Vital Sign     Unable to Pay for Housing in the Last Year: No     Number of Places Lived in the Last Year: 1     Unstable Housing in the Last Year: No            Family History:     Family History   Problem Relation Age of Onset    C.A.D. No family hx of     Diabetes No family hx of     Hypertension No family hx of     Cancer No family hx of         no skin cancer    Amblyopia No family hx of     Retinal detachment No family hx of     Thyroid Disease No family hx of     Glaucoma No family hx of     Macular Degeneration No family hx of             Allergies:     Allergies   Allergen Reactions    Penicillins Rash            Medications:   (Not in a hospital admission)           Physical Exam:     BP (!) 148/90 (BP Location: Right arm, Patient Position: Sitting, Cuff Size: Adult Large)   Pulse 55   Temp 98.5  F (36.9  C) (Oral)   Resp 16   Wt 114.9 kg (253 lb 4.8 oz)   SpO2 100%   BMI 31.66 kg/m      General: Appears well, sitting in bed, in no acute distress.  Heme/Lymph: No overt bleeding. No cervical, axillary, or supraclavicular adenopathy.Scar in left groin  Skin: No jaundice, cyanosis, erythema, petechiae or ecchymoses on exposed surfaces.  Maculopapular rash over right lower leg. Some rash on left lower leg no rash  on chest back or arms See pix below  Respiratory: Non-labored breathing, good air exchange, lungs clear to auscultation bilaterally.  Cardiovascular: Regular rate and rhythm. No murmur or rub.   Gastrointestinal: Normoactive bowel sounds. Abdomen soft, non-distended, and non-tender. No palpable masses or organomegaly.Could not feel spleen  Extremities: No extremity edema.    Media Information    Document Information    Other: Photograph      03/20/2024 12:42 PM   Attached To:   Oncology Visit on 3/20/24 with Moose Joy MD   Source Information    Moose Joy MD   Bmt      Media Information    Document Information    Other: Photograph       2024 12:42 PM   Attached To:   Oncology Visit on 3/20/24 with Moose Joy MD   Source Information    Moose Joy MD  [facility]     Neurologic: A&O x 3, speech normal, sensation to light touch grossly WNL.  Lymph No  Cervical nodes I cannot feel axillary nodes Scar in left inguinal area with fullness         Data:   I have personally reviewed the following labs/imaging:  CBC@LABRCNTIPR(wbc:4,rbc:4,hgb:4,hct:4,mcv:4,mch:4,mchc:4,rdw:4,plt:4)@  CMP@LABRCNTIPR(na:4,potassium:4,chloride:4,co2:4,aniongap:4,g,bun:4,cr:4,gfrestimated:4,gfrestblack:4,jason:4,ma,phos:4,prottotal:4,albumin:4,bilitotal:4,alkphos:4,ast:4,alt:4)@  INR   Latest Reference Range & Units 24 15:16 03/15/24 09:14 24 07:47 24 11:56   Sodium 135 - 145 mmol/L 139 138     Potassium 3.4 - 5.3 mmol/L 3.9 3.9     Chloride 98 - 107 mmol/L 100 100     Carbon Dioxide (CO2) 22 - 29 mmol/L 29 27     Urea Nitrogen 6.0 - 20.0 mg/dL 15.2 13.3     Creatinine 0.67 - 1.17 mg/dL 1.30 (H) 1.21 (H)     GFR Estimate >60 mL/min/1.73m2 73 80     Calcium 8.6 - 10.0 mg/dL 9.3 9.3     Anion Gap 7 - 15 mmol/L 10 11     Albumin 3.5 - 5.2 g/dL 5.0 4.7     Protein Total 6.4 - 8.3 g/dL 7.4 7.1     Alkaline Phosphatase 40 - 150 U/L 65 61     ALT 0 - 70 U/L 28 23     AST 0 - 45 U/L 28 31     Bilirubin Total <=1.2 mg/dL 1.5 (H) 1.8 (H)     CRP Inflammation <5.00 mg/L <3.00      Glucose 70 - 99 mg/dL 149 (H) 161 (H)     Lactate Dehydrogenase 0 - 250 U/L 216      WBC 4.0 - 11.0 10e3/uL 6.7 6.2 5.4 5.5   Hemoglobin 13.3 - 17.7 g/dL 16.9 17.3 16.2 16.7   Hematocrit 40.0 - 53.0 % 47.8 47.6 45.2 46.0   Platelet Count 150 - 450 10e3/uL 19 (LL) 26 (LL) 27 (LL) 53 (L)   RBC Count 4.40 - 5.90 10e6/uL 5.84 5.90 5.56 5.76   MCV 78 - 100 fL 82 81 81 80   MCH 26.5 - 33.0 pg 28.9 29.3 29.1 29.0   MCHC 31.5 - 36.5 g/dL 35.4 36.3 35.8 36.3   RDW 10.0 - 15.0 % 11.9 11.9 11.9 11.9   % Neutrophils % 52 47 46 52   % Lymphocytes % 32 34 32 26   %  Monocytes % 10 10 12 14   % Eosinophils % 5 7 9 7   % Basophils % 1 2 1 1   Absolute Basophils 0.0 - 0.2 10e3/uL 0.1 0.1 0.1 0.1   Absolute Eosinophils 0.0 - 0.7 10e3/uL 0.3 0.4 0.5 0.4   Absolute Immature Granulocytes <=0.4 10e3/uL 0.0 0.0 0.0 0.0   Absolute Lymphocytes 0.8 - 5.3 10e3/uL 2.2 2.1 1.8 1.4   Absolute Monocytes 0.0 - 1.3 10e3/uL 0.7 0.6 0.7 0.8   % Immature Granulocytes % 0 0 0 0   Absolute Neutrophils 1.6 - 8.3 10e3/uL 3.5 2.9 2.4 2.9   Absolute NRBCs 10e3/uL 0.0 0.0 0.0 0.0   NRBCs per 100 WBC <1 /100 0 0 0 0   RBC Morphology  Confirmed RBC Indices Confirmed RBC Indices  Confirmed RBC Indices   Platelet Morphology Automated Count Confirmed. Platelet morphology is normal.  Automated Count Confirmed. Platelet morphology is normal. Automated Count Confirmed. Platelet morphology is normal.  Automated Count Confirmed. Platelet morphology is normal.   Reactive Lymphs None Seen  Present !      % Retic 0.5 - 2.0 % 0.8 1.0     Absolute Retic 0.025 - 0.095 10e6/uL 0.048 0.059     Sed Rate 0 - 15 mm/hr  8     SPECIMEN EXPIRATION DATE   [id number]     MIRA Anti-IgG,-C3d   Positive 2+     Hep B Surface Agn Nonreactive    Nonreactive    Hepatitis B Core Margarette Nonreactive    Nonreactive    Hepatitis B Surface Antibody Instrument Value <8.5 m[IU]/mL   24.60    Hepatitis B Surface Antibody    Reactive    Haptoglobin 30 - 200 mg/dL <10 (L)      BLOOD MORPHOLOGY PATHOLOGIST REVIEW   Rpt     LAB BLOOD MORPHOLOGY PATHOLOGIST REVIEW   Rpt !!     (LL): Data is critically low  !!: Data is critical  (H): Data is abnormally high  (L): Data is abnormally low  !: Data is abnormal  Rpt: View report in Results Review for more information

## 2024-03-18 ENCOUNTER — INFUSION THERAPY VISIT (OUTPATIENT)
Dept: ONCOLOGY | Facility: CLINIC | Age: 34
End: 2024-03-18
Payer: COMMERCIAL

## 2024-03-18 ENCOUNTER — PATIENT OUTREACH (OUTPATIENT)
Dept: ONCOLOGY | Facility: CLINIC | Age: 34
End: 2024-03-18

## 2024-03-18 VITALS
DIASTOLIC BLOOD PRESSURE: 76 MMHG | HEIGHT: 75 IN | BODY MASS INDEX: 32.09 KG/M2 | HEART RATE: 73 BPM | WEIGHT: 258.1 LBS | OXYGEN SATURATION: 99 % | SYSTOLIC BLOOD PRESSURE: 161 MMHG | TEMPERATURE: 99.4 F | RESPIRATION RATE: 16 BRPM

## 2024-03-18 DIAGNOSIS — D69.3 AUTOIMMUNE THROMBOCYTOPENIA (H): Primary | ICD-10-CM

## 2024-03-18 DIAGNOSIS — D59.10 AUTOIMMUNE HEMOLYTIC ANEMIA (H): ICD-10-CM

## 2024-03-18 LAB
BASOPHILS # BLD AUTO: 0.1 10E3/UL (ref 0–0.2)
BASOPHILS NFR BLD AUTO: 1 %
EOSINOPHIL # BLD AUTO: 0.5 10E3/UL (ref 0–0.7)
EOSINOPHIL NFR BLD AUTO: 9 %
ERYTHROCYTE [DISTWIDTH] IN BLOOD BY AUTOMATED COUNT: 11.9 % (ref 10–15)
HBV CORE AB SERPL QL IA: NONREACTIVE
HBV SURFACE AB SERPL IA-ACNC: 24.6 M[IU]/ML
HBV SURFACE AB SERPL IA-ACNC: REACTIVE M[IU]/ML
HBV SURFACE AG SERPL QL IA: NONREACTIVE
HCT VFR BLD AUTO: 45.2 % (ref 40–53)
HGB BLD-MCNC: 16.2 G/DL (ref 13.3–17.7)
IMM GRANULOCYTES # BLD: 0 10E3/UL
IMM GRANULOCYTES NFR BLD: 0 %
LYMPHOCYTES # BLD AUTO: 1.8 10E3/UL (ref 0.8–5.3)
LYMPHOCYTES NFR BLD AUTO: 32 %
MCH RBC QN AUTO: 29.1 PG (ref 26.5–33)
MCHC RBC AUTO-ENTMCNC: 35.8 G/DL (ref 31.5–36.5)
MCV RBC AUTO: 81 FL (ref 78–100)
MONOCYTES # BLD AUTO: 0.7 10E3/UL (ref 0–1.3)
MONOCYTES NFR BLD AUTO: 12 %
NEUTROPHILS # BLD AUTO: 2.4 10E3/UL (ref 1.6–8.3)
NEUTROPHILS NFR BLD AUTO: 46 %
NRBC # BLD AUTO: 0 10E3/UL
NRBC BLD AUTO-RTO: 0 /100
PLATELET # BLD AUTO: 27 10E3/UL (ref 150–450)
RBC # BLD AUTO: 5.56 10E6/UL (ref 4.4–5.9)
WBC # BLD AUTO: 5.4 10E3/UL (ref 4–11)

## 2024-03-18 PROCEDURE — 250N000013 HC RX MED GY IP 250 OP 250 PS 637: Performed by: INTERNAL MEDICINE

## 2024-03-18 PROCEDURE — 36415 COLL VENOUS BLD VENIPUNCTURE: CPT | Performed by: INTERNAL MEDICINE

## 2024-03-18 PROCEDURE — 83615 LACTATE (LD) (LDH) ENZYME: CPT

## 2024-03-18 PROCEDURE — 86704 HEP B CORE ANTIBODY TOTAL: CPT | Performed by: INTERNAL MEDICINE

## 2024-03-18 PROCEDURE — 83550 IRON BINDING TEST: CPT

## 2024-03-18 PROCEDURE — 250N000011 HC RX IP 250 OP 636: Mod: JZ | Performed by: INTERNAL MEDICINE

## 2024-03-18 PROCEDURE — 87340 HEPATITIS B SURFACE AG IA: CPT | Performed by: INTERNAL MEDICINE

## 2024-03-18 PROCEDURE — 82728 ASSAY OF FERRITIN: CPT

## 2024-03-18 PROCEDURE — 96413 CHEMO IV INFUSION 1 HR: CPT

## 2024-03-18 PROCEDURE — 86706 HEP B SURFACE ANTIBODY: CPT | Performed by: INTERNAL MEDICINE

## 2024-03-18 PROCEDURE — 258N000003 HC RX IP 258 OP 636: Performed by: INTERNAL MEDICINE

## 2024-03-18 PROCEDURE — 85025 COMPLETE CBC W/AUTO DIFF WBC: CPT | Performed by: INTERNAL MEDICINE

## 2024-03-18 PROCEDURE — 96415 CHEMO IV INFUSION ADDL HR: CPT

## 2024-03-18 RX ORDER — ACETAMINOPHEN 325 MG/1
650 TABLET ORAL ONCE
Status: COMPLETED | OUTPATIENT
Start: 2024-03-18 | End: 2024-03-18

## 2024-03-18 RX ORDER — DIPHENHYDRAMINE HCL 25 MG
50 CAPSULE ORAL ONCE
Status: COMPLETED | OUTPATIENT
Start: 2024-03-18 | End: 2024-03-18

## 2024-03-18 RX ADMIN — RITUXIMAB-ABBS 1000 MG: 10 INJECTION, SOLUTION INTRAVENOUS at 09:04

## 2024-03-18 RX ADMIN — ACETAMINOPHEN 650 MG: 325 TABLET ORAL at 08:19

## 2024-03-18 RX ADMIN — DIPHENHYDRAMINE HYDROCHLORIDE 50 MG: 25 CAPSULE ORAL at 08:19

## 2024-03-18 ASSESSMENT — PAIN SCALES - GENERAL: PAINLEVEL: NO PAIN (0)

## 2024-03-18 NOTE — PATIENT INSTRUCTIONS
[facility] Triage and after hours / weekends / holidays:  [phone] option 5, option 2    Please call the triage or after hours line if you experience a temperature greater than or equal to 100.4, shaking chills, have uncontrolled nausea, vomiting and/or diarrhea, dizziness, shortness of breath, chest pain, bleeding, unexplained bruising, or if you have any other new/concerning symptoms, questions or concerns.      If you are having any concerning symptoms or wish to speak to a provider before your next infusion visit, please call triage to notify your care team so we can adequately serve you.     If you need a refill on a narcotic prescription or other medication, please call before your infusion appointment.        Lab Results:  Recent Results (from the past 12 hour(s))   CBC with platelets and differential    Collection Time: 03/18/24  7:47 AM   Result Value Ref Range    WBC Count 5.4 4.0 - 11.0 10e3/uL    RBC Count 5.56 4.40 - 5.90 10e6/uL    Hemoglobin 16.2 13.3 - 17.7 g/dL    Hematocrit 45.2 40.0 - 53.0 %    MCV 81 78 - 100 fL    MCH 29.1 26.5 - 33.0 pg    MCHC 35.8 31.5 - 36.5 g/dL    RDW 11.9 10.0 - 15.0 %    Platelet Count 27 (LL) 150 - 450 10e3/uL    % Neutrophils 46 %    % Lymphocytes 32 %    % Monocytes 12 %    % Eosinophils 9 %    % Basophils 1 %    % Immature Granulocytes 0 %    NRBCs per 100 WBC 0 <1 /100    Absolute Neutrophils 2.4 1.6 - 8.3 10e3/uL    Absolute Lymphocytes 1.8 0.8 - 5.3 10e3/uL    Absolute Monocytes 0.7 0.0 - 1.3 10e3/uL    Absolute Eosinophils 0.5 0.0 - 0.7 10e3/uL    Absolute Basophils 0.1 0.0 - 0.2 10e3/uL    Absolute Immature Granulocytes 0.0 <=0.4 10e3/uL    Absolute NRBCs 0.0 10e3/uL

## 2024-03-18 NOTE — PROGRESS NOTES
Infusion Nursing Note:  Billy Carey presents today for C1D1 Rituximab-abbs.    Patient seen by provider today: No   present during visit today: Not Applicable.    Note: Patient arrives in infusion feeling well. No new complaints or concerns. Patient denies having fever, SOB, or other sings of infections. Patient denies having pain today.     Patient new to oncology infusion room and is receiving Rituximab-abbs for the first time. Pt oriented to infusion room, including chair, nutrition station and call light. New patient teaching done by Carin Kohli in infusion. Pt received new pt folder prior to coming to infusion. Writer reinforced chemotherapy teaching/side effects and schedule.     Pt instructed to call care coordinator, triage (or MD on call if after hours/weekends) with chills/temp >=100.5, questions/concerns. Pt stated understanding of plan.     During first hours of infusion patient stated they felt thirsty. Drinking water during the infusion helped keep their mouth from feeling dry. Patient temperature increased to 99.3 during last hour of infusion. Patient denied having any other signs or symptoms of a reaction. Patient reminded to monitor their temperature at home.       Intravenous Access:  Peripheral IV placed.    Treatment Conditions:  Lab Results   Component Value Date    HGB 17.3 03/15/2024    WBC 6.2 03/15/2024    ANEU 5.4 08/17/2019    ANEUTAUTO 2.9 03/15/2024    PLT 26 (LL) 03/15/2024        Lab Results   Component Value Date     03/15/2024    POTASSIUM 3.9 03/15/2024    MAG 1.9 12/15/2021    CR 1.21 (H) 03/15/2024    DEBORAH 9.3 03/15/2024    BILITOTAL 1.8 (H) 03/15/2024    ALBUMIN 4.7 03/15/2024    ALT 23 03/15/2024    AST 31 03/15/2024       Results reviewed, labs MET treatment parameters, ok to proceed with treatment.      Post Infusion Assessment:  Patient tolerated infusion without incident.  Blood return noted pre and post infusion.  Site patent and intact, free  from redness, edema or discomfort.  No evidence of extravasations.  Access discontinued per protocol.       Discharge Plan:   Patient declined prescription refills.  Discharge instructions reviewed with: Patient and Family.  Patient and/or family verbalized understanding of discharge instructions and all questions answered.  Copy of AVS reviewed with patient and/or family.  Patient will return 03/20/2024 for provider visit.  AVS to patient via [employer].  Patient will return 03/20/2024 for provider visit.   Patient discharged in stable condition accompanied by: self and wife.  Departure Mode: Ambulatory.      John Batista RN

## 2024-03-18 NOTE — PROGRESS NOTES
[facility]: Cancer Care Plan of Care Education Note                                    Discussion with Patient:                                                      Met with pt and wife, Jayla, today in infusion area prior to start of rituximab for ITP        Goals          General    Other     Notes - Note edited  3/18/2024  9:15 AM by Carin Kohli RN    Goal Statement: I will use my clinic and care team resources as directed.  Date Goal set: 3/18/2024  Barriers: disease burden  Strengths: support, motivation, and health awareness  Date to Achieve By: ongoing  Patient expressed understanding of goal: Yes  Action steps to achieve this goal:  I will contact triage with new, worsening or uncontrolled symptoms.   I will contact triage with temperature over 100.4               Assessment:                                                      Assessment completed with:: Patient;Spouse or significant other    Plan of Care Education   Yearly learning assessment completed?: Yes (see Education tab)  Diagnosis:: ITP  Does patient understand diagnosis?: Yes  Tx plan/regimen:: Rituximab x4 doses.  Does patient understand treatment plan/regimen?: Yes (First dose today, 3/18/24.)  Side effect education:: Diarrhea/Constipation;Fatigue;Infection;Nausea/Vomiting;Lab value monitoring (anemia, neutropenia, thrombocytopenia);Immune-mediated effects (Also discussed potential for infusion and or allergic reaction.  Discussed when to call or seek medical assistance.)  When to call provider:: Bleeding;Increased shortness of breath;Shaking chills;Temperature >100.4F;Uncontrolled diarrhea/constipation;Uncontrolled nausea/vomiting (Encouraged to call as needed with any questions about potential side effects.  Encouraged to call if he is having any nausea effecting ability of eat or drink or any feeling of constipation including if not had a BM in 3 days.)    Evaluation of Learning  Patient Education Provided:  "Yes  Readiness:: Acceptance  Method:: Explanation;Booklet/Handout  Response:: Verbalizes understanding    Learning assessment.    Intervention/Education provided during outreach:                                                       See above.    Discussed potential for infusion and or allergic type reactions for rituximab-xxxx.   Reactions can be any where from minor itching or hives to shaking chills with fever, or even effect breathing such as swelling of airway, lower blood pressure or affect heart rate.  Discussed preventive actions taken including giving premedications to help prevent and or minimize reactions and starting initial dose slowly with gradually increase in rate.  Stated for any symptoms or  reaction, rituximab-xxxx is stopped and symptoms treated.  Drug is restarted when pt returns to baseline.  Also discussed that although most pt's have infusion type reactions with first dose, only a few have reaction with consecutive doses.    Discussed other potential side effects such as nausea, headache, and or \"cold\" type symptoms such as cough, runny nose, sore throat.  Also stated there are other, rare side effects such as reactivation of hepatitis B or damage to CNS.  Focused on self care and when to seek medical assistance through out conversation.  Both pt and wife, Jayla, denied questions at end of conversation.  Encouraged to review information and call RN to discuss.  Also stated infusion nurses are excellent resources to ask questions of as well.      Signature:  Carin Kohli, RN, OCN  "

## 2024-03-19 ENCOUNTER — PATIENT OUTREACH (OUTPATIENT)
Dept: ONCOLOGY | Facility: CLINIC | Age: 34
End: 2024-03-19
Payer: COMMERCIAL

## 2024-03-19 DIAGNOSIS — D69.3 AUTOIMMUNE THROMBOCYTOPENIA (H): Primary | ICD-10-CM

## 2024-03-19 NOTE — PROGRESS NOTES
[facility]: Cancer Care                                                                                          Per communication with Dr. Alli Joy, he does want pt to have CBC/d/p prior to his appt on Wed, 3/20, with patient.  RN sent high priority message to clinic coordinator team to schedule lab appt 3/20 prior to MD visit.    Signature:  Carin Kohli RN, OCN

## 2024-03-20 ENCOUNTER — PATIENT OUTREACH (OUTPATIENT)
Dept: ONCOLOGY | Facility: CLINIC | Age: 34
End: 2024-03-20

## 2024-03-20 ENCOUNTER — ONCOLOGY VISIT (OUTPATIENT)
Dept: TRANSPLANT | Facility: CLINIC | Age: 34
End: 2024-03-20
Attending: INTERNAL MEDICINE
Payer: COMMERCIAL

## 2024-03-20 ENCOUNTER — APPOINTMENT (OUTPATIENT)
Dept: LAB | Facility: CLINIC | Age: 34
End: 2024-03-20
Attending: INTERNAL MEDICINE
Payer: COMMERCIAL

## 2024-03-20 VITALS
BODY MASS INDEX: 31.66 KG/M2 | HEART RATE: 55 BPM | WEIGHT: 253.3 LBS | SYSTOLIC BLOOD PRESSURE: 148 MMHG | RESPIRATION RATE: 16 BRPM | DIASTOLIC BLOOD PRESSURE: 90 MMHG | TEMPERATURE: 98.5 F | OXYGEN SATURATION: 100 %

## 2024-03-20 DIAGNOSIS — D59.10 AUTOIMMUNE HEMOLYTIC ANEMIA (H): Primary | ICD-10-CM

## 2024-03-20 DIAGNOSIS — D69.3 AUTOIMMUNE THROMBOCYTOPENIA (H): ICD-10-CM

## 2024-03-20 LAB
ACANTHOCYTES BLD QL SMEAR: NORMAL
AUER BODIES BLD QL SMEAR: NORMAL
BASO STIPL BLD QL SMEAR: NORMAL
BASOPHILS # BLD AUTO: 0.1 10E3/UL (ref 0–0.2)
BASOPHILS NFR BLD AUTO: 1 %
BITE CELLS BLD QL SMEAR: NORMAL
BLISTER CELLS BLD QL SMEAR: NORMAL
BURR CELLS BLD QL SMEAR: NORMAL
DACRYOCYTES BLD QL SMEAR: NORMAL
ELLIPTOCYTES BLD QL SMEAR: NORMAL
EOSINOPHIL # BLD AUTO: 0.4 10E3/UL (ref 0–0.7)
EOSINOPHIL NFR BLD AUTO: 7 %
ERYTHROCYTE [DISTWIDTH] IN BLOOD BY AUTOMATED COUNT: 11.9 % (ref 10–15)
FERRITIN SERPL-MCNC: 264 NG/ML (ref 31–409)
FRAGMENTS BLD QL SMEAR: NORMAL
HAPTOGLOB SERPL-MCNC: <10 MG/DL (ref 30–200)
HCT VFR BLD AUTO: 46 % (ref 40–53)
HGB BLD-MCNC: 16.7 G/DL (ref 13.3–17.7)
HGB C CRYSTALS: NORMAL
HOWELL-JOLLY BOD BLD QL SMEAR: NORMAL
IMM GRANULOCYTES # BLD: 0 10E3/UL
IMM GRANULOCYTES NFR BLD: 0 %
IRON BINDING CAPACITY (ROCHE): 255 UG/DL (ref 240–430)
IRON SATN MFR SERPL: 35 % (ref 15–46)
IRON SERPL-MCNC: 90 UG/DL (ref 61–157)
LDH SERPL L TO P-CCNC: 204 U/L (ref 0–250)
LYMPHOCYTES # BLD AUTO: 1.4 10E3/UL (ref 0.8–5.3)
LYMPHOCYTES NFR BLD AUTO: 26 %
MCH RBC QN AUTO: 29 PG (ref 26.5–33)
MCHC RBC AUTO-ENTMCNC: 36.3 G/DL (ref 31.5–36.5)
MCV RBC AUTO: 80 FL (ref 78–100)
MONOCYTES # BLD AUTO: 0.8 10E3/UL (ref 0–1.3)
MONOCYTES NFR BLD AUTO: 14 %
NEUTROPHILS # BLD AUTO: 2.9 10E3/UL (ref 1.6–8.3)
NEUTROPHILS NFR BLD AUTO: 52 %
NEUTS HYPERSEG BLD QL SMEAR: NORMAL
NRBC # BLD AUTO: 0 10E3/UL
NRBC BLD AUTO-RTO: 0 /100
PLAT MORPH BLD: NORMAL
PLATELET # BLD AUTO: 53 10E3/UL (ref 150–450)
POLYCHROMASIA BLD QL SMEAR: NORMAL
RBC # BLD AUTO: 5.76 10E6/UL (ref 4.4–5.9)
RBC AGGLUT BLD QL: NORMAL
RBC MORPH BLD: NORMAL
RETICS # AUTO: 0.06 10E6/UL (ref 0.03–0.1)
RETICS/RBC NFR AUTO: 1 % (ref 0.5–2)
ROULEAUX BLD QL SMEAR: NORMAL
SICKLE CELLS BLD QL SMEAR: NORMAL
SMUDGE CELLS BLD QL SMEAR: NORMAL
SPHEROCYTES BLD QL SMEAR: NORMAL
STOMATOCYTES BLD QL SMEAR: NORMAL
TARGETS BLD QL SMEAR: NORMAL
TOXIC GRANULES BLD QL SMEAR: NORMAL
VARIANT LYMPHS BLD QL SMEAR: NORMAL
WBC # BLD AUTO: 5.5 10E3/UL (ref 4–11)

## 2024-03-20 PROCEDURE — 85025 COMPLETE CBC W/AUTO DIFF WBC: CPT | Performed by: INTERNAL MEDICINE

## 2024-03-20 PROCEDURE — 99215 OFFICE O/P EST HI 40 MIN: CPT | Performed by: INTERNAL MEDICINE

## 2024-03-20 PROCEDURE — 36415 COLL VENOUS BLD VENIPUNCTURE: CPT | Performed by: INTERNAL MEDICINE

## 2024-03-20 PROCEDURE — 85045 AUTOMATED RETICULOCYTE COUNT: CPT | Performed by: INTERNAL MEDICINE

## 2024-03-20 PROCEDURE — 99213 OFFICE O/P EST LOW 20 MIN: CPT | Performed by: INTERNAL MEDICINE

## 2024-03-20 ASSESSMENT — PAIN SCALES - GENERAL: PAINLEVEL: NO PAIN (0)

## 2024-03-20 NOTE — LETTER
3/20/2024         RE: Billy Carey  [address] [facility] [address]        Dear Colleague,    Thank you for referring your patient, Billy Carey, to the [facility]LOOD AND MARROW TRANSPLANT PROGRAM [place]. Please see a copy of my visit note below.    Hematology/Oncology Consult Note    Billy Carey MRN# [id number]   Age: 32 year old YOB: 1990          Reason for Consult:   thrombocytopenia         Assessment and Plan:   Billy Carey is a 32 year old   ASSESSMENT:    1.  Immune thrombocytopenic purpura,.Hackett Syndrome  2.  Positive MIRA, IgG and C3d,Hackett Syndrome  3.  Type 1 diabetes.  4.  History of COVID.  5.  Status post lymph node biopsies and bone marrow biopsies  6.  Positive PAVAN 1:160 .   7. Skin rash    Billy likely triggered the drop in platelet count due to a viral URI. The decision to go now to rituxan was based on the difficulty with steroids with his Type 1 DM, the Pos PANCHO and lymphadenopathy. He has no lymph nodes I can feel today. He has a rash which could be eczema but will get derm appt. Gladly  platelets are up to 53k K today and MIRA is positive. This is a very unusual presentation of immune thrombocytopenia and a positive MIRA suggesting Hackett syndrome.Of interest he is not briskly hemolyzing . He does have a pos PANCHO.  Does he have some kind of an autoimmune rheumatologic disorder?  I have been impressed that autoimmune disorders following COVID are more common than we ever dreamed of.  A nice review in The Lancet in January 09, 2023 ( DOI:https://doi.org/10.1016/j.eclinm.2022.485719) shows an increased incidence of RA, lupus and other autoimmune disorders.  How COVID can elicit an autoimmune response and lead to an anergic response to an autoimmune clone is unclear to me.  I worry in Billy about long-term steroid use and I would like to propose that although high-dose dexamethasone could be used for his ITP, I think it might be most reasonable,  especially with the positive PAVAN, to continue rituximab 375 mg/m2 weekly x4.  That may be able to steroid-spare him.. I would continue to watch for autoimmune diseases evolving.  I am impressed with how hot the PET scan was, suggesting that inflammatory cells were taking up glucose quite readily.  We do have a good node biopsy from the axilla and so I am confident at this time that we cannot document a B-cell lymphoma.    PLAN   Rituxan 375 mg/m2 weekly for 3 more doses  Weekly CBC plat diff retic and CMP  Follow blood glucose   Call if fever or other infectious issues arise  RTC in 4 weeks April 20    I spent a total of  40 minutes face to face with Billy Carey during today's office visit. Over 50% of this time was spent counseling the patient and coordinating the care regarding immunethrombocytopenia  and 20 minutes preparing to see the patient and  care coordination   Moose Joy MD  [phone]       History of Present Illness:   History obtained from chart review and confirmed with patient.    I had the pleasure of seeing Billy Carey in consultation for Dr. Hayde Lopez for evaluation of autoimmune thrombocytopenia and a history of a positive MIRA.  Billy has been a healthy young man.  Participated in track while at the [employer] [employer] including shot put who developed COVID in 05/2022 with fatigue, joint aches and fever.  He did not receive any other medications.  He had been vaccinated.  He was really fine until around Thanksgiving.  He developed a cough with fever and fatigue.  It was productive.  He did not receive an antibiotic.  He had no night sweats or fevers.  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  A repeat was 43,000.  He was seen by Dr. Hayde Lopez on 12/06.  At that time, his platelet count was 46,000, hemoglobin 17.6.  MIRA was positive +2, IgG, C3d.  Creatinine  was slightly elevated.  A CT scan showed a 3 cm mass in the pericardial fat but he also had periaortic nodes, iliac nodes, axillary nodes.  His spleen was 15.5 cm.  A PET scan was done.  Showed increased uptake with SUV of 6.5 in the right axilla, 12.5 in the retroperitoneal nodes, and the left iliac showed an SUV of 11.  An attempt at an interventional radiology needle biopsy of the retroperitoneal was done but no tissue was noted.  He subsequently underwent a biopsy of a right axillary 3 x 2 cm mass.  The biopsy showed polytypic lymphocytes and no evidence of lymphoma, extensive immunocytochemistry could not identify any clonal abnormalities.  Flow cytometry and cytogenetics were also done in December.  He had a bone marrow biopsy which showed hypercellular marrow with adequate megakaryocytes and no evidence of lymphoma.  Interestingly, despite having a continuous MIRA positivity, his hemoglobin remained 15-16gm% with minimal evidence of hemolysis. Haptoglobin was low at  22  While retic count and LDH not elevated He was given prednisone to see if his platelet count would respond, 60 mg a day, at the end of January for 2 weeks.  Again, he had no significant bleeding, no petechiae, no weight loss, no fevers, no chills.  On 02/15, his platelet count had fallen to 18,000, had nose bleed  and he was admitted and received IVIgG for 2 days and dexamethasone 40 mg a day for 4 days.  His platelet count gisell to 196,000 and he is referred here for further evaluation.  He has had an extensive evaluation for autoimmune disease.  His IgG level is 956, IgA 135 and IgM 44.  His haptoglobin was 118.  No further evidence of hemolysis.  No abnormality in kappa or lambda free light chains.  No monoclonal protein seen.  Cytogenetics have been normal.  Flow cytometry showed no immunophenotypic evidence of non-Hodgkin lymphoma on the lymph node biopsies.  He also had an extensive viral evaluation.  He was negative for COVID.  EBV DNA  was negative.  Hep B surface antigen, hep B core antibody, hep B surface antibody were positive for vaccination.  Hepatitis C was negative.  HIV was negative.  Influenza was negative.  RSV was negative.  Lupus anticoagulant was absent.  PAVAN was positive at 1:160 in a nucleolar pattern.  Complement levels were drawn.  Today, he feels good with no bleeding.  He said the dexamethasone did raise his blood glucose levels, so he had to alter his insulin requirements.  INTERVAL HISTORY  About 3 weeks ago noted discomfort in left groin, a subtle soreness.This catalyzed a blood test on Thursday March 14 . No bleeding no bruising no fever.Did have cold sx 3 weeks ago with sinus congestion. He was in [place] in February but no illnesses No diarrhea.   Found to have Platelet 19,000. On Friday Platelets 25k.Subsequently arranged to receive rituxan on Monday March 18 which well tolerated.  Has had pruritic maculopapular rash lower ext  treats with [facility] comes and goes but always present         Review of Systems:   A comprehensive ROS was performed with the patient and was found to be negative with the exception of that noted in the HPI above.  EYES  Some DM retinopathy  RESP Has cough since end of November with cold sx Continue to have a cough No asthma  COR  No chest pain no palpitations  GI  No GI bleeding No Reflux  ENDO Not thryoid issues Has insulin pump for DM   No UTIs  NEURO no seizure or HA  ID Had COVID in May 2022 with fatiuge joint aches, fever  PSYCH Mood ok         Past Medical History:     Past Medical History:   Diagnosis Date    Diabetes mellitus type 1, uncontrolled, insulin dependent 9/16/2013            Past Surgical History:     Past Surgical History:   Procedure Laterality Date    EXCISE MASS GROIN Left 1/20/2023    Procedure: Lymph Node excisional biopsy;  Surgeon: New Burger MD;  Location: [facility] OR    MYRINGOTOMY, INSERT TUBE BILATERAL, COMBINED Bilateral     As a child            Social  History:     Social History     Socioeconomic History    Marital status:      Spouse name: Jayla    Number of children: 3    Years of education: Not on file    Highest education level: Not on file   Occupational History    Occupation:      Comment: [employer]   Tobacco Use    Smoking status: Never     Passive exposure: Never    Smokeless tobacco: Never   Substance and Sexual Activity    Alcohol use: No     Comment: rarely    Drug use: No    Sexual activity: Yes     Partners: Female   Other Topics Concern    Parent/sibling w/ CABG, MI or angioplasty before 65F 55M? Not Asked   Social History Narrative    Not on file     Social Determinants of Health     Financial Resource Strain: Low Risk  (1/12/2023)    Overall Financial Resource Strain (CARDIA)     Difficulty of Paying Living Expenses: Not hard at all   Food Insecurity: No Food Insecurity (1/12/2023)    Hunger Vital Sign     Worried About Running Out of Food in the Last Year: Never true     Ran Out of Food in the Last Year: Never true   Transportation Needs: No Transportation Needs (1/12/2023)    PRAPARE - Transportation     Lack of Transportation (Medical): No     Lack of Transportation (Non-Medical): No   Physical Activity: Sufficiently Active (1/12/2023)    Exercise Vital Sign     Days of Exercise per Week: 6 days     Minutes of Exercise per Session: 40 min   Stress: No Stress Concern Present (1/12/2023)    [employer] - Occupational Stress Questionnaire     Feeling of Stress : Only a little   Social Connections: Socially Integrated (1/12/2023)    Social Connection and Isolation Panel [NHANES]     Frequency of Communication with Friends and Family: More than three times a week     Frequency of Social Gatherings with Friends and Family: More than three times a week     Attends [employer] Services: 1 to 4 times per year     Active Member of Clubs or Organizations: Yes     Attends Club or Organization Meetings: Not on  file     Marital Status:    Interpersonal Safety: Not At Risk (3/31/2023)    Humiliation, Afraid, Rape, and Kick questionnaire     Fear of Current or Ex-Partner: No     Emotionally Abused: No     Physically Abused: No     Sexually Abused: No   Housing Stability: Low Risk  (1/12/2023)    Housing Stability Vital Sign     Unable to Pay for Housing in the Last Year: No     Number of Places Lived in the Last Year: 1     Unstable Housing in the Last Year: No            Family History:     Family History   Problem Relation Age of Onset    C.A.D. No family hx of     Diabetes No family hx of     Hypertension No family hx of     Cancer No family hx of         no skin cancer    Amblyopia No family hx of     Retinal detachment No family hx of     Thyroid Disease No family hx of     Glaucoma No family hx of     Macular Degeneration No family hx of             Allergies:     Allergies   Allergen Reactions    Penicillins Rash            Medications:   (Not in a hospital admission)           Physical Exam:     BP (!) 148/90 (BP Location: Right arm, Patient Position: Sitting, Cuff Size: Adult Large)   Pulse 55   Temp 98.5  F (36.9  C) (Oral)   Resp 16   Wt 114.9 kg (253 lb 4.8 oz)   SpO2 100%   BMI 31.66 kg/m      General: Appears well, sitting in bed, in no acute distress.  Heme/Lymph: No overt bleeding. No cervical, axillary, or supraclavicular adenopathy.Scar in left groin  Skin: No jaundice, cyanosis, erythema, petechiae or ecchymoses on exposed surfaces.  Maculopapular rash over right lower leg. Some rash on left lower leg no rash  on chest back or arms See pix below  Respiratory: Non-labored breathing, good air exchange, lungs clear to auscultation bilaterally.  Cardiovascular: Regular rate and rhythm. No murmur or rub.   Gastrointestinal: Normoactive bowel sounds. Abdomen soft, non-distended, and non-tender. No palpable masses or organomegaly.Could not feel spleen  Extremities: No extremity edema.    Media  Information    Document Information    Other: Photograph      2024 12:42 PM   Attached To:   Oncology Visit on 3/20/24 with Moose Joy MD   Source Information    Moose Joy MD  [facility]      Media Information    Document Information    Other: Photograph      2024 12:42 PM   Attached To:   Oncology Visit on 3/20/24 with Moose Joy MD   Source Information    Moose Joy MD  [facility]     Neurologic: A&O x 3, speech normal, sensation to light touch grossly WNL.  Lymph No  Cervical nodes I cannot feel axillary nodes Scar in left inguinal area with fullness         Data:   I have personally reviewed the following labs/imaging:  CBC@LABRCNTIPR(wbc:4,rbc:4,hgb:4,hct:4,mcv:4,mch:4,mchc:4,rdw:4,plt:4)@  CMP@LABRCNTIPR(na:4,potassium:4,chloride:4,co2:4,aniongap:4,g,bun:4,cr:4,gfrestimated:4,gfrestblack:4,jason:4,ma,phos:4,prottotal:4,albumin:4,bilitotal:4,alkphos:4,ast:4,alt:4)@  INR   Latest Reference Range & Units 24 15:16 03/15/24 09:14 24 07:47 24 11:56   Sodium 135 - 145 mmol/L 139 138     Potassium 3.4 - 5.3 mmol/L 3.9 3.9     Chloride 98 - 107 mmol/L 100 100     Carbon Dioxide (CO2) 22 - 29 mmol/L 29 27     Urea Nitrogen 6.0 - 20.0 mg/dL 15.2 13.3     Creatinine 0.67 - 1.17 mg/dL 1.30 (H) 1.21 (H)     GFR Estimate >60 mL/min/1.73m2 73 80     Calcium 8.6 - 10.0 mg/dL 9.3 9.3     Anion Gap 7 - 15 mmol/L 10 11     Albumin 3.5 - 5.2 g/dL 5.0 4.7     Protein Total 6.4 - 8.3 g/dL 7.4 7.1     Alkaline Phosphatase 40 - 150 U/L 65 61     ALT 0 - 70 U/L 28 23     AST 0 - 45 U/L 28 31     Bilirubin Total <=1.2 mg/dL 1.5 (H) 1.8 (H)     CRP Inflammation <5.00 mg/L <3.00      Glucose 70 - 99 mg/dL 149 (H) 161 (H)     Lactate Dehydrogenase 0 - 250 U/L 216      WBC 4.0 - 11.0 10e3/uL 6.7 6.2 5.4 5.5   Hemoglobin 13.3 - 17.7 g/dL 16.9 17.3 16.2 16.7   Hematocrit 40.0 - 53.0 % 47.8 47.6 45.2 46.0   Platelet Count 150 - 450 10e3/uL 19 (LL) 26 (LL) 27 (LL)  53 (L)   RBC Count 4.40 - 5.90 10e6/uL 5.84 5.90 5.56 5.76   MCV 78 - 100 fL 82 81 81 80   MCH 26.5 - 33.0 pg 28.9 29.3 29.1 29.0   MCHC 31.5 - 36.5 g/dL 35.4 36.3 35.8 36.3   RDW 10.0 - 15.0 % 11.9 11.9 11.9 11.9   % Neutrophils % 52 47 46 52   % Lymphocytes % 32 34 32 26   % Monocytes % 10 10 12 14   % Eosinophils % 5 7 9 7   % Basophils % 1 2 1 1   Absolute Basophils 0.0 - 0.2 10e3/uL 0.1 0.1 0.1 0.1   Absolute Eosinophils 0.0 - 0.7 10e3/uL 0.3 0.4 0.5 0.4   Absolute Immature Granulocytes <=0.4 10e3/uL 0.0 0.0 0.0 0.0   Absolute Lymphocytes 0.8 - 5.3 10e3/uL 2.2 2.1 1.8 1.4   Absolute Monocytes 0.0 - 1.3 10e3/uL 0.7 0.6 0.7 0.8   % Immature Granulocytes % 0 0 0 0   Absolute Neutrophils 1.6 - 8.3 10e3/uL 3.5 2.9 2.4 2.9   Absolute NRBCs 10e3/uL 0.0 0.0 0.0 0.0   NRBCs per 100 WBC <1 /100 0 0 0 0   RBC Morphology  Confirmed RBC Indices Confirmed RBC Indices  Confirmed RBC Indices   Platelet Morphology Automated Count Confirmed. Platelet morphology is normal.  Automated Count Confirmed. Platelet morphology is normal. Automated Count Confirmed. Platelet morphology is normal.  Automated Count Confirmed. Platelet morphology is normal.   Reactive Lymphs None Seen  Present !      % Retic 0.5 - 2.0 % 0.8 1.0     Absolute Retic 0.025 - 0.095 10e6/uL 0.048 0.059     Sed Rate 0 - 15 mm/hr  8     SPECIMEN EXPIRATION DATE   [id number]     MIRA Anti-IgG,-C3d   Positive 2+     Hep B Surface Agn Nonreactive    Nonreactive    Hepatitis B Core Margarette Nonreactive    Nonreactive    Hepatitis B Surface Antibody Instrument Value <8.5 m[IU]/mL   24.60    Hepatitis B Surface Antibody    Reactive    Haptoglobin 30 - 200 mg/dL <10 (L)      BLOOD MORPHOLOGY PATHOLOGIST REVIEW   Rpt     LAB BLOOD MORPHOLOGY PATHOLOGIST REVIEW   Rpt !!     (LL): Data is critically low  !!: Data is critical  (H): Data is abnormally high  (L): Data is abnormally low  !: Data is abnormal  Rpt: View report in Results Review for more information        Moose BASILIO  MD Benita

## 2024-03-20 NOTE — PROGRESS NOTES
[facility]: Cancer Care                                                                                          Met with pt and wife today after visit with Dr. Alli Joy.      When asked, pt reported he did not have any issues Tuesday after his rituximab on Monday.  (RN had received updated on Monday that pt had tolerated first dose of rituximab well.)      Discussed will cancel April 2nd return appt and change to after 4th dose of rituximab.  Also discussed will have lab appts added to 4/3 and 4/9 rituximab infusions.      Signature:  Carin Kohli RN, OCN

## 2024-03-20 NOTE — NURSING NOTE
"Oncology Rooming Note    March 20, 2024 12:13 PM   Billy Carey is a 33 year old male who presents for:    Chief Complaint   Patient presents with    Blood Draw     Labs drawn with  by rn.  VS taken.    Oncology Clinic Visit     Autoimmune thrombocytopenia      Initial Vitals: BP (!) 148/90 (BP Location: Right arm, Patient Position: Sitting, Cuff Size: Adult Large)   Pulse 55   Temp 98.5  F (36.9  C) (Oral)   Resp 16   Wt 114.9 kg (253 lb 4.8 oz)   SpO2 100%   BMI 31.66 kg/m   Estimated body mass index is 31.66 kg/m  as calculated from the following:    Height as of 3/18/24: 1.905 m (6' 3\").    Weight as of this encounter: 114.9 kg (253 lb 4.8 oz). Body surface area is 2.47 meters squared.  No Pain (0) Comment: Data Unavailable   No LMP for male patient.  Allergies reviewed: Yes  Medications reviewed: Yes    Medications: Medication refills not needed today.  Pharmacy name entered into docBeat:    [id number] - [place], [place] - [address] [place] [address] [address] [place] [address]  [id number] - [place], [place] - [address]  [id number] - [place], [place] - [address] [address] [facility] - [facility], [place] - [address] [place] [address]    Frailty Screening:   Is the patient here for a new oncology consult visit in cancer care? 2. No      Clinical concerns: no other complaints       Mendoza Zavaleta"

## 2024-03-20 NOTE — NURSING NOTE
Chief Complaint   Patient presents with    Blood Draw     Labs drawn with  by rn.  VS taken.     Labs drawn with  by rn.  Pt tolerated well.  VS taken.  Pt checked in for next appt.    Chrissy Breaux RN

## 2024-03-26 ENCOUNTER — LAB (OUTPATIENT)
Dept: INFUSION THERAPY | Facility: CLINIC | Age: 34
End: 2024-03-26
Attending: INTERNAL MEDICINE
Payer: COMMERCIAL

## 2024-03-26 ENCOUNTER — NURSE TRIAGE (OUTPATIENT)
Dept: ONCOLOGY | Facility: CLINIC | Age: 34
End: 2024-03-26

## 2024-03-26 VITALS
HEIGHT: 75 IN | OXYGEN SATURATION: 99 % | RESPIRATION RATE: 16 BRPM | DIASTOLIC BLOOD PRESSURE: 83 MMHG | BODY MASS INDEX: 32.13 KG/M2 | WEIGHT: 258.4 LBS | HEART RATE: 62 BPM | SYSTOLIC BLOOD PRESSURE: 139 MMHG | TEMPERATURE: 98.7 F

## 2024-03-26 DIAGNOSIS — D69.3 AUTOIMMUNE THROMBOCYTOPENIA (H): Primary | ICD-10-CM

## 2024-03-26 DIAGNOSIS — D59.10 AUTOIMMUNE HEMOLYTIC ANEMIA (H): ICD-10-CM

## 2024-03-26 DIAGNOSIS — D69.3 AUTOIMMUNE THROMBOCYTOPENIA (H): ICD-10-CM

## 2024-03-26 LAB
ALBUMIN SERPL BCG-MCNC: 4.4 G/DL (ref 3.5–5.2)
ALP SERPL-CCNC: 62 U/L (ref 40–150)
ALT SERPL W P-5'-P-CCNC: 22 U/L (ref 0–70)
ANION GAP SERPL CALCULATED.3IONS-SCNC: 8 MMOL/L (ref 7–15)
AST SERPL W P-5'-P-CCNC: 27 U/L (ref 0–45)
BASOPHILS # BLD AUTO: 0.1 10E3/UL (ref 0–0.2)
BASOPHILS NFR BLD AUTO: 1 %
BILIRUB SERPL-MCNC: 1.1 MG/DL
BUN SERPL-MCNC: 11.7 MG/DL (ref 6–20)
CALCIUM SERPL-MCNC: 9.1 MG/DL (ref 8.6–10)
CHLORIDE SERPL-SCNC: 103 MMOL/L (ref 98–107)
CREAT SERPL-MCNC: 1.13 MG/DL (ref 0.67–1.17)
DEPRECATED HCO3 PLAS-SCNC: 28 MMOL/L (ref 22–29)
EGFRCR SERPLBLD CKD-EPI 2021: 87 ML/MIN/1.73M2
EOSINOPHIL # BLD AUTO: 0.4 10E3/UL (ref 0–0.7)
EOSINOPHIL NFR BLD AUTO: 8 %
ERYTHROCYTE [DISTWIDTH] IN BLOOD BY AUTOMATED COUNT: 12 % (ref 10–15)
GLUCOSE SERPL-MCNC: 122 MG/DL (ref 70–99)
HCT VFR BLD AUTO: 43.5 % (ref 40–53)
HGB BLD-MCNC: 15.7 G/DL (ref 13.3–17.7)
IMM GRANULOCYTES # BLD: 0 10E3/UL
IMM GRANULOCYTES NFR BLD: 0 %
LYMPHOCYTES # BLD AUTO: 1.6 10E3/UL (ref 0.8–5.3)
LYMPHOCYTES NFR BLD AUTO: 30 %
MCH RBC QN AUTO: 28.9 PG (ref 26.5–33)
MCHC RBC AUTO-ENTMCNC: 36.1 G/DL (ref 31.5–36.5)
MCV RBC AUTO: 80 FL (ref 78–100)
MONOCYTES # BLD AUTO: 0.6 10E3/UL (ref 0–1.3)
MONOCYTES NFR BLD AUTO: 11 %
NEUTROPHILS # BLD AUTO: 2.6 10E3/UL (ref 1.6–8.3)
NEUTROPHILS NFR BLD AUTO: 50 %
NRBC # BLD AUTO: 0 10E3/UL
NRBC BLD AUTO-RTO: 0 /100
PLATELET # BLD AUTO: 36 10E3/UL (ref 150–450)
POTASSIUM SERPL-SCNC: 4.1 MMOL/L (ref 3.4–5.3)
PROT SERPL-MCNC: 6.5 G/DL (ref 6.4–8.3)
RBC # BLD AUTO: 5.43 10E6/UL (ref 4.4–5.9)
RETICS # AUTO: 0.06 10E6/UL (ref 0.03–0.1)
RETICS/RBC NFR AUTO: 1.1 % (ref 0.5–2)
SODIUM SERPL-SCNC: 139 MMOL/L (ref 135–145)
WBC # BLD AUTO: 5.3 10E3/UL (ref 4–11)

## 2024-03-26 PROCEDURE — 250N000011 HC RX IP 250 OP 636: Mod: JZ | Performed by: INTERNAL MEDICINE

## 2024-03-26 PROCEDURE — 85004 AUTOMATED DIFF WBC COUNT: CPT | Performed by: INTERNAL MEDICINE

## 2024-03-26 PROCEDURE — 36415 COLL VENOUS BLD VENIPUNCTURE: CPT

## 2024-03-26 PROCEDURE — 258N000003 HC RX IP 258 OP 636: Performed by: INTERNAL MEDICINE

## 2024-03-26 PROCEDURE — 96413 CHEMO IV INFUSION 1 HR: CPT

## 2024-03-26 PROCEDURE — 80053 COMPREHEN METABOLIC PANEL: CPT | Performed by: INTERNAL MEDICINE

## 2024-03-26 PROCEDURE — 85045 AUTOMATED RETICULOCYTE COUNT: CPT | Performed by: INTERNAL MEDICINE

## 2024-03-26 PROCEDURE — 250N000013 HC RX MED GY IP 250 OP 250 PS 637: Performed by: INTERNAL MEDICINE

## 2024-03-26 RX ORDER — ACETAMINOPHEN 325 MG/1
650 TABLET ORAL ONCE
Status: COMPLETED | OUTPATIENT
Start: 2024-03-26 | End: 2024-03-26

## 2024-03-26 RX ORDER — DIPHENHYDRAMINE HCL 25 MG
50 CAPSULE ORAL ONCE
Status: COMPLETED | OUTPATIENT
Start: 2024-03-26 | End: 2024-03-26

## 2024-03-26 RX ADMIN — DIPHENHYDRAMINE HYDROCHLORIDE 50 MG: 25 CAPSULE ORAL at 11:46

## 2024-03-26 RX ADMIN — SODIUM CHLORIDE 250 ML: 9 INJECTION, SOLUTION INTRAVENOUS at 12:13

## 2024-03-26 RX ADMIN — ACETAMINOPHEN 650 MG: 325 TABLET, FILM COATED ORAL at 11:46

## 2024-03-26 RX ADMIN — RITUXIMAB-ABBS 1000 MG: 10 INJECTION, SOLUTION INTRAVENOUS at 12:15

## 2024-03-26 ASSESSMENT — PAIN SCALES - GENERAL: PAINLEVEL: NO PAIN (0)

## 2024-03-26 NOTE — CONFIDENTIAL NOTE
Lab called with critical platelet count of 36.  Patient getting rituxan today.  No other intervention needed.

## 2024-03-26 NOTE — PROGRESS NOTES
Infusion Nursing Note:  Billy Carey presents today for C1D8 Rituxan-abbs Truxima (rapid).    Patient seen by provider today: No   present during visit today: Not Applicable.    Note: Patient feeling well, denies any new concerns since first rituxan infusion 1 week ago. Has not noticed any side effects after first treatment. Denies fevers, nausea/vomiting, bowel concerns or skin concerns. Denies any nosebleeds or other signs of bleeding. Platelets noted at 36 today, stable for patient, no treatment parameters as patient is receiving rituxan for ITP.       Intravenous Access:  Peripheral IV placed.    Treatment Conditions:  Lab Results   Component Value Date    HGB 15.7 03/26/2024    WBC 5.3 03/26/2024    ANEU 5.4 08/17/2019    ANEUTAUTO 2.6 03/26/2024    PLT 36 (LL) 03/26/2024        Lab Results   Component Value Date     03/26/2024    POTASSIUM 4.1 03/26/2024    MAG 1.9 12/15/2021    CR 1.13 03/26/2024    DEBORAH 9.1 03/26/2024    BILITOTAL 1.1 03/26/2024    ALBUMIN 4.4 03/26/2024    ALT 22 03/26/2024    AST 27 03/26/2024     Results reviewed, labs MET treatment parameters, ok to proceed with treatment.      Post Infusion Assessment:  Patient tolerated infusion without incident.  Blood return noted pre and post infusion.  Site patent and intact, free from redness, edema or discomfort.  No evidence of extravasations.  Access discontinued per protocol.       Discharge Plan:   Discharge instructions reviewed with: Patient.  Patient and/or family verbalized understanding of discharge instructions and all questions answered.  AVS to patient via [facility]  Patient will return to [place] 4/3/24 for next appointment.   Patient discharged in stable condition accompanied by: wife.  Departure Mode: Ambulatory.      Latonya Bradshaw RN

## 2024-03-26 NOTE — PROGRESS NOTES
Medical Assistant Note:  Billy Carey presents today for blood draw.    Patient seen by provider today: No.   present during visit today: Not Applicable.    Concerns: No Concerns.    Procedure:  Lab draw site: lac, Needle type: bf, Gauge: 23.    Post Assessment:  Labs drawn without difficulty: Yes.    Discharge Plan:  Departure Mode: Ambulatory.    Face to Face Time: 5 min.    Suzie Riggins, CMA

## 2024-04-03 ENCOUNTER — INFUSION THERAPY VISIT (OUTPATIENT)
Dept: INFUSION THERAPY | Facility: CLINIC | Age: 34
End: 2024-04-03
Attending: INTERNAL MEDICINE
Payer: COMMERCIAL

## 2024-04-03 VITALS
RESPIRATION RATE: 16 BRPM | WEIGHT: 253.8 LBS | SYSTOLIC BLOOD PRESSURE: 148 MMHG | TEMPERATURE: 97.4 F | DIASTOLIC BLOOD PRESSURE: 88 MMHG | OXYGEN SATURATION: 100 % | HEART RATE: 62 BPM | BODY MASS INDEX: 31.72 KG/M2

## 2024-04-03 DIAGNOSIS — D59.10 AUTOIMMUNE HEMOLYTIC ANEMIA (H): ICD-10-CM

## 2024-04-03 DIAGNOSIS — D69.3 AUTOIMMUNE THROMBOCYTOPENIA (H): Primary | ICD-10-CM

## 2024-04-03 LAB
ACANTHOCYTES BLD QL SMEAR: NORMAL
ALBUMIN SERPL BCG-MCNC: 4.7 G/DL (ref 3.5–5.2)
ALP SERPL-CCNC: 59 U/L (ref 40–150)
ALT SERPL W P-5'-P-CCNC: 27 U/L (ref 0–70)
ANION GAP SERPL CALCULATED.3IONS-SCNC: 12 MMOL/L (ref 7–15)
AST SERPL W P-5'-P-CCNC: 34 U/L (ref 0–45)
AUER BODIES BLD QL SMEAR: NORMAL
BASO STIPL BLD QL SMEAR: NORMAL
BASOPHILS # BLD AUTO: 0.1 10E3/UL (ref 0–0.2)
BASOPHILS NFR BLD AUTO: 1 %
BILIRUB SERPL-MCNC: 1.3 MG/DL
BITE CELLS BLD QL SMEAR: NORMAL
BLISTER CELLS BLD QL SMEAR: NORMAL
BUN SERPL-MCNC: 14.1 MG/DL (ref 6–20)
BURR CELLS BLD QL SMEAR: NORMAL
CALCIUM SERPL-MCNC: 9.1 MG/DL (ref 8.6–10)
CHLORIDE SERPL-SCNC: 100 MMOL/L (ref 98–107)
CREAT SERPL-MCNC: 1.09 MG/DL (ref 0.67–1.17)
DACRYOCYTES BLD QL SMEAR: NORMAL
DEPRECATED HCO3 PLAS-SCNC: 26 MMOL/L (ref 22–29)
EGFRCR SERPLBLD CKD-EPI 2021: >90 ML/MIN/1.73M2
ELLIPTOCYTES BLD QL SMEAR: NORMAL
EOSINOPHIL # BLD AUTO: 0.5 10E3/UL (ref 0–0.7)
EOSINOPHIL NFR BLD AUTO: 7 %
ERYTHROCYTE [DISTWIDTH] IN BLOOD BY AUTOMATED COUNT: 12.1 % (ref 10–15)
FRAGMENTS BLD QL SMEAR: NORMAL
GLUCOSE SERPL-MCNC: 120 MG/DL (ref 70–99)
HCT VFR BLD AUTO: 46.8 % (ref 40–53)
HGB BLD-MCNC: 17 G/DL (ref 13.3–17.7)
HGB C CRYSTALS: NORMAL
HOWELL-JOLLY BOD BLD QL SMEAR: NORMAL
IMM GRANULOCYTES # BLD: 0 10E3/UL
IMM GRANULOCYTES NFR BLD: 1 %
LYMPHOCYTES # BLD AUTO: 1.7 10E3/UL (ref 0.8–5.3)
LYMPHOCYTES NFR BLD AUTO: 27 %
MCH RBC QN AUTO: 29.4 PG (ref 26.5–33)
MCHC RBC AUTO-ENTMCNC: 36.3 G/DL (ref 31.5–36.5)
MCV RBC AUTO: 81 FL (ref 78–100)
MONOCYTES # BLD AUTO: 0.7 10E3/UL (ref 0–1.3)
MONOCYTES NFR BLD AUTO: 11 %
NEUTROPHILS # BLD AUTO: 3.2 10E3/UL (ref 1.6–8.3)
NEUTROPHILS NFR BLD AUTO: 53 %
NEUTS HYPERSEG BLD QL SMEAR: NORMAL
NRBC # BLD AUTO: 0 10E3/UL
NRBC BLD AUTO-RTO: 0 /100
PLAT MORPH BLD: NORMAL
PLATELET # BLD AUTO: 40 10E3/UL (ref 150–450)
POLYCHROMASIA BLD QL SMEAR: NORMAL
POTASSIUM SERPL-SCNC: 4.1 MMOL/L (ref 3.4–5.3)
PROT SERPL-MCNC: 7.1 G/DL (ref 6.4–8.3)
RBC # BLD AUTO: 5.79 10E6/UL (ref 4.4–5.9)
RBC AGGLUT BLD QL: NORMAL
RBC MORPH BLD: NORMAL
RETICS # AUTO: 0.06 10E6/UL (ref 0.03–0.1)
RETICS/RBC NFR AUTO: 1.1 % (ref 0.5–2)
ROULEAUX BLD QL SMEAR: NORMAL
SICKLE CELLS BLD QL SMEAR: NORMAL
SMUDGE CELLS BLD QL SMEAR: NORMAL
SODIUM SERPL-SCNC: 138 MMOL/L (ref 135–145)
SPHEROCYTES BLD QL SMEAR: NORMAL
STOMATOCYTES BLD QL SMEAR: NORMAL
TARGETS BLD QL SMEAR: NORMAL
TOXIC GRANULES BLD QL SMEAR: NORMAL
VARIANT LYMPHS BLD QL SMEAR: NORMAL
WBC # BLD AUTO: 6.1 10E3/UL (ref 4–11)

## 2024-04-03 PROCEDURE — 96413 CHEMO IV INFUSION 1 HR: CPT

## 2024-04-03 PROCEDURE — 85025 COMPLETE CBC W/AUTO DIFF WBC: CPT | Performed by: INTERNAL MEDICINE

## 2024-04-03 PROCEDURE — 250N000013 HC RX MED GY IP 250 OP 250 PS 637: Performed by: INTERNAL MEDICINE

## 2024-04-03 PROCEDURE — 36415 COLL VENOUS BLD VENIPUNCTURE: CPT

## 2024-04-03 PROCEDURE — 85045 AUTOMATED RETICULOCYTE COUNT: CPT | Performed by: INTERNAL MEDICINE

## 2024-04-03 PROCEDURE — 250N000011 HC RX IP 250 OP 636: Mod: JZ | Performed by: INTERNAL MEDICINE

## 2024-04-03 PROCEDURE — 80053 COMPREHEN METABOLIC PANEL: CPT | Performed by: INTERNAL MEDICINE

## 2024-04-03 PROCEDURE — 258N000003 HC RX IP 258 OP 636: Performed by: INTERNAL MEDICINE

## 2024-04-03 RX ORDER — DIPHENHYDRAMINE HCL 25 MG
50 CAPSULE ORAL ONCE
Status: COMPLETED | OUTPATIENT
Start: 2024-04-03 | End: 2024-04-03

## 2024-04-03 RX ORDER — ACETAMINOPHEN 325 MG/1
650 TABLET ORAL ONCE
Status: COMPLETED | OUTPATIENT
Start: 2024-04-03 | End: 2024-04-03

## 2024-04-03 RX ADMIN — DIPHENHYDRAMINE HYDROCHLORIDE 50 MG: 25 CAPSULE ORAL at 10:56

## 2024-04-03 RX ADMIN — SODIUM CHLORIDE 250 ML: 9 INJECTION, SOLUTION INTRAVENOUS at 10:58

## 2024-04-03 RX ADMIN — RITUXIMAB-ABBS 1000 MG: 10 INJECTION, SOLUTION INTRAVENOUS at 11:25

## 2024-04-03 RX ADMIN — ACETAMINOPHEN 650 MG: 325 TABLET ORAL at 10:56

## 2024-04-03 ASSESSMENT — PAIN SCALES - GENERAL: PAINLEVEL: NO PAIN (0)

## 2024-04-03 NOTE — PROGRESS NOTES
Infusion Nursing Note:  Billy Carey presents today for Labs + Cycle 1, Day 15 Truxima.    Patient seen by provider today: No   present during visit today: Not Applicable.    Note:  Patient reports is feeling well today.  Patient denies fevers, chills or signs of infection.  Patient denies issues with bleeding or bruising.     PLTS = 40,000 (last week platelets = 36,000).  -No parameters in plan regarding PLT count-patient is receiving Truxima for ITP.    Pre-Medications given today:  Tylenol 650mg PO + Benadryl 50mg PO,    Truxima started at 200mls/hr x 30 minutes, then increased to 400mls/hr x 1 hour per Rapid Infusion protocol.    Intravenous Access:  Labs drawn without difficulty.  Peripheral IV placed.  PIV site C/D/I.  PIV flushes well + excellent blood return.    Treatment Conditions:  Not Applicable.      Post Infusion Assessment:  Patient tolerated infusion without incident.  Blood return noted pre and post infusion.  Site patent and intact, free from redness, edema or discomfort.  No evidence of extravasations.  Access discontinued per protocol.       Discharge Plan:   Discharge instructions reviewed with: Patient.  Patient and/or family verbalized understanding of discharge instructions and all questions answered.  Patient discharged in stable condition accompanied by: wife.  Departure Mode: Ambulatory.  4/9/24: Labs + Cycle 1, Day 22 Truxima.    Yessenia Trammell, RN, BSN, OCN on 4/3/2024 at 2:32 PM

## 2024-04-09 ENCOUNTER — INFUSION THERAPY VISIT (OUTPATIENT)
Dept: INFUSION THERAPY | Facility: CLINIC | Age: 34
End: 2024-04-09
Attending: INTERNAL MEDICINE
Payer: COMMERCIAL

## 2024-04-09 VITALS
WEIGHT: 259.7 LBS | TEMPERATURE: 96.7 F | RESPIRATION RATE: 16 BRPM | DIASTOLIC BLOOD PRESSURE: 79 MMHG | BODY MASS INDEX: 32.46 KG/M2 | OXYGEN SATURATION: 100 % | HEART RATE: 65 BPM | SYSTOLIC BLOOD PRESSURE: 126 MMHG

## 2024-04-09 DIAGNOSIS — D69.3 AUTOIMMUNE THROMBOCYTOPENIA (H): Primary | ICD-10-CM

## 2024-04-09 DIAGNOSIS — D59.10 AUTOIMMUNE HEMOLYTIC ANEMIA (H): ICD-10-CM

## 2024-04-09 LAB
ALBUMIN SERPL BCG-MCNC: 4.5 G/DL (ref 3.5–5.2)
ALP SERPL-CCNC: 55 U/L (ref 40–150)
ALT SERPL W P-5'-P-CCNC: 26 U/L (ref 0–70)
ANION GAP SERPL CALCULATED.3IONS-SCNC: 15 MMOL/L (ref 7–15)
AST SERPL W P-5'-P-CCNC: 33 U/L (ref 0–45)
BASOPHILS # BLD AUTO: 0.1 10E3/UL (ref 0–0.2)
BASOPHILS NFR BLD AUTO: 1 %
BILIRUB SERPL-MCNC: 1.3 MG/DL
BUN SERPL-MCNC: 18.1 MG/DL (ref 6–20)
CALCIUM SERPL-MCNC: 9 MG/DL (ref 8.6–10)
CHLORIDE SERPL-SCNC: 101 MMOL/L (ref 98–107)
CREAT SERPL-MCNC: 1.09 MG/DL (ref 0.67–1.17)
DEPRECATED HCO3 PLAS-SCNC: 23 MMOL/L (ref 22–29)
EGFRCR SERPLBLD CKD-EPI 2021: >90 ML/MIN/1.73M2
EOSINOPHIL # BLD AUTO: 0.3 10E3/UL (ref 0–0.7)
EOSINOPHIL NFR BLD AUTO: 5 %
ERYTHROCYTE [DISTWIDTH] IN BLOOD BY AUTOMATED COUNT: 12 % (ref 10–15)
GLUCOSE SERPL-MCNC: 94 MG/DL (ref 70–99)
HCT VFR BLD AUTO: 45.7 % (ref 40–53)
HGB BLD-MCNC: 16.6 G/DL (ref 13.3–17.7)
IMM GRANULOCYTES # BLD: 0 10E3/UL
IMM GRANULOCYTES NFR BLD: 0 %
LYMPHOCYTES # BLD AUTO: 1.9 10E3/UL (ref 0.8–5.3)
LYMPHOCYTES NFR BLD AUTO: 32 %
MCH RBC QN AUTO: 29.6 PG (ref 26.5–33)
MCHC RBC AUTO-ENTMCNC: 36.3 G/DL (ref 31.5–36.5)
MCV RBC AUTO: 82 FL (ref 78–100)
MONOCYTES # BLD AUTO: 0.7 10E3/UL (ref 0–1.3)
MONOCYTES NFR BLD AUTO: 12 %
NEUTROPHILS # BLD AUTO: 2.9 10E3/UL (ref 1.6–8.3)
NEUTROPHILS NFR BLD AUTO: 50 %
NRBC # BLD AUTO: 0 10E3/UL
NRBC BLD AUTO-RTO: 0 /100
PLATELET # BLD AUTO: 35 10E3/UL (ref 150–450)
POTASSIUM SERPL-SCNC: 4 MMOL/L (ref 3.4–5.3)
PROT SERPL-MCNC: 6.9 G/DL (ref 6.4–8.3)
RBC # BLD AUTO: 5.61 10E6/UL (ref 4.4–5.9)
RETICS # AUTO: 0.07 10E6/UL (ref 0.03–0.1)
RETICS/RBC NFR AUTO: 1.2 % (ref 0.5–2)
SODIUM SERPL-SCNC: 139 MMOL/L (ref 135–145)
WBC # BLD AUTO: 5.8 10E3/UL (ref 4–11)

## 2024-04-09 PROCEDURE — 80053 COMPREHEN METABOLIC PANEL: CPT | Performed by: INTERNAL MEDICINE

## 2024-04-09 PROCEDURE — 250N000011 HC RX IP 250 OP 636: Mod: JZ | Performed by: INTERNAL MEDICINE

## 2024-04-09 PROCEDURE — 258N000003 HC RX IP 258 OP 636: Performed by: INTERNAL MEDICINE

## 2024-04-09 PROCEDURE — 96413 CHEMO IV INFUSION 1 HR: CPT

## 2024-04-09 PROCEDURE — 96415 CHEMO IV INFUSION ADDL HR: CPT

## 2024-04-09 PROCEDURE — 36415 COLL VENOUS BLD VENIPUNCTURE: CPT

## 2024-04-09 PROCEDURE — 250N000013 HC RX MED GY IP 250 OP 250 PS 637: Performed by: INTERNAL MEDICINE

## 2024-04-09 PROCEDURE — 85045 AUTOMATED RETICULOCYTE COUNT: CPT | Performed by: INTERNAL MEDICINE

## 2024-04-09 PROCEDURE — 85025 COMPLETE CBC W/AUTO DIFF WBC: CPT | Performed by: INTERNAL MEDICINE

## 2024-04-09 RX ORDER — ACETAMINOPHEN 325 MG/1
650 TABLET ORAL ONCE
Status: COMPLETED | OUTPATIENT
Start: 2024-04-09 | End: 2024-04-09

## 2024-04-09 RX ORDER — DIPHENHYDRAMINE HCL 25 MG
50 CAPSULE ORAL ONCE
Status: COMPLETED | OUTPATIENT
Start: 2024-04-09 | End: 2024-04-09

## 2024-04-09 RX ADMIN — RITUXIMAB-ABBS 1000 MG: 10 INJECTION, SOLUTION INTRAVENOUS at 10:43

## 2024-04-09 RX ADMIN — DIPHENHYDRAMINE HYDROCHLORIDE 50 MG: 25 CAPSULE ORAL at 10:26

## 2024-04-09 RX ADMIN — ACETAMINOPHEN 650 MG: 325 TABLET ORAL at 10:26

## 2024-04-11 NOTE — PROGRESS NOTES
Critical plt 35 reported to Radha VALVERDE RN  
Infusion Nursing Note:  Billy Carey presents today for Rituximab #4, labs  Patient seen by provider today: No   present during visit today: Not Applicable.    Note: Has not experienced any side effects from rituximab. Denies any new or unusual bleeding/bruising. Plts today are 35. No tx parameters for plts as he is being treated for ITP.     Premedicated with 650mg Tylenol and 50mg Benadryl.     Truxima infused at:  200mLs x 30mins   400mL x remainder.    Intravenous Access:  Labs drawn without difficulty.  Peripheral IV placed.    Treatment Conditions:  Not Applicable.      Post Infusion Assessment:  Patient tolerated infusion without incident.  Blood return noted pre and post infusion.  Site patent and intact, free from redness, edema or discomfort.  No evidence of extravasations.  Access discontinued per protocol.       Discharge Plan:   Discharge instructions reviewed with: Patient and Family.  Patient and/or family verbalized understanding of discharge instructions and all questions answered.  AVS to patient via [employer] [facility].  Patient will return 4/24 for Dr Joy appointment at the .  Patient discharged in stable condition accompanied by: self and wife.  Departure Mode: Ambulatory.      Radha Baig RN    
99

## 2024-04-19 DIAGNOSIS — D59.10 AUTOIMMUNE HEMOLYTIC ANEMIA (H): ICD-10-CM

## 2024-04-19 DIAGNOSIS — D69.3 AUTOIMMUNE THROMBOCYTOPENIA (H): Primary | ICD-10-CM

## 2024-04-19 NOTE — PROGRESS NOTES
Hematology/Oncology Consult Note    Billy Carey MRN# [id number]   Age: 32 year old YOB: 1990          Reason for Consult:   thrombocytopenia         Assessment and Plan:   Billy Carey is a 32 year old   ASSESSMENT:    1.  Immune thrombocytopenic purpura,.Hackett Syndrome  2.  Positive MIRA, IgG and C3d,Hackett Syndrome  3.  Type 1 diabetes.  4.  History of COVID.  5.  Status post lymph node biopsies and bone marrow biopsies  6.  Positive PAVAN 1:160 .   7. Skin rash  Billy likely triggered his recent drop in platelet count from a viral URI in March.  His platelet count has come up nicely to 61 thousand and is presently not on any steroids.  I still wonder whether this whole process is related to COVID but I cannot really be sure.  The lymphadenopathy and positive PAVAN 1-1 60 still suggest an underlying autoimmune disorder.  He has also had a positive MIRA which we now is now negative after rituxan  His hemoglobin is good today.  I would like to have him get labs including CBC and platelets every 3 weeks at [facility].  We will monitor these counts and I plan to see him in 4 months time  PLAN    Every 3 week CBC plat diff retic and CMP  Follow blood glucose   Call if fever or other infectious issues arise  RTC in 4 months     I spent a total of  30 minutes face to face with Billy Carey during today's office visit. Over 50% of this time was spent counseling the patient and coordinating the care regarding immunethrombocytopenia  and 10.  Minutes preparing to see the patient and  care coordination   Moose Joy MD  [phone]       History of Present Illness:   History obtained from chart review and confirmed with patient.    I had the pleasure of seeing Billy Carey in consultation for Dr. Hayde Lopez for evaluation of autoimmune thrombocytopenia and a history of a positive MIRA.  Bilyl has been a healthy young man.  Participated in track while at the [facility] including shot put who  developed COVID in 05/2022 with fatigue, joint aches and fever.  He did not receive any other medications.  He had been vaccinated.  He was really fine until around Thanksgiving.  He developed a cough with fever and fatigue.  It was productive.  He did not receive an antibiotic.  He had no night sweats or fevers.  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  A repeat was 43,000.  He was seen by Dr. Hayde Lopez on 12/06.  At that time, his platelet count was 46,000, hemoglobin 17.6.  MIRA was positive +2, IgG, C3d.  Creatinine was slightly elevated.  A CT scan showed a 3 cm mass in the pericardial fat but he also had periaortic nodes, iliac nodes, axillary nodes.  His spleen was 15.5 cm.  A PET scan was done.  Showed increased uptake with SUV of 6.5 in the right axilla, 12.5 in the retroperitoneal nodes, and the left iliac showed an SUV of 11.  An attempt at an interventional radiology needle biopsy of the retroperitoneal was done but no tissue was noted.  He subsequently underwent a biopsy of a right axillary 3 x 2 cm mass.  The biopsy showed polytypic lymphocytes and no evidence of lymphoma, extensive immunocytochemistry could not identify any clonal abnormalities.  Flow cytometry and cytogenetics were also done in December.  He had a bone marrow biopsy which showed hypercellular marrow with adequate megakaryocytes and no evidence of lymphoma.  Interestingly, despite having a continuous MIRA positivity, his hemoglobin remained 15-16gm% with minimal evidence of hemolysis. Haptoglobin was low at  22  While retic count and LDH not elevated He was given prednisone to see if his platelet count would respond, 60 mg a day, at the end of January for 2 weeks.  Again, he had no significant bleeding, no petechiae, no weight loss, no fevers, no chills.  On 02/15, his platelet count had fallen to 18,000, had  nose bleed  and he was admitted and received IVIgG for 2 days and dexamethasone 40 mg a day for 4 days.  His platelet count gisell to 196,000 and he is referred here for further evaluation.  He has had an extensive evaluation for autoimmune disease.  His IgG level is 956, IgA 135 and IgM 44.  His haptoglobin was 118.  No further evidence of hemolysis.  No abnormality in kappa or lambda free light chains.  No monoclonal protein seen.  Cytogenetics have been normal.  Flow cytometry showed no immunophenotypic evidence of non-Hodgkin lymphoma on the lymph node biopsies.  He also had an extensive viral evaluation.  He was negative for COVID.  EBV DNA was negative.  Hep B surface antigen, hep B core antibody, hep B surface antibody were positive for vaccination.  Hepatitis C was negative.  HIV was negative.  Influenza was negative.  RSV was negative.  Lupus anticoagulant was absent.  PAVAN was positive at 1:160 in a nucleolar pattern.  Complement levels were drawn.  Today, he feels good with no bleeding.  He said the dexamethasone did raise his blood glucose levels, so he had to alter his insulin requirements.  INTERVAL HISTORY  Billy is completed 4 doses of rituximab last on April 9, 2024.  He is feeling well he denies any bleeding petechiae or melena or hematochezia.  Energy level is good.  He continues with the similar doses of insulin for his diabetes.  He was seen by dermatology for dermatitis and advised to continue triamcinolone 0.1%.  He feels that the rash has improved         Review of Systems:   A comprehensive ROS was performed with the patient and was found to be negative with the exception of that noted in the HPI above.  EYES  Some DM retinopathy  RESP Has cough since end of November with cold sx Continue to have a cough No asthma  COR  No chest pain no palpitations  GI  No GI bleeding No Reflux  ENDO Not thryoid issues Has insulin pump for DM   No UTIs  NEURO no seizure or HA  ID Had COVID in May 2022 with  fatiuge joint aches, fever  PSYCH Mood ok         Past Medical History:     Past Medical History:   Diagnosis Date    Diabetes mellitus type 1, uncontrolled, insulin dependent 9/16/2013            Past Surgical History:     Past Surgical History:   Procedure Laterality Date    EXCISE MASS GROIN Left 1/20/2023    Procedure: Lymph Node excisional biopsy;  Surgeon: New Burger MD;  Location: [facility] OR    MYRINGOTOMY, INSERT TUBE BILATERAL, COMBINED Bilateral     As a child            Social History:     Social History     Socioeconomic History    Marital status:      Spouse name: Jayla    Number of children: 3    Years of education: Not on file    Highest education level: Not on file   Occupational History    Occupation:      Comment: [employer]   Tobacco Use    Smoking status: Never     Passive exposure: Never    Smokeless tobacco: Never   Substance and Sexual Activity    Alcohol use: No     Comment: rarely    Drug use: No    Sexual activity: Yes     Partners: Female   Other Topics Concern    Parent/sibling w/ CABG, MI or angioplasty before 65F 55M? Not Asked   Social History Narrative    Not on file     Social Determinants of Health     Financial Resource Strain: Low Risk  (1/12/2023)    Overall Financial Resource Strain (CARDIA)     Difficulty of Paying Living Expenses: Not hard at all   Food Insecurity: No Food Insecurity (1/12/2023)    Hunger Vital Sign     Worried About Running Out of Food in the Last Year: Never true     Ran Out of Food in the Last Year: Never true   Transportation Needs: No Transportation Needs (1/12/2023)    PRAPARE - Transportation     Lack of Transportation (Medical): No     Lack of Transportation (Non-Medical): No   Physical Activity: Sufficiently Active (1/12/2023)    Exercise Vital Sign     Days of Exercise per Week: 6 days     Minutes of Exercise per Session: 40 min   Stress: No Stress Concern Present (1/12/2023)    [place] [employer] -  Occupational Stress Questionnaire     Feeling of Stress : Only a little   Social Connections: Socially Integrated (1/12/2023)    Social Connection and Isolation Panel [NHANES]     Frequency of Communication with Friends and Family: More than three times a week     Frequency of Social Gatherings with Friends and Family: More than three times a week     Attends Jainism Services: 1 to 4 times per year     Active Member of Clubs or Organizations: Yes     Attends Club or Organization Meetings: Not on file     Marital Status:    Interpersonal Safety: Not At Risk (3/31/2023)    Humiliation, Afraid, Rape, and Kick questionnaire     Fear of Current or Ex-Partner: No     Emotionally Abused: No     Physically Abused: No     Sexually Abused: No   Housing Stability: Low Risk  (1/12/2023)    Housing Stability Vital Sign     Unable to Pay for Housing in the Last Year: No     Number of Places Lived in the Last Year: 1     Unstable Housing in the Last Year: No            Family History:     Family History   Problem Relation Age of Onset    C.A.D. No family hx of     Diabetes No family hx of     Hypertension No family hx of     Cancer No family hx of         no skin cancer    Amblyopia No family hx of     Retinal detachment No family hx of     Thyroid Disease No family hx of     Glaucoma No family hx of     Macular Degeneration No family hx of             Allergies:     Allergies   Allergen Reactions    Penicillins Rash            Medications:   (Not in a hospital admission)           Physical Exam:     .vs    General: Appears well, sitting in bed, in no acute distress.  Heme/Lymph: No overt bleeding. No cervical, axillary, or supraclavicular adenopathy.Scar in left groin  Skin: Rash on lower legs bilaterally browinsh red,flat macules over shins  Maculopapular rash over right lower leg. Some rash on left lower leg no rash  on chest back or arms See pix below  Respiratory: Non-labored breathing, good air exchange, lungs clear  to auscultation bilaterally.  Cardiovascular: Regular rate and rhythm. No murmur or rub.   Gastrointestinal: Normoactive bowel sounds. Abdomen soft, non-distended, and non-tender. No palpable masses or organomegaly.Could not feel spleen  Extremities: No extremity edema.    Neurologic: A&O x 3, speech normal, sensation to light touch grossly WNL.  Lymph No  Cervical nodes I cannot feel axillary nodes Scar in left inguinal area with fullness         Data:   I have personally reviewed the following labs/imaging:  CBC@LABRCNTIPR(wbc:4,rbc:4,hgb:4,hct:4,mcv:4,mch:4,mchc:4,rdw:4,plt:4)@  CMP@LABRCNTIPR(na:4,potassium:4,chloride:4,co2:4,aniongap:4,g,bun:4,cr:4,gfrestimated:4,gfrestblack:4,jason:4,ma,phos:4,prottotal:4,albumin:4,bilitotal:4,alkphos:4,ast:4,alt:4)@  INR   Latest Reference Range & Units 24 12:48   WBC 4.0 - 11.0 10e3/uL 8.4   Hemoglobin 13.3 - 17.7 g/dL 17.3   Hematocrit 40.0 - 53.0 % 48.2   Platelet Count 150 - 450 10e3/uL 61 (L)   RBC Count 4.40 - 5.90 10e6/uL 6.00 (H)   MCV 78 - 100 fL 80   MCH 26.5 - 33.0 pg 28.8   MCHC 31.5 - 36.5 g/dL 35.9   RDW 10.0 - 15.0 % 12.1   % Neutrophils % 71   % Lymphocytes % 18   % Monocytes % 9   % Eosinophils % 1   % Basophils % 1   Absolute Basophils 0.0 - 0.2 10e3/uL 0.1   Absolute Eosinophils 0.0 - 0.7 10e3/uL 0.1   Absolute Immature Granulocytes <=0.4 10e3/uL 0.0   Absolute Lymphocytes 0.8 - 5.3 10e3/uL 1.5   Absolute Monocytes 0.0 - 1.3 10e3/uL 0.8   % Immature Granulocytes % 0   Absolute Neutrophils 1.6 - 8.3 10e3/uL 5.9   Absolute NRBCs 10e3/uL 0.0   NRBCs per 100 WBC <1 /100 0   % Retic 0.5 - 2.0 % 1.1   (L): Data is abnormally low  (H): Data is abnormally high   Latest Reference Range & Units 24 12:48   Sodium 135 - 145 mmol/L 139   Potassium 3.4 - 5.3 mmol/L 4.2   Chloride 98 - 107 mmol/L 100   Carbon Dioxide (CO2) 22 - 29 mmol/L 27   Urea Nitrogen 6.0 - 20.0 mg/dL 15.6   Creatinine 0.67 - 1.17 mg/dL 1.21 (H)   GFR Estimate >60 mL/min/1.73m2 80    Calcium 8.6 - 10.0 mg/dL 9.6   Anion Gap 7 - 15 mmol/L 12   Albumin 3.5 - 5.2 g/dL 4.8   Protein Total 6.4 - 8.3 g/dL 7.2   Alkaline Phosphatase 40 - 150 U/L 62   ALT 0 - 70 U/L 15   AST 0 - 45 U/L 30   Bilirubin Total <=1.2 mg/dL 1.6 (H)   Glucose 70 - 99 mg/dL 122 (H)   (H): Data is abnormally high

## 2024-04-23 ENCOUNTER — OFFICE VISIT (OUTPATIENT)
Dept: FAMILY MEDICINE | Facility: CLINIC | Age: 34
End: 2024-04-23
Attending: INTERNAL MEDICINE
Payer: COMMERCIAL

## 2024-04-23 DIAGNOSIS — D59.10 AUTOIMMUNE HEMOLYTIC ANEMIA (H): ICD-10-CM

## 2024-04-23 DIAGNOSIS — L30.9 DERMATITIS: Primary | ICD-10-CM

## 2024-04-23 DIAGNOSIS — D69.3 AUTOIMMUNE THROMBOCYTOPENIA (H): ICD-10-CM

## 2024-04-23 LAB
DAT POLY: NEGATIVE
SPECIMEN EXPIRATION DATE: NORMAL

## 2024-04-23 PROCEDURE — 99204 OFFICE O/P NEW MOD 45 MIN: CPT | Performed by: DERMATOLOGY

## 2024-04-23 RX ORDER — TRIAMCINOLONE ACETONIDE 1 MG/G
OINTMENT TOPICAL 2 TIMES DAILY
Qty: 454 G | Refills: 1 | Status: SHIPPED | OUTPATIENT
Start: 2024-04-23

## 2024-04-23 NOTE — LETTER
4/23/2024         RE: Billy Carey  [address] [address]        Dear Colleague,    Thank you for referring your patient, Billy Carey, to the [facility]. Please see a copy of my visit note below.    [facility] Note  Encounter Date: Apr 23, 2024  Office Visit      Dermatology Problem List:    # Dermatitis   - Tx: Triamcinolone 0.1 % ointment    PMHx: Autoimmune thrombocytopenia, Hackett syndrome, type 1 diabetes, positive PAVAN.   Social: Has 3 daughters.   ____________________________________________    Assessment & Plan:    # Dermatitis   Has had rash on lower legs for about 5 years. Does respond to topical steroids. Note history of ITP, type 1 diabetes, positive PAVAN. The ITP and Hackett syndrome was diagnosed more recently. Exam subtle today but favor most likely to be nummular/eczematous dermatitis. Will continue empiric topicals. With flare and/or if not responding to topicals, he will notify me for re-eval and possible biopsy.  - Continue use of triamcinolone 0.1% ointment, apply to his lower legs as needed for flares. Refilled Rx.   - Advised daily moisturization with bland emollient.   - notify me for flares or if not responding to treatment  *reviewed heme/onc note    Procedures Performed:   None     Follow-up: As needed. Will call if issues and can please get him into clinic in timely fashion.    Staff and scribe:      Scribe Disclosure:   I, LIBIA KING, am serving as a scribe; to document services personally performed by Mary Burrell MD -based on data collection and the provider's statements to me.     Provider Disclosure:   The documentation recorded by the scribe accurately reflects the services I personally performed and the decisions made by me.    Mary Burrell MD    Department of Dermatology  [facility]: Phone:  [phone], Fax: [phone]  4/26/2024      ____________________________________________    CC: Rash (Itchy rash on both lower legs, wrose on R leg sx off/on for a couple years)      Reviewed patients past medical history and pertinent chart review prior to patient's visit today.     HPI:  Mr. Billy Carey is a 33 year old male who presents today as a new patient for rash follow up. Today patient reported an itchy rash on both lower legs. Noted that R leg is worse. Symptoms have been on and off for a couple years, about 5. Reports that today is a calmer day for him     Denied any hx of Eczema as a kid. Denied any asthma or allergies. Noted his brother has asthma.     Has a hx of Autoimmune thrombocytopenia. Reports that rash occurred before diagnosis.     Patient is otherwise feeling well, without additional concerns.    Labs:  N/A    Physical Exam:  Vitals: There were no vitals taken for this visit.  SKIN: Focused examination of lower extremities was performed.   - There are subtle pink scaly papules/patches on the lower legs.  - No other lesions of concern on areas examined.     Medications:  Current Outpatient Medications   Medication Sig Dispense Refill     atorvastatin (LIPITOR) 10 MG tablet Take 1 tablet (10 mg) by mouth daily 90 tablet 3     blood glucose (KELL CONTOUR NEXT) test strip Test 4-5 times daily 4 Box 3     Blood Glucose Monitoring Suppl (KELL CONTOUR NEXT LINK) W/DEVICE KIT 1 kit 5 times daily. Use as directed 1 kit 1     Continuous Blood Gluc Sensor (DEXCOM G6 SENSOR) MISC USE 1 SENSOR EVERY 10 DAYS       insulin lispro (HUMALOG VIAL) 100 UNIT/ML vial To be used in insulin pump       insulin pen needle (BD CHRIST U/F) 32G X 4 MM miscellaneous Use 3-6 pen needles daily or as directed. 50 each 0     INSULIN PUMP - OUTPATIENT Date Last Updated: 2/15/23  Tandem  Pump Basal Rates/Times:  1290-0527: 2 units/hour  2452-1864: 2.6 units/hour  2732-8554: 2.2 units/hour  8952-5679: 1.7 units/hour  CARB  RATIO:   1177-0264: 1 unit per 10 grams carbohydrates  CF / Sensitivity Times:   5722-7761: 1 unit to decrease BG by 30 mg/dL  TARGET B mg/dL       triamcinolone (KENALOG) 0.1 % external ointment Apply topically 2 times daily To lower legs as needed with good moisturizer 454 g 1     triamcinolone (KENALOG) 0.5 % external ointment Apply a thin layer to affected areas twice daily as needed. 15 g 3     vitamin D2 (ERGOCALCIFEROL) 86215 units (1250 mcg) capsule Take 50,000 Units by mouth once a week       No current facility-administered medications for this visit.      Past Medical/Surgical History:   Patient Active Problem List   Diagnosis     Diabetes mellitus type 1, uncontrolled, insulin dependent     Obesity     Hyperlipidemia with target LDL less than 100     Folliculitis     Mild nonproliferative diabetic retinopathy of both eyes without macular edema associated with type 1 diabetes mellitus (H)     Morbid obesity (H)     Acute kidney injury (H24)     Autoimmune thrombocytopenia (H)     Past Medical History:   Diagnosis Date     Diabetes mellitus type 1, uncontrolled, insulin dependent 2013                        Again, thank you for allowing me to participate in the care of your patient.        Sincerely,        Mary Burrell MD

## 2024-04-23 NOTE — PROGRESS NOTES
[facility] Dermatology Note  Encounter Date: Apr 23, 2024  Office Visit      Dermatology Problem List:    # Dermatitis   - Tx: Triamcinolone 0.1 % ointment    PMHx: Autoimmune thrombocytopenia, Hackett syndrome, type 1 diabetes, positive PAVAN.   Social: Has 3 daughters.   ____________________________________________    Assessment & Plan:    # Dermatitis   Has had rash on lower legs for about 5 years. Does respond to topical steroids. Note history of ITP, type 1 diabetes, positive PAVAN. The ITP and Hackett syndrome was diagnosed more recently. Exam subtle today but favor most likely to be nummular/eczematous dermatitis. Will continue empiric topicals. With flare and/or if not responding to topicals, he will notify me for re-eval and possible biopsy.  - Continue use of triamcinolone 0.1% ointment, apply to his lower legs as needed for flares. Refilled Rx.   - Advised daily moisturization with bland emollient.   - notify me for flares or if not responding to treatment  *reviewed heme/onc note    Procedures Performed:   None     Follow-up: As needed. Will call if issues and can please get him into clinic in timely fashion.    Staff and scribe:      Scribe Disclosure:   I, LIBIA KING, am serving as a scribe; to document services personally performed by Mary Burrell MD -based on data collection and the provider's statements to me.     Provider Disclosure:   The documentation recorded by the scribe accurately reflects the services I personally performed and the decisions made by me.    Mary Burrell MD    Department of Dermatology  [facility]: Phone: [phone], Fax: [phone]  4/26/2024      ____________________________________________    CC: Rash (Itchy rash on both lower legs, wrose on R leg sx off/on for a couple years)      Reviewed patients past medical history and pertinent chart review  prior to patient's visit today.     HPI:  Mr. Billy Carey is a 33 year old male who presents today as a new patient for rash follow up. Today patient reported an itchy rash on both lower legs. Noted that R leg is worse. Symptoms have been on and off for a couple years, about 5. Reports that today is a calmer day for him     Denied any hx of Eczema as a kid. Denied any asthma or allergies. Noted his brother has asthma.     Has a hx of Autoimmune thrombocytopenia. Reports that rash occurred before diagnosis.     Patient is otherwise feeling well, without additional concerns.    Labs:  N/A    Physical Exam:  Vitals: There were no vitals taken for this visit.  SKIN: Focused examination of lower extremities was performed.   - There are subtle pink scaly papules/patches on the lower legs.  - No other lesions of concern on areas examined.     Medications:  Current Outpatient Medications   Medication Sig Dispense Refill    atorvastatin (LIPITOR) 10 MG tablet Take 1 tablet (10 mg) by mouth daily 90 tablet 3    blood glucose (KELL CONTOUR NEXT) test strip Test 4-5 times daily 4 Box 3    Blood Glucose Monitoring Suppl (KELL CONTOUR NEXT LINK) W/DEVICE KIT 1 kit 5 times daily. Use as directed 1 kit 1    Continuous Blood Gluc Sensor (DEXCOM G6 SENSOR) MISC USE 1 SENSOR EVERY 10 DAYS      insulin lispro (HUMALOG VIAL) 100 UNIT/ML vial To be used in insulin pump      insulin pen needle (BD CHRIST U/F) 32G X 4 MM miscellaneous Use 3-6 pen needles daily or as directed. 50 each 0    INSULIN PUMP - OUTPATIENT Date Last Updated: 2/15/23  Tandem  Pump Basal Rates/Times:  3730-8756: 2 units/hour  2399-5651: 2.6 units/hour  1846-5738: 2.2 units/hour  1919-1750: 1.7 units/hour  CARB RATIO:   1676-4199: 1 unit per 10 grams carbohydrates  CF / Sensitivity Times:   4339-7987: 1 unit to decrease BG by 30 mg/dL  TARGET B mg/dL      triamcinolone (KENALOG) 0.1 % external ointment Apply topically 2 times daily To lower legs as needed with  good moisturizer 454 g 1    triamcinolone (KENALOG) 0.5 % external ointment Apply a thin layer to affected areas twice daily as needed. 15 g 3    vitamin D2 (ERGOCALCIFEROL) 13847 units (1250 mcg) capsule Take 50,000 Units by mouth once a week       No current facility-administered medications for this visit.      Past Medical/Surgical History:   Patient Active Problem List   Diagnosis    Diabetes mellitus type 1, uncontrolled, insulin dependent    Obesity    Hyperlipidemia with target LDL less than 100    Folliculitis    Mild nonproliferative diabetic retinopathy of both eyes without macular edema associated with type 1 diabetes mellitus (H)    Morbid obesity (H)    Acute kidney injury (H24)    Autoimmune thrombocytopenia (H)     Past Medical History:   Diagnosis Date    Diabetes mellitus type 1, uncontrolled, insulin dependent 9/16/2013

## 2024-04-24 ENCOUNTER — ONCOLOGY VISIT (OUTPATIENT)
Dept: TRANSPLANT | Facility: CLINIC | Age: 34
End: 2024-04-24
Attending: INTERNAL MEDICINE
Payer: COMMERCIAL

## 2024-04-24 ENCOUNTER — APPOINTMENT (OUTPATIENT)
Dept: LAB | Facility: CLINIC | Age: 34
End: 2024-04-24
Attending: INTERNAL MEDICINE
Payer: COMMERCIAL

## 2024-04-24 VITALS
OXYGEN SATURATION: 99 % | DIASTOLIC BLOOD PRESSURE: 84 MMHG | RESPIRATION RATE: 16 BRPM | WEIGHT: 259.7 LBS | HEART RATE: 80 BPM | TEMPERATURE: 97.7 F | BODY MASS INDEX: 32.46 KG/M2 | SYSTOLIC BLOOD PRESSURE: 139 MMHG

## 2024-04-24 DIAGNOSIS — D59.10 AUTOIMMUNE HEMOLYTIC ANEMIA (H): ICD-10-CM

## 2024-04-24 DIAGNOSIS — D69.3 AUTOIMMUNE THROMBOCYTOPENIA (H): ICD-10-CM

## 2024-04-24 LAB
ALBUMIN SERPL BCG-MCNC: 4.8 G/DL (ref 3.5–5.2)
ALP SERPL-CCNC: 62 U/L (ref 40–150)
ALT SERPL W P-5'-P-CCNC: 15 U/L (ref 0–70)
ANION GAP SERPL CALCULATED.3IONS-SCNC: 12 MMOL/L (ref 7–15)
AST SERPL W P-5'-P-CCNC: 30 U/L (ref 0–45)
BASOPHILS # BLD AUTO: 0.1 10E3/UL (ref 0–0.2)
BASOPHILS NFR BLD AUTO: 1 %
BILIRUB SERPL-MCNC: 1.6 MG/DL
BUN SERPL-MCNC: 15.6 MG/DL (ref 6–20)
CALCIUM SERPL-MCNC: 9.6 MG/DL (ref 8.6–10)
CHLORIDE SERPL-SCNC: 100 MMOL/L (ref 98–107)
CREAT SERPL-MCNC: 1.21 MG/DL (ref 0.67–1.17)
DEPRECATED HCO3 PLAS-SCNC: 27 MMOL/L (ref 22–29)
EGFRCR SERPLBLD CKD-EPI 2021: 80 ML/MIN/1.73M2
EOSINOPHIL # BLD AUTO: 0.1 10E3/UL (ref 0–0.7)
EOSINOPHIL NFR BLD AUTO: 1 %
ERYTHROCYTE [DISTWIDTH] IN BLOOD BY AUTOMATED COUNT: 12.1 % (ref 10–15)
GLUCOSE SERPL-MCNC: 122 MG/DL (ref 70–99)
HCT VFR BLD AUTO: 48.2 % (ref 40–53)
HGB BLD-MCNC: 17.3 G/DL (ref 13.3–17.7)
IMM GRANULOCYTES # BLD: 0 10E3/UL
IMM GRANULOCYTES NFR BLD: 0 %
LDH SERPL L TO P-CCNC: 210 U/L (ref 0–250)
LYMPHOCYTES # BLD AUTO: 1.5 10E3/UL (ref 0.8–5.3)
LYMPHOCYTES NFR BLD AUTO: 18 %
MCH RBC QN AUTO: 28.8 PG (ref 26.5–33)
MCHC RBC AUTO-ENTMCNC: 35.9 G/DL (ref 31.5–36.5)
MCV RBC AUTO: 80 FL (ref 78–100)
MONOCYTES # BLD AUTO: 0.8 10E3/UL (ref 0–1.3)
MONOCYTES NFR BLD AUTO: 9 %
NEUTROPHILS # BLD AUTO: 5.9 10E3/UL (ref 1.6–8.3)
NEUTROPHILS NFR BLD AUTO: 71 %
NRBC # BLD AUTO: 0 10E3/UL
NRBC BLD AUTO-RTO: 0 /100
PLATELET # BLD AUTO: 61 10E3/UL (ref 150–450)
POTASSIUM SERPL-SCNC: 4.2 MMOL/L (ref 3.4–5.3)
PROT SERPL-MCNC: 7.2 G/DL (ref 6.4–8.3)
RBC # BLD AUTO: 6 10E6/UL (ref 4.4–5.9)
RETICS # AUTO: 0.06 10E6/UL (ref 0.03–0.1)
RETICS/RBC NFR AUTO: 1.1 % (ref 0.5–2)
SODIUM SERPL-SCNC: 139 MMOL/L (ref 135–145)
WBC # BLD AUTO: 8.4 10E3/UL (ref 4–11)

## 2024-04-24 PROCEDURE — 82784 ASSAY IGA/IGD/IGG/IGM EACH: CPT | Performed by: INTERNAL MEDICINE

## 2024-04-24 PROCEDURE — 99213 OFFICE O/P EST LOW 20 MIN: CPT | Performed by: INTERNAL MEDICINE

## 2024-04-24 PROCEDURE — 85045 AUTOMATED RETICULOCYTE COUNT: CPT | Performed by: INTERNAL MEDICINE

## 2024-04-24 PROCEDURE — 36415 COLL VENOUS BLD VENIPUNCTURE: CPT | Performed by: INTERNAL MEDICINE

## 2024-04-24 PROCEDURE — 82374 ASSAY BLOOD CARBON DIOXIDE: CPT | Performed by: INTERNAL MEDICINE

## 2024-04-24 PROCEDURE — 85025 COMPLETE CBC W/AUTO DIFF WBC: CPT | Performed by: INTERNAL MEDICINE

## 2024-04-24 PROCEDURE — 86880 COOMBS TEST DIRECT: CPT | Performed by: INTERNAL MEDICINE

## 2024-04-24 PROCEDURE — 99214 OFFICE O/P EST MOD 30 MIN: CPT | Performed by: INTERNAL MEDICINE

## 2024-04-24 PROCEDURE — 83615 LACTATE (LD) (LDH) ENZYME: CPT | Performed by: INTERNAL MEDICINE

## 2024-04-24 RX ORDER — URINE ACETONE TEST STRIPS
STRIP MISCELLANEOUS
COMMUNITY
Start: 2023-05-15

## 2024-04-24 RX ORDER — INSULIN ASPART 100 [IU]/ML
INJECTION, SOLUTION INTRAVENOUS; SUBCUTANEOUS PRN
COMMUNITY

## 2024-04-24 RX ORDER — PROCHLORPERAZINE 25 MG/1
SUPPOSITORY RECTAL
COMMUNITY
Start: 2024-04-14

## 2024-04-24 ASSESSMENT — PAIN SCALES - GENERAL: PAINLEVEL: NO PAIN (0)

## 2024-04-24 NOTE — LETTER
4/24/2024         RE: Billy Carey  [address] [facility] [address]        Dear Colleague,    Thank you for referring your patient, Billy Carey, to the [facility] [place]. Please see a copy of my visit note below.    Hematology/Oncology Consult Note    Billy Carey MRN# [id number]   Age: 32 year old YOB: 1990          Reason for Consult:   thrombocytopenia         Assessment and Plan:   Billy Carey is a 32 year old   ASSESSMENT:    1.  Immune thrombocytopenic purpura,.Hackett Syndrome  2.  Positive MIRA, IgG and C3d,Hackett Syndrome  3.  Type 1 diabetes.  4.  History of COVID.  5.  Status post lymph node biopsies and bone marrow biopsies  6.  Positive PAVAN 1:160 .   7. Skin rash  Billy likely triggered his recent drop in platelet count from a viral URI in March.  His platelet count has come up nicely to 61 thousand and is presently not on any steroids.  I still wonder whether this whole process is related to COVID but I cannot really be sure.  The lymphadenopathy and positive PAVAN 1-1 60 still suggest an underlying autoimmune disorder.  He has also had a positive MIRA which we need to monitor.  His hemoglobin is good today.  I would like to have him get labs including CBC and platelets every 3 weeks at [facility].  We will monitor these counts and I plan to see him in 4 months time  PLAN    Every 3 week CBC plat diff retic and CMP  Follow blood glucose   Call if fever or other infectious issues arise  RTC in 4 months     I spent a total of  30 minutes face to face with Billy Carey during today's office visit. Over 50% of this time was spent counseling the patient and coordinating the care regarding immunethrombocytopenia  and 10.  Minutes preparing to see the patient and  care coordination   Moose Joy MD  [phone]       History of Present Illness:   History obtained from chart review and confirmed with patient.    I had the pleasure  of seeing Billy Carey in consultation for Dr. Hayde Lopez for evaluation of autoimmune thrombocytopenia and a history of a positive MIRA.  Billy has been a healthy young man.  Participated in track while at the [employer] including shot eduardo who developed COVID in 05/2022 with fatigue, joint aches and fever.  He did not receive any other medications.  He had been vaccinated.  He was really fine until around Thanksgiving.  He developed a cough with fever and fatigue.  It was productive.  He did not receive an antibiotic.  He had no night sweats or fevers.  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  A repeat was 43,000.  He was seen by Dr. Hayde Lopez on 12/06.  At that time, his platelet count was 46,000, hemoglobin 17.6.  MIRA was positive +2, IgG, C3d.  Creatinine was slightly elevated.  A CT scan showed a 3 cm mass in the pericardial fat but he also had periaortic nodes, iliac nodes, axillary nodes.  His spleen was 15.5 cm.  A PET scan was done.  Showed increased uptake with SUV of 6.5 in the right axilla, 12.5 in the retroperitoneal nodes, and the left iliac showed an SUV of 11.  An attempt at an interventional radiology needle biopsy of the retroperitoneal was done but no tissue was noted.  He subsequently underwent a biopsy of a right axillary 3 x 2 cm mass.  The biopsy showed polytypic lymphocytes and no evidence of lymphoma, extensive immunocytochemistry could not identify any clonal abnormalities.  Flow cytometry and cytogenetics were also done in December.  He had a bone marrow biopsy which showed hypercellular marrow with adequate megakaryocytes and no evidence of lymphoma.  Interestingly, despite having a continuous MIRA positivity, his hemoglobin remained 15-16gm% with minimal evidence of hemolysis. Haptoglobin was low at  22  While retic count and LDH not elevated He was  given prednisone to see if his platelet count would respond, 60 mg a day, at the end of January for 2 weeks.  Again, he had no significant bleeding, no petechiae, no weight loss, no fevers, no chills.  On 02/15, his platelet count had fallen to 18,000, had nose bleed  and he was admitted and received IVIgG for 2 days and dexamethasone 40 mg a day for 4 days.  His platelet count gisell to 196,000 and he is referred here for further evaluation.  He has had an extensive evaluation for autoimmune disease.  His IgG level is 956, IgA 135 and IgM 44.  His haptoglobin was 118.  No further evidence of hemolysis.  No abnormality in kappa or lambda free light chains.  No monoclonal protein seen.  Cytogenetics have been normal.  Flow cytometry showed no immunophenotypic evidence of non-Hodgkin lymphoma on the lymph node biopsies.  He also had an extensive viral evaluation.  He was negative for COVID.  EBV DNA was negative.  Hep B surface antigen, hep B core antibody, hep B surface antibody were positive for vaccination.  Hepatitis C was negative.  HIV was negative.  Influenza was negative.  RSV was negative.  Lupus anticoagulant was absent.  PAVAN was positive at 1:160 in a nucleolar pattern.  Complement levels were drawn.  Today, he feels good with no bleeding.  He said the dexamethasone did raise his blood glucose levels, so he had to alter his insulin requirements.  INTERVAL HISTORY  Billy is completed 4 doses of rituximab last on April 9, 2024.  He is feeling well he denies any bleeding petechiae or melena or hematochezia.  Energy level is good.  He continues with the similar doses of insulin for his diabetes.  He was seen by dermatology for dermatitis and advised to continue triamcinolone 0.1%.  He feels that the rash has improved         Review of Systems:   A comprehensive ROS was performed with the patient and was found to be negative with the exception of that noted in the HPI above.  EYES  Some DM retinopathy  RESP Has  cough since end of November with cold sx Continue to have a cough No asthma  COR  No chest pain no palpitations  GI  No GI bleeding No Reflux  ENDO Not thryoid issues Has insulin pump for DM   No UTIs  NEURO no seizure or HA  ID Had COVID in May 2022 with fatiuge joint aches, fever  PSYCH Mood ok         Past Medical History:     Past Medical History:   Diagnosis Date    Diabetes mellitus type 1, uncontrolled, insulin dependent 9/16/2013            Past Surgical History:     Past Surgical History:   Procedure Laterality Date    EXCISE MASS GROIN Left 1/20/2023    Procedure: Lymph Node excisional biopsy;  Surgeon: New Burger MD;  Location: [facility] OR    MYRINGOTOMY, INSERT TUBE BILATERAL, COMBINED Bilateral     As a child            Social History:     Social History     Socioeconomic History    Marital status:      Spouse name: Jayla    Number of children: 3    Years of education: Not on file    Highest education level: Not on file   Occupational History    Occupation:      Comment: [employer]   Tobacco Use    Smoking status: Never     Passive exposure: Never    Smokeless tobacco: Never   Substance and Sexual Activity    Alcohol use: No     Comment: rarely    Drug use: No    Sexual activity: Yes     Partners: Female   Other Topics Concern    Parent/sibling w/ CABG, MI or angioplasty before 65F 55M? Not Asked   Social History Narrative    Not on file     Social Determinants of Health     Financial Resource Strain: Low Risk  (1/12/2023)    Overall Financial Resource Strain (CARDIA)     Difficulty of Paying Living Expenses: Not hard at all   Food Insecurity: No Food Insecurity (1/12/2023)    Hunger Vital Sign     Worried About Running Out of Food in the Last Year: Never true     Ran Out of Food in the Last Year: Never true   Transportation Needs: No Transportation Needs (1/12/2023)    PRAPARE - Transportation     Lack of Transportation (Medical): No     Lack of Transportation  (Non-Medical): No   Physical Activity: Sufficiently Active (1/12/2023)    Exercise Vital Sign     Days of Exercise per Week: 6 days     Minutes of Exercise per Session: 40 min   Stress: No Stress Concern Present (1/12/2023)    [employer] [place] [employer] - Occupational Stress Questionnaire     Feeling of Stress : Only a little   Social Connections: Socially Integrated (1/12/2023)    Social Connection and Isolation Panel [NHANES]     Frequency of Communication with Friends and Family: More than three times a week     Frequency of Social Gatherings with Friends and Family: More than three times a week     Attends Mosque Services: 1 to 4 times per year     Active Member of Clubs or Organizations: Yes     Attends Club or Organization Meetings: Not on file     Marital Status:    Interpersonal Safety: Not At Risk (3/31/2023)    Humiliation, Afraid, Rape, and Kick questionnaire     Fear of Current or Ex-Partner: No     Emotionally Abused: No     Physically Abused: No     Sexually Abused: No   Housing Stability: Low Risk  (1/12/2023)    Housing Stability Vital Sign     Unable to Pay for Housing in the Last Year: No     Number of Places Lived in the Last Year: 1     Unstable Housing in the Last Year: No            Family History:     Family History   Problem Relation Age of Onset    C.A.D. No family hx of     Diabetes No family hx of     Hypertension No family hx of     Cancer No family hx of         no skin cancer    Amblyopia No family hx of     Retinal detachment No family hx of     Thyroid Disease No family hx of     Glaucoma No family hx of     Macular Degeneration No family hx of             Allergies:     Allergies   Allergen Reactions    Penicillins Rash            Medications:   (Not in a hospital admission)           Physical Exam:     .vs    General: Appears well, sitting in bed, in no acute distress.  Heme/Lymph: No overt bleeding. No cervical, axillary, or supraclavicular adenopathy.Scar  in left groin  Skin: Rash on lower legs bilaterally browinsh red,flat macules over shins  Maculopapular rash over right lower leg. Some rash on left lower leg no rash  on chest back or arms See pix below  Respiratory: Non-labored breathing, good air exchange, lungs clear to auscultation bilaterally.  Cardiovascular: Regular rate and rhythm. No murmur or rub.   Gastrointestinal: Normoactive bowel sounds. Abdomen soft, non-distended, and non-tender. No palpable masses or organomegaly.Could not feel spleen  Extremities: No extremity edema.    Neurologic: A&O x 3, speech normal, sensation to light touch grossly WNL.  Lymph No  Cervical nodes I cannot feel axillary nodes Scar in left inguinal area with fullness         Data:   I have personally reviewed the following labs/imaging:  CBC@LABRCNTIPR(wbc:4,rbc:4,hgb:4,hct:4,mcv:4,mch:4,mchc:4,rdw:4,plt:4)@  CMP@LABRCNTIPR(na:4,potassium:4,chloride:4,co2:4,aniongap:4,g,bun:4,cr:4,gfrestimated:4,gfrestblack:4,jason:4,ma,phos:4,prottotal:4,albumin:4,bilitotal:4,alkphos:4,ast:4,alt:4)@  INR   Latest Reference Range & Units 24 12:48   WBC 4.0 - 11.0 10e3/uL 8.4   Hemoglobin 13.3 - 17.7 g/dL 17.3   Hematocrit 40.0 - 53.0 % 48.2   Platelet Count 150 - 450 10e3/uL 61 (L)   RBC Count 4.40 - 5.90 10e6/uL 6.00 (H)   MCV 78 - 100 fL 80   MCH 26.5 - 33.0 pg 28.8   MCHC 31.5 - 36.5 g/dL 35.9   RDW 10.0 - 15.0 % 12.1   % Neutrophils % 71   % Lymphocytes % 18   % Monocytes % 9   % Eosinophils % 1   % Basophils % 1   Absolute Basophils 0.0 - 0.2 10e3/uL 0.1   Absolute Eosinophils 0.0 - 0.7 10e3/uL 0.1   Absolute Immature Granulocytes <=0.4 10e3/uL 0.0   Absolute Lymphocytes 0.8 - 5.3 10e3/uL 1.5   Absolute Monocytes 0.0 - 1.3 10e3/uL 0.8   % Immature Granulocytes % 0   Absolute Neutrophils 1.6 - 8.3 10e3/uL 5.9   Absolute NRBCs 10e3/uL 0.0   NRBCs per 100 WBC <1 /100 0   % Retic 0.5 - 2.0 % 1.1   (L): Data is abnormally low  (H): Data is abnormally high   Latest Reference Range &  Units 04/24/24 12:48   Sodium 135 - 145 mmol/L 139   Potassium 3.4 - 5.3 mmol/L 4.2   Chloride 98 - 107 mmol/L 100   Carbon Dioxide (CO2) 22 - 29 mmol/L 27   Urea Nitrogen 6.0 - 20.0 mg/dL 15.6   Creatinine 0.67 - 1.17 mg/dL 1.21 (H)   GFR Estimate >60 mL/min/1.73m2 80   Calcium 8.6 - 10.0 mg/dL 9.6   Anion Gap 7 - 15 mmol/L 12   Albumin 3.5 - 5.2 g/dL 4.8   Protein Total 6.4 - 8.3 g/dL 7.2   Alkaline Phosphatase 40 - 150 U/L 62   ALT 0 - 70 U/L 15   AST 0 - 45 U/L 30   Bilirubin Total <=1.2 mg/dL 1.6 (H)   Glucose 70 - 99 mg/dL 122 (H)   (H): Data is abnormally high        Moose Joy MD

## 2024-04-24 NOTE — NURSING NOTE
"Oncology Rooming Note    April 24, 2024 12:59 PM   Billy Carey is a 33 year old male who presents for:    Chief Complaint   Patient presents with    Labs Only     Labs drawn via VPT by RN, LAURA done    Oncology Clinic Visit     Autoimmune thrombocytopenia     Initial Vitals: /84 (BP Location: Left arm, Patient Position: Sitting, Cuff Size: Adult Large)   Pulse 80   Temp 97.7  F (36.5  C) (Oral)   Resp 16   Wt 117.8 kg (259 lb 11.2 oz)   SpO2 99%   BMI 32.46 kg/m   Estimated body mass index is 32.46 kg/m  as calculated from the following:    Height as of 3/26/24: 1.905 m (6' 3\").    Weight as of this encounter: 117.8 kg (259 lb 11.2 oz). Body surface area is 2.5 meters squared.  No Pain (0) Comment: Data Unavailable   No LMP for male patient.  Allergies reviewed: Yes  Medications reviewed: Yes    Medications: Medication refills not needed today.  Pharmacy name entered into [employer]:    [id number] - [address] [facility]  [id number] - [place], [place] - [address]  [id number] - [place], [place] - [address] [facility] - [facility], [place] - [address] [place] [address]    Frailty Screening:   Is the patient here for a new oncology consult visit in cancer care? 2. No      Clinical concerns: none.       Mitch Amezquita"

## 2024-04-25 LAB — IGG SERPL-MCNC: 855 MG/DL (ref 610–1616)

## 2024-05-17 ENCOUNTER — LAB (OUTPATIENT)
Dept: LAB | Facility: CLINIC | Age: 34
End: 2024-05-17
Attending: INTERNAL MEDICINE
Payer: COMMERCIAL

## 2024-05-17 DIAGNOSIS — D69.3 AUTOIMMUNE THROMBOCYTOPENIA (H): ICD-10-CM

## 2024-05-17 DIAGNOSIS — D59.10 AUTOIMMUNE HEMOLYTIC ANEMIA (H): ICD-10-CM

## 2024-05-17 LAB
ALBUMIN SERPL BCG-MCNC: 4.7 G/DL (ref 3.5–5.2)
ALP SERPL-CCNC: 64 U/L (ref 40–150)
ALT SERPL W P-5'-P-CCNC: 22 U/L (ref 0–70)
ANION GAP SERPL CALCULATED.3IONS-SCNC: 12 MMOL/L (ref 7–15)
AST SERPL W P-5'-P-CCNC: 32 U/L (ref 0–45)
BASOPHILS # BLD AUTO: 0.1 10E3/UL (ref 0–0.2)
BASOPHILS NFR BLD AUTO: 1 %
BILIRUB SERPL-MCNC: 1.1 MG/DL
BUN SERPL-MCNC: 16.8 MG/DL (ref 6–20)
CALCIUM SERPL-MCNC: 9.1 MG/DL (ref 8.6–10)
CHLORIDE SERPL-SCNC: 101 MMOL/L (ref 98–107)
CREAT SERPL-MCNC: 1.09 MG/DL (ref 0.67–1.17)
DEPRECATED HCO3 PLAS-SCNC: 27 MMOL/L (ref 22–29)
EGFRCR SERPLBLD CKD-EPI 2021: >90 ML/MIN/1.73M2
EOSINOPHIL # BLD AUTO: 0.1 10E3/UL (ref 0–0.7)
EOSINOPHIL NFR BLD AUTO: 2 %
ERYTHROCYTE [DISTWIDTH] IN BLOOD BY AUTOMATED COUNT: 12 % (ref 10–15)
GLUCOSE SERPL-MCNC: 120 MG/DL (ref 70–99)
HCT VFR BLD AUTO: 48.6 % (ref 40–53)
HGB BLD-MCNC: 17.6 G/DL (ref 13.3–17.7)
IMM GRANULOCYTES # BLD: 0 10E3/UL
IMM GRANULOCYTES NFR BLD: 1 %
LYMPHOCYTES # BLD AUTO: 1.8 10E3/UL (ref 0.8–5.3)
LYMPHOCYTES NFR BLD AUTO: 32 %
MCH RBC QN AUTO: 29.1 PG (ref 26.5–33)
MCHC RBC AUTO-ENTMCNC: 36.2 G/DL (ref 31.5–36.5)
MCV RBC AUTO: 80 FL (ref 78–100)
MONOCYTES # BLD AUTO: 0.7 10E3/UL (ref 0–1.3)
MONOCYTES NFR BLD AUTO: 13 %
NEUTROPHILS # BLD AUTO: 3 10E3/UL (ref 1.6–8.3)
NEUTROPHILS NFR BLD AUTO: 51 %
NRBC # BLD AUTO: 0 10E3/UL
NRBC BLD AUTO-RTO: 0 /100
PLATELET # BLD AUTO: 52 10E3/UL (ref 150–450)
POTASSIUM SERPL-SCNC: 3.9 MMOL/L (ref 3.4–5.3)
PROT SERPL-MCNC: 7.1 G/DL (ref 6.4–8.3)
RBC # BLD AUTO: 6.05 10E6/UL (ref 4.4–5.9)
RETICS # AUTO: 0.05 10E6/UL (ref 0.03–0.1)
RETICS/RBC NFR AUTO: 0.8 % (ref 0.5–2)
SODIUM SERPL-SCNC: 140 MMOL/L (ref 135–145)
WBC # BLD AUTO: 5.8 10E3/UL (ref 4–11)

## 2024-05-17 PROCEDURE — 85045 AUTOMATED RETICULOCYTE COUNT: CPT

## 2024-05-17 PROCEDURE — 80053 COMPREHEN METABOLIC PANEL: CPT

## 2024-05-17 PROCEDURE — 85048 AUTOMATED LEUKOCYTE COUNT: CPT

## 2024-05-17 PROCEDURE — 36415 COLL VENOUS BLD VENIPUNCTURE: CPT

## 2024-05-24 ENCOUNTER — LAB (OUTPATIENT)
Dept: LAB | Facility: CLINIC | Age: 34
End: 2024-05-24
Payer: COMMERCIAL

## 2024-05-24 DIAGNOSIS — D59.10 AUTOIMMUNE HEMOLYTIC ANEMIA (H): ICD-10-CM

## 2024-05-24 DIAGNOSIS — D69.3 AUTOIMMUNE THROMBOCYTOPENIA (H): ICD-10-CM

## 2024-05-24 LAB
ALBUMIN SERPL BCG-MCNC: 4.7 G/DL (ref 3.5–5.2)
ALP SERPL-CCNC: 64 U/L (ref 40–150)
ALT SERPL W P-5'-P-CCNC: 22 U/L (ref 0–70)
ANION GAP SERPL CALCULATED.3IONS-SCNC: 11 MMOL/L (ref 7–15)
AST SERPL W P-5'-P-CCNC: 30 U/L (ref 0–45)
BASOPHILS # BLD AUTO: 0.1 10E3/UL (ref 0–0.2)
BASOPHILS NFR BLD AUTO: 1 %
BILIRUB SERPL-MCNC: 1.1 MG/DL
BUN SERPL-MCNC: 10.9 MG/DL (ref 6–20)
CALCIUM SERPL-MCNC: 9.4 MG/DL (ref 8.6–10)
CHLORIDE SERPL-SCNC: 100 MMOL/L (ref 98–107)
CREAT SERPL-MCNC: 1.16 MG/DL (ref 0.67–1.17)
DEPRECATED HCO3 PLAS-SCNC: 28 MMOL/L (ref 22–29)
EGFRCR SERPLBLD CKD-EPI 2021: 84 ML/MIN/1.73M2
EOSINOPHIL # BLD AUTO: 0.2 10E3/UL (ref 0–0.7)
EOSINOPHIL NFR BLD AUTO: 3 %
ERYTHROCYTE [DISTWIDTH] IN BLOOD BY AUTOMATED COUNT: 12 % (ref 10–15)
GLUCOSE SERPL-MCNC: 113 MG/DL (ref 70–99)
HCT VFR BLD AUTO: 48.6 % (ref 40–53)
HGB BLD-MCNC: 17.8 G/DL (ref 13.3–17.7)
IMM GRANULOCYTES # BLD: 0 10E3/UL
IMM GRANULOCYTES NFR BLD: 0 %
LYMPHOCYTES # BLD AUTO: 1.8 10E3/UL (ref 0.8–5.3)
LYMPHOCYTES NFR BLD AUTO: 31 %
MCH RBC QN AUTO: 29.8 PG (ref 26.5–33)
MCHC RBC AUTO-ENTMCNC: 36.6 G/DL (ref 31.5–36.5)
MCV RBC AUTO: 81 FL (ref 78–100)
MONOCYTES # BLD AUTO: 0.6 10E3/UL (ref 0–1.3)
MONOCYTES NFR BLD AUTO: 11 %
NEUTROPHILS # BLD AUTO: 3.2 10E3/UL (ref 1.6–8.3)
NEUTROPHILS NFR BLD AUTO: 54 %
NRBC # BLD AUTO: 0 10E3/UL
NRBC BLD AUTO-RTO: 0 /100
PLATELET # BLD AUTO: 74 10E3/UL (ref 150–450)
POTASSIUM SERPL-SCNC: 4.2 MMOL/L (ref 3.4–5.3)
PROT SERPL-MCNC: 6.9 G/DL (ref 6.4–8.3)
RBC # BLD AUTO: 5.98 10E6/UL (ref 4.4–5.9)
RETICS # AUTO: 0.05 10E6/UL (ref 0.03–0.1)
RETICS/RBC NFR AUTO: 0.8 % (ref 0.5–2)
SODIUM SERPL-SCNC: 139 MMOL/L (ref 135–145)
WBC # BLD AUTO: 5.9 10E3/UL (ref 4–11)

## 2024-05-24 PROCEDURE — 82040 ASSAY OF SERUM ALBUMIN: CPT

## 2024-05-24 PROCEDURE — 36415 COLL VENOUS BLD VENIPUNCTURE: CPT

## 2024-05-24 PROCEDURE — 85025 COMPLETE CBC W/AUTO DIFF WBC: CPT

## 2024-05-24 PROCEDURE — 85045 AUTOMATED RETICULOCYTE COUNT: CPT

## 2024-06-07 ENCOUNTER — PATIENT OUTREACH (OUTPATIENT)
Dept: ONCOLOGY | Facility: CLINIC | Age: 34
End: 2024-06-07

## 2024-06-07 ENCOUNTER — LAB (OUTPATIENT)
Dept: LAB | Facility: CLINIC | Age: 34
End: 2024-06-07
Attending: INTERNAL MEDICINE
Payer: COMMERCIAL

## 2024-06-07 DIAGNOSIS — D69.3 AUTOIMMUNE THROMBOCYTOPENIA (H): ICD-10-CM

## 2024-06-07 DIAGNOSIS — D59.10 AUTOIMMUNE HEMOLYTIC ANEMIA (H): ICD-10-CM

## 2024-06-07 LAB
ALBUMIN SERPL BCG-MCNC: 4.7 G/DL (ref 3.5–5.2)
ALP SERPL-CCNC: 61 U/L (ref 40–150)
ALT SERPL W P-5'-P-CCNC: 23 U/L (ref 0–70)
ANION GAP SERPL CALCULATED.3IONS-SCNC: 13 MMOL/L (ref 7–15)
AST SERPL W P-5'-P-CCNC: 32 U/L (ref 0–45)
BASOPHILS # BLD AUTO: 0.1 10E3/UL (ref 0–0.2)
BASOPHILS NFR BLD AUTO: 1 %
BILIRUB SERPL-MCNC: 1.6 MG/DL
BUN SERPL-MCNC: 14.4 MG/DL (ref 6–20)
CALCIUM SERPL-MCNC: 9 MG/DL (ref 8.6–10)
CHLORIDE SERPL-SCNC: 100 MMOL/L (ref 98–107)
CREAT SERPL-MCNC: 1.09 MG/DL (ref 0.67–1.17)
DEPRECATED HCO3 PLAS-SCNC: 25 MMOL/L (ref 22–29)
EGFRCR SERPLBLD CKD-EPI 2021: 90 ML/MIN/1.73M2
EOSINOPHIL # BLD AUTO: 0.2 10E3/UL (ref 0–0.7)
EOSINOPHIL NFR BLD AUTO: 3 %
ERYTHROCYTE [DISTWIDTH] IN BLOOD BY AUTOMATED COUNT: 12 % (ref 10–15)
GLUCOSE SERPL-MCNC: 128 MG/DL (ref 70–99)
HCT VFR BLD AUTO: 47.8 % (ref 40–53)
HGB BLD-MCNC: 17.6 G/DL (ref 13.3–17.7)
IMM GRANULOCYTES # BLD: 0 10E3/UL
IMM GRANULOCYTES NFR BLD: 0 %
LYMPHOCYTES # BLD AUTO: 1.8 10E3/UL (ref 0.8–5.3)
LYMPHOCYTES NFR BLD AUTO: 29 %
MCH RBC QN AUTO: 29.6 PG (ref 26.5–33)
MCHC RBC AUTO-ENTMCNC: 36.8 G/DL (ref 31.5–36.5)
MCV RBC AUTO: 81 FL (ref 78–100)
MONOCYTES # BLD AUTO: 0.7 10E3/UL (ref 0–1.3)
MONOCYTES NFR BLD AUTO: 12 %
NEUTROPHILS # BLD AUTO: 3.4 10E3/UL (ref 1.6–8.3)
NEUTROPHILS NFR BLD AUTO: 55 %
NRBC # BLD AUTO: 0 10E3/UL
NRBC BLD AUTO-RTO: 0 /100
PLATELET # BLD AUTO: 65 10E3/UL (ref 150–450)
POTASSIUM SERPL-SCNC: 3.8 MMOL/L (ref 3.4–5.3)
PROT SERPL-MCNC: 7 G/DL (ref 6.4–8.3)
RBC # BLD AUTO: 5.94 10E6/UL (ref 4.4–5.9)
RETICS # AUTO: 0.04 10E6/UL (ref 0.03–0.1)
RETICS/RBC NFR AUTO: 0.7 % (ref 0.5–2)
SODIUM SERPL-SCNC: 138 MMOL/L (ref 135–145)
WBC # BLD AUTO: 6.3 10E3/UL (ref 4–11)

## 2024-06-07 PROCEDURE — 82040 ASSAY OF SERUM ALBUMIN: CPT

## 2024-06-07 PROCEDURE — 85025 COMPLETE CBC W/AUTO DIFF WBC: CPT

## 2024-06-07 PROCEDURE — 85045 AUTOMATED RETICULOCYTE COUNT: CPT

## 2024-06-07 PROCEDURE — 36415 COLL VENOUS BLD VENIPUNCTURE: CPT

## 2024-06-07 NOTE — PROGRESS NOTES
[facility]: Cancer Care                                                                                          Lab Results   Component Value Date    WBC 6.3 06/07/2024     Lab Results   Component Value Date    RBC 5.94 06/07/2024     Lab Results   Component Value Date    HGB 17.6 06/07/2024     Lab Results   Component Value Date    HCT 47.8 06/07/2024     Lab Results   Component Value Date    PLT 65 06/07/2024     Per message from Dr. Alli Joy, pt's plts are steady so he can recheck labs in 3 weeks.    Called and updated pt on lab results, specifically plts stable at 65 and Hgb stable.  Told him, per Dr. Joy to recheck labs in 3 wks, ~6/28.  Pt stated that will work for him.    Reminded him to call if he has any s/s of new or worsening bleeding or bruising.  Pt indicated he understood.    Signature:  Carin Kohli RN, OCN

## 2024-06-28 ENCOUNTER — PATIENT OUTREACH (OUTPATIENT)
Dept: ONCOLOGY | Facility: CLINIC | Age: 34
End: 2024-06-28

## 2024-06-28 ENCOUNTER — LAB (OUTPATIENT)
Dept: LAB | Facility: CLINIC | Age: 34
End: 2024-06-28
Attending: INTERNAL MEDICINE
Payer: COMMERCIAL

## 2024-06-28 DIAGNOSIS — D69.3 AUTOIMMUNE THROMBOCYTOPENIA (H): ICD-10-CM

## 2024-06-28 DIAGNOSIS — D59.10 AUTOIMMUNE HEMOLYTIC ANEMIA (H): ICD-10-CM

## 2024-06-28 LAB
ALBUMIN SERPL BCG-MCNC: 4.7 G/DL (ref 3.5–5.2)
ALP SERPL-CCNC: 59 U/L (ref 40–150)
ALT SERPL W P-5'-P-CCNC: 20 U/L (ref 0–70)
ANION GAP SERPL CALCULATED.3IONS-SCNC: 15 MMOL/L (ref 7–15)
AST SERPL W P-5'-P-CCNC: 31 U/L (ref 0–45)
BASOPHILS # BLD AUTO: 0.1 10E3/UL (ref 0–0.2)
BASOPHILS NFR BLD AUTO: 1 %
BILIRUB SERPL-MCNC: 1.1 MG/DL
BUN SERPL-MCNC: 15.9 MG/DL (ref 6–20)
CALCIUM SERPL-MCNC: 9 MG/DL (ref 8.6–10)
CHLORIDE SERPL-SCNC: 100 MMOL/L (ref 98–107)
CREAT SERPL-MCNC: 1.13 MG/DL (ref 0.67–1.17)
DEPRECATED HCO3 PLAS-SCNC: 23 MMOL/L (ref 22–29)
EGFRCR SERPLBLD CKD-EPI 2021: 86 ML/MIN/1.73M2
EOSINOPHIL # BLD AUTO: 0.1 10E3/UL (ref 0–0.7)
EOSINOPHIL NFR BLD AUTO: 2 %
ERYTHROCYTE [DISTWIDTH] IN BLOOD BY AUTOMATED COUNT: 11.9 % (ref 10–15)
GLUCOSE SERPL-MCNC: 105 MG/DL (ref 70–99)
HCT VFR BLD AUTO: 47.2 % (ref 40–53)
HGB BLD-MCNC: 17 G/DL (ref 13.3–17.7)
IMM GRANULOCYTES # BLD: 0 10E3/UL
IMM GRANULOCYTES NFR BLD: 0 %
LYMPHOCYTES # BLD AUTO: 1.6 10E3/UL (ref 0.8–5.3)
LYMPHOCYTES NFR BLD AUTO: 27 %
MCH RBC QN AUTO: 28.7 PG (ref 26.5–33)
MCHC RBC AUTO-ENTMCNC: 36 G/DL (ref 31.5–36.5)
MCV RBC AUTO: 80 FL (ref 78–100)
MONOCYTES # BLD AUTO: 0.7 10E3/UL (ref 0–1.3)
MONOCYTES NFR BLD AUTO: 12 %
NEUTROPHILS # BLD AUTO: 3.4 10E3/UL (ref 1.6–8.3)
NEUTROPHILS NFR BLD AUTO: 58 %
NRBC # BLD AUTO: 0 10E3/UL
NRBC BLD AUTO-RTO: 0 /100
PLATELET # BLD AUTO: 65 10E3/UL (ref 150–450)
POTASSIUM SERPL-SCNC: 3.9 MMOL/L (ref 3.4–5.3)
PROT SERPL-MCNC: 6.9 G/DL (ref 6.4–8.3)
RBC # BLD AUTO: 5.93 10E6/UL (ref 4.4–5.9)
RETICS # AUTO: 0.05 10E6/UL (ref 0.03–0.1)
RETICS/RBC NFR AUTO: 0.8 % (ref 0.5–2)
SODIUM SERPL-SCNC: 138 MMOL/L (ref 135–145)
WBC # BLD AUTO: 5.8 10E3/UL (ref 4–11)

## 2024-06-28 PROCEDURE — 85045 AUTOMATED RETICULOCYTE COUNT: CPT

## 2024-06-28 PROCEDURE — 85025 COMPLETE CBC W/AUTO DIFF WBC: CPT

## 2024-06-28 PROCEDURE — 36415 COLL VENOUS BLD VENIPUNCTURE: CPT

## 2024-06-28 PROCEDURE — 80053 COMPREHEN METABOLIC PANEL: CPT

## 2024-06-28 NOTE — PROGRESS NOTES
[facility]: Cancer Care                                                                                          6/28 lab results include:     WBC 5.8 with ANC 3.4  Hgb 17.0  Plts stable at 65  Absolute retic 0.049  CMP results all within reference range with exception of gluc = 105 which is slightly elevated.  However, we expect pt's that have to be fasting for their lab draw.    Per Dr. Alli Joy, plts stable.  Pt to repeat counts in one month, ~7/26.    Called and discussed lab results and plan for labs in about 1 month, ~7/26.  Pt stated he understood and appreciated call.    Signature:  Carin Kohli RN, OCN

## 2024-07-26 ENCOUNTER — LAB (OUTPATIENT)
Dept: LAB | Facility: CLINIC | Age: 34
End: 2024-07-26
Payer: COMMERCIAL

## 2024-07-26 DIAGNOSIS — D59.10 AUTOIMMUNE HEMOLYTIC ANEMIA (H): ICD-10-CM

## 2024-07-26 DIAGNOSIS — D69.3 AUTOIMMUNE THROMBOCYTOPENIA (H): ICD-10-CM

## 2024-07-26 LAB
ALBUMIN SERPL BCG-MCNC: 4.6 G/DL (ref 3.5–5.2)
ALP SERPL-CCNC: 57 U/L (ref 40–150)
ALT SERPL W P-5'-P-CCNC: 18 U/L (ref 0–70)
ANION GAP SERPL CALCULATED.3IONS-SCNC: 11 MMOL/L (ref 7–15)
AST SERPL W P-5'-P-CCNC: 23 U/L (ref 0–45)
BASOPHILS # BLD AUTO: 0.1 10E3/UL (ref 0–0.2)
BASOPHILS NFR BLD AUTO: 1 %
BILIRUB SERPL-MCNC: 1 MG/DL
BUN SERPL-MCNC: 14.1 MG/DL (ref 6–20)
CALCIUM SERPL-MCNC: 9.1 MG/DL (ref 8.8–10.4)
CHLORIDE SERPL-SCNC: 98 MMOL/L (ref 98–107)
CREAT SERPL-MCNC: 1.19 MG/DL (ref 0.67–1.17)
EGFRCR SERPLBLD CKD-EPI 2021: 81 ML/MIN/1.73M2
EOSINOPHIL # BLD AUTO: 0.2 10E3/UL (ref 0–0.7)
EOSINOPHIL NFR BLD AUTO: 3 %
ERYTHROCYTE [DISTWIDTH] IN BLOOD BY AUTOMATED COUNT: 12.1 % (ref 10–15)
GLUCOSE SERPL-MCNC: 119 MG/DL (ref 70–99)
HCO3 SERPL-SCNC: 25 MMOL/L (ref 22–29)
HCT VFR BLD AUTO: 46.6 % (ref 40–53)
HGB BLD-MCNC: 16.8 G/DL (ref 13.3–17.7)
IMM GRANULOCYTES # BLD: 0 10E3/UL
IMM GRANULOCYTES NFR BLD: 1 %
LYMPHOCYTES # BLD AUTO: 1.8 10E3/UL (ref 0.8–5.3)
LYMPHOCYTES NFR BLD AUTO: 29 %
MCH RBC QN AUTO: 28.9 PG (ref 26.5–33)
MCHC RBC AUTO-ENTMCNC: 36.1 G/DL (ref 31.5–36.5)
MCV RBC AUTO: 80 FL (ref 78–100)
MONOCYTES # BLD AUTO: 0.7 10E3/UL (ref 0–1.3)
MONOCYTES NFR BLD AUTO: 11 %
NEUTROPHILS # BLD AUTO: 3.5 10E3/UL (ref 1.6–8.3)
NEUTROPHILS NFR BLD AUTO: 57 %
NRBC # BLD AUTO: 0 10E3/UL
NRBC BLD AUTO-RTO: 0 /100
PLATELET # BLD AUTO: 92 10E3/UL (ref 150–450)
POTASSIUM SERPL-SCNC: 3.9 MMOL/L (ref 3.4–5.3)
PROT SERPL-MCNC: 6.7 G/DL (ref 6.4–8.3)
RBC # BLD AUTO: 5.81 10E6/UL (ref 4.4–5.9)
RETICS # AUTO: 0.04 10E6/UL (ref 0.03–0.1)
RETICS/RBC NFR AUTO: 0.6 % (ref 0.5–2)
SODIUM SERPL-SCNC: 134 MMOL/L (ref 135–145)
WBC # BLD AUTO: 6.2 10E3/UL (ref 4–11)

## 2024-07-26 PROCEDURE — 80053 COMPREHEN METABOLIC PANEL: CPT

## 2024-07-26 PROCEDURE — 85045 AUTOMATED RETICULOCYTE COUNT: CPT

## 2024-07-26 PROCEDURE — 85025 COMPLETE CBC W/AUTO DIFF WBC: CPT

## 2024-07-26 PROCEDURE — 36415 COLL VENOUS BLD VENIPUNCTURE: CPT

## 2024-08-05 ENCOUNTER — PATIENT OUTREACH (OUTPATIENT)
Dept: ONCOLOGY | Facility: CLINIC | Age: 34
End: 2024-08-05
Payer: COMMERCIAL

## 2024-08-05 NOTE — PROGRESS NOTES
[facility]: Cancer Care                                                                                        Spoke with pt this afternoon.  Apologized for not calling him earlier regarding his lab results from  7/26.  Discussed lab results included:  WBC 6.2 with ANC 3.5   Hb 16.8   Plts 92, up from 65 the previous month   Absolute retic 0.036   Na+ slightly decreased at 134   Creat up a bit at 1.19     Discussed need to be sure drinking plenty (at least 64 oz) of non caffeine, non alcoholic beverages; more on really hot or humid days.    Told him, per Dr. Alli Joy, no need for labs later this week.  Can just recheck at his return visit on 8/21/24.  Pt agreed with plan.  RN sent message to clinic coordinators to cance 8/9 lab appt (and pt aware).    Signature:  Carin Kohli, LEONA, OCN

## 2024-08-16 DIAGNOSIS — D69.3 AUTOIMMUNE THROMBOCYTOPENIA (H): Primary | ICD-10-CM

## 2024-08-16 NOTE — PROGRESS NOTES
Hematology/Oncology Consult Note    Billy Carey MRN# [id number]   Age: 34 year old YOB: 1990          Reason for Consult:   thrombocytopenia         Assessment and Plan:   Billy Carey is a 32 year old   ASSESSMENT:    1.  Immune thrombocytopenic purpura,.Hackett Syndrome  2.  Positive MIRA, IgG and C3d,Hackett Syndrome  3.  Type 1 diabetes.  4.  History of COVID.  5.  Status post lymph node biopsies and bone marrow biopsies  6.  Positive PAVAN 1:160 .   7. Skin rash  Billy is doing well.Platelets up to 112 .Rituxan may be kicking in. Since he had a drop in platelets due to a viral infections should be sure to get flu shot and COVID this fall.. Not sure about the polyuria Will advise UA/UC today    PLAN    Every 3 month CBC plat diff retic and CMP  Follow blood glucose   Call if fever or other infectious issues arise  RTC in 6 months     I spent a total of  30 minutes face to face with Billy Carey during today's office visit. Over 50% of this time was spent counseling the patient and coordinating the care regarding immunethrombocytopenia  and 10.  Minutes preparing to see the patient and  care coordination   The longitudinal plan of care for the diagnosis(es)/condition(s) as documented were addressed during this visit. Due to the added complexity in care, I will continue to support Billy in the subsequent management and with ongoing continuity of care.    Moose Joy MD  [phone]       History of Present Illness:   History obtained from chart review and confirmed with patient.    I had the pleasure of seeing Billy Carey in consultation for Dr. Hayde Lopez for evaluation of autoimmune thrombocytopenia and a history of a positive MIRA.  Billy has been a healthy young man.  Participated in track while at [facility] including shot put who developed COVID in 05/2022 with fatigue, joint aches and fever.  He did not receive any other medications.  He had been vaccinated.  He was  really fine until around Thanksgiving.  He developed a cough with fever and fatigue.  It was productive.  He did not receive an antibiotic.  He had no night sweats or fevers.  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  A repeat was 43,000.  He was seen by Dr. Hayde Lopez on 12/06.  At that time, his platelet count was 46,000, hemoglobin 17.6.  MIRA was positive +2, IgG, C3d.  Creatinine was slightly elevated.  A CT scan showed a 3 cm mass in the pericardial fat but he also had periaortic nodes, iliac nodes, axillary nodes.  His spleen was 15.5 cm.  A PET scan was done.  Showed increased uptake with SUV of 6.5 in the right axilla, 12.5 in the retroperitoneal nodes, and the left iliac showed an SUV of 11.  An attempt at an interventional radiology needle biopsy of the retroperitoneal was done but no tissue was noted.  He subsequently underwent a biopsy of a right axillary 3 x 2 cm mass.  The biopsy showed polytypic lymphocytes and no evidence of lymphoma, extensive immunocytochemistry could not identify any clonal abnormalities.  Flow cytometry and cytogenetics were also done in December.  He had a bone marrow biopsy which showed hypercellular marrow with adequate megakaryocytes and no evidence of lymphoma.  Interestingly, despite having a continuous MIRA positivity, his hemoglobin remained 15-16gm% with minimal evidence of hemolysis. Haptoglobin was low at  22  While retic count and LDH not elevated He was given prednisone to see if his platelet count would respond, 60 mg a day, at the end of January for 2 weeks.  Again, he had no significant bleeding, no petechiae, no weight loss, no fevers, no chills.  On 02/15, his platelet count had fallen to 18,000, had nose bleed  and he was admitted and received IVIgG for 2 days and dexamethasone 40 mg a day for 4 days.  His platelet count gisell to 196,000 and  he is referred here for further evaluation.  He has had an extensive evaluation for autoimmune disease.  His IgG level is 956, IgA 135 and IgM 44.  His haptoglobin was 118.  No further evidence of hemolysis.  No abnormality in kappa or lambda free light chains.  No monoclonal protein seen.  Cytogenetics have been normal.  Flow cytometry showed no immunophenotypic evidence of non-Hodgkin lymphoma on the lymph node biopsies.  He also had an extensive viral evaluation.  He was negative for COVID.  EBV DNA was negative.  Hep B surface antigen, hep B core antibody, hep B surface antibody were positive for vaccination.  Hepatitis C was negative.  HIV was negative.  Influenza was negative.  RSV was negative.  Lupus anticoagulant was absent.  PAVAN was positive at 1:160 in a nucleolar pattern.  Complement levels were drawn.  Today, he feels good with no bleeding.  He said the dexamethasone did raise his blood glucose levels, so he had to alter his insulin requirements.  INTERVAL HISTORY  Billy is completed 4 doses of rituximab last on April 9, 2024.  He is feeling well he denies any bleeding petechiae or melena or hematochezia.  Energy level is good.  He continues with the similar doses of insulin for his diabetes.  A couple of weeks ago he note a few days of polyuria and frequency increased thirst No dysuria no hematuria. No changes in meds.       Review of Systems:   A comprehensive ROS was performed with the patient and was found to be negative with the exception of that noted in the HPI above.  EYES  Some DM retinopathy  RESP Has cough since end of November with cold sx Continue to have a cough No asthma  COR  No chest pain no palpitations  GI  No GI bleeding No Reflux  ENDO Not thryoid issues Has insulin pump for DM   No UTIs  NEURO no seizure or HA  ID Had COVID in May 2022 with fatiuge joint aches, fever  PSYCH Mood ok         Past Medical History:     Past Medical History:   Diagnosis Date    Diabetes mellitus type  1, uncontrolled, insulin dependent 9/16/2013            Past Surgical History:     Past Surgical History:   Procedure Laterality Date    EXCISE MASS GROIN Left 1/20/2023    Procedure: Lymph Node excisional biopsy;  Surgeon: New Burger MD;  Location: [facility] OR    MYRINGOTOMY, INSERT TUBE BILATERAL, COMBINED Bilateral     As a child            Social History:     Social History     Socioeconomic History    Marital status:      Spouse name: Jayla    Number of children: 3    Years of education: Not on file    Highest education level: Not on file   Occupational History    Occupation:      Comment: [employer]   Tobacco Use    Smoking status: Never     Passive exposure: Never    Smokeless tobacco: Never   Substance and Sexual Activity    Alcohol use: No     Comment: rarely    Drug use: No    Sexual activity: Yes     Partners: Female   Other Topics Concern    Parent/sibling w/ CABG, MI or angioplasty before 65F 55M? Not Asked   Social History Narrative    Not on file     Social Determinants of Health     Financial Resource Strain: Low Risk  (1/12/2023)    Overall Financial Resource Strain (CARDIA)     Difficulty of Paying Living Expenses: Not hard at all   Food Insecurity: No Food Insecurity (1/12/2023)    Hunger Vital Sign     Worried About Running Out of Food in the Last Year: Never true     Ran Out of Food in the Last Year: Never true   Transportation Needs: No Transportation Needs (1/12/2023)    PRAPARE - Transportation     Lack of Transportation (Medical): No     Lack of Transportation (Non-Medical): No   Physical Activity: Sufficiently Active (1/12/2023)    Exercise Vital Sign     Days of Exercise per Week: 6 days     Minutes of Exercise per Session: 40 min   Stress: No Stress Concern Present (1/12/2023)    [employer] [place] [employer] - Occupational Stress Questionnaire     Feeling of Stress : Only a little   Social Connections: Socially Integrated (1/12/2023)    Social  Connection and Isolation Panel [NHANES]     Frequency of Communication with Friends and Family: More than three times a week     Frequency of Social Gatherings with Friends and Family: More than three times a week     Attends Caoda[employer] Services: 1 to 4 times per year     Active Member of Clubs or Organizations: Yes     Attends Club or Organization Meetings: Not on file     Marital Status:    Interpersonal Safety: Not At Risk (3/31/2023)    Humiliation, Afraid, Rape, and Kick questionnaire     Fear of Current or Ex-Partner: No     Emotionally Abused: No     Physically Abused: No     Sexually Abused: No   Housing Stability: Low Risk  (1/12/2023)    Housing Stability Vital Sign     Unable to Pay for Housing in the Last Year: No     Number of Places Lived in the Last Year: 1     Unstable Housing in the Last Year: No            Family History:     Family History   Problem Relation Age of Onset    C.A.D. No family hx of     Diabetes No family hx of     Hypertension No family hx of     Cancer No family hx of         no skin cancer    Amblyopia No family hx of     Retinal detachment No family hx of     Thyroid Disease No family hx of     Glaucoma No family hx of     Macular Degeneration No family hx of             Allergies:     Allergies   Allergen Reactions    Penicillins Rash            Medications:   (Not in a hospital admission)           Physical Exam:     BP (!) 158/75 (BP Location: Right arm, Patient Position: Sitting, Cuff Size: Adult Large)   Pulse 72   Temp 99  F (37.2  C) (Oral)   Resp 16   Wt 110.7 kg (244 lb)   SpO2 99%   BMI 30.50 kg/m      Repeat /82    General: Appears well, sitting in bed, in no acute distress.  Heme/Lymph: No overt bleeding. No cervical, axillary, or supraclavicular adenopathy.Scar in left groin  Skin: Rash on thigh just above knee bilaterally and the lesions on lower leg have faded less browinsh red,flat macules over shins  Maculopapular rash over right lower leg. Some  rash on left lower leg no rash  on chest back or arms See pix below  Respiratory: Non-labored breathing, good air exchange, lungs clear to auscultation bilaterally.  Cardiovascular: Regular rate and rhythm. No murmur or rub.   Gastrointestinal: Normoactive bowel sounds. Abdomen soft, non-distended, and non-tender. No palpable masses or organomegaly.Could not feel spleen  Extremities: No extremity edema.    Neurologic: A&O x 3, speech normal, sensation to light touch grossly WNL.  Lymph No  Cervical nodes I cannot feel axillary nodes Scar in left inguinal area with fullness         Data:   I have personally reviewed the following labs/imaging:  CBC@LABRCNTIPR(wbc:4,rbc:4,hgb:4,hct:4,mcv:4,mch:4,mchc:4,rdw:4,plt:4)@  CMP@   Latest Reference Range & Units 08/21/24 14:22 08/21/24 14:57   Sodium 135 - 145 mmol/L 139    Potassium 3.4 - 5.3 mmol/L 3.9    Chloride 98 - 107 mmol/L 102    Carbon Dioxide (CO2) 22 - 29 mmol/L 24    Urea Nitrogen 6.0 - 20.0 mg/dL 12.8    Creatinine 0.67 - 1.17 mg/dL 1.05    GFR Estimate >60 mL/min/1.73m2 >90    Calcium 8.8 - 10.4 mg/dL 9.7    Anion Gap 7 - 15 mmol/L 13    Albumin 3.5 - 5.2 g/dL 4.7    Protein Total 6.4 - 8.3 g/dL 7.1    Alkaline Phosphatase 40 - 150 U/L 64    ALT 0 - 70 U/L 17    AST 0 - 45 U/L 27    Bilirubin Total <=1.2 mg/dL 1.7 (H)    CRP Inflammation <5.00 mg/L <3.00    Glucose 70 - 99 mg/dL 144 (H)    WBC 4.0 - 11.0 10e3/uL 6.1    Hemoglobin 13.3 - 17.7 g/dL 16.2    Hematocrit 40.0 - 53.0 % 44.1    Platelet Count 150 - 450 10e3/uL 112 (L)    RBC Count 4.40 - 5.90 10e6/uL 5.52    MCV 78 - 100 fL 80    MCH 26.5 - 33.0 pg 29.3    MCHC 31.5 - 36.5 g/dL 36.7 (H)    RDW 10.0 - 15.0 % 11.9    % Neutrophils % 64    % Lymphocytes % 24    % Monocytes % 10    % Eosinophils % 1    % Basophils % 1    Absolute Basophils 0.0 - 0.2 10e3/uL 0.1    Absolute Eosinophils 0.0 - 0.7 10e3/uL 0.1    Absolute Immature Granulocytes <=0.4 10e3/uL 0.0    Absolute Lymphocytes 0.8 - 5.3 10e3/uL 1.5     Absolute Monocytes 0.0 - 1.3 10e3/uL 0.6    % Immature Granulocytes % 0    Absolute Neutrophils 1.6 - 8.3 10e3/uL 3.9    Absolute NRBCs 10e3/uL 0.0    NRBCs per 100 WBC <1 /100 0    % Retic 0.5 - 2.0 % 1.0    Absolute Retic 0.025 - 0.095 10e6/uL 0.054    Color Urine Colorless, Straw, Light Yellow, Yellow   Straw   Appearance Urine Clear   Clear   Glucose Urine Negative mg/dL  Negative   Bilirubin Urine Negative   Negative   Ketones Urine Negative mg/dL  Negative   Specific Gravity Urine 1.003 - 1.035   1.004   pH Urine 5.0 - 7.0   6.0   Protein Albumin Urine Negative mg/dL  Negative   Urobilinogen mg/dL Normal, 2.0 mg/dL  Normal   Nitrite Urine Negative   Negative   Blood Urine Negative   Negative   Leukocyte Esterase Urine Negative   Negative   WBC Urine <=5 /HPF  <1   RBC Urine <=2 /HPF  <1   (H): Data is abnormally high  (L): Data is abnormally low

## 2024-08-21 ENCOUNTER — ONCOLOGY VISIT (OUTPATIENT)
Dept: TRANSPLANT | Facility: CLINIC | Age: 34
End: 2024-08-21
Attending: INTERNAL MEDICINE
Payer: COMMERCIAL

## 2024-08-21 ENCOUNTER — APPOINTMENT (OUTPATIENT)
Dept: LAB | Facility: CLINIC | Age: 34
End: 2024-08-21
Attending: INTERNAL MEDICINE
Payer: COMMERCIAL

## 2024-08-21 VITALS
DIASTOLIC BLOOD PRESSURE: 82 MMHG | BODY MASS INDEX: 30.5 KG/M2 | SYSTOLIC BLOOD PRESSURE: 135 MMHG | OXYGEN SATURATION: 99 % | HEART RATE: 72 BPM | TEMPERATURE: 99 F | WEIGHT: 244 LBS | RESPIRATION RATE: 16 BRPM

## 2024-08-21 DIAGNOSIS — D69.3 AUTOIMMUNE THROMBOCYTOPENIA (H): ICD-10-CM

## 2024-08-21 DIAGNOSIS — E10.9 TYPE 1 DIABETES MELLITUS WITHOUT COMPLICATION (H): Primary | ICD-10-CM

## 2024-08-21 LAB
ALBUMIN SERPL BCG-MCNC: 4.7 G/DL (ref 3.5–5.2)
ALBUMIN UR-MCNC: NEGATIVE MG/DL
ALP SERPL-CCNC: 64 U/L (ref 40–150)
ALT SERPL W P-5'-P-CCNC: 17 U/L (ref 0–70)
ANION GAP SERPL CALCULATED.3IONS-SCNC: 13 MMOL/L (ref 7–15)
APPEARANCE UR: CLEAR
AST SERPL W P-5'-P-CCNC: 27 U/L (ref 0–45)
BASOPHILS # BLD AUTO: 0.1 10E3/UL (ref 0–0.2)
BASOPHILS NFR BLD AUTO: 1 %
BILIRUB SERPL-MCNC: 1.7 MG/DL
BILIRUB UR QL STRIP: NEGATIVE
BUN SERPL-MCNC: 12.8 MG/DL (ref 6–20)
CALCIUM SERPL-MCNC: 9.7 MG/DL (ref 8.8–10.4)
CHLORIDE SERPL-SCNC: 102 MMOL/L (ref 98–107)
COLOR UR AUTO: NORMAL
CREAT SERPL-MCNC: 1.05 MG/DL (ref 0.67–1.17)
CRP SERPL-MCNC: <3 MG/L
EGFRCR SERPLBLD CKD-EPI 2021: >90 ML/MIN/1.73M2
EOSINOPHIL # BLD AUTO: 0.1 10E3/UL (ref 0–0.7)
EOSINOPHIL NFR BLD AUTO: 1 %
ERYTHROCYTE [DISTWIDTH] IN BLOOD BY AUTOMATED COUNT: 11.9 % (ref 10–15)
GLUCOSE SERPL-MCNC: 144 MG/DL (ref 70–99)
GLUCOSE UR STRIP-MCNC: NEGATIVE MG/DL
HCO3 SERPL-SCNC: 24 MMOL/L (ref 22–29)
HCT VFR BLD AUTO: 44.1 % (ref 40–53)
HGB BLD-MCNC: 16.2 G/DL (ref 13.3–17.7)
HGB UR QL STRIP: NEGATIVE
IMM GRANULOCYTES # BLD: 0 10E3/UL
IMM GRANULOCYTES NFR BLD: 0 %
KETONES UR STRIP-MCNC: NEGATIVE MG/DL
LEUKOCYTE ESTERASE UR QL STRIP: NEGATIVE
LYMPHOCYTES # BLD AUTO: 1.5 10E3/UL (ref 0.8–5.3)
LYMPHOCYTES NFR BLD AUTO: 24 %
MCH RBC QN AUTO: 29.3 PG (ref 26.5–33)
MCHC RBC AUTO-ENTMCNC: 36.7 G/DL (ref 31.5–36.5)
MCV RBC AUTO: 80 FL (ref 78–100)
MONOCYTES # BLD AUTO: 0.6 10E3/UL (ref 0–1.3)
MONOCYTES NFR BLD AUTO: 10 %
NEUTROPHILS # BLD AUTO: 3.9 10E3/UL (ref 1.6–8.3)
NEUTROPHILS NFR BLD AUTO: 64 %
NITRATE UR QL: NEGATIVE
NRBC # BLD AUTO: 0 10E3/UL
NRBC BLD AUTO-RTO: 0 /100
PH UR STRIP: 6 [PH] (ref 5–7)
PLATELET # BLD AUTO: 112 10E3/UL (ref 150–450)
POTASSIUM SERPL-SCNC: 3.9 MMOL/L (ref 3.4–5.3)
PROT SERPL-MCNC: 7.1 G/DL (ref 6.4–8.3)
RBC # BLD AUTO: 5.52 10E6/UL (ref 4.4–5.9)
RBC URINE: <1 /HPF
RETICS # AUTO: 0.05 10E6/UL (ref 0.03–0.1)
RETICS/RBC NFR AUTO: 1 % (ref 0.5–2)
SODIUM SERPL-SCNC: 139 MMOL/L (ref 135–145)
SP GR UR STRIP: 1 (ref 1–1.03)
UROBILINOGEN UR STRIP-MCNC: NORMAL MG/DL
WBC # BLD AUTO: 6.1 10E3/UL (ref 4–11)
WBC URINE: <1 /HPF

## 2024-08-21 PROCEDURE — 36415 COLL VENOUS BLD VENIPUNCTURE: CPT | Performed by: INTERNAL MEDICINE

## 2024-08-21 PROCEDURE — 99214 OFFICE O/P EST MOD 30 MIN: CPT | Performed by: INTERNAL MEDICINE

## 2024-08-21 PROCEDURE — 82784 ASSAY IGA/IGD/IGG/IGM EACH: CPT | Performed by: INTERNAL MEDICINE

## 2024-08-21 PROCEDURE — 99213 OFFICE O/P EST LOW 20 MIN: CPT | Mod: 27 | Performed by: INTERNAL MEDICINE

## 2024-08-21 PROCEDURE — G0463 HOSPITAL OUTPT CLINIC VISIT: HCPCS

## 2024-08-21 PROCEDURE — G2211 COMPLEX E/M VISIT ADD ON: HCPCS | Performed by: INTERNAL MEDICINE

## 2024-08-21 PROCEDURE — 80053 COMPREHEN METABOLIC PANEL: CPT | Performed by: INTERNAL MEDICINE

## 2024-08-21 PROCEDURE — 81001 URINALYSIS AUTO W/SCOPE: CPT | Performed by: INTERNAL MEDICINE

## 2024-08-21 PROCEDURE — 85045 AUTOMATED RETICULOCYTE COUNT: CPT | Performed by: INTERNAL MEDICINE

## 2024-08-21 PROCEDURE — 85048 AUTOMATED LEUKOCYTE COUNT: CPT | Performed by: INTERNAL MEDICINE

## 2024-08-21 PROCEDURE — 86140 C-REACTIVE PROTEIN: CPT | Performed by: INTERNAL MEDICINE

## 2024-08-21 ASSESSMENT — PAIN SCALES - GENERAL: PAINLEVEL: NO PAIN (0)

## 2024-08-21 NOTE — NURSING NOTE
Chief Complaint   Patient presents with    Oncology Clinic Visit     Return; hx thrombocytopenia    Blood Draw     Labs drawn with  by rn.  VS taken.     Labs drawn with  by rn.  Pt tolerated well.  VS taken.  Pt checked in for next appt.    Chrissy Breaux RN

## 2024-08-21 NOTE — LETTER
8/21/2024      Billy Carey  [address] [facility] [address]      Dear Colleague,    Thank you for referring your patient, Billy Carey, to the [facility] [place]. Please see a copy of my visit note below.    Hematology/Oncology Consult Note    Billy Carey MRN# [id number]   Age: 34 year old YOB: 1990          Reason for Consult:   thrombocytopenia         Assessment and Plan:   Billy Carey is a 32 year old   ASSESSMENT:    1.  Immune thrombocytopenic purpura,.Hackett Syndrome  2.  Positive MIRA, IgG and C3d,Hackett Syndrome  3.  Type 1 diabetes.  4.  History of COVID.  5.  Status post lymph node biopsies and bone marrow biopsies  6.  Positive PAVAN 1:160 .   7. Skin rash  Billy is doing well.Platelets up to 112 .Rituxan may be kicking in. Since he had a drop in platelets due to a viral infections should be sure to get flu shot and COVID this fall.. Not sure about the polyuria Will advise UA/UC today    PLAN    Every 3 month CBC plat diff retic and CMP  Follow blood glucose   Call if fever or other infectious issues arise  RTC in 6 months     I spent a total of  30 minutes face to face with Billy Carey during today's office visit. Over 50% of this time was spent counseling the patient and coordinating the care regarding immunethrombocytopenia  and 10.  Minutes preparing to see the patient and  care coordination   The longitudinal plan of care for the diagnosis(es)/condition(s) as documented were addressed during this visit. Due to the added complexity in care, I will continue to support Billy in the subsequent management and with ongoing continuity of care.    Moose Joy MD  [phone]       History of Present Illness:   History obtained from chart review and confirmed with patient.    I had the pleasure of seeing Billy Carey in consultation for Dr. Hayde Lopez for evaluation of autoimmune thrombocytopenia and a history of a positive  CROW Cervantes has been a healthy young man.  Participated in track while at the [facility] including shot put who developed COVID in 05/2022 with fatigue, joint aches and fever.  He did not receive any other medications.  He had been vaccinated.  He was really fine until around Thanksgiving.  He developed a cough with fever and fatigue.  It was productive.  He did not receive an antibiotic.  He had no night sweats or fevers.  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  A repeat was 43,000.  He was seen by Dr. Hayed Lopez on 12/06.  At that time, his platelet count was 46,000, hemoglobin 17.6.  MIRA was positive +2, IgG, C3d.  Creatinine was slightly elevated.  A CT scan showed a 3 cm mass in the pericardial fat but he also had periaortic nodes, iliac nodes, axillary nodes.  His spleen was 15.5 cm.  A PET scan was done.  Showed increased uptake with SUV of 6.5 in the right axilla, 12.5 in the retroperitoneal nodes, and the left iliac showed an SUV of 11.  An attempt at an interventional radiology needle biopsy of the retroperitoneal was done but no tissue was noted.  He subsequently underwent a biopsy of a right axillary 3 x 2 cm mass.  The biopsy showed polytypic lymphocytes and no evidence of lymphoma, extensive immunocytochemistry could not identify any clonal abnormalities.  Flow cytometry and cytogenetics were also done in December.  He had a bone marrow biopsy which showed hypercellular marrow with adequate megakaryocytes and no evidence of lymphoma.  Interestingly, despite having a continuous MIRA positivity, his hemoglobin remained 15-16gm% with minimal evidence of hemolysis. Haptoglobin was low at  22  While retic count and LDH not elevated He was given prednisone to see if his platelet count would respond, 60 mg a day, at the end of January for 2 weeks.  Again, he had no  significant bleeding, no petechiae, no weight loss, no fevers, no chills.  On 02/15, his platelet count had fallen to 18,000, had nose bleed  and he was admitted and received IVIgG for 2 days and dexamethasone 40 mg a day for 4 days.  His platelet count gisell to 196,000 and he is referred here for further evaluation.  He has had an extensive evaluation for autoimmune disease.  His IgG level is 956, IgA 135 and IgM 44.  His haptoglobin was 118.  No further evidence of hemolysis.  No abnormality in kappa or lambda free light chains.  No monoclonal protein seen.  Cytogenetics have been normal.  Flow cytometry showed no immunophenotypic evidence of non-Hodgkin lymphoma on the lymph node biopsies.  He also had an extensive viral evaluation.  He was negative for COVID.  EBV DNA was negative.  Hep B surface antigen, hep B core antibody, hep B surface antibody were positive for vaccination.  Hepatitis C was negative.  HIV was negative.  Influenza was negative.  RSV was negative.  Lupus anticoagulant was absent.  PAVAN was positive at 1:160 in a nucleolar pattern.  Complement levels were drawn.  Today, he feels good with no bleeding.  He said the dexamethasone did raise his blood glucose levels, so he had to alter his insulin requirements.  INTERVAL HISTORY  Billy is completed 4 doses of rituximab last on April 9, 2024.  He is feeling well he denies any bleeding petechiae or melena or hematochezia.  Energy level is good.  He continues with the similar doses of insulin for his diabetes.  A couple of weeks ago he note a few days of polyuria and frequency increased thirst No dysuria no hematuria. No changes in meds.       Review of Systems:   A comprehensive ROS was performed with the patient and was found to be negative with the exception of that noted in the HPI above.  EYES  Some DM retinopathy  RESP Has cough since end of November with cold sx Continue to have a cough No asthma  COR  No chest pain no palpitations  GI  No GI  bleeding No Reflux  ENDO Not thryoid issues Has insulin pump for DM   No UTIs  NEURO no seizure or HA  ID Had COVID in May 2022 with fatiuge joint aches, fever  PSYCH Mood ok         Past Medical History:     Past Medical History:   Diagnosis Date     Diabetes mellitus type 1, uncontrolled, insulin dependent 9/16/2013            Past Surgical History:     Past Surgical History:   Procedure Laterality Date     EXCISE MASS GROIN Left 1/20/2023    Procedure: Lymph Node excisional biopsy;  Surgeon: New Burger MD;  Location: [facility] OR     MYRINGOTOMY, INSERT TUBE BILATERAL, COMBINED Bilateral     As a child            Social History:     Social History     Socioeconomic History     Marital status:      Spouse name: Jayla     Number of children: 3     Years of education: Not on file     Highest education level: Not on file   Occupational History     Occupation:      Comment: [employer]   Tobacco Use     Smoking status: Never     Passive exposure: Never     Smokeless tobacco: Never   Substance and Sexual Activity     Alcohol use: No     Comment: rarely     Drug use: No     Sexual activity: Yes     Partners: Female   Other Topics Concern     Parent/sibling w/ CABG, MI or angioplasty before 65F 55M? Not Asked   Social History Narrative     Not on file     Social Determinants of Health     Financial Resource Strain: Low Risk  (1/12/2023)    Overall Financial Resource Strain (CARDIA)      Difficulty of Paying Living Expenses: Not hard at all   Food Insecurity: No Food Insecurity (1/12/2023)    Hunger Vital Sign      Worried About Running Out of Food in the Last Year: Never true      Ran Out of Food in the Last Year: Never true   Transportation Needs: No Transportation Needs (1/12/2023)    PRAPARE - Transportation      Lack of Transportation (Medical): No      Lack of Transportation (Non-Medical): No   Physical Activity: Sufficiently Active (1/12/2023)    Exercise Vital Sign      Days of  Exercise per Week: 6 days      Minutes of Exercise per Session: 40 min   Stress: No Stress Concern Present (1/12/2023)    [employer] - Occupational Stress Questionnaire      Feeling of Stress : Only a little   Social Connections: Socially Integrated (1/12/2023)    Social Connection and Isolation Panel [NHANES]      Frequency of Communication with Friends and Family: More than three times a week      Frequency of Social Gatherings with Friends and Family: More than three times a week      Attends Hindu Services: 1 to 4 times per year      Active Member of Clubs or Organizations: Yes      Attends Club or Organization Meetings: Not on file      Marital Status:    Interpersonal Safety: Not At Risk (3/31/2023)    Humiliation, Afraid, Rape, and Kick questionnaire      Fear of Current or Ex-Partner: No      Emotionally Abused: No      Physically Abused: No      Sexually Abused: No   Housing Stability: Low Risk  (1/12/2023)    Housing Stability Vital Sign      Unable to Pay for Housing in the Last Year: No      Number of Places Lived in the Last Year: 1      Unstable Housing in the Last Year: No            Family History:     Family History   Problem Relation Age of Onset     C.A.D. No family hx of      Diabetes No family hx of      Hypertension No family hx of      Cancer No family hx of         no skin cancer     Amblyopia No family hx of      Retinal detachment No family hx of      Thyroid Disease No family hx of      Glaucoma No family hx of      Macular Degeneration No family hx of             Allergies:     Allergies   Allergen Reactions     Penicillins Rash            Medications:   (Not in a hospital admission)           Physical Exam:     BP (!) 158/75 (BP Location: Right arm, Patient Position: Sitting, Cuff Size: Adult Large)   Pulse 72   Temp 99  F (37.2  C) (Oral)   Resp 16   Wt 110.7 kg (244 lb)   SpO2 99%   BMI 30.50 kg/m      Repeat /82    General: Appears  well, sitting in bed, in no acute distress.  Heme/Lymph: No overt bleeding. No cervical, axillary, or supraclavicular adenopathy.Scar in left groin  Skin: Rash on thigh just above knee bilaterally and the lesions on lower leg have faded less browinsh red,flat macules over shins  Maculopapular rash over right lower leg. Some rash on left lower leg no rash  on chest back or arms See pix below  Respiratory: Non-labored breathing, good air exchange, lungs clear to auscultation bilaterally.  Cardiovascular: Regular rate and rhythm. No murmur or rub.   Gastrointestinal: Normoactive bowel sounds. Abdomen soft, non-distended, and non-tender. No palpable masses or organomegaly.Could not feel spleen  Extremities: No extremity edema.    Neurologic: A&O x 3, speech normal, sensation to light touch grossly WNL.  Lymph No  Cervical nodes I cannot feel axillary nodes Scar in left inguinal area with fullness         Data:   I have personally reviewed the following labs/imaging:  CBC@LABRCNTIPR(wbc:4,rbc:4,hgb:4,hct:4,mcv:4,mch:4,mchc:4,rdw:4,plt:4)@  CMP@   Latest Reference Range & Units 08/21/24 14:22 08/21/24 14:57   Sodium 135 - 145 mmol/L 139    Potassium 3.4 - 5.3 mmol/L 3.9    Chloride 98 - 107 mmol/L 102    Carbon Dioxide (CO2) 22 - 29 mmol/L 24    Urea Nitrogen 6.0 - 20.0 mg/dL 12.8    Creatinine 0.67 - 1.17 mg/dL 1.05    GFR Estimate >60 mL/min/1.73m2 >90    Calcium 8.8 - 10.4 mg/dL 9.7    Anion Gap 7 - 15 mmol/L 13    Albumin 3.5 - 5.2 g/dL 4.7    Protein Total 6.4 - 8.3 g/dL 7.1    Alkaline Phosphatase 40 - 150 U/L 64    ALT 0 - 70 U/L 17    AST 0 - 45 U/L 27    Bilirubin Total <=1.2 mg/dL 1.7 (H)    CRP Inflammation <5.00 mg/L <3.00    Glucose 70 - 99 mg/dL 144 (H)    WBC 4.0 - 11.0 10e3/uL 6.1    Hemoglobin 13.3 - 17.7 g/dL 16.2    Hematocrit 40.0 - 53.0 % 44.1    Platelet Count 150 - 450 10e3/uL 112 (L)    RBC Count 4.40 - 5.90 10e6/uL 5.52    MCV 78 - 100 fL 80    MCH 26.5 - 33.0 pg 29.3    MCHC 31.5 - 36.5 g/dL 36.7  (H)    RDW 10.0 - 15.0 % 11.9    % Neutrophils % 64    % Lymphocytes % 24    % Monocytes % 10    % Eosinophils % 1    % Basophils % 1    Absolute Basophils 0.0 - 0.2 10e3/uL 0.1    Absolute Eosinophils 0.0 - 0.7 10e3/uL 0.1    Absolute Immature Granulocytes <=0.4 10e3/uL 0.0    Absolute Lymphocytes 0.8 - 5.3 10e3/uL 1.5    Absolute Monocytes 0.0 - 1.3 10e3/uL 0.6    % Immature Granulocytes % 0    Absolute Neutrophils 1.6 - 8.3 10e3/uL 3.9    Absolute NRBCs 10e3/uL 0.0    NRBCs per 100 WBC <1 /100 0    % Retic 0.5 - 2.0 % 1.0    Absolute Retic 0.025 - 0.095 10e6/uL 0.054    Color Urine Colorless, Straw, Light Yellow, Yellow   Straw   Appearance Urine Clear   Clear   Glucose Urine Negative mg/dL  Negative   Bilirubin Urine Negative   Negative   Ketones Urine Negative mg/dL  Negative   Specific Gravity Urine 1.003 - 1.035   1.004   pH Urine 5.0 - 7.0   6.0   Protein Albumin Urine Negative mg/dL  Negative   Urobilinogen mg/dL Normal, 2.0 mg/dL  Normal   Nitrite Urine Negative   Negative   Blood Urine Negative   Negative   Leukocyte Esterase Urine Negative   Negative   WBC Urine <=5 /HPF  <1   RBC Urine <=2 /HPF  <1   (H): Data is abnormally high  (L): Data is abnormally low      Again, thank you for allowing me to participate in the care of your patient.        Sincerely,        Moose Joy MD

## 2024-08-21 NOTE — NURSING NOTE
"Oncology Rooming Note    August 21, 2024 2:27 PM   Billy Carey is a 34 year old male who presents for:    Chief Complaint   Patient presents with    Oncology Clinic Visit     Return; hx thrombocytopenia    Blood Draw     Labs drawn with  by rn.  VS taken.     Initial Vitals: BP (!) 158/75 (BP Location: Right arm, Patient Position: Sitting, Cuff Size: Adult Large)   Pulse 72   Temp 99  F (37.2  C) (Oral)   Resp 16   Wt 110.7 kg (244 lb)   SpO2 99%   BMI 30.50 kg/m   Estimated body mass index is 30.5 kg/m  as calculated from the following:    Height as of 3/26/24: 1.905 m (6' 3\").    Weight as of this encounter: 110.7 kg (244 lb). Body surface area is 2.42 meters squared.  No Pain (0) Comment: Data Unavailable   No LMP for male patient.  Allergies reviewed: Yes  Medications reviewed: Yes    Medications: Medication refills not needed today.  Pharmacy name entered into Prisync:    [id number] - [address] [facility]  [id number] [place], [place] - [address]  [id number] [place], [place] - [id number] - [facility], [place] - [address] [place] [address]    Frailty Screening:   Is the patient here for a new oncology consult visit in cancer care? 2. No      Clinical concerns: None.       Gaby Iglesias RN              "

## 2024-08-22 LAB — IGG SERPL-MCNC: 730 MG/DL (ref 610–1616)

## 2024-09-01 ENCOUNTER — HEALTH MAINTENANCE LETTER (OUTPATIENT)
Age: 34
End: 2024-09-01

## 2024-11-20 ENCOUNTER — LAB (OUTPATIENT)
Dept: LAB | Facility: CLINIC | Age: 34
End: 2024-11-20
Payer: COMMERCIAL

## 2024-11-20 DIAGNOSIS — D69.3 AUTOIMMUNE THROMBOCYTOPENIA (H): ICD-10-CM

## 2024-11-20 DIAGNOSIS — D59.10 AUTOIMMUNE HEMOLYTIC ANEMIA (H): ICD-10-CM

## 2024-11-20 LAB
ALBUMIN SERPL BCG-MCNC: 4.5 G/DL (ref 3.5–5.2)
ALP SERPL-CCNC: 88 U/L (ref 40–150)
ALT SERPL W P-5'-P-CCNC: 28 U/L (ref 0–70)
ANION GAP SERPL CALCULATED.3IONS-SCNC: 12 MMOL/L (ref 7–15)
AST SERPL W P-5'-P-CCNC: 37 U/L (ref 0–45)
BASOPHILS # BLD AUTO: 0.1 10E3/UL (ref 0–0.2)
BASOPHILS NFR BLD AUTO: 1 %
BILIRUB SERPL-MCNC: 0.8 MG/DL
BUN SERPL-MCNC: 15.5 MG/DL (ref 6–20)
CALCIUM SERPL-MCNC: 9 MG/DL (ref 8.8–10.4)
CHLORIDE SERPL-SCNC: 103 MMOL/L (ref 98–107)
CREAT SERPL-MCNC: 1.33 MG/DL (ref 0.67–1.17)
EGFRCR SERPLBLD CKD-EPI 2021: 71 ML/MIN/1.73M2
EOSINOPHIL # BLD AUTO: 0.2 10E3/UL (ref 0–0.7)
EOSINOPHIL NFR BLD AUTO: 3 %
ERYTHROCYTE [DISTWIDTH] IN BLOOD BY AUTOMATED COUNT: 12.1 % (ref 10–15)
GLUCOSE SERPL-MCNC: 84 MG/DL (ref 70–99)
HCO3 SERPL-SCNC: 26 MMOL/L (ref 22–29)
HCT VFR BLD AUTO: 47.2 % (ref 40–53)
HGB BLD-MCNC: 17 G/DL (ref 13.3–17.7)
IMM GRANULOCYTES # BLD: 0 10E3/UL
IMM GRANULOCYTES NFR BLD: 0 %
LYMPHOCYTES # BLD AUTO: 1.8 10E3/UL (ref 0.8–5.3)
LYMPHOCYTES NFR BLD AUTO: 23 %
MCH RBC QN AUTO: 29.6 PG (ref 26.5–33)
MCHC RBC AUTO-ENTMCNC: 36 G/DL (ref 31.5–36.5)
MCV RBC AUTO: 82 FL (ref 78–100)
MONOCYTES # BLD AUTO: 0.8 10E3/UL (ref 0–1.3)
MONOCYTES NFR BLD AUTO: 10 %
NEUTROPHILS # BLD AUTO: 4.8 10E3/UL (ref 1.6–8.3)
NEUTROPHILS NFR BLD AUTO: 63 %
NRBC # BLD AUTO: 0 10E3/UL
NRBC BLD AUTO-RTO: 0 /100
PLATELET # BLD AUTO: 184 10E3/UL (ref 150–450)
POTASSIUM SERPL-SCNC: 4.2 MMOL/L (ref 3.4–5.3)
PROT SERPL-MCNC: 6.6 G/DL (ref 6.4–8.3)
RBC # BLD AUTO: 5.75 10E6/UL (ref 4.4–5.9)
RETICS # AUTO: 0.07 10E6/UL (ref 0.03–0.1)
RETICS/RBC NFR AUTO: 1.2 % (ref 0.5–2)
SODIUM SERPL-SCNC: 141 MMOL/L (ref 135–145)
WBC # BLD AUTO: 7.7 10E3/UL (ref 4–11)

## 2024-11-20 PROCEDURE — 85004 AUTOMATED DIFF WBC COUNT: CPT

## 2024-11-20 PROCEDURE — 82247 BILIRUBIN TOTAL: CPT

## 2024-11-20 PROCEDURE — 84155 ASSAY OF PROTEIN SERUM: CPT

## 2024-11-20 PROCEDURE — 85045 AUTOMATED RETICULOCYTE COUNT: CPT

## 2024-11-20 PROCEDURE — 85041 AUTOMATED RBC COUNT: CPT

## 2024-11-20 PROCEDURE — 36415 COLL VENOUS BLD VENIPUNCTURE: CPT

## 2025-03-02 ENCOUNTER — HEALTH MAINTENANCE LETTER (OUTPATIENT)
Age: 35
End: 2025-03-02

## 2025-03-29 NOTE — PROGRESS NOTES
Hematology/Oncology Progresss Note    Billy Carey MRN# [id number]   Age: 34 year old YOB: 1990          Reason for Consult:   thrombocytopenia         Assessment and Plan:   Billy Carey is a 34year old   ASSESSMENT:    1.  Immune thrombocytopenic purpura,.Hackett Syndrome  2.  Positive MIRA, IgG and C3d,Hackett Syndrome  3.  Type 1 diabetes.  4.  History of COVID.  5.  Status post lymph node biopsies and bone marrow biopsies  6.  Positive PAVAN 1:160 .   7. Skin rash  Billy is doing well.Platelets up to 113 .Had a good wale Need to check IGG levels post rituxan    PLAN    Every 3 month CBC plat diff retic and CMP  Follow blood glucose   Call if fever or other infectious issues arise  RTC in 6 months       I spent  30 minutes on  reviewing records both in house and externally, including laboratory and pathology and radiology preparing for today's visit The longitudinal plan of care for the diagnosis(es)/condition(s) as documented were addressed during this visit. Due to the added complexity in care, I will continue to support Billy in the subsequent management and with ongoing continuity of care.    Moose Joy MD    History obtained from chart review and confirmed with patient.    I had the pleasure of seeing Billy Carey in consultation for Dr. Hayde Lopez for evaluation of autoimmune thrombocytopenia and a history of a positive MIRA.  Billy has been a healthy young man.  Participated in track while at the [facility] including shot put who developed COVID in 05/2022 with fatigue, joint aches and fever.  He did not receive any other medications.  He had been vaccinated.  He was really fine until around Thanksgiving.  He developed a cough with fever and fatigue.  It was productive.  He did not receive an antibiotic.  He had no night sweats or fevers.  He was having a routine physical examination and laboratory studies done for an international adoption agency when it was found that  his platelet count was 42,000.  A repeat was 43,000.  He was seen by Dr. Hayde Lopez on 12/06.  At that time, his platelet count was 46,000, hemoglobin 17.6.  MIRA was positive +2, IgG, C3d.  Creatinine was slightly elevated.  A CT scan showed a 3 cm mass in the pericardial fat but he also had periaortic nodes, iliac nodes, axillary nodes.  His spleen was 15.5 cm.  A PET scan was done.  Showed increased uptake with SUV of 6.5 in the right axilla, 12.5 in the retroperitoneal nodes, and the left iliac showed an SUV of 11.  An attempt at an interventional radiology needle biopsy of the retroperitoneal was done but no tissue was noted.  He subsequently underwent a biopsy of a right axillary 3 x 2 cm mass.  The biopsy showed polytypic lymphocytes and no evidence of lymphoma, extensive immunocytochemistry could not identify any clonal abnormalities.  Flow cytometry and cytogenetics were also done in December.  He had a bone marrow biopsy which showed hypercellular marrow with adequate megakaryocytes and no evidence of lymphoma.  Interestingly, despite having a continuous MIRA positivity, his hemoglobin remained 15-16gm% with minimal evidence of hemolysis. Haptoglobin was low at  22  While retic count and LDH not elevated He was given prednisone to see if his platelet count would respond, 60 mg a day, at the end of January for 2 weeks.  Again, he had no significant bleeding, no petechiae, no weight loss, no fevers, no chills.  On 02/15, his platelet count had fallen to 18,000, had nose bleed  and he was admitted and received IVIgG for 2 days and dexamethasone 40 mg a day for 4 days.  His platelet count gisell to 196,000 and he is referred here for further evaluation.  He has had an extensive evaluation for autoimmune disease.  His IgG level is 956, IgA 135 and IgM 44.  His haptoglobin was 118.  No further evidence of hemolysis.  No abnormality in kappa or lambda free light chains.  No monoclonal protein seen.  Cytogenetics  have been normal.  Flow cytometry showed no immunophenotypic evidence of non-Hodgkin lymphoma on the lymph node biopsies.  He also had an extensive viral evaluation.  He was negative for COVID.  EBV DNA was negative.  Hep B surface antigen, hep B core antibody, hep B surface antibody were positive for vaccination.  Hepatitis C was negative.  HIV was negative.  Influenza was negative.  RSV was negative.  Lupus anticoagulant was absent.  PAVAN was positive at 1:160 in a nucleolar pattern.  Complement levels were drawn.  Today, he feels good with no bleeding.  He said the dexamethasone did raise his blood glucose levels, so he had to alter his insulin requirements.  INTERVAL HISTORY  Billy is doing well. In November he had cough and sore throat sinus congestion No COVID. No petechiae or bleeding  No fatigue. DM well controlled A1C 6.0.Had good winter skiing in [place] and [place]  No fever or chills Completed rituxan in May 2024      A comprehensive ROS was performed with the patient and was found to be negative with the exception of that noted in the HPI above.  EYES  Some DM retinopathy  RESP Has cough since end of November with cold sx Continue to have a cough No asthma  COR  No chest pain no palpitations  GI  No GI bleeding No Reflux  ENDO Not thryoid issues Has insulin pump for DM   No UTIs  NEURO no seizure or HA  ID Had COVID in May 2022 with fatiuge joint aches, fever  PSYCH Mood ok         Past Medical History:     Past Medical History:   Diagnosis Date    Diabetes mellitus type 1, uncontrolled, insulin dependent 9/16/2013            Past Surgical History:     Past Surgical History:   Procedure Laterality Date    EXCISE MASS GROIN Left 1/20/2023    Procedure: Lymph Node excisional biopsy;  Surgeon: New Burger MD;  Location: [facility] OR    MYRINGOTOMY, INSERT TUBE BILATERAL, COMBINED Bilateral     As a child            Social History:     Social History     Socioeconomic History    Marital status:       Spouse name: Jayla    Number of children: 3    Years of education: Not on file    Highest education level: Not on file   Occupational History    Occupation:      Comment: [employer]   Tobacco Use    Smoking status: Never     Passive exposure: Never    Smokeless tobacco: Never   Substance and Sexual Activity    Alcohol use: No     Comment: rarely    Drug use: No    Sexual activity: Yes     Partners: Female   Other Topics Concern    Parent/sibling w/ CABG, MI or angioplasty before 65F 55M? Not Asked   Social History Narrative    Not on file     Social Drivers of Health     Financial Resource Strain: Low Risk  (1/12/2023)    Overall Financial Resource Strain (CARDIA)     Difficulty of Paying Living Expenses: Not hard at all   Food Insecurity: No Food Insecurity (1/12/2023)    Hunger Vital Sign     Worried About Running Out of Food in the Last Year: Never true     Ran Out of Food in the Last Year: Never true   Transportation Needs: No Transportation Needs (1/12/2023)    PRAPARE - Transportation     Lack of Transportation (Medical): No     Lack of Transportation (Non-Medical): No   Physical Activity: Sufficiently Active (1/12/2023)    Exercise Vital Sign     Days of Exercise per Week: 6 days     Minutes of Exercise per Session: 40 min   Stress: No Stress Concern Present (1/12/2023)    [employer] [place] [employer] - Occupational Stress Questionnaire     Feeling of Stress : Only a little   Social Connections: Socially Integrated (1/12/2023)    Social Connection and Isolation Panel [NHANES]     Frequency of Communication with Friends and Family: More than three times a week     Frequency of Social Gatherings with Friends and Family: More than three times a week     Attends [employer] Services: 1 to 4 times per year     Active Member of Clubs or Organizations: Yes     Attends Club or Organization Meetings: Not on file     Marital Status:    Interpersonal Safety: Not At Risk (3/31/2023)     Humiliation, Afraid, Rape, and Kick questionnaire     Fear of Current or Ex-Partner: No     Emotionally Abused: No     Physically Abused: No     Sexually Abused: No   Housing Stability: Low Risk  (1/12/2023)    Housing Stability Vital Sign     Unable to Pay for Housing in the Last Year: No     Number of Places Lived in the Last Year: 1     Unstable Housing in the Last Year: No            Family History:     Family History   Problem Relation Age of Onset    C.A.D. No family hx of     Diabetes No family hx of     Hypertension No family hx of     Cancer No family hx of         no skin cancer    Amblyopia No family hx of     Retinal detachment No family hx of     Thyroid Disease No family hx of     Glaucoma No family hx of     Macular Degeneration No family hx of             Allergies:     Allergies   Allergen Reactions    Penicillins Rash            Medications:   (Not in a hospital admission)           Physical Exam:     /78 (BP Location: Right arm, Patient Position: Sitting, Cuff Size: Adult Large)   Pulse 74   Resp 16   Wt 106.4 kg (234 lb 9.6 oz)   SpO2 100%   BMI 29.32 kg/m            General: Appears well, sitting in bed, in no acute distress.  Heme/Lymph: No overt bleeding. No cervical, axillary, or supraclavicular adenopathy.Scar in left groin  Skin: Rash on thigh just above knee bilaterally and the lesions on lower leg have faded less browinsh red,flat macules over shins  Maculopapular rash over right lower leg. Some rash on left lower leg no rash  on chest back or arms See pix below  Respiratory: Non-labored breathing, good air exchange, lungs clear to auscultation bilaterally.  Cardiovascular: Regular rate and rhythm. No murmur or rub.   Gastrointestinal: Normoactive bowel sounds. Abdomen soft, non-distended, and non-tender. No palpable masses or organomegaly.Could not feel spleen  Extremities: No extremity edema.    Neurologic: A&O x 3, speech normal, sensation to light touch grossly  WNL.  Lymph No  Cervical nodes I cannot feel axillary nodes Scar in left inguinal area with fullness         Data:   I have personally reviewed the following labs/imaging:  CBC@LABRCNTIPR(wbc:4,rbc:4,hgb:4,hct:4,mcv:4,mch:4,mchc:4,rdw:4,plt:4)@  CMP@   Latest Reference Range & Units 04/02/25 15:19   WBC 4.0 - 11.0 10e3/uL 6.5   Hemoglobin 13.3 - 17.7 g/dL 16.8   Hematocrit 40.0 - 53.0 % 46.6   Platelet Count 150 - 450 10e3/uL 113 (L)   RBC Count 4.40 - 5.90 10e6/uL 5.90   MCV 78 - 100 fL 79   MCH 26.5 - 33.0 pg 28.5   MCHC 31.5 - 36.5 g/dL 36.1   RDW 10.0 - 15.0 % 12.2   % Neutrophils % 72   % Lymphocytes % 16   % Monocytes % 10   % Eosinophils % 1   % Basophils % 1   Absolute Basophils 0.0 - 0.2 10e3/uL 0.1   Absolute Eosinophils 0.0 - 0.7 10e3/uL 0.0   Absolute Immature Granulocytes <=0.4 10e3/uL 0.0   Absolute Lymphocytes 0.8 - 5.3 10e3/uL 1.1   Absolute Monocytes 0.0 - 1.3 10e3/uL 0.7   % Immature Granulocytes % 0   Absolute Neutrophils 1.6 - 8.3 10e3/uL 4.7   Absolute NRBCs 10e3/uL 0.0   NRBCs per 100 WBC <1 /100 0   % Retic 0.5 - 2.0 % 0.8   Absolute Retic 0.025 - 0.095 10e6/uL 0.048   (L): Data is abnormally low   Latest Reference Range & Units 04/02/25 15:19   Sodium 135 - 145 mmol/L 134 (L)   Potassium 3.4 - 5.3 mmol/L 4.2   Chloride 98 - 107 mmol/L 96 (L)   Carbon Dioxide (CO2) 22 - 29 mmol/L 23   Urea Nitrogen 6.0 - 20.0 mg/dL 12.6   Creatinine 0.67 - 1.17 mg/dL 1.12   GFR Estimate >60 mL/min/1.73m2 87   Calcium 8.8 - 10.4 mg/dL 9.7   Anion Gap 7 - 15 mmol/L 15   Albumin 3.5 - 5.2 g/dL 4.8   Protein Total 6.4 - 8.3 g/dL 7.1   Alkaline Phosphatase 40 - 150 U/L 68   ALT 0 - 70 U/L 19   AST 0 - 45 U/L 27   Bilirubin Total <=1.2 mg/dL 1.8 (H)   CRP Inflammation <5.00 mg/L <3.00   Glucose 70 - 99 mg/dL 96   (L): Data is abnormally low  (H): Data is abnormally high

## 2025-04-02 ENCOUNTER — ONCOLOGY VISIT (OUTPATIENT)
Dept: TRANSPLANT | Facility: CLINIC | Age: 35
End: 2025-04-02
Attending: INTERNAL MEDICINE
Payer: COMMERCIAL

## 2025-04-02 ENCOUNTER — PATIENT OUTREACH (OUTPATIENT)
Dept: ONCOLOGY | Facility: CLINIC | Age: 35
End: 2025-04-02

## 2025-04-02 VITALS
HEART RATE: 74 BPM | RESPIRATION RATE: 16 BRPM | BODY MASS INDEX: 29.32 KG/M2 | DIASTOLIC BLOOD PRESSURE: 78 MMHG | OXYGEN SATURATION: 100 % | SYSTOLIC BLOOD PRESSURE: 139 MMHG | WEIGHT: 234.6 LBS

## 2025-04-02 DIAGNOSIS — D69.3 AUTOIMMUNE THROMBOCYTOPENIA (H): ICD-10-CM

## 2025-04-02 LAB
ALBUMIN SERPL BCG-MCNC: 4.8 G/DL (ref 3.5–5.2)
ALP SERPL-CCNC: 68 U/L (ref 40–150)
ALT SERPL W P-5'-P-CCNC: 19 U/L (ref 0–70)
ANION GAP SERPL CALCULATED.3IONS-SCNC: 15 MMOL/L (ref 7–15)
AST SERPL W P-5'-P-CCNC: 27 U/L (ref 0–45)
BASOPHILS # BLD AUTO: 0.1 10E3/UL (ref 0–0.2)
BASOPHILS NFR BLD AUTO: 1 %
BILIRUB SERPL-MCNC: 1.8 MG/DL
BUN SERPL-MCNC: 12.6 MG/DL (ref 6–20)
CALCIUM SERPL-MCNC: 9.7 MG/DL (ref 8.8–10.4)
CHLORIDE SERPL-SCNC: 96 MMOL/L (ref 98–107)
CREAT SERPL-MCNC: 1.12 MG/DL (ref 0.67–1.17)
CRP SERPL-MCNC: <3 MG/L
EGFRCR SERPLBLD CKD-EPI 2021: 87 ML/MIN/1.73M2
EOSINOPHIL # BLD AUTO: 0 10E3/UL (ref 0–0.7)
EOSINOPHIL NFR BLD AUTO: 1 %
ERYTHROCYTE [DISTWIDTH] IN BLOOD BY AUTOMATED COUNT: 12.2 % (ref 10–15)
GLUCOSE SERPL-MCNC: 96 MG/DL (ref 70–99)
HCO3 SERPL-SCNC: 23 MMOL/L (ref 22–29)
HCT VFR BLD AUTO: 46.6 % (ref 40–53)
HGB BLD-MCNC: 16.8 G/DL (ref 13.3–17.7)
IMM GRANULOCYTES # BLD: 0 10E3/UL
IMM GRANULOCYTES NFR BLD: 0 %
LYMPHOCYTES # BLD AUTO: 1.1 10E3/UL (ref 0.8–5.3)
LYMPHOCYTES NFR BLD AUTO: 16 %
MCH RBC QN AUTO: 28.5 PG (ref 26.5–33)
MCHC RBC AUTO-ENTMCNC: 36.1 G/DL (ref 31.5–36.5)
MCV RBC AUTO: 79 FL (ref 78–100)
MONOCYTES # BLD AUTO: 0.7 10E3/UL (ref 0–1.3)
MONOCYTES NFR BLD AUTO: 10 %
NEUTROPHILS # BLD AUTO: 4.7 10E3/UL (ref 1.6–8.3)
NEUTROPHILS NFR BLD AUTO: 72 %
NRBC # BLD AUTO: 0 10E3/UL
NRBC BLD AUTO-RTO: 0 /100
PLATELET # BLD AUTO: 113 10E3/UL (ref 150–450)
POTASSIUM SERPL-SCNC: 4.2 MMOL/L (ref 3.4–5.3)
PROT SERPL-MCNC: 7.1 G/DL (ref 6.4–8.3)
RBC # BLD AUTO: 5.9 10E6/UL (ref 4.4–5.9)
RETICS # AUTO: 0.05 10E6/UL (ref 0.03–0.1)
RETICS/RBC NFR AUTO: 0.8 % (ref 0.5–2)
SODIUM SERPL-SCNC: 134 MMOL/L (ref 135–145)
WBC # BLD AUTO: 6.5 10E3/UL (ref 4–11)

## 2025-04-02 PROCEDURE — 85004 AUTOMATED DIFF WBC COUNT: CPT | Performed by: INTERNAL MEDICINE

## 2025-04-02 PROCEDURE — 82040 ASSAY OF SERUM ALBUMIN: CPT | Performed by: INTERNAL MEDICINE

## 2025-04-02 PROCEDURE — 36415 COLL VENOUS BLD VENIPUNCTURE: CPT | Performed by: INTERNAL MEDICINE

## 2025-04-02 PROCEDURE — 85045 AUTOMATED RETICULOCYTE COUNT: CPT | Performed by: INTERNAL MEDICINE

## 2025-04-02 PROCEDURE — 80053 COMPREHEN METABOLIC PANEL: CPT | Performed by: INTERNAL MEDICINE

## 2025-04-02 PROCEDURE — 86140 C-REACTIVE PROTEIN: CPT | Performed by: INTERNAL MEDICINE

## 2025-04-02 PROCEDURE — 85014 HEMATOCRIT: CPT | Performed by: INTERNAL MEDICINE

## 2025-04-02 PROCEDURE — 82784 ASSAY IGA/IGD/IGG/IGM EACH: CPT | Performed by: INTERNAL MEDICINE

## 2025-04-02 PROCEDURE — 99213 OFFICE O/P EST LOW 20 MIN: CPT | Performed by: INTERNAL MEDICINE

## 2025-04-02 RX ORDER — IBUPROFEN 600 MG/1
TABLET ORAL
COMMUNITY
Start: 2025-03-20

## 2025-04-02 ASSESSMENT — PAIN SCALES - GENERAL: PAINLEVEL_OUTOF10: NO PAIN (0)

## 2025-04-02 NOTE — LETTER
4/2/2025      Billy Carey  [address] [address]      Dear Colleague,    Thank you for referring your patient, Billy Carey, to the [facility]. Please see a copy of my visit note below.    Hematology/Oncology Progresss Note    Billy Carey MRN# [id number]   Age: 34 year old YOB: 1990          Reason for Consult:   thrombocytopenia         Assessment and Plan:   Billy Carey is a 34year old   ASSESSMENT:    1.  Immune thrombocytopenic purpura,.Hackett Syndrome  2.  Positive MIRA, IgG and C3d,Hackett Syndrome  3.  Type 1 diabetes.  4.  History of COVID.  5.  Status post lymph node biopsies and bone marrow biopsies  6.  Positive PAVAN 1:160 .   7. Skin rash  Billy is doing well.Platelets up to 113 .Had a good wale Need to check IGG levels post rituxan    PLAN    Every 3 month CBC plat diff retic and CMP  Follow blood glucose   Call if fever or other infectious issues arise  RTC in 6 months       I spent  30 minutes on  reviewing records both in house and externally, including laboratory and pathology and radiology preparing for today's visit The longitudinal plan of care for the diagnosis(es)/condition(s) as documented were addressed during this visit. Due to the added complexity in care, I will continue to support Billy in the subsequent management and with ongoing continuity of care.    Moose Joy MD    History obtained from chart review and confirmed with patient.    I had the pleasure of seeing Billy Carey in consultation for Dr. Hayde Lopez for evaluation of autoimmune thrombocytopenia and a history of a positive MIRA.  Billy has been a healthy young man.  Participated in track while at the [facility] including shot put who developed COVID in 05/2022 with fatigue, joint aches and fever.  He did not receive any other medications.  He had been vaccinated.  He was really fine until around Thanksgiving.  He  developed a cough with fever and fatigue.  It was productive.  He did not receive an antibiotic.  He had no night sweats or fevers.  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  A repeat was 43,000.  He was seen by Dr. Hayde Lopez on 12/06.  At that time, his platelet count was 46,000, hemoglobin 17.6.  MIRA was positive +2, IgG, C3d.  Creatinine was slightly elevated.  A CT scan showed a 3 cm mass in the pericardial fat but he also had periaortic nodes, iliac nodes, axillary nodes.  His spleen was 15.5 cm.  A PET scan was done.  Showed increased uptake with SUV of 6.5 in the right axilla, 12.5 in the retroperitoneal nodes, and the left iliac showed an SUV of 11.  An attempt at an interventional radiology needle biopsy of the retroperitoneal was done but no tissue was noted.  He subsequently underwent a biopsy of a right axillary 3 x 2 cm mass.  The biopsy showed polytypic lymphocytes and no evidence of lymphoma, extensive immunocytochemistry could not identify any clonal abnormalities.  Flow cytometry and cytogenetics were also done in December.  He had a bone marrow biopsy which showed hypercellular marrow with adequate megakaryocytes and no evidence of lymphoma.  Interestingly, despite having a continuous MIRA positivity, his hemoglobin remained 15-16gm% with minimal evidence of hemolysis. Haptoglobin was low at  22  While retic count and LDH not elevated He was given prednisone to see if his platelet count would respond, 60 mg a day, at the end of January for 2 weeks.  Again, he had no significant bleeding, no petechiae, no weight loss, no fevers, no chills.  On 02/15, his platelet count had fallen to 18,000, had nose bleed  and he was admitted and received IVIgG for 2 days and dexamethasone 40 mg a day for 4 days.  His platelet count gisell to 196,000 and he is referred here for further  evaluation.  He has had an extensive evaluation for autoimmune disease.  His IgG level is 956, IgA 135 and IgM 44.  His haptoglobin was 118.  No further evidence of hemolysis.  No abnormality in kappa or lambda free light chains.  No monoclonal protein seen.  Cytogenetics have been normal.  Flow cytometry showed no immunophenotypic evidence of non-Hodgkin lymphoma on the lymph node biopsies.  He also had an extensive viral evaluation.  He was negative for COVID.  EBV DNA was negative.  Hep B surface antigen, hep B core antibody, hep B surface antibody were positive for vaccination.  Hepatitis C was negative.  HIV was negative.  Influenza was negative.  RSV was negative.  Lupus anticoagulant was absent.  PAVAN was positive at 1:160 in a nucleolar pattern.  Complement levels were drawn.  Today, he feels good with no bleeding.  He said the dexamethasone did raise his blood glucose levels, so he had to alter his insulin requirements.  INTERVAL HISTORY  Billy is doing well. In November he had cough and sore throat sinus congestion No COVID. No petechiae or bleeding  No fatigue. DM well controlled A1C 6.0.[place] [facility] [place] [facility]  No fever or chills Completed rituxan in May 2024      A comprehensive ROS was performed with the patient and was found to be negative with the exception of that noted in the HPI above.  EYES  Some DM retinopathy  RESP Has cough since end of November with cold sx Continue to have a cough No asthma  COR  No chest pain no palpitations  GI  No GI bleeding No Reflux  ENDO Not thryoid issues Has insulin pump for DM   No UTIs  NEURO no seizure or HA  ID Had COVID in May 2022 with fatiuge joint aches, fever  PSYCH Mood ok         Past Medical History:     Past Medical History:   Diagnosis Date     Diabetes mellitus type 1, uncontrolled, insulin dependent 9/16/2013            Past Surgical History:     Past Surgical History:   Procedure Laterality Date     EXCISE MASS GROIN Left  1/20/2023    Procedure: Lymph Node excisional biopsy;  Surgeon: New Burger MD;  Location: [facility] OR     MYRINGOTOMY, INSERT TUBE BILATERAL, COMBINED Bilateral     As a child            Social History:     Social History     Socioeconomic History     Marital status:      Spouse name: Jayla     Number of children: 3     Years of education: Not on file     Highest education level: Not on file   Occupational History     Occupation:      Comment: [employer]   Tobacco Use     Smoking status: Never     Passive exposure: Never     Smokeless tobacco: Never   Substance and Sexual Activity     Alcohol use: No     Comment: rarely     Drug use: No     Sexual activity: Yes     Partners: Female   Other Topics Concern     Parent/sibling w/ CABG, MI or angioplasty before 65F 55M? Not Asked   Social History Narrative     Not on file     Social Drivers of Health     Financial Resource Strain: Low Risk  (1/12/2023)    Overall Financial Resource Strain (CARDIA)      Difficulty of Paying Living Expenses: Not hard at all   Food Insecurity: No Food Insecurity (1/12/2023)    Hunger Vital Sign      Worried About Running Out of Food in the Last Year: Never true      Ran Out of Food in the Last Year: Never true   Transportation Needs: No Transportation Needs (1/12/2023)    PRAPARE - Transportation      Lack of Transportation (Medical): No      Lack of Transportation (Non-Medical): No   Physical Activity: Sufficiently Active (1/12/2023)    Exercise Vital Sign      Days of Exercise per Week: 6 days      Minutes of Exercise per Session: 40 min   Stress: No Stress Concern Present (1/12/2023)    [employer] [place] [employer] - Occupational Stress Questionnaire      Feeling of Stress : Only a little   Social Connections: Socially Integrated (1/12/2023)    Social Connection and Isolation Panel [NHANES]      Frequency of Communication with Friends and Family: More than three times a week      Frequency of  Social Gatherings with Friends and Family: More than three times a week      Attends [employer] Services: 1 to 4 times per year      Active Member of Clubs or Organizations: Yes      Attends Club or Organization Meetings: Not on file      Marital Status:    Interpersonal Safety: Not At Risk (3/31/2023)    Humiliation, Afraid, Rape, and Kick questionnaire      Fear of Current or Ex-Partner: No      Emotionally Abused: No      Physically Abused: No      Sexually Abused: No   Housing Stability: Low Risk  (1/12/2023)    Housing Stability Vital Sign      Unable to Pay for Housing in the Last Year: No      Number of Places Lived in the Last Year: 1      Unstable Housing in the Last Year: No            Family History:     Family History   Problem Relation Age of Onset     C.A.D. No family hx of      Diabetes No family hx of      Hypertension No family hx of      Cancer No family hx of         no skin cancer     Amblyopia No family hx of      Retinal detachment No family hx of      Thyroid Disease No family hx of      Glaucoma No family hx of      Macular Degeneration No family hx of             Allergies:     Allergies   Allergen Reactions     Penicillins Rash            Medications:   (Not in a hospital admission)           Physical Exam:     /78 (BP Location: Right arm, Patient Position: Sitting, Cuff Size: Adult Large)   Pulse 74   Resp 16   Wt 106.4 kg (234 lb 9.6 oz)   SpO2 100%   BMI 29.32 kg/m            General: Appears well, sitting in bed, in no acute distress.  Heme/Lymph: No overt bleeding. No cervical, axillary, or supraclavicular adenopathy.Scar in left groin  Skin: Rash on thigh just above knee bilaterally and the lesions on lower leg have faded less browinsh red,flat macules over shins  Maculopapular rash over right lower leg. Some rash on left lower leg no rash  on chest back or arms See pix below  Respiratory: Non-labored breathing, good air exchange, lungs clear to auscultation  bilaterally.  Cardiovascular: Regular rate and rhythm. No murmur or rub.   Gastrointestinal: Normoactive bowel sounds. Abdomen soft, non-distended, and non-tender. No palpable masses or organomegaly.Could not feel spleen  Extremities: No extremity edema.    Neurologic: A&O x 3, speech normal, sensation to light touch grossly WNL.  Lymph No  Cervical nodes I cannot feel axillary nodes Scar in left inguinal area with fullness         Data:   I have personally reviewed the following labs/imaging:  CBC@LABRCNTIPR(wbc:4,rbc:4,hgb:4,hct:4,mcv:4,mch:4,mchc:4,rdw:4,plt:4)@  CMP@   Latest Reference Range & Units 04/02/25 15:19   WBC 4.0 - 11.0 10e3/uL 6.5   Hemoglobin 13.3 - 17.7 g/dL 16.8   Hematocrit 40.0 - 53.0 % 46.6   Platelet Count 150 - 450 10e3/uL 113 (L)   RBC Count 4.40 - 5.90 10e6/uL 5.90   MCV 78 - 100 fL 79   MCH 26.5 - 33.0 pg 28.5   MCHC 31.5 - 36.5 g/dL 36.1   RDW 10.0 - 15.0 % 12.2   % Neutrophils % 72   % Lymphocytes % 16   % Monocytes % 10   % Eosinophils % 1   % Basophils % 1   Absolute Basophils 0.0 - 0.2 10e3/uL 0.1   Absolute Eosinophils 0.0 - 0.7 10e3/uL 0.0   Absolute Immature Granulocytes <=0.4 10e3/uL 0.0   Absolute Lymphocytes 0.8 - 5.3 10e3/uL 1.1   Absolute Monocytes 0.0 - 1.3 10e3/uL 0.7   % Immature Granulocytes % 0   Absolute Neutrophils 1.6 - 8.3 10e3/uL 4.7   Absolute NRBCs 10e3/uL 0.0   NRBCs per 100 WBC <1 /100 0   % Retic 0.5 - 2.0 % 0.8   Absolute Retic 0.025 - 0.095 10e6/uL 0.048   (L): Data is abnormally low   Latest Reference Range & Units 04/02/25 15:19   Sodium 135 - 145 mmol/L 134 (L)   Potassium 3.4 - 5.3 mmol/L 4.2   Chloride 98 - 107 mmol/L 96 (L)   Carbon Dioxide (CO2) 22 - 29 mmol/L 23   Urea Nitrogen 6.0 - 20.0 mg/dL 12.6   Creatinine 0.67 - 1.17 mg/dL 1.12   GFR Estimate >60 mL/min/1.73m2 87   Calcium 8.8 - 10.4 mg/dL 9.7   Anion Gap 7 - 15 mmol/L 15   Albumin 3.5 - 5.2 g/dL 4.8   Protein Total 6.4 - 8.3 g/dL 7.1   Alkaline Phosphatase 40 - 150 U/L 68   ALT 0 - 70 U/L 19    AST 0 - 45 U/L 27   Bilirubin Total <=1.2 mg/dL 1.8 (H)   CRP Inflammation <5.00 mg/L <3.00   Glucose 70 - 99 mg/dL 96   (L): Data is abnormally low  (H): Data is abnormally high    Again, thank you for allowing me to participate in the care of your patient.        Sincerely,        Moose Joy MD    Electronically signed

## 2025-04-02 NOTE — NURSING NOTE
"Oncology Rooming Note    April 2, 2025 3:31 PM   Billy Carey is a 34 year old male who presents for:    Chief Complaint   Patient presents with    Blood Draw     Labs drawn via  by RN in lab. VS taken.     Hematology     Thrombocytopenia    Autoimmune Hemolytic Anemia     Initial Vitals: /78 (BP Location: Right arm, Patient Position: Sitting, Cuff Size: Adult Large)   Pulse 74   Resp 16   Wt 106.4 kg (234 lb 9.6 oz)   SpO2 100%   BMI 29.32 kg/m   Estimated body mass index is 29.32 kg/m  as calculated from the following:    Height as of 3/26/24: 1.905 m (6' 3\").    Weight as of this encounter: 106.4 kg (234 lb 9.6 oz). Body surface area is 2.37 meters squared.  No Pain (0) Comment: Data Unavailable   No LMP for male patient.  Allergies reviewed: Yes  Medications reviewed: Yes    Medications: Medication refills not needed today.  Pharmacy name entered into [facility]:    [id number] [place], [place] - [address] [place] [address] AT [place] [id number]  [id number] [place], [place] - [address]  [id number] [place], [place] - [address] [facility] - [place], [place] - [address] [place] [address]    Frailty Screening:   Is the patient here for a new oncology consult visit in cancer care? 2. No    PHQ9:  Did this patient require a PHQ9?: Yes   If the patient required a PHQ9 assessment, did the results require a follow up with the Provider/Nurse?: No      Clinical concerns: None       Marine Bauer LPN  4/2/2025              "

## 2025-04-02 NOTE — PROGRESS NOTES
[facility]: Cancer Care                                                                                          RN briefly met with pt today, prior to his appt with Dr. Alli Joy.   Completed new learning assessment.  Reviewed consent to communicate (scanned in on 3/9/23) with pt.  Pt stated information was correct and no changes needed at this time.      Signature:  Carin Kohli RN, OCN

## 2025-04-02 NOTE — NURSING NOTE
Chief Complaint   Patient presents with    Blood Draw     Labs drawn via  by RN in lab. VS taken.      Labs collected from venipuncture by RN. Vitals taken. Checked in for appointment(s).    Aleta Mohamud RN

## 2025-04-03 LAB
IGA SERPL-MCNC: 121 MG/DL (ref 84–499)
IGM SERPL-MCNC: 34 MG/DL (ref 35–242)

## (undated) DEVICE — SU VICRYL 3-0 TIE 54" J614H

## (undated) DEVICE — LINEN TOWEL PACK X30 5481

## (undated) DEVICE — SUCTION MANIFOLD NEPTUNE 2 SYS 4 PORT 0702-020-000

## (undated) DEVICE — LINEN TOWEL PACK X6 WHITE 5487

## (undated) DEVICE — DRSG STERI STRIP 1/2X4" R1547

## (undated) DEVICE — Device

## (undated) DEVICE — ADH SKIN CLOSURE PREMIERPRO EXOFIN 1.0ML 3470

## (undated) DEVICE — SU VICRYL 3-0 SH 27" J316H

## (undated) DEVICE — SU SILK 3-0 TIE 12X30" A304H

## (undated) DEVICE — DECANTER BAG 2002S

## (undated) DEVICE — SU VICRYL 3-0 TIE 12X18" J904T

## (undated) DEVICE — SU SILK 2-0 TIE 12X30" A305H

## (undated) DEVICE — SU VICRYL 4-0 PS-2 18" UND J496H

## (undated) DEVICE — PREP CHLORAPREP 26ML TINTED HI-LITE ORANGE 930815

## (undated) DEVICE — ESU GROUND PAD ADULT W/CORD E7507

## (undated) DEVICE — DECANTER VIAL 2006S

## (undated) DEVICE — SOL NACL 0.9% IRRIG 1000ML BOTTLE 07138-09

## (undated) DEVICE — JELLY LUBRICATING SURGILUBE 2OZ TUBE

## (undated) DEVICE — COVER ULTRASOUND PROBE W/GEL FLEXI-FEEL 6"X58" LF  25-FF658

## (undated) RX ORDER — FENTANYL CITRATE 50 UG/ML
INJECTION, SOLUTION INTRAMUSCULAR; INTRAVENOUS
Status: DISPENSED
Start: 2022-12-15

## (undated) RX ORDER — FENTANYL CITRATE 50 UG/ML
INJECTION, SOLUTION INTRAMUSCULAR; INTRAVENOUS
Status: DISPENSED
Start: 2023-01-20

## (undated) RX ORDER — LIDOCAINE HYDROCHLORIDE AND EPINEPHRINE 10; 10 MG/ML; UG/ML
INJECTION, SOLUTION INFILTRATION; PERINEURAL
Status: DISPENSED
Start: 2023-01-20

## (undated) RX ORDER — BUPIVACAINE HYDROCHLORIDE 5 MG/ML
INJECTION, SOLUTION EPIDURAL; INTRACAUDAL
Status: DISPENSED
Start: 2023-01-20

## (undated) RX ORDER — CLINDAMYCIN PHOSPHATE 900 MG/50ML
INJECTION, SOLUTION INTRAVENOUS
Status: DISPENSED
Start: 2023-01-20

## (undated) RX ORDER — LIDOCAINE HYDROCHLORIDE 10 MG/ML
INJECTION, SOLUTION EPIDURAL; INFILTRATION; INTRACAUDAL; PERINEURAL
Status: DISPENSED
Start: 2023-01-20